# Patient Record
Sex: FEMALE | Race: WHITE | NOT HISPANIC OR LATINO | Employment: FULL TIME | URBAN - METROPOLITAN AREA
[De-identification: names, ages, dates, MRNs, and addresses within clinical notes are randomized per-mention and may not be internally consistent; named-entity substitution may affect disease eponyms.]

---

## 2018-10-17 ENCOUNTER — TRANSCRIBE ORDERS (OUTPATIENT)
Dept: NEUROLOGY | Facility: CLINIC | Age: 58
End: 2018-10-17

## 2018-10-17 ENCOUNTER — TELEPHONE (OUTPATIENT)
Dept: NEUROLOGY | Facility: CLINIC | Age: 58
End: 2018-10-17

## 2018-10-17 DIAGNOSIS — G43.109 MIGRAINE WITH AURA AND WITHOUT STATUS MIGRAINOSUS, NOT INTRACTABLE: Primary | ICD-10-CM

## 2018-11-27 ENCOUNTER — OFFICE VISIT (OUTPATIENT)
Dept: NEUROLOGY | Facility: CLINIC | Age: 58
End: 2018-11-27
Payer: COMMERCIAL

## 2018-11-27 VITALS
WEIGHT: 239.8 LBS | HEART RATE: 78 BPM | HEIGHT: 65 IN | SYSTOLIC BLOOD PRESSURE: 124 MMHG | BODY MASS INDEX: 39.95 KG/M2 | DIASTOLIC BLOOD PRESSURE: 84 MMHG

## 2018-11-27 DIAGNOSIS — G43.009 MIGRAINE WITHOUT AURA AND WITHOUT STATUS MIGRAINOSUS, NOT INTRACTABLE: Primary | ICD-10-CM

## 2018-11-27 DIAGNOSIS — H81.11 BENIGN POSITIONAL VERTIGO, RIGHT: ICD-10-CM

## 2018-11-27 PROCEDURE — 99244 OFF/OP CNSLTJ NEW/EST MOD 40: CPT | Performed by: PSYCHIATRY & NEUROLOGY

## 2018-11-27 RX ORDER — POLYETHYLENE GLYCOL 3350 17 G/17G
POWDER, FOR SOLUTION ORAL 2 TIMES DAILY
Refills: 0 | COMMUNITY
Start: 2018-10-10 | End: 2022-03-09

## 2018-11-27 RX ORDER — RIZATRIPTAN BENZOATE 10 MG/1
10 TABLET ORAL ONCE AS NEEDED
Qty: 9 TABLET | Refills: 5 | Status: SHIPPED | OUTPATIENT
Start: 2018-11-27 | End: 2020-04-27

## 2018-11-27 RX ORDER — ASPIRIN 81 MG/1
1 TABLET ORAL DAILY
COMMUNITY

## 2018-11-27 RX ORDER — MECLIZINE HYDROCHLORIDE 25 MG/1
25 TABLET ORAL 3 TIMES DAILY PRN
Refills: 0 | COMMUNITY
Start: 2018-10-28 | End: 2022-03-09

## 2018-11-27 RX ORDER — OMEPRAZOLE 20 MG/1
20 CAPSULE, DELAYED RELEASE ORAL 2 TIMES DAILY
Refills: 0 | COMMUNITY
Start: 2018-11-13 | End: 2021-04-21 | Stop reason: SDUPTHER

## 2018-11-27 NOTE — PROGRESS NOTES
Leyda Perez is a 61 y o  female who presents with complaints of migraine headache and dizziness    Assessment:  1  Migraine without aura and without status migrainosus, not intractable    2  Benign positional vertigo, right        Plan:  Physical therapy for vestibular therapy  Verapamil 180 mg daily  Maxalt 10 mg p r n  headache  Follow-up 6 weeks    Discussion:  Pepito Plamer has migraine without aura with frequent headaches  As she is intolerant of Imitrex have recommended a trial of Maxalt  She had been on Topamax (dosage unclear) without significant improvement in headache frequency  I have recommended a trial of verapamil (we did discuss potential adverse effects) of 180 mg daily  If this is not effective at time of refill will increase to 240 mg  She will initiate physical therapy for her positional vertigo and I will see her back in follow-up on these are completed      Subjective:    HPI  Pepito Palmer presents today with the above complaints  She states that for years she has had migraine headaches  She states that she gets 4-6 migraine headaches per month lasting for at least 2 or 3 days each time  She finds that lack of sleep will precipitate her headache  She states her headaches are usually frontal with a throbbing stabbing type pain associated with photophobia, sonophobia and nausea  She states that rarely did she vomit but when she does the headache goes away quicker  She states that recently when she has a headache if she can lay down in a dark quiet room she does so  She usually takes an Excedrin and states that if she catches it early enough this seems to reduce the severity of the pain enough that she can function  She states she had been on Imitrex for years and it worked effectively but she suddenly noted symptoms of nausea and lightheadedness when she took the medication and thus it was discontinued    She had been on Topamax and she states she was at 1 point taking 1/2 pills 2 or 3 times a day but does not recall the mg strength  She did not notice any improvement in her headache frequency with this  She did have imaging studies done of her head a few years ago and was told there were nonspecific white matter changes but no other structural abnormalities that would explain her headaches  In addition to headache she states that since August she has been having symptoms of vertigo  She states that when she turns removed in a position the room starts to spin for brief period of time  She denies any change in hearing or vision associated with this  She states she was given meclizine and this only helped very temporarily and continues to have symptoms      Past Medical History:   Diagnosis Date    Anemia     Cancer ([facility] [address] ) 2007    breast history of    Migraine        Family History:  Family History   Problem Relation Age of Onset    No Known Problems Mother     Lung cancer Brother        Past Surgical History:  Past Surgical History:   Procedure Laterality Date     SECTION      x2    DILATION AND CURETTAGE OF UTERUS      TONSILLECTOMY      age 4       Social History:   reports that she has never smoked  She has never used smokeless tobacco  She reports that she drinks alcohol  She reports that she does not use drugs      Allergies:  Codeine      Current Outpatient Prescriptions:     aspirin (ASPIRIN ADULT LOW DOSE) 81 mg EC tablet, Take 1 tablet by mouth daily, Disp: , Rfl:     Cholecalciferol (VITAMIN D3 PO), Take 1 tablet by mouth daily, Disp: , Rfl:     MAGNESIUM PO, Take 1 tablet by mouth daily, Disp: , Rfl:     meclizine (ANTIVERT) 25 mg tablet, Take 25 mg by mouth 3 (three) times a day as needed, Disp: , Rfl: 0    omeprazole (PriLOSEC) 20 mg delayed release capsule, Take 20 mg by mouth 2 (two) times a day, Disp: , Rfl: 0    polyethylene glycol (GLYCOLAX) powder, Take by mouth 2 (two) times a day, Disp: , Rfl: 0    I have reviewed the past medical, social and family history, current medications, allergies, vitals, review of systems and updated this information as appropriate today     Objective:    Vitals:  Blood pressure 124/84, pulse 78, height 5' 5" (1 651 m), weight 109 kg (239 lb 12 8 oz)  Physical Exam    Neurological Exam    GENERAL:  Cooperative in no acute distress  Well-developed and well-nourished    HEAD and NECK   Head is atraumatic normocephalic with no lesions or masses  Neck is supple with full range of motion    CARDIOVASCULAR  Carotid Arteries-no carotid bruits  NEUROLOGIC:  Mental Status-the patient is awake alert and oriented without aphasia or apraxia  Cranial Nerves: Visual fields are full to confrontation  Discs are flat  Extraocular movements are full without nystagmus  Pupils are 2-1/2 mm and reactive  Face is symmetrical to light touch  Movements of facial expression move symmetrically  Hearing is normal to finger rub bilaterally  Soft palate lifts symmetrically  Shoulder shrug is symmetrical  Tongue is midline without atrophy  Motor: No drift is noted on arm extension  Strength is full in the upper and lower extremities with normal bulk and tone  Sensory: Intact to temperature and vibratory sensation in the upper and lower extremities bilaterally  Cortical function is intact  Coordination: Finger to nose testing is performed accurately  Romberg is negative  Gait reveals a normal base with symmetrical arm swing  Tandem walk is normal   Reflexes:  1+ and symmetrical in the biceps, triceps, brachioradialis, knee jerk and ankle jerk regions  Toes are downgoing  Hallpike maneuver was performed and produced rotary nystagmus with head down to the right that did have a brief latency and did fatigue with repeated positioning            ROS:    Review of Systems   Constitutional: Positive for fatigue  Negative for appetite change and fever  HENT: Negative  Negative for hearing loss, tinnitus, trouble swallowing and voice change  Eyes: Negative    Negative for photophobia and pain  Respiratory: Negative  Negative for shortness of breath  Cardiovascular: Negative  Negative for palpitations  Gastrointestinal: Negative  Negative for nausea and vomiting  Endocrine: Negative  Negative for cold intolerance and heat intolerance  Genitourinary: Negative  Negative for dysuria, frequency and urgency  Musculoskeletal: Positive for back pain  Negative for myalgias and neck pain  Skin: Negative  Negative for rash  Neurological: Positive for dizziness and headaches  Negative for tremors, seizures, syncope, facial asymmetry, speech difficulty, weakness, light-headedness and numbness  Hematological: Negative  Does not bruise/bleed easily  Psychiatric/Behavioral: Positive for sleep disturbance  Negative for confusion and hallucinations  All other systems reviewed and are negative

## 2018-12-03 ENCOUNTER — EVALUATION (OUTPATIENT)
Dept: PHYSICAL THERAPY | Facility: MEDICAL CENTER | Age: 58
End: 2018-12-03
Payer: COMMERCIAL

## 2018-12-03 DIAGNOSIS — R42 DIZZINESS: Primary | ICD-10-CM

## 2018-12-03 DIAGNOSIS — H81.11 BENIGN POSITIONAL VERTIGO, RIGHT: ICD-10-CM

## 2018-12-03 PROCEDURE — G8990 OTHER PT/OT CURRENT STATUS: HCPCS | Performed by: PHYSICAL THERAPIST

## 2018-12-03 PROCEDURE — 97161 PT EVAL LOW COMPLEX 20 MIN: CPT | Performed by: PHYSICAL THERAPIST

## 2018-12-03 PROCEDURE — 97112 NEUROMUSCULAR REEDUCATION: CPT | Performed by: PHYSICAL THERAPIST

## 2018-12-03 PROCEDURE — G8991 OTHER PT/OT GOAL STATUS: HCPCS | Performed by: PHYSICAL THERAPIST

## 2018-12-10 ENCOUNTER — APPOINTMENT (OUTPATIENT)
Dept: PHYSICAL THERAPY | Facility: MEDICAL CENTER | Age: 58
End: 2018-12-10
Payer: COMMERCIAL

## 2018-12-12 ENCOUNTER — APPOINTMENT (OUTPATIENT)
Dept: PHYSICAL THERAPY | Facility: MEDICAL CENTER | Age: 58
End: 2018-12-12
Payer: COMMERCIAL

## 2018-12-12 ENCOUNTER — OFFICE VISIT (OUTPATIENT)
Dept: PHYSICAL THERAPY | Facility: MEDICAL CENTER | Age: 58
End: 2018-12-12
Payer: COMMERCIAL

## 2018-12-12 DIAGNOSIS — H81.11 BENIGN POSITIONAL VERTIGO, RIGHT: Primary | ICD-10-CM

## 2018-12-12 DIAGNOSIS — R42 DIZZINESS: ICD-10-CM

## 2018-12-12 PROCEDURE — 97112 NEUROMUSCULAR REEDUCATION: CPT

## 2018-12-12 NOTE — PROGRESS NOTES
Daily Note     Today's date: 2018  Patient name: Henry Vickers  : 1960  MRN: [id number]  Referring provider: Robi Skinner MD  Dx:   Encounter Diagnosis     ICD-10-CM    1  Benign positional vertigo, right H81 11    2  Dizziness R42                   Subjective: Pt states she was able to sleep in bed 2x this week with less dizziness compared to sleeping in recliner  She continues to report sense of vibrating in head  Objective: See treatment diary below    recautions: migraines, history breast CA    Daily Treatment Diary     Manual                                                                                   Exercise Diary  12/3 12/12           Seated VOR horizontal HEP            Seated VOR vertical HEP            Tandem stance nv 30"x3           Tandem walk nv 4 laps           FT EC Foam nv 30"x3           FA EC Foam nv NP           Seated targets nv 1 min x2 (50s each time)           Walking VOR horiz/vertical  2 laps ea                                                                                                                                                                           Modalities                                                           Assessment: Tolerated treatment well  Patient demonstrated fatigue post treatment and would benefit from continued PT  Pt reports worsening of symptoms with moving eyes to the L during targets  Mild sway w/ FT EC om foam  Good tolerance to walking VOR today  She stated she felt "an aura" post tx that subsided after resting  Plan: Continue per plan of care

## 2018-12-18 ENCOUNTER — APPOINTMENT (OUTPATIENT)
Dept: PHYSICAL THERAPY | Facility: MEDICAL CENTER | Age: 58
End: 2018-12-18
Payer: COMMERCIAL

## 2018-12-20 ENCOUNTER — APPOINTMENT (OUTPATIENT)
Dept: PHYSICAL THERAPY | Facility: MEDICAL CENTER | Age: 58
End: 2018-12-20
Payer: COMMERCIAL

## 2018-12-26 ENCOUNTER — APPOINTMENT (OUTPATIENT)
Dept: PHYSICAL THERAPY | Facility: MEDICAL CENTER | Age: 58
End: 2018-12-26
Payer: COMMERCIAL

## 2018-12-27 ENCOUNTER — OFFICE VISIT (OUTPATIENT)
Dept: PHYSICAL THERAPY | Facility: MEDICAL CENTER | Age: 58
End: 2018-12-27
Payer: COMMERCIAL

## 2018-12-27 ENCOUNTER — TELEPHONE (OUTPATIENT)
Dept: NEUROLOGY | Facility: CLINIC | Age: 58
End: 2018-12-27

## 2018-12-27 DIAGNOSIS — R42 DIZZINESS: ICD-10-CM

## 2018-12-27 DIAGNOSIS — H81.11 BENIGN POSITIONAL VERTIGO, RIGHT: Primary | ICD-10-CM

## 2018-12-27 PROCEDURE — 97112 NEUROMUSCULAR REEDUCATION: CPT | Performed by: PHYSICAL THERAPIST

## 2018-12-27 NOTE — TELEPHONE ENCOUNTER
Letter finalized as requested by patient  Called patient to inform her but her mailbox was full and not accepting messages at this time

## 2018-12-27 NOTE — PROGRESS NOTES
Daily Note     Today's date: 2018  Patient name: Giselle Daly  : 1960  MRN: [id number]  Referring provider: Zechariah Lane MD  Dx:   Encounter Diagnosis     ICD-10-CM    1  Benign positional vertigo, right H81 11    2  Dizziness R42                   Subjective:   Pt continues to c/o ongoing vibrating in her head and states "it's bad today", which she believes is due to repetitive bending at work today  She was able to tolerate sleeping in bed rather than recliner 5 times since last visit  Objective: See treatment diary below    recautions: migraines, history breast CA    Daily Treatment Diary     Manual                                                                                   Exercise Diary  12/3 12/12 12/27          Seated VOR horizontal HEP  HEP          Seated VOR vertical HEP  HEP          Tandem stance nv 30"x3 30"x3 ea          Tandem walk nv 4 laps 4 laps          FT EC Foam nv 30"x3 30"x3          FA EC Foam nv NP NP          Seated 3 targets nv 1 min x2 (50s each time) 1 min x2          Walking VOR horiz/vertical  2 laps ea 2 laps ea                                                                                                                                                                          Modalities                                                           Assessment: Tolerated treatment well  Patient demonstrated fatigue post treatment and would benefit from continued PT  Pt feels challenged w/ eye movements to the L w/ targets and describes it as strain  Challenged w/ horizontal VOR;  Less difficulty w/ vertical head movement  Plan: Continue per plan of care

## 2018-12-27 NOTE — TELEPHONE ENCOUNTER
Patient called in requesting note for her gym stating that she is under the care of Dr Piper Lopez for her vertigo and they will not let her out of her membership without a physicians note  Patient requests that the note specify that she's been experiencing the vertigo since August 2018  Patient first visit with us 11/27/18  It was noted in 11/27 office note that she had been experiencing the vertigo since August   Please advise if okay to write note as described above      Clinical team: patient requests notification once note is complete at P: [phone]

## 2018-12-27 NOTE — LETTER
December 27, 2018     Aga Man  [address]  [place] [address]    Patient: Aga Man   YOB: 1960       To Whom It May Concern:     Aga Man is under the care of Dr Mahogany Bello with [facility]  She has been experiencing vertigo since August of 2018  Due to her condition she has been unable to utilize the gym facility          Sincerely,        Mahogany Bello MD      CC: No Recipients

## 2019-01-03 ENCOUNTER — APPOINTMENT (OUTPATIENT)
Dept: PHYSICAL THERAPY | Facility: MEDICAL CENTER | Age: 59
End: 2019-01-03
Payer: COMMERCIAL

## 2019-01-08 ENCOUNTER — OFFICE VISIT (OUTPATIENT)
Dept: PHYSICAL THERAPY | Facility: MEDICAL CENTER | Age: 59
End: 2019-01-08
Payer: COMMERCIAL

## 2019-01-08 DIAGNOSIS — R42 DIZZINESS: ICD-10-CM

## 2019-01-08 DIAGNOSIS — H81.11 BENIGN POSITIONAL VERTIGO, RIGHT: Primary | ICD-10-CM

## 2019-01-08 PROCEDURE — 97112 NEUROMUSCULAR REEDUCATION: CPT

## 2019-01-08 NOTE — PROGRESS NOTES
Daily Note     Today's date: 2019  Patient name: Alex Marquez  : 1960  MRN: [id number]  Referring provider: Breonna Lebron MD  Dx:   Encounter Diagnosis     ICD-10-CM    1  Benign positional vertigo, right H81 11    2  Dizziness R42                   Subjective:   Pt reports intermittent vibration and she notes last week she has worse symptoms and she was unable to sleep in bed due to spinning  Objective: See treatment diary below    recautions: migraines, history breast CA    Daily Treatment Diary     Manual                                                                                   Exercise Diary  12/3 12/12 12/27 1/8         Seated VOR horizontal HEP  HEP HEP         Seated VOR vertical HEP  HEP HEP         Tandem stance nv 30"x3 30"x3 ea 30"x3         Tandem walk nv 4 laps 4 laps 4 laps         FT EC Foam nv 30"x3 30"x3 30"x3         FA EC Foam nv NP NP np         Seated 3 targets nv 1 min x2 (50s each time) 1 min x2 1 min x2         Walking VOR horiz/vertical  2 laps ea 2 laps ea 2 laps ea         Tandem walking on foam    3 laps                                                                                                                                                            Modalities                                                           Assessment: Tolerated treatment well  Patient demonstrated fatigue post treatment and would benefit from continued PT  Pt has improved tolerance to all NR today and was able to progress w/ foam for tandem walking and balance  She denied symptoms throughout tx  Plan: Continue per plan of care

## 2019-01-10 ENCOUNTER — APPOINTMENT (OUTPATIENT)
Dept: PHYSICAL THERAPY | Facility: MEDICAL CENTER | Age: 59
End: 2019-01-10
Payer: COMMERCIAL

## 2019-01-15 ENCOUNTER — OFFICE VISIT (OUTPATIENT)
Dept: PHYSICAL THERAPY | Facility: MEDICAL CENTER | Age: 59
End: 2019-01-15
Payer: COMMERCIAL

## 2019-01-15 DIAGNOSIS — H81.11 BENIGN POSITIONAL VERTIGO, RIGHT: Primary | ICD-10-CM

## 2019-01-15 DIAGNOSIS — R42 DIZZINESS: ICD-10-CM

## 2019-01-15 PROCEDURE — 97112 NEUROMUSCULAR REEDUCATION: CPT

## 2019-01-15 NOTE — PROGRESS NOTES
Daily Note     Today's date: 1/15/2019  Patient name: Marbin Golden  : 1960  MRN: [id number]  Referring provider: Rachel Jang MD  Dx:   Encounter Diagnosis     ICD-10-CM    1  Benign positional vertigo, right H81 11    2  Dizziness R42                   Subjective:   Pt reports  she was watching football at a friends house and when she went to leave she walked down the [address] and the "vibrations hit like a ton of bricks"  She states since then the dizziness and vibrations have felt worse than usual (since she had been overall feeling better until )  Pt states she has not been sleeping well lately since she still has to sleep in recliner secondary to dizzy symptoms if she lays flat  She denies room spinning  Objective: See treatment diary below    recautions: migraines, history breast CA    Daily Treatment Diary     Manual                                                                                   Exercise Diary  12/3 12/12 12/27 1/8 1/15        Seated VOR horizontal HEP  HEP HEP hep        Seated VOR vertical HEP  HEP HEP hep        Tandem stance nv 30"x3 30"x3 ea 30"x3 30"x2 ea        Tandem walk nv 4 laps 4 laps 4 laps 4 laps        FT EC Foam nv 30"x3 30"x3 30"x3 30"x2 dizzy        FA EC Foam nv NP NP np NP        Seated 3 targets nv 1 min x2 (50s each time) 1 min x2 1 min x2 1 min x2 ea        Walking VOR horiz/vertical  2 laps ea 2 laps ea 2 laps ea 1 lap ea        Tandem walking on foam    3 laps 3 laps        Side stepping on foam     3 laps         Busy background VOR     NV                                                                                                                                 Modalities                                                           Assessment: Tolerated treatment well  Patient demonstrated fatigue post treatment and would benefit from continued PT  Added side stepping on foam today   Pt c/o dizziness w/ FT EC on foam today and was unable to perform final set  She was also more challenged today w/ walking VOR  Pt notes good compliance w/ HEP  Progress w/ busy background VOR nv if able  Plan: Continue per plan of care

## 2019-01-24 ENCOUNTER — APPOINTMENT (OUTPATIENT)
Dept: PHYSICAL THERAPY | Facility: MEDICAL CENTER | Age: 59
End: 2019-01-24
Payer: COMMERCIAL

## 2019-01-29 ENCOUNTER — APPOINTMENT (OUTPATIENT)
Dept: PHYSICAL THERAPY | Facility: MEDICAL CENTER | Age: 59
End: 2019-01-29
Payer: COMMERCIAL

## 2019-01-30 ENCOUNTER — TELEPHONE (OUTPATIENT)
Dept: NEUROLOGY | Facility: CLINIC | Age: 59
End: 2019-01-30

## 2019-01-31 ENCOUNTER — APPOINTMENT (OUTPATIENT)
Dept: PHYSICAL THERAPY | Facility: MEDICAL CENTER | Age: 59
End: 2019-01-31
Payer: COMMERCIAL

## 2019-02-18 NOTE — TELEPHONE ENCOUNTER
LMOM for patient to call back to reschedule 3/20/19 please transfer to me patient is due to come in for this appointment 1/22/19

## 2019-02-26 NOTE — TELEPHONE ENCOUNTER
LMOM for patient to cancel 3/20/19 appointment with Dr Tracy Lewis and to call back to reschedule  Mailed out letter

## 2019-03-12 ENCOUNTER — TELEPHONE (OUTPATIENT)
Dept: NEUROLOGY | Facility: CLINIC | Age: 59
End: 2019-03-12

## 2019-03-19 ENCOUNTER — TELEPHONE (OUTPATIENT)
Dept: NEUROLOGY | Facility: CLINIC | Age: 59
End: 2019-03-19

## 2019-03-19 NOTE — TELEPHONE ENCOUNTER
Pt called and stated that she needs something to help her when she gets a migraine  Pt states she remembers some meds were prescribed in November but she's having issues getting them from the pharmacy  She states she got the verapamil but is not sure why it was prescribed  Reviewed this and directions with pt  Pt did not get rizatriptan  Spoke with [address] [place] [address]  They said med is covered and they will get it ready for pt  Left pt detailed voicemail letting her know

## 2019-05-23 DIAGNOSIS — G43.009 MIGRAINE WITHOUT AURA AND WITHOUT STATUS MIGRAINOSUS, NOT INTRACTABLE: ICD-10-CM

## 2019-10-09 ENCOUNTER — TELEPHONE (OUTPATIENT)
Dept: NEUROLOGY | Facility: CLINIC | Age: 59
End: 2019-10-09

## 2019-10-09 NOTE — TELEPHONE ENCOUNTER
Patient stated the welfare office is trying to take away her medicaid and she needs to appeal this  She is requesting a letter from our office with her dx, her treatment and her prognosis  She would like to pick this letter up this week  Okay to leave detailed message

## 2019-10-09 NOTE — TELEPHONE ENCOUNTER
Patient was seen on 1 occasion last November with complaints of frequent migraine headaches and vertigo  She did not keep her follow-up appointment was scheduled  We can generate a letter that in November she was having frequent migraine headaches and vertigo  At that time she was prescribed verapamil to reduce migraine headache frequency and Maxalt to use when she had a migraine and she was referred to physical therapy for vestibular rehab    Thank you

## 2019-10-10 ENCOUNTER — TELEPHONE (OUTPATIENT)
Dept: NEUROLOGY | Facility: CLINIC | Age: 59
End: 2019-10-10

## 2019-12-16 DIAGNOSIS — G43.009 MIGRAINE WITHOUT AURA AND WITHOUT STATUS MIGRAINOSUS, NOT INTRACTABLE: ICD-10-CM

## 2019-12-16 RX ORDER — RIZATRIPTAN BENZOATE 10 MG/1
TABLET ORAL
Qty: 9 TABLET | Refills: 0 | OUTPATIENT
Start: 2019-12-16

## 2019-12-18 NOTE — TELEPHONE ENCOUNTER
I called and spoke with patient son, and ask if he could please his mother call the office to schedule an appt

## 2020-02-12 ENCOUNTER — TELEPHONE (OUTPATIENT)
Dept: DERMATOLOGY | Facility: CLINIC | Age: 60
End: 2020-02-12

## 2020-02-17 ENCOUNTER — TELEPHONE (OUTPATIENT)
Dept: DERMATOLOGY | Facility: CLINIC | Age: 60
End: 2020-02-17

## 2020-02-17 NOTE — TELEPHONE ENCOUNTER
Left a message to patient regarding an appointment  Offered an appointment in our [place] office with Dr Nancy Kapoor  Advised to call us back when available

## 2020-02-25 ENCOUNTER — APPOINTMENT (OUTPATIENT)
Dept: LAB | Facility: MEDICAL CENTER | Age: 60
End: 2020-02-25
Payer: COMMERCIAL

## 2020-02-25 ENCOUNTER — TRANSCRIBE ORDERS (OUTPATIENT)
Dept: ADMINISTRATIVE | Facility: HOSPITAL | Age: 60
End: 2020-02-25

## 2020-02-25 ENCOUNTER — OFFICE VISIT (OUTPATIENT)
Dept: DERMATOLOGY | Facility: CLINIC | Age: 60
End: 2020-02-25
Payer: COMMERCIAL

## 2020-02-25 VITALS — WEIGHT: 227.4 LBS | BODY MASS INDEX: 38.82 KG/M2 | HEIGHT: 64 IN | TEMPERATURE: 97.8 F

## 2020-02-25 DIAGNOSIS — Z00.00 WELL ADULT EXAM: ICD-10-CM

## 2020-02-25 DIAGNOSIS — L82.1 SEBORRHEIC KERATOSIS: Primary | ICD-10-CM

## 2020-02-25 DIAGNOSIS — D22.9 ATYPICAL NEVUS: ICD-10-CM

## 2020-02-25 DIAGNOSIS — R53.83 FATIGUE, UNSPECIFIED TYPE: Primary | ICD-10-CM

## 2020-02-25 DIAGNOSIS — D48.5 NEOPLASM OF UNCERTAIN BEHAVIOR OF SKIN: ICD-10-CM

## 2020-02-25 DIAGNOSIS — E78.2 COMBINED HYPERLIPIDEMIA: ICD-10-CM

## 2020-02-25 LAB
25(OH)D3 SERPL-MCNC: 16.3 NG/ML (ref 30–100)
ALBUMIN SERPL BCP-MCNC: 3.7 G/DL (ref 3.5–5)
ALP SERPL-CCNC: 91 U/L (ref 46–116)
ALT SERPL W P-5'-P-CCNC: 25 U/L (ref 12–78)
ANION GAP SERPL CALCULATED.3IONS-SCNC: 4 MMOL/L (ref 4–13)
AST SERPL W P-5'-P-CCNC: 16 U/L (ref 5–45)
BASOPHILS # BLD AUTO: 0.04 THOUSANDS/ΜL (ref 0–0.1)
BASOPHILS NFR BLD AUTO: 1 % (ref 0–1)
BILIRUB SERPL-MCNC: 0.34 MG/DL (ref 0.2–1)
BUN SERPL-MCNC: 8 MG/DL (ref 5–25)
CALCIUM SERPL-MCNC: 9.2 MG/DL (ref 8.3–10.1)
CHLORIDE SERPL-SCNC: 108 MMOL/L (ref 100–108)
CHOLEST SERPL-MCNC: 293 MG/DL (ref 50–200)
CO2 SERPL-SCNC: 27 MMOL/L (ref 21–32)
CREAT SERPL-MCNC: 0.91 MG/DL (ref 0.6–1.3)
EOSINOPHIL # BLD AUTO: 0.18 THOUSAND/ΜL (ref 0–0.61)
EOSINOPHIL NFR BLD AUTO: 2 % (ref 0–6)
ERYTHROCYTE [DISTWIDTH] IN BLOOD BY AUTOMATED COUNT: 13.2 % (ref 11.6–15.1)
GFR SERPL CREATININE-BSD FRML MDRD: 69 ML/MIN/1.73SQ M
GLUCOSE P FAST SERPL-MCNC: 88 MG/DL (ref 65–99)
HCT VFR BLD AUTO: 40.8 % (ref 34.8–46.1)
HDLC SERPL-MCNC: 59 MG/DL
HGB BLD-MCNC: 13.1 G/DL (ref 11.5–15.4)
IMM GRANULOCYTES # BLD AUTO: 0.02 THOUSAND/UL (ref 0–0.2)
IMM GRANULOCYTES NFR BLD AUTO: 0 % (ref 0–2)
LDLC SERPL CALC-MCNC: 205 MG/DL (ref 0–100)
LYMPHOCYTES # BLD AUTO: 2.83 THOUSANDS/ΜL (ref 0.6–4.47)
LYMPHOCYTES NFR BLD AUTO: 33 % (ref 14–44)
MCH RBC QN AUTO: 30.1 PG (ref 26.8–34.3)
MCHC RBC AUTO-ENTMCNC: 32.1 G/DL (ref 31.4–37.4)
MCV RBC AUTO: 94 FL (ref 82–98)
MONOCYTES # BLD AUTO: 0.52 THOUSAND/ΜL (ref 0.17–1.22)
MONOCYTES NFR BLD AUTO: 6 % (ref 4–12)
NEUTROPHILS # BLD AUTO: 4.92 THOUSANDS/ΜL (ref 1.85–7.62)
NEUTS SEG NFR BLD AUTO: 58 % (ref 43–75)
NONHDLC SERPL-MCNC: 234 MG/DL
NRBC BLD AUTO-RTO: 0 /100 WBCS
PLATELET # BLD AUTO: 458 THOUSANDS/UL (ref 149–390)
PMV BLD AUTO: 10.6 FL (ref 8.9–12.7)
POTASSIUM SERPL-SCNC: 4.2 MMOL/L (ref 3.5–5.3)
PROT SERPL-MCNC: 7.8 G/DL (ref 6.4–8.2)
RBC # BLD AUTO: 4.35 MILLION/UL (ref 3.81–5.12)
SODIUM SERPL-SCNC: 139 MMOL/L (ref 136–145)
TRIGL SERPL-MCNC: 145 MG/DL
TSH SERPL DL<=0.05 MIU/L-ACNC: 1.39 UIU/ML (ref 0.36–3.74)
WBC # BLD AUTO: 8.51 THOUSAND/UL (ref 4.31–10.16)

## 2020-02-25 PROCEDURE — 88341 IMHCHEM/IMCYTCHM EA ADD ANTB: CPT | Performed by: STUDENT IN AN ORGANIZED HEALTH CARE EDUCATION/TRAINING PROGRAM

## 2020-02-25 PROCEDURE — 80061 LIPID PANEL: CPT | Performed by: PHYSICIAN ASSISTANT

## 2020-02-25 PROCEDURE — 36415 COLL VENOUS BLD VENIPUNCTURE: CPT | Performed by: PHYSICIAN ASSISTANT

## 2020-02-25 PROCEDURE — 88342 IMHCHEM/IMCYTCHM 1ST ANTB: CPT | Performed by: STUDENT IN AN ORGANIZED HEALTH CARE EDUCATION/TRAINING PROGRAM

## 2020-02-25 PROCEDURE — 99244 OFF/OP CNSLTJ NEW/EST MOD 40: CPT | Performed by: DERMATOLOGY

## 2020-02-25 PROCEDURE — 84443 ASSAY THYROID STIM HORMONE: CPT | Performed by: PHYSICIAN ASSISTANT

## 2020-02-25 PROCEDURE — 82306 VITAMIN D 25 HYDROXY: CPT | Performed by: PHYSICIAN ASSISTANT

## 2020-02-25 PROCEDURE — 88305 TISSUE EXAM BY PATHOLOGIST: CPT | Performed by: STUDENT IN AN ORGANIZED HEALTH CARE EDUCATION/TRAINING PROGRAM

## 2020-02-25 PROCEDURE — 85025 COMPLETE CBC W/AUTO DIFF WBC: CPT | Performed by: PHYSICIAN ASSISTANT

## 2020-02-25 PROCEDURE — 80053 COMPREHEN METABOLIC PANEL: CPT | Performed by: PHYSICIAN ASSISTANT

## 2020-02-25 PROCEDURE — 11104 PUNCH BX SKIN SINGLE LESION: CPT | Performed by: DERMATOLOGY

## 2020-02-25 RX ORDER — MELOXICAM 15 MG/1
TABLET ORAL DAILY
COMMUNITY
End: 2022-03-09

## 2020-02-25 RX ORDER — OMEPRAZOLE 20 MG/1
CAPSULE, DELAYED RELEASE ORAL DAILY
COMMUNITY
Start: 2019-09-18 | End: 2022-03-10 | Stop reason: SDUPTHER

## 2020-02-25 RX ORDER — CYCLOBENZAPRINE HCL 10 MG
TABLET ORAL 3 TIMES DAILY
COMMUNITY
End: 2022-03-09

## 2020-02-25 NOTE — PROGRESS NOTES
[facility] Note     Patient Name: Clementine Rowley  Encounter Date: 2/25/2020    Today's Chief Concerns:  Jocy Jean Baptiste Concern #1:  Skin exam    Past Medical History:  Have you ever had or currently have any of the following medical conditions or treatments? · HIV/AIDS: No  · Hepatitis B: No  · Hepatitis C: No   · Diabetes: No  · Tuberculosis: No  · Biologic Therapy/Chemotherapy: YES  · Organ or Bone Marrow Transplantation: No  · Radiation Treatment: No  · Cancer (If Yes, which types)- YES, Breast       Have you ever had any of the following skin conditions? · Melanoma? (If Yes, please provide more detail)- No  · Basal Cell Carcinoma: No  · Squamous Cell Carcinoma: No  · Sebaceous Cell Carcinoma: No  · Merkel Cell Carcinoma: No  · Angiosarcoma: No  · Blistering Sunburns: No  · Eczema: No  · Psoriasis: No    Social History:    What is your current Smoking Status? 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 Hike or walking    Family history:  Do any of your "first degree relatives" (parent, brother, sister, or child) have any of the following conditions? · Melanoma? (If Yes, which relatives?) No  · Eczema: No  · Asthma: YES  · Hay Fever/Seasonal Allergies: No  · Psoriasis: No  · Arthritis: No  · Thyroid Problems: No  · Lupus/Connective Tissue Disease: No  · Diabetes: No  · Stroke: No  · Blood Clots: No  · IBD/Crohn's/Ulcerative Colitis: No  · Vitiligo: No  · Scarring/Keloids: No  · Severe Acne: No  · Pancreatic Cancer: No  · Other known Skin Condition? If Yes, what condition and which relatives?   No    Current Medications:    Current Outpatient Medications:     aspirin (ASPIRIN ADULT LOW DOSE) 81 mg EC tablet, Take 1 tablet by mouth daily, Disp: , Rfl:     Cholecalciferol (VITAMIN D3 PO), Take 1 tablet by mouth daily, Disp: , Rfl:     MAGNESIUM PO, Take 1 tablet by mouth daily, Disp: , Rfl:     omeprazole (PriLOSEC) 20 mg delayed release capsule, Take 20 mg by mouth 2 (two) times a day, Disp: , Rfl: 0    verapamil (CALAN-SR) 180 mg CR tablet, take 1 tablet by mouth daily at bedtime, Disp: 30 tablet, Rfl: 5    cyclobenzaprine (FLEXERIL) 10 mg tablet, 3 (three) times a day, Disp: , Rfl:     meclizine (ANTIVERT) 25 mg tablet, Take 25 mg by mouth 3 (three) times a day as needed, Disp: , Rfl: 0    meloxicam (Mobic) 15 mg tablet, Daily, Disp: , Rfl:     omeprazole (PriLOSEC) 20 mg delayed release capsule, Daily, Disp: , Rfl:     polyethylene glycol (GLYCOLAX) powder, Take by mouth 2 (two) times a day, Disp: , Rfl: 0    rizatriptan (MAXALT) 10 MG tablet, Take 1 tablet (10 mg total) by mouth once as needed for migraine for up to 1 dose May repeat in 2 hours if needed (Patient not taking: Reported on 2/25/2020), Disp: 9 tablet, Rfl: 5    Specific Alerts:    Have you been seen by a [facility] in the last 3 years? No    Are you pregnant or planning to become pregnant? No    Are you currently or planning to be nursing or breast feeding? No    Allergies   Allergen Reactions    Codeine Headache     Headaches  Other reaction(s): headache       May we call your Preferred Phone number to discuss your specific medical information? YES    May we leave a detailed message that includes your specific medical information? YES    Have you traveled outside of the [facility] in the past 3 months? No    Do you currently have a pacemaker or defibrillator? No    Do you have any artificial heart valves, joints, plates, screws, rods, stents, pins, etc? YES   - If Yes, were any placed within the last 2 years? 2017 pins and plate in left ankle    Do you require any medications prior to a surgical procedure? No   - If Yes, for which procedure? - If Yes, what medications to you require? Are you taking any medications that cause you to bleed more easily ("blood thinners") No    Have you ever experienced a rapid heartbeat with epinephrine? No    Have you ever been treated with "gold" (gold sodium thiomalate) therapy? No    [facility] can help with wrinkles, "laugh lines," facial volume loss, "double chin," "love handles," age spots, and more  Are you interested in learning today about some of the skin enhancement procedures that we offer? (If Yes, please provide more detail) No    Review of Systems:  Have you recently had or currently have any of the following? · Fever or chills: No  · Night Sweats: No  · Headaches: No  · Weight Gain: No  · Weight Loss: No  · Blurry Vision: No  · Nausea: No  · Vomiting: No  · Diarrhea: No  · Blood in Stool: No  · Abdominal Pain: No  · Itchy Skin: No  · Painful Joints: No  · Swollen Joints: No  · Muscle Pain: No  · Irregular Mole: No  · Sun Burn: No  · Dry Skin: No  · Skin Color Changes: No  · Scar or Keloid: No  · Cold Sores/Fever Blisters: No  · Bacterial Infections/MRSA: No  · Anxiety: No  · Depression: No  · Suicidal or Homicidal Thoughts: No      PHYSICAL EXAM:      Was a chaperone (Derm Clinical Assistant) present for the entirety of the Physical Exam? YES    Did the Dermatology Team specifically ask and  the patient on the importance of a Full Skin Exam to be sure that nothing is missed clinically?  YES    Did the patient request or accept a Full Skin Exam?  No    Did the patient specifically refuse to have the areas "under-the-bra" examined by the Dermatologist? No    Did the patient specifically refuse to have the areas "under-the-underwear" examined by the Dermatologist? No      CONSTITUTIONAL:   Vitals:    02/25/20 1337   Temp: 97 8 °F (36 6 °C)   TempSrc: Tympanic   Weight: 103 kg (227 lb 6 4 oz)   Height: 5' 4" (1 626 m)       PSYCH: Normal mood and affect  EYES: Normal conjunctiva  ENT: Normal lips and oral mucosa  CARDIOVASCULAR: No edema  RESPIRATORY: Normal respirations  HEME/LYMPH/IMMUNO:  No regional lymphadenopathy except as noted below in ASSESSMENT AND PLAN BY DIAGNOSIS    FULL ORGAN SYSTEM SKIN EXAM (SKIN)  Hair, Scalp, Ears, Face Normal except as noted below in Assessment   Neck, Cervical Chain Nodes Normal except as noted below in Assessment   Right Arm/Hand/Fingers Normal except as noted below in Assessment   Left Arm/Hand/Fingers Normal except as noted below in Assessment   Chest/Breasts/Axillae Viewed areas Normal except as noted below in Assessment   Abdomen, Umbilicus Normal except as noted below in Assessment   Back/Spine Normal except as noted below in Assessment   Groin/Genitalia/Buttocks Deferred    Right Leg, Foot, Toes Deferred   Left Leg, Foot, Toes Deferred        ASSESSMENT AND PLAN BY DIAGNOSIS:    History of Present Condition:     Duration:  How long has this been an issue for you?    o  1 year, worsening   Location Affected:  Where on the body is this affecting you? o  Left chest    Quality:  Is there any bleeding, pain, itch, burning/irritation, or redness associated with the skin lesion?    o  Denies   Severity:  Describe any bleeding, pain, itch, burning/irritation, or redness on a scale of 1 to 10 (with 10 being the worst)  o  0   Timing:  Does this condition seem to be there pretty constantly or do you notice it more at specific times throughout the day?    o  Constant   Context:  Have you ever noticed that this condition seems to be associated with specific activities you do?    o  Deneis   Modifying Factors:    o Anything that seems to make the condition worse?    -  Denies   o What have you tried to do to make the condition better? -  Denies   Associated Signs and Symptoms:  Does this skin lesion seem to be associated with any of the following:  o  Denies     1   SEBORRHEIC KERATOSIS;INFLAMED    Physical Exam:   Anatomic Location Affected:  Lower right mid lower back flank   Morphological Description:  A: 0 1 cm brown scaly plaque   B: 0 2 cm brown warty plaque     Assessment and Plan:  Based on a thorough discussion of this condition and the management approach to it (including a comprehensive discussion of the known risks, side effects and potential benefits of treatment), the patient (family) agrees to implement the following specific plan:   Observe for change    Seborrheic Keratosis  A seborrheic keratosis is a harmless warty spot that appears during adult life as a common sign of skin aging  Seborrheic keratoses can arise on any area of skin, covered or uncovered, with the usual exception of the palms and soles  They do not arise from mucous membranes  Seborrheic keratoses can have highly variable appearance  Seborrheic keratoses are extremely common  It has been estimated that over 90% of adults over the age of 61 years have one or more of them  They occur in males and females of all races, typically beginning to erupt in the 35s or 45s  They are uncommon under the age of 21 years  The precise cause of seborrhoeic keratoses is not known  Seborrhoeic keratoses are considered degenerative in nature  As time goes by, seborrheic keratoses tend to become more numerous  Some people inherit a tendency to develop a very large number of them; some people may have hundreds of them  The name "seborrheic keratosis" is misleading, because these lesions are not limited to a seborrhoeic distribution (scalp, mid-face, chest, upper back), nor are they formed from sebaceous glands, nor are they associated with sebum -- which is greasy    Seborrheic keratosis may also be called "SK," "Seb K," "basal cell papilloma," "senile wart," or "barnacle "      Researchers have noted:   Eruptive seborrhoeic keratoses can follow sunburn or dermatitis   Skin friction may be the reason they appear in body folds   Viral cause (e g , human papillomavirus) seems unlikely   Stable and clonal mutations or activation of FRFR3, PIK3CA, JULIA, AKT1 and EGFR genes are found in seborrhoeic keratoses   Seborrhoeic keratosis can arise from solar lentigo   FRFR3 mutations also arise in solar lentigines  These mutations are associated with increased age and location on the head and neck, suggesting a role of ultraviolet radiation in these lesions   Seborrheic keratoses do not harbour tumour suppressor gene mutations   Epidermal growth factor receptor inhibitors, which are used to treat some cancers, often result in an increase in verrucal (warty) keratoses  There is no easy way to remove multiple lesions on a single occasion  Unless a specific lesion is "inflamed" and is causing pain or stinging/burning or is bleeding, most insurance companies do not authorize treatment  2  ATYPICAL MELANOCYTIC NEVI ("Moles")    Physical Exam:   Anatomic Location Affected:   Mostly on sun-exposed areas of the Right lateral back   Morphological Description:  A: 0 6 cm dark brown slightly asymmetrical macule     Assessment and Plan:  Based on a thorough discussion of this condition and the management approach to it (including a comprehensive discussion of the known risks, side effects and potential benefits of treatment), the patient (family) agrees to implement the following specific plan:   When outside we recommend using a wide brim hat, sunglasses, long sleeve and pants, sunscreen with SPF 52+ with reapplication every 2 hours, or SPF specific clothing      Melanocytic Nevi  Melanocytic nevi ("moles") are tan or brown, raised or flat areas of the skin which have an increased number of melanocytes  Melanocytes are the cells in our body which make pigment and account for skin color  Some moles are present at birth (I e , "congenital nevi"), while others come up later in life (i e , "acquired nevi")  The sun can stimulate the body to make more moles  Sunburns are not the only thing that triggers more moles  Chronic sun exposure can do it too  Clinically distinguishing a healthy mole from melanoma may be difficult, even for experienced dermatologists   The "ABCDE's" of moles have been suggested as a means of helping to alert a person to a suspicious mole and the possible increased risk of melanoma  The suggestions for raising alert are as follows:    Asymmetry: Healthy moles tend to be symmetric, while melanomas are often asymmetric  Asymmetry means if you draw a line through the mole, the two halves do not match in color, size, shape, or surface texture  Asymmetry can be a result of rapid enlargement of a mole, the development of a raised area on a previously flat lesion, scaling, ulceration, bleeding or scabbing within the mole  Any mole that starts to demonstrate "asymmetry" should be examined promptly by a board certified dermatologist      Border: Healthy moles tend to have discrete, even borders  The border of a melanoma often blends into the normal skin and does not sharply delineate the mole from normal skin  Any mole that starts to demonstrate "uneven borders" should be examined promptly by a board certified dermatologist      Color: Healthy moles tend to be one color throughout  Melanomas tend to be made up of different colors ranging from dark black, blue, white, or red  Any mole that demonstrates a color change should be examined promptly by a board certified dermatologist      Diameter: Healthy moles tend to be smaller than 0 6 cm in size; an exception are "congenital nevi" that can be larger  Melanomas tend to grow and can often be greater than 0 6 cm (1/4 of an inch, or the size of a pencil eraser)  This is only a guideline, and many normal moles may be larger than 0 6 cm without being unhealthy  Any mole that starts to change in size (small to bigger or bigger to smaller) should be examined promptly by a board certified dermatologist      Evolving: Healthy moles tend to "stay the same "  Melanomas may often show signs of change or evolution such as a change in size, shape, color, or elevation    Any mole that starts to itch, bleed, crust, burn, hurt, or ulcerate or demonstrate a change or evolution should be examined promptly by a board certified dermatologist       Dysplastic Nevi  Dysplastic moles are moles that fit the ABCDE rules of melanoma but are not identified as melanomas when examined under the microscope  They may indicate an increased risk of melanoma in that person  If there is a family history of melanoma, most experts agree that the person may be at an increased risk for developing a melanoma  Experts still do not agree on what dysplastic moles mean in patients without a personal or family history of melanoma  Dysplastic moles are usually larger than common moles and have different colors within it with irregular borders  The appearance can be very similar to a melanoma  Biopsies of dysplastic moles may show abnormalities which are different from a regular mole  Melanoma  Malignant melanoma is a type of skin cancer that can be deadly if it spreads throughout the body  The incidence of melanoma in the [place] is growing faster than any other cancer  Melanoma usually grows near the surface of the skin for a period of time, and then begins to grow deeper into the skin  Once it grows deeper into the skin, the risk of spread to other organs greatly increases  Therefore, early detection and removal of a malignant melanoma may result in a better chance at a complete cure; removal after the tumor has spread may not be as effective, leading to worse clinical outcomes such as death  The true rate of nevus transformation into a melanoma is unknown  It has been estimated that the lifetime risk for any acquired melanocytic nevus on any 21year-old individual transforming into melanoma by age [de-identified] is 0 03% (1 in 3,164) for men and 0 009% (1 in 10,800) for women  The appearance of a "new mole" remains one of the most reliable methods for identifying a malignant melanoma    Occasionally, melanomas appear as rapidly growing, blue-black, dome-shaped bumps within a previous mole or previous area of normal skin  Other times, melanomas are suspected when a mole suddenly appears or changes  Itching, burning, or pain in a pigmented lesion should increase suspicion, but most patients with early melanoma have no skin discomfort whatsoever  Melanoma can occur anywhere on the skin, including areas that are difficult for self-examination  Many melanomas are first noticed by other family members  Suspicious-looking moles may be removed for microscopic examination  You may be able to prevent death from melanoma by doing two simple things:    1  Try to avoid unnecessary sun exposure and protect your skin when it is exposed to the sun  People who live near the equator, people who have intermittent exposures to large amounts of sun, and people who have had sunburns in childhood or adolescence have an increased risk for melanoma  Sun sense and vigilant sun protection may be keys to helping to prevent melanoma  We recommend wearing UPF-rated sun protective clothing and sunglasses whenever possible and applying a moisturizer-sunscreen combination product (SPF 50+) such as Neutrogena Daily Defense to sun exposed areas of skin at least three times a day  2  Have your moles regularly examined by a board certified dermatologist AND by yourself or a family member/friend at home  We recommend that you have your moles examined at least once a year by a board certified dermatologist   Use your birthday as an annual reminder to have your "Birthday Suit" (I e , your skin) examined; it is a nice birthday gift to yourself to know that your skin is healthy appearing! Additionally, at-home self examinations may be helpful for detecting a possible melanoma  Use the ABCDEs we discussed and check your moles once a month at home        3  NEOPLASM OF UNCERTAIN BEHAVIOR OF SKIN    Physical Exam:   (Anatomic Location); (Size and Morphological Description); (Differential Diagnosis):  o Left anterior chest with a 6 cm pink dermal papule  Diff: Dermatofibroma versus other     Pertinent Positives:   Pertinent Negatives: No Lymphadenopathy     Additional History of Present Condition:  Present for 1 year  Located on the upper right chest  Patient denies symptoms or attempted treatment  Assessment and Plan:   I have discussed with the patient that a sample of skin via a "skin biopsy would be potentially helpful to further make a specific diagnosis under the microscope   Based on a thorough discussion of this condition and the management approach to it (including a comprehensive discussion of the known risks, side effects and potential benefits of treatment), the patient (family) agrees to implement the following specific plan:    o Procedure:  Skin Biopsy  After a thorough discussion of treatment options and risk/benefits/alternatives (including but not limited to local pain, scarring, dyspigmentation, blistering, possible superinfection, and inability to confirm a diagnosis via histopathology), verbal and written consent were obtained and portion of the rash was biopsied for tissue sample  See below for consent that was obtained from patient and subsequent Procedure Note  PROCEDURE NOTE:  PUNCH BIOPSY      Performing Physician: Dr Caicedo    Anatomic Location; Clinical Description with size (cm); Pre-Op Diagnosis:     Left anterior chest with a 6 cm pink dermal papule  Diff: Dermatofibroma versus other        Anesthesia: 1% xylocaine with epi       Topical anesthesia: None       Indications: To indicate diagnosis and management plan  Procedure Details     Patient informed of the risks (including bleeding,scaring and infection) and benefits of the procedure explained  Verbal and written informed consent obtained  The area was prepped and draped in the usual fashion  Anesthesia was obtained with 1% lidocaine with epinephrine   The skin was then stretched perpendicular to the skin tension lines and a punch biopsy to an appropriate sampling depth was obtained with a 6 mm punch with a forceps and iris scissors  Hemostasis was obtained with 3-0 Monocryl x 3 sutures  Complications:  None    Specimen has been sent for review by Dermatopathology  Plan:  1  Instructed to keep the wound dry and covered for 24-48h and clean thereafter  2  Warning signs of infection were reviewed  3  Recommended that the patient use acetaminophen as needed for pain    Standard post-procedure care has been explained and has been included in written form within the patient's copy of Informed Consent  Physician synopsis:    1  Dermatofibroma -L ant shoulder -   suspect red and irritated from bra but bx taken to r/o more serious dx because presentation isn't typical  2  ISK R back - 2 - pt prefers not tx  3  Mildly atypical nevus - L lateral back - monitor  4    Hx of skin cancer leg - uncertain type - recommend yearly skin checks  Scribe Attestation    I,:   Maria Ines Rogers MA am acting as a scribe while in the presence of the attending physician :        I,:   Odalys Morrison MD personally performed the services described in this documentation    as scribed in my presence :

## 2020-02-25 NOTE — PATIENT INSTRUCTIONS
SEBORRHEIC KERATOSIS;INFLAMED    Assessment and Plan:  Based on a thorough discussion of this condition and the management approach to it (including a comprehensive discussion of the known risks, side effects and potential benefits of treatment), the patient (family) agrees to implement the following specific plan:   Observe for change  Seborrheic Keratosis  A seborrheic keratosis is a harmless warty spot that appears during adult life as a common sign of skin aging  Seborrheic keratoses can arise on any area of skin, covered or uncovered, with the usual exception of the palms and soles  They do not arise from mucous membranes  Seborrheic keratoses can have highly variable appearance  Seborrheic keratoses are extremely common  It has been estimated that over 90% of adults over the age of 61 years have one or more of them  They occur in males and females of all races, typically beginning to erupt in the 35s or 45s  They are uncommon under the age of 21 years  The precise cause of seborrhoeic keratoses is not known  Seborrhoeic keratoses are considered degenerative in nature  As time goes by, seborrheic keratoses tend to become more numerous  Some people inherit a tendency to develop a very large number of them; some people may have hundreds of them  The name "seborrheic keratosis" is misleading, because these lesions are not limited to a seborrhoeic distribution (scalp, mid-face, chest, upper back), nor are they formed from sebaceous glands, nor are they associated with sebum -- which is greasy    Seborrheic keratosis may also be called "SK," "Seb K," "basal cell papilloma," "senile wart," or "barnacle "      Researchers have noted:   Eruptive seborrhoeic keratoses can follow sunburn or dermatitis   Skin friction may be the reason they appear in body folds   Viral cause (e g , human papillomavirus) seems unlikely   Stable and clonal mutations or activation of FRFR3, PIK3CA, JULIA, AKT1 and EGFR genes are found in seborrhoeic keratoses   Seborrhoeic keratosis can arise from solar lentigo   FRFR3 mutations also arise in solar lentigines  These mutations are associated with increased age and location on the head and neck, suggesting a role of ultraviolet radiation in these lesions   Seborrheic keratoses do not harbour tumour suppressor gene mutations   Epidermal growth factor receptor inhibitors, which are used to treat some cancers, often result in an increase in verrucal (warty) keratoses  There is no easy way to remove multiple lesions on a single occasion  Unless a specific lesion is "inflamed" and is causing pain or stinging/burning or is bleeding, most insurance companies do not authorize treatment  2  ATYPICAL MELANOCYTIC NEVI ("Moles")  Assessment and Plan:  Based on a thorough discussion of this condition and the management approach to it (including a comprehensive discussion of the known risks, side effects and potential benefits of treatment), the patient (family) agrees to implement the following specific plan:   When outside we recommend using a wide brim hat, sunglasses, long sleeve and pants, sunscreen with SPF 56+ with reapplication every 2 hours, or SPF specific clothing    Melanocytic Nevi  Melanocytic nevi ("moles") are tan or brown, raised or flat areas of the skin which have an increased number of melanocytes  Melanocytes are the cells in our body which make pigment and account for skin color  Some moles are present at birth (I e , "congenital nevi"), while others come up later in life (i e , "acquired nevi")  The sun can stimulate the body to make more moles  Sunburns are not the only thing that triggers more moles  Chronic sun exposure can do it too  Clinically distinguishing a healthy mole from melanoma may be difficult, even for experienced dermatologists   The "ABCDE's" of moles have been suggested as a means of helping to alert a person to a suspicious mole and the possible increased risk of melanoma  The suggestions for raising alert are as follows:    Asymmetry: Healthy moles tend to be symmetric, while melanomas are often asymmetric  Asymmetry means if you draw a line through the mole, the two halves do not match in color, size, shape, or surface texture  Asymmetry can be a result of rapid enlargement of a mole, the development of a raised area on a previously flat lesion, scaling, ulceration, bleeding or scabbing within the mole  Any mole that starts to demonstrate "asymmetry" should be examined promptly by a board certified dermatologist      Border: Healthy moles tend to have discrete, even borders  The border of a melanoma often blends into the normal skin and does not sharply delineate the mole from normal skin  Any mole that starts to demonstrate "uneven borders" should be examined promptly by a board certified dermatologist      Color: Healthy moles tend to be one color throughout  Melanomas tend to be made up of different colors ranging from dark black, blue, white, or red  Any mole that demonstrates a color change should be examined promptly by a board certified dermatologist      Diameter: Healthy moles tend to be smaller than 0 6 cm in size; an exception are "congenital nevi" that can be larger  Melanomas tend to grow and can often be greater than 0 6 cm (1/4 of an inch, or the size of a pencil eraser)  This is only a guideline, and many normal moles may be larger than 0 6 cm without being unhealthy  Any mole that starts to change in size (small to bigger or bigger to smaller) should be examined promptly by a board certified dermatologist      Evolving: Healthy moles tend to "stay the same "  Melanomas may often show signs of change or evolution such as a change in size, shape, color, or elevation    Any mole that starts to itch, bleed, crust, burn, hurt, or ulcerate or demonstrate a change or evolution should be examined promptly by a board certified dermatologist       Dysplastic Nevi  Dysplastic moles are moles that fit the ABCDE rules of melanoma but are not identified as melanomas when examined under the microscope  They may indicate an increased risk of melanoma in that person  If there is a family history of melanoma, most experts agree that the person may be at an increased risk for developing a melanoma  Experts still do not agree on what dysplastic moles mean in patients without a personal or family history of melanoma  Dysplastic moles are usually larger than common moles and have different colors within it with irregular borders  The appearance can be very similar to a melanoma  Biopsies of dysplastic moles may show abnormalities which are different from a regular mole  Melanoma  Malignant melanoma is a type of skin cancer that can be deadly if it spreads throughout the body  The incidence of melanoma in the [place] is growing faster than any other cancer  Melanoma usually grows near the surface of the skin for a period of time, and then begins to grow deeper into the skin  Once it grows deeper into the skin, the risk of spread to other organs greatly increases  Therefore, early detection and removal of a malignant melanoma may result in a better chance at a complete cure; removal after the tumor has spread may not be as effective, leading to worse clinical outcomes such as death  The true rate of nevus transformation into a melanoma is unknown  It has been estimated that the lifetime risk for any acquired melanocytic nevus on any 21year-old individual transforming into melanoma by age [de-identified] is 0 03% (1 in 3,164) for men and 0 009% (1 in 10,800) for women  The appearance of a "new mole" remains one of the most reliable methods for identifying a malignant melanoma  Occasionally, melanomas appear as rapidly growing, blue-black, dome-shaped bumps within a previous mole or previous area of normal skin    Other times, melanomas are suspected when a mole suddenly appears or changes  Itching, burning, or pain in a pigmented lesion should increase suspicion, but most patients with early melanoma have no skin discomfort whatsoever  Melanoma can occur anywhere on the skin, including areas that are difficult for self-examination  Many melanomas are first noticed by other family members  Suspicious-looking moles may be removed for microscopic examination  You may be able to prevent death from melanoma by doing two simple things:    1  Try to avoid unnecessary sun exposure and protect your skin when it is exposed to the sun  People who live near the equator, people who have intermittent exposures to large amounts of sun, and people who have had sunburns in childhood or adolescence have an increased risk for melanoma  Sun sense and vigilant sun protection may be keys to helping to prevent melanoma  We recommend wearing UPF-rated sun protective clothing and sunglasses whenever possible and applying a moisturizer-sunscreen combination product (SPF 50+) such as Neutrogena Daily Defense to sun exposed areas of skin at least three times a day  2  Have your moles regularly examined by a board certified dermatologist AND by yourself or a family member/friend at home  We recommend that you have your moles examined at least once a year by a board certified dermatologist   Use your birthday as an annual reminder to have your "Birthday Suit" (I e , your skin) examined; it is a nice birthday gift to yourself to know that your skin is healthy appearing! Additionally, at-home self examinations may be helpful for detecting a possible melanoma  Use the ABCDEs we discussed and check your moles once a month at home        NEOPLASM OF UNCERTAIN BEHAVIOR OF SKIN    Assessment and Plan:   I have discussed with the patient that a sample of skin via a "skin biopsy would be potentially helpful to further make a specific diagnosis under the microscope   Based on a thorough discussion of this condition and the management approach to it (including a comprehensive discussion of the known risks, side effects and potential benefits of treatment), the patient (family) agrees to implement the following specific plan:    o Procedure:  Skin Biopsy  After a thorough discussion of treatment options and risk/benefits/alternatives (including but not limited to local pain, scarring, dyspigmentation, blistering, possible superinfection, and inability to confirm a diagnosis via histopathology), verbal and written consent were obtained and portion of the rash was biopsied for tissue sample  See below for consent that was obtained from patient and subsequent Procedure Note  Plan:  1  Instructed to keep the wound dry and covered for 24-48h and clean thereafter  2  Warning signs of infection were reviewed  3  Recommended that the patient use acetaminophen as needed for pain      Standard post-procedure care has been explained and has been included in written form within the patient's copy of Informed Consent

## 2020-03-05 ENCOUNTER — OFFICE VISIT (OUTPATIENT)
Dept: DERMATOLOGY | Facility: CLINIC | Age: 60
End: 2020-03-05
Payer: COMMERCIAL

## 2020-03-05 VITALS — BODY MASS INDEX: 40.08 KG/M2 | HEIGHT: 64 IN | WEIGHT: 234.8 LBS | TEMPERATURE: 97.8 F

## 2020-03-05 DIAGNOSIS — L23.1 CONTACT DERMATITIS DUE TO ADHESIVE BANDAGE: Primary | ICD-10-CM

## 2020-03-05 PROCEDURE — 99213 OFFICE O/P EST LOW 20 MIN: CPT | Performed by: DERMATOLOGY

## 2020-03-05 NOTE — PATIENT INSTRUCTIONS
1  CONTACT DERMATITIS           Assessment and Plan:  Based on a thorough discussion of this condition and the management approach to it (including a comprehensive discussion of the known risks, side effects and potential benefits of treatment), the patient (family) agrees to implement the following specific plan:   Start using Triamcinolone 0 1% ointment topically 2 times a day for 2-3 weeks     Assessment and Plan:   Atopic Dermatitis is a chronic, itchy skin condition that is very common in children but may occur at any age  It is also known as eczema or atopic eczema   It is the most common form of dermatitis  Atopic dermatitis usually occurs in people who have an atopic tendency    This means they may develop any or all of these closely linked conditions: Atopic dermatitis, asthma, hay fever (allergic rhinitis), eosinophilic esophagitis, and gastroenteritis  Often these conditions run within families with a parent, child or sibling also affected  A family history of asthma, eczema or hay fever is particularly useful in diagnosing atopic dermatitis in infants  Atopic dermatitis arises because of a complex interaction of genetic and environmental factors  These include defects in skin barrier function making the skin more susceptible to irritation by soap and other contact irritants, the weather, temperature and non-specific triggers  There is also an element of immune system dysregulation that is often present  By definition, it is chronic and has a "waxing-waning" nature; flares should be expected but with good education and treatment strategies can be minimized  Some specific tips we discussed:   Dry skin care   Usingonly mild cleansers (hypoallergenic and without fragrances) and fragrance free detergent (not unscented products which contain a masking agent); we discussed avoiding irritants/fragranced products     The importance of regular application of moisturizers daily (at least 3 times a day)   The known and theoretical side effects of steroids at length, including but not limited to atrophy of skin and increased pressure in eye (glaucoma) and clouding of the eye's lens (cataracts) if used in or around the eye for extended durations   The specific over-the-counter interventions and medications   Side effects, risks and benefits of topical and oral medications discussed   After lengthy discussion of etiology and treatment options, we decided to implement the following personalized treatment plan:      YOUR PERSONALIZED ECZEMA ACTION PLAN    FOR ACUTE FLARING    1) Antimicrobials  a) None    b) Clindamycin 75mg/5mL solution:  Take by mouth THREE TIMES A DAY for 10 days straight to help reduce the amount of presumed Staph aureus colonizing the skin  c) Bleach baths:  Soak in a bleach bath (recipe provided via email) about 3 times a week for about 5-10 minutes a day; crucially, make sure to rinse thoroughly with fresh water and apply moisturizer within 3 minutes of toweling off gently  2) Anti-Inflammatories  a) During periods of acute flaring, apply the Hydrocortisone 2 5% ointment to the face and neck TWICE A DAY for 2 weeks straight  To give you an idea of how much medication to use: You should be using at least a single full 30-gram tube of this medication EACH WEEK  b) During periods of acute flaring, apply the Triamcinolone 0 1% ointment to the body TWICE A DAY for 2 weeks straight  To give you an idea of how much medication to use: You should be using at least two full 30-gram tubes of this medication EACH WEEK  Do NOT apply this stronger medication to the face or groin area as the skin is too thin and at greater risk for side effects  3) Moisturizers  a) Apply Eucerin cream or Aquaphor ointment at least 3 times a day  It is best to use moisturizers and prescription medications at DIFFERENT times during the day (ideally, about 30 minutes apart)   If you must apply your prescription medication and your moisturizer at the same time, then ALWAYS apply the prescription medication FIRST (i e , directly to the skin); as we discussed, this allows the prescription medication to reach the skin without being blocked by the thick moisturizer! 4) Oral Antihistamines  a) Cetirizine (Zyrtec): Take 5 mL (1 teaspoon) of 5mg/5mL oral suspension EACH MORNING through allergy season  b) Hydroxyzine (Atarax): Take 5 mL (1 teaspoon) of 12 5mg/5mL oral solution about 1 hour before bedtime for the next 3-5 evenings to help reduce scratching  FOR CHRONIC MAINTENANCE    1) Antimicrobials  a) None    b) Bleach baths:  Soak in a bleach bath (recipe provided via email) about 3 times a week for about 5-10 minutes a day; crucially, make sure to rinse thoroughly with fresh water and apply moisturizer within 3 minutes of toweling off gently  2) Anti-Inflammatories  a) Once in the chronic maintenance phase apply Hydrocortisone 2 5% ointment to the face ONCE A DAY and only on Mondays, Wednesdays and Fridays to help decrease the inflammation; try to decrease to [facility] and Fridays if possible  b) Once in the chronic maintenance phase apply Triamcinolone 0 1% ointment to the body ONCE A DAY and only on Mondays, Wednesdays and Fridays to help decrease the inflammation; try to decrease to [facility] and Fridays if possible  Do NOT apply this stronger medication to your face or groin area as the skin is too thin and at greater risk for side effects  c) Apply crisaborole 2% Kendrick Murillo) ointment TWICE A DAY on Tuesdays, Thursdays, Saturdays and Sundays (i e , the days you are not applying a topical steroid) to both the face/neck and body  As we discussed, this product is approved for the topical treatment of mild-to-moderate eczema in patients 3years of age and older; use of the medication in kids younger than 2 is considered off label and has not been formally studied  Burning and stinging are the most commonly reported side effects of this medication  Rarely, this product has been known to cause hives and hypersensitivity reactions; discontinue its use if you develop severe itching, swelling, or redness in the area of application  3) Moisturizers  a) Continue to apply Eucerin cream or Aquaphor ointment at least 3 times a day  It is best to use moisturizers and prescription medications at DIFFERENT times during the day (ideally, about 30 minutes apart)  If you must apply your prescription medication and your moisturizer at the same time, then ALWAYS apply the prescription medication FIRST (i e , directly to the skin); as we discussed, this allows the prescription medication to reach the skin without being blocked by the thick moisturizer! 4) Oral Antihistamines  Take Cetirizine (Zyrtec): Take 5 mL (1 teaspoon) of 5mg/5mL oral suspension through allergy season  EDUCATION AS INTERVENTION! WHAT IS ATOPIC DERMATITIS? Atopic dermatitis (also called eczema) is a condition of the skin where the skin is dry, red, and itchy  The main function of the skin is to provide a barrier from the environment and is also the first defense of the immune system  In atopic dermatitis the skin barrier is decreased or disrupted, and the skin is easily irritated  As a result, moisture escapes the skin more easily, and environmental allergens and microbes can enter the skin more easily  Consequently, the skin's immune system is altered  If there are increased allergic type cells in the skin, the skin may become red and hyper-excitable   This leads to itching and a subsequent rash  WHY DO PEOPLE GET ATOPIC DERMATITIS? There is no single answer because many factors are involved  It is likely a combination of genetic makeup and environmental triggers and/or exposures   Excessive drying or sweating of the skin, Irritating soaps, dust mites, and pet dander are some of the more common triggers  There is no blood test that can be done to confirm this diagnosis  The history and appearance of the skin is usually sufficient for a diagnosis  However, in some cases if the rash does not fit with the history or respond appropriately to treatment, a skin biopsy may be helpful  Many children do outgrow atopic dermatitis or get better; however, many continue to have sensitive skin into adulthood  Asthma and hay fever are often seen in many patients with atopic dermatitis; however, asthma flares do not necessarily occur at the same time as skin flares  PREVENTING FLARES OF ATOPIC DERMATITIS  The first step is to maintain the skin's barrier function  Keep the skin well moisturized  Avoid irritants and triggers  Use prescribed medicine when there are red or rough areas to help the skin to return to normal as quickly as possible  Try to limit scratching  If you keep the skin well moisturized, and avoid coming in contact with things you know irritate your child's skin, there will be less flares  However, some flares of atopic dermatitis are beyond your control  You should work with your health care provider to come up with a plan that minimizes flares while minimizing long term use of medications that suppress the immune system  WHAT ARE SOME OF THE TRIGGERS? Triggers are different for different people  The most common triggers are:   Heat and sweat for some individuals, cold weather for others   House dust mites, pet fur   Wool; synthetic fabrics like nylon; dyed fabrics   Tobacco smoke    Fragrances in: shampoos, soaps, lotions, laundry detergents, fabric softeners   Saliva or prolonged exposure to water  WHAT ABOUT FOOD ALLERGIES? This is a very controversial topic, as many believe that food allergies are responsible for skin flares   In some cases, specific foods may cause worsening of atopic dermatitis; however this occurs in a minority of cases and usually happens within a few hours of ingestion  While food allergy is more common in children with eczema, foods are specific triggers for flares in only a small percentage of children  If you notice that the skin flares after certain foods you can see if eliminating one food at time makes a difference, as long as your child can still enjoy a well-balanced diet  There are blood (RAST) and skin (PRICK) tests that can check for allergies, but they are often positive in children who are not truly allergic  Therefore it is important that you work with your allergist and dermatologist to determine which foods are relevant and causing true symptoms  Extreme food elimination diets without the guidance of your doctor, which have become more popular in recent years, may even result in worsening of the skin rash due to malnutrition and avoidance of essential nutrients  TREATMENT  Treatments are aimed at minimizing exposure to irritating factors and decreasing  the skin inflammation which results in an itchy rash  There are many different treatment options, which depend on your child's rash, its location, and severity  Topical treatments include corticosteroids and steroid-like creams such as Protopic, Elidel, and Eucrisa, which are believed to not thin the skin  Please read the discussions below regarding risks and benefits of all of these creams  Occasionally bacterial or viral infections can occur which flare the skin and require oral and/or topical antibiotics or antivirals  In some cases bleach baths 2-3 times weekly can be helpful to prevent recurrent infection  For severe disease, strong oral medications such as corticosteroids, methotrexate or azathioprine (Imuran) may be needed  These medications require close monitoring and follow-up  You should discuss the risks/ benefits/alternatives of these medications with your health care provider to come up with the best treatment plan for your child      1) Use moisturizer all over the entire body at least THREE TIMES a day  This keeps the skin moisturized to restore the barrier function  Find a cream or ointment that your child likes - this is the most important  The medicines do not work in the bottle  The thicker the moisturizer, generally the better barrier it provides  Ointments often moisturize better than creams; and creams work better than lotions  Lotions are more useful during the summer when thick greasy ointments are uncomfortable  If you put moisturizer on the skin after bathing, while the skin is damp, it is twice as effective  The moisturizer provides a seal holding the water in the skin  You may bathe your child in warm  not hot  water, for short periods of time (no more than 5-10 minutes at a time) once a day if they like  Lightly pat your child dry with a towel and, while the skin is still damp, (within 3 minutes) apply a moisturizer from head to toe  If your child is using a medicated cream, apply it and allow it to absorb completely BEFORE you apply the moisturizer  2) Apply the prescription medication TWICE A DAY to only the red, rough areas on the skin OR [place]  Put the medication on your fingers and gently rub it into the areas  Usually the medicine will help an area within a few days time  Try to put the medicine on for two days after you have noticed that the redness is no longer present; this will help the redness from returning  The severity of the rash and the strength and usage of the medication will determine how quickly you see improvement  It is important that you do not overuse steroid creams, and if you notice a thin, shiny appearance to the skin or broken blood vessels, you should stop using the cream and consult your health care provider regarding possible overuse/overthinning of the skin    The face, armpits and groin have particularly thin and sensitive skin and are therefore most at risk for bad results if steroids are over-used in these sites  3) Avoid triggers  Some children have specific things that trigger itching and rashes, while others may have none that can be identified  It may require a little bit of trial and error to see what applies to your child  Also, triggers can change over time for your child  The most common triggers are listed above; start with these  Avoid the use of fabric softeners in the washing machine or dryer sheets (unless they are fragrance-free)  Try to use laundry detergents, soaps and shampoos that are fragrance-free  You may find it helpful to double-rinse your clothes  Some children are sensitive to house dust mites and they may benefit from a plastic mattress wrap  While food allergy is more common in children with eczema, foods are specific triggers for flares in only a small percentage of children  If you notice that the skin flares after certain foods you can see if eliminating one food at time makes a difference, as long as your child can still enjoy a well-balanced diet  4) Consider using a medication like an anti-histamine by mouth to help control the itching  Scratching only makes the skin more reactive and the barrier function even more disrupted  It can cause both children and their parents to lose sleep! There are different types of anti-itch medications  Some cause more drowsiness than others  Both types are acceptable depending on your child and your preference  Start with Benadryl and if that does not work, ask for a prescription antihistamine      5) About the prescription creams:  Corticosteroid creams and ointments (generally things with "-one" or "stefano" on the end of their names): The strength of the cream or ointment depends on the name of the active ingredient  The numbers at the end do not indicate the relative strength    Thus triamcinolone 0 1% ointment, considered a mid-strength corticosteroid, is much stronger than hydrocortisone 1% even though the number following the name is much lower  Topical corticosteroids are very effective in treating atopic dermatitis  When used in the manner prescribed (to rashy areas of skin and for no more than a few weeks at a time to any one area) they are very safe  These are corticosteroids and are anti-inflammatory, not the anabolic steroids like those used illegally by some athletes  Topical non-steroid creams and ointments (immunomodulators): These creams and ointments are also called topical calcineurin inhibitors (TCIs)  These include Protopic ointment and Elidel cream  Crisaborole 2% Guy Noel) is a prescription ointment that targets an enzyme called PDE4 (phosphodiesterase 4)  It is used on the skin topically to treat mild-to-moderate eczema in adults and children 3years of age and older  In total, these nonsteroidal prescriptions are used to help decrease itching and redness in the skin  They are not as strong as most steroid creams; however, it is believed that they do not thin the skin when overused  They are generally used as second-line medications, though they may be used alone or in conjunction with topical steroids  In sensitive areas such as the face, underarms or groin, they are often recommended  They can sting inflamed skin, but are generally well tolerated once the skin is healing  The FDA placed a black-box warning on both Elidel and Protopic in 2006 based on animal studies using the medications  Some animals developed skin cancer and lymphoma  Subsequently, the FDA released a statement that there is no causal relationship between the two medications and cancer  Because of this concern, there are ongoing studies to evaluate this relationship in humans  So far, there are studies that support the safety of these medications    One showed that the rates of cancer in patient using these medications topically were less than the rates of the general population and another showed that in patient's using the medication over a large area of the body, the levels of the medication in the blood was undetectable  As for Eucrisa, this product is only approved for the topical treatment of mild-to-moderate eczema in patients 3years of age and older; use of the medication in kids younger than 2 is considered off label and has not been formally studied  Burning and stinging are the most commonly reported side effects of this medication  Rarely, this product has been known to cause hives and hypersensitivity reactions; discontinue its use if you develop severe itching, swelling, or redness in the area of application

## 2020-03-05 NOTE — PROGRESS NOTES
[facility] Follow Up Note    Patient Name: Kaci Cornelius  Encounter Date: 3/5/2020    Today's Chief Concerns:  [facility] Concern #1:  Wound check      Current Medications:    Current Outpatient Medications:     aspirin (ASPIRIN ADULT LOW DOSE) 81 mg EC tablet, Take 1 tablet by mouth daily, Disp: , Rfl:     Cholecalciferol (VITAMIN D3 PO), Take 1 tablet by mouth daily, Disp: , Rfl:     cyclobenzaprine (FLEXERIL) 10 mg tablet, 3 (three) times a day, Disp: , Rfl:     MAGNESIUM PO, Take 1 tablet by mouth daily, Disp: , Rfl:     meclizine (ANTIVERT) 25 mg tablet, Take 25 mg by mouth 3 (three) times a day as needed, Disp: , Rfl: 0    meloxicam (Mobic) 15 mg tablet, Daily, Disp: , Rfl:     omeprazole (PriLOSEC) 20 mg delayed release capsule, Take 20 mg by mouth 2 (two) times a day, Disp: , Rfl: 0    omeprazole (PriLOSEC) 20 mg delayed release capsule, Daily, Disp: , Rfl:     polyethylene glycol (GLYCOLAX) powder, Take by mouth 2 (two) times a day, Disp: , Rfl: 0    verapamil (CALAN-SR) 180 mg CR tablet, take 1 tablet by mouth daily at bedtime, Disp: 30 tablet, Rfl: 5    rizatriptan (MAXALT) 10 MG tablet, Take 1 tablet (10 mg total) by mouth once as needed for migraine for up to 1 dose May repeat in 2 hours if needed (Patient not taking: Reported on 2/25/2020), Disp: 9 tablet, Rfl: 5    CONSTITUTIONAL:   Vitals:    03/05/20 0856   Temp: 97 8 °F (36 6 °C)   TempSrc: Tympanic   Weight: 107 kg (234 lb 12 8 oz)   Height: 5' 4" (1 626 m)             Specific Alerts:    Have you been seen by a [facility] in the last 3 years? YES    Are you pregnant or planning to become pregnant? No    Are you currently or planning to be nursing or breast feeding? No    Allergies   Allergen Reactions    Codeine Headache     Headaches  Other reaction(s): headache       May we call your Preferred Phone number to discuss your specific medical information?  YES    May we leave a detailed message that includes your specific medical information? YES    Have you traveled outside of the [facility] in the past 3 months? No    Do you currently have a pacemaker or defibrillator? No    Do you have any artificial heart valves, joints, plates, screws, rods, stents, pins, etc? YES   - If Yes, were any placed within the last 2 years? Do you require any medications prior to a surgical procedure? No   - If Yes, for which procedure? - If Yes, what medications to you require? Are you taking any medications that cause you to bleed more easily ("blood thinners") No    Have you ever experienced a rapid heartbeat with epinephrine? No    Have you ever been treated with "gold" (gold sodium thiomalate) therapy? No    [address] [facility] can help with wrinkles, "laugh lines," facial volume loss, "double chin," "love handles," age spots, and more  Are you interested in learning today about some of the skin enhancement procedures that we offer? (If Yes, please provide more detail) No    Review of Systems:  Have you recently had or currently have any of the following?     · Fever or chills: No  · Night Sweats: No  · Headaches: No  · Weight Gain: No  · Weight Loss: No  · Blurry Vision: No  · Nausea: No  · Vomiting: No  · Diarrhea: No  · Blood in Stool: No  · Abdominal Pain: No  · Itchy Skin: No  · Painful Joints: No  · Swollen Joints: No  · Muscle Pain: No  · Irregular Mole: No  · Sun Burn: No  · Dry Skin: No  · Skin Color Changes: No  · Scar or Keloid: No  · Cold Sores/Fever Blisters: No  · Bacterial Infections/MRSA: No  · Anxiety: No  · Depression: No  · Suicidal or Homicidal Thoughts: No      PSYCH: Normal mood and affect  EYES: Normal conjunctiva  ENT: Normal lips and oral mucosa  CARDIOVASCULAR: No edema  RESPIRATORY: Normal respirations  HEME/LYMPH/IMMUNO:  No regional lymphadenopathy except as noted below in ASSESSMENT AND PLAN BY DIAGNOSIS    FULL ORGAN SYSTEM SKIN EXAM (SKIN)      Left shoulder 1  CONTACT DERMATITIS     Physical Exam:   Anatomic Location Affected:  Left shoulser   Morphological Description:  Well demarcated red spot in shape of bandaid   Severity: moderate     Pertinent Negatives:        Assessment and Plan:  Based on a thorough discussion of this condition and the management approach to it (including a comprehensive discussion of the known risks, side effects and potential benefits of treatment), the patient (family) agrees to implement the following specific plan:   Start using Triamcinolone 0 1% ointment topically 2 times a day for 2-3 weeks     Assessment and Plan:   Atopic Dermatitis is a chronic, itchy skin condition that is very common in children but may occur at any age  It is also known as eczema or atopic eczema   It is the most common form of dermatitis  Atopic dermatitis usually occurs in people who have an atopic tendency    This means they may develop any or all of these closely linked conditions: Atopic dermatitis, asthma, hay fever (allergic rhinitis), eosinophilic esophagitis, and gastroenteritis  Often these conditions run within families with a parent, child or sibling also affected  A family history of asthma, eczema or hay fever is particularly useful in diagnosing atopic dermatitis in infants  Atopic dermatitis arises because of a complex interaction of genetic and environmental factors  These include defects in skin barrier function making the skin more susceptible to irritation by soap and other contact irritants, the weather, temperature and non-specific triggers  There is also an element of immune system dysregulation that is often present  By definition, it is chronic and has a "waxing-waning" nature; flares should be expected but with good education and treatment strategies can be minimized  Some specific tips we discussed:   Dry skin care     Usingonly mild cleansers (hypoallergenic and without fragrances) and fragrance free detergent (not unscented products which contain a masking agent); we discussed avoiding irritants/fragranced products   The importance of regular application of moisturizers daily (at least 3 times a day)   The known and theoretical side effects of steroids at length, including but not limited to atrophy of skin and increased pressure in eye (glaucoma) and clouding of the eye's lens (cataracts) if used in or around the eye for extended durations   The specific over-the-counter interventions and medications   Side effects, risks and benefits of topical and oral medications discussed   After lengthy discussion of etiology and treatment options, we decided to implement the following personalized treatment plan:      YOUR PERSONALIZED ECZEMA ACTION PLAN    FOR ACUTE FLARING    1) Antimicrobials  a) None    b) Clindamycin 75mg/5mL solution:  Take by mouth THREE TIMES A DAY for 10 days straight to help reduce the amount of presumed Staph aureus colonizing the skin  c) Bleach baths:  Soak in a bleach bath (recipe provided via email) about 3 times a week for about 5-10 minutes a day; crucially, make sure to rinse thoroughly with fresh water and apply moisturizer within 3 minutes of toweling off gently  2) Anti-Inflammatories  a) During periods of acute flaring, apply the Hydrocortisone 2 5% ointment to the face and neck TWICE A DAY for 2 weeks straight  To give you an idea of how much medication to use: You should be using at least a single full 30-gram tube of this medication EACH WEEK  b) During periods of acute flaring, apply the Triamcinolone 0 1% ointment to the body TWICE A DAY for 2 weeks straight  To give you an idea of how much medication to use: You should be using at least two full 30-gram tubes of this medication EACH WEEK  Do NOT apply this stronger medication to the face or groin area as the skin is too thin and at greater risk for side effects        3) Moisturizers  a) Apply Eucerin cream or Aquaphor ointment at least 3 times a day  It is best to use moisturizers and prescription medications at DIFFERENT times during the day (ideally, about 30 minutes apart)  If you must apply your prescription medication and your moisturizer at the same time, then ALWAYS apply the prescription medication FIRST (i e , directly to the skin); as we discussed, this allows the prescription medication to reach the skin without being blocked by the thick moisturizer! 4) Oral Antihistamines  a) Cetirizine (Zyrtec): Take 5 mL (1 teaspoon) of 5mg/5mL oral suspension EACH MORNING through allergy season  b) Hydroxyzine (Atarax): Take 5 mL (1 teaspoon) of 12 5mg/5mL oral solution about 1 hour before bedtime for the next 3-5 evenings to help reduce scratching  FOR CHRONIC MAINTENANCE    1) Antimicrobials  a) None    b) Bleach baths:  Soak in a bleach bath (recipe provided via email) about 3 times a week for about 5-10 minutes a day; crucially, make sure to rinse thoroughly with fresh water and apply moisturizer within 3 minutes of toweling off gently  2) Anti-Inflammatories  a) Once in the chronic maintenance phase apply Hydrocortisone 2 5% ointment to the face ONCE A DAY and only on Mondays, Wednesdays and Fridays to help decrease the inflammation; try to decrease to [facility] and Fridays if possible  b) Once in the chronic maintenance phase apply Triamcinolone 0 1% ointment to the body ONCE A DAY and only on Mondays, Wednesdays and Fridays to help decrease the inflammation; try to decrease to [facility] and Fridays if possible  Do NOT apply this stronger medication to your face or groin area as the skin is too thin and at greater risk for side effects  c) Apply crisaborole 2% Georgi Shore) ointment TWICE A DAY on Tuesdays, Thursdays, Saturdays and Sundays (i e , the days you are not applying a topical steroid) to both the face/neck and body    As we discussed, this product is approved for the topical treatment of mild-to-moderate eczema in patients 3years of age and older; use of the medication in kids younger than 2 is considered off label and has not been formally studied  Burning and stinging are the most commonly reported side effects of this medication  Rarely, this product has been known to cause hives and hypersensitivity reactions; discontinue its use if you develop severe itching, swelling, or redness in the area of application  3) Moisturizers  a) Continue to apply Eucerin cream or Aquaphor ointment at least 3 times a day  It is best to use moisturizers and prescription medications at DIFFERENT times during the day (ideally, about 30 minutes apart)  If you must apply your prescription medication and your moisturizer at the same time, then ALWAYS apply the prescription medication FIRST (i e , directly to the skin); as we discussed, this allows the prescription medication to reach the skin without being blocked by the thick moisturizer! 4) Oral Antihistamines  Take Cetirizine (Zyrtec): Take 5 mL (1 teaspoon) of 5mg/5mL oral suspension through allergy season  EDUCATION AS INTERVENTION! WHAT IS ATOPIC DERMATITIS? Atopic dermatitis (also called eczema) is a condition of the skin where the skin is dry, red, and itchy  The main function of the skin is to provide a barrier from the environment and is also the first defense of the immune system  In atopic dermatitis the skin barrier is decreased or disrupted, and the skin is easily irritated  As a result, moisture escapes the skin more easily, and environmental allergens and microbes can enter the skin more easily  Consequently, the skin's immune system is altered  If there are increased allergic type cells in the skin, the skin may become red and hyper-excitable   This leads to itching and a subsequent rash  WHY DO PEOPLE GET ATOPIC DERMATITIS? There is no single answer because many factors are involved    It is likely a combination of genetic makeup and environmental triggers and/or exposures  Excessive drying or sweating of the skin, Irritating soaps, dust mites, and pet dander are some of the more common triggers  There is no blood test that can be done to confirm this diagnosis  The history and appearance of the skin is usually sufficient for a diagnosis  However, in some cases if the rash does not fit with the history or respond appropriately to treatment, a skin biopsy may be helpful  Many children do outgrow atopic dermatitis or get better; however, many continue to have sensitive skin into adulthood  Asthma and hay fever are often seen in many patients with atopic dermatitis; however, asthma flares do not necessarily occur at the same time as skin flares  PREVENTING FLARES OF ATOPIC DERMATITIS  The first step is to maintain the skin's barrier function  Keep the skin well moisturized  Avoid irritants and triggers  Use prescribed medicine when there are red or rough areas to help the skin to return to normal as quickly as possible  Try to limit scratching  If you keep the skin well moisturized, and avoid coming in contact with things you know irritate your child's skin, there will be less flares  However, some flares of atopic dermatitis are beyond your control  You should work with your health care provider to come up with a plan that minimizes flares while minimizing long term use of medications that suppress the immune system  WHAT ARE SOME OF THE TRIGGERS? Triggers are different for different people  The most common triggers are:   Heat and sweat for some individuals, cold weather for others   House dust mites, pet fur   Wool; synthetic fabrics like nylon; dyed fabrics   Tobacco smoke    Fragrances in: shampoos, soaps, lotions, laundry detergents, fabric softeners   Saliva or prolonged exposure to water  WHAT ABOUT FOOD ALLERGIES?   This is a very controversial topic, as many believe that food allergies are responsible for skin flares  In some cases, specific foods may cause worsening of atopic dermatitis; however this occurs in a minority of cases and usually happens within a few hours of ingestion  While food allergy is more common in children with eczema, foods are specific triggers for flares in only a small percentage of children  If you notice that the skin flares after certain foods you can see if eliminating one food at time makes a difference, as long as your child can still enjoy a well-balanced diet  There are blood (RAST) and skin (PRICK) tests that can check for allergies, but they are often positive in children who are not truly allergic  Therefore it is important that you work with your allergist and dermatologist to determine which foods are relevant and causing true symptoms  Extreme food elimination diets without the guidance of your doctor, which have become more popular in recent years, may even result in worsening of the skin rash due to malnutrition and avoidance of essential nutrients  TREATMENT  Treatments are aimed at minimizing exposure to irritating factors and decreasing  the skin inflammation which results in an itchy rash  There are many different treatment options, which depend on your child's rash, its location, and severity  Topical treatments include corticosteroids and steroid-like creams such as Protopic, Elidel, and Eucrisa, which are believed to not thin the skin  Please read the discussions below regarding risks and benefits of all of these creams  Occasionally bacterial or viral infections can occur which flare the skin and require oral and/or topical antibiotics or antivirals  In some cases bleach baths 2-3 times weekly can be helpful to prevent recurrent infection  For severe disease, strong oral medications such as corticosteroids, methotrexate or azathioprine (Imuran) may be needed   These medications require close monitoring and follow-up  You should discuss the risks/ benefits/alternatives of these medications with your health care provider to come up with the best treatment plan for your child  1) Use moisturizer all over the entire body at least THREE TIMES a day  This keeps the skin moisturized to restore the barrier function  Find a cream or ointment that your child likes - this is the most important  The medicines do not work in the bottle  The thicker the moisturizer, generally the better barrier it provides  Ointments often moisturize better than creams; and creams work better than lotions  Lotions are more useful during the summer when thick greasy ointments are uncomfortable  If you put moisturizer on the skin after bathing, while the skin is damp, it is twice as effective  The moisturizer provides a seal holding the water in the skin  You may bathe your child in warm  not hot  water, for short periods of time (no more than 5-10 minutes at a time) once a day if they like  Lightly pat your child dry with a towel and, while the skin is still damp, (within 3 minutes) apply a moisturizer from head to toe  If your child is using a medicated cream, apply it and allow it to absorb completely BEFORE you apply the moisturizer  2) Apply the prescription medication TWICE A DAY to only the red, rough areas on the skin OR AS [place]  Put the medication on your fingers and gently rub it into the areas  Usually the medicine will help an area within a few days time  Try to put the medicine on for two days after you have noticed that the redness is no longer present; this will help the redness from returning  The severity of the rash and the strength and usage of the medication will determine how quickly you see improvement      It is important that you do not overuse steroid creams, and if you notice a thin, shiny appearance to the skin or broken blood vessels, you should stop using the cream and consult your health care provider regarding possible overuse/overthinning of the skin  The face, armpits and groin have particularly thin and sensitive skin and are therefore most at risk for bad results if steroids are over-used in these sites  3) Avoid triggers  Some children have specific things that trigger itching and rashes, while others may have none that can be identified  It may require a little bit of trial and error to see what applies to your child  Also, triggers can change over time for your child  The most common triggers are listed above; start with these  Avoid the use of fabric softeners in the washing machine or dryer sheets (unless they are fragrance-free)  Try to use laundry detergents, soaps and shampoos that are fragrance-free  You may find it helpful to double-rinse your clothes  Some children are sensitive to house dust mites and they may benefit from a plastic mattress wrap  While food allergy is more common in children with eczema, foods are specific triggers for flares in only a small percentage of children  If you notice that the skin flares after certain foods you can see if eliminating one food at time makes a difference, as long as your child can still enjoy a well-balanced diet  4) Consider using a medication like an anti-histamine by mouth to help control the itching  Scratching only makes the skin more reactive and the barrier function even more disrupted  It can cause both children and their parents to lose sleep! There are different types of anti-itch medications  Some cause more drowsiness than others  Both types are acceptable depending on your child and your preference  Start with Benadryl and if that does not work, ask for a prescription antihistamine      5) About the prescription creams:  Corticosteroid creams and ointments (generally things with "-one" or "stefano" on the end of their names):   The strength of the cream or ointment depends on the name of the active ingredient  The numbers at the end do not indicate the relative strength  Thus triamcinolone 0 1% ointment, considered a mid-strength corticosteroid, is much stronger than hydrocortisone 1% even though the number following the name is much lower  Topical corticosteroids are very effective in treating atopic dermatitis  When used in the manner prescribed (to rashy areas of skin and for no more than a few weeks at a time to any one area) they are very safe  These are corticosteroids and are anti-inflammatory, not the anabolic steroids like those used illegally by some athletes  Topical non-steroid creams and ointments (immunomodulators): These creams and ointments are also called topical calcineurin inhibitors (TCIs)  These include Protopic ointment and Elidel cream  Crisaborole 2% Sulema Castanon) is a prescription ointment that targets an enzyme called PDE4 (phosphodiesterase 4)  It is used on the skin topically to treat mild-to-moderate eczema in adults and children 3years of age and older  In total, these nonsteroidal prescriptions are used to help decrease itching and redness in the skin  They are not as strong as most steroid creams; however, it is believed that they do not thin the skin when overused  They are generally used as second-line medications, though they may be used alone or in conjunction with topical steroids  In sensitive areas such as the face, underarms or groin, they are often recommended  They can sting inflamed skin, but are generally well tolerated once the skin is healing  The FDA placed a black-box warning on both Elidel and Protopic in 2006 based on animal studies using the medications  Some animals developed skin cancer and lymphoma  Subsequently, the FDA released a statement that there is no causal relationship between the two medications and cancer  Because of this concern, there are ongoing studies to evaluate this relationship in humans    So far, there are studies that support the safety of these medications  One showed that the rates of cancer in patient using these medications topically were less than the rates of the general population and another showed that in patient's using the medication over a large area of the body, the levels of the medication in the blood was undetectable  As for Eucrisa, this product is only approved for the topical treatment of mild-to-moderate eczema in patients 3years of age and older; use of the medication in kids younger than 2 is considered off label and has not been formally studied  Burning and stinging are the most commonly reported side effects of this medication  Rarely, this product has been known to cause hives and hypersensitivity reactions; discontinue its use if you   develop severe itching, swelling, or redness in the area of application  Suture removal  Date/Time: 3/5/2020 9:36 AM  Performed by: Nguyen Bass  Authorized by: Mary Leonard MD     Patient location:  Clinic  Consent:     Consent obtained:  Verbal    Consent given by:  Patient    Risks discussed:  Bleeding, pain and wound separation    Alternatives discussed:  No treatment  Universal protocol:     Procedure explained and questions answered to patient or proxy's satisfaction: yes      Relevant documents present and verified: yes      Test results available and properly labeled: yes      Radiology Images displayed and confirmed  If images not available, report reviewed: no      Required blood products, implants, devices, and special equipment available: no      Site/side marked: no      Immediately prior to procedure, a time out was called: no      Patient identity confirmed:  Verbally with patient  Location:     Laterality:  Left    Location:  Upper extremity    Upper extremity location:  Shoulder    Shoulder location:  L shoulder  Procedure details:      Tools used:  Suture removal kit and other (comment)    Wound appearance:  No sign(s) of infection, good wound healing and clean  Post-procedure details:     Post-removal:  No dressing applied  Comments:      Patient developed a rash from bandaid  Physician synopsis:  Pt developed contact derm to bandaid, tx with TAC 0 1% for 2-3 weeks  Reviewed pathology -> cellular DF, re-excision recommended  Will schedule for 4 weeks, after rash resolves          Scribe Attestation    I,:   Delon Gonzalez am acting as a scribe while in the presence of the attending physician :        I,:   Jocy Skelton MD personally performed the services described in this documentation    as scribed in my presence :

## 2020-03-18 ENCOUNTER — OFFICE VISIT (OUTPATIENT)
Dept: URGENT CARE | Facility: MEDICAL CENTER | Age: 60
End: 2020-03-18
Payer: COMMERCIAL

## 2020-03-18 VITALS
HEART RATE: 94 BPM | BODY MASS INDEX: 39.27 KG/M2 | RESPIRATION RATE: 18 BRPM | DIASTOLIC BLOOD PRESSURE: 86 MMHG | HEIGHT: 64 IN | SYSTOLIC BLOOD PRESSURE: 118 MMHG | OXYGEN SATURATION: 96 % | TEMPERATURE: 97.1 F | WEIGHT: 230 LBS

## 2020-03-18 DIAGNOSIS — R51.9 ACUTE NONINTRACTABLE HEADACHE, UNSPECIFIED HEADACHE TYPE: Primary | ICD-10-CM

## 2020-03-18 DIAGNOSIS — J02.9 SORE THROAT: ICD-10-CM

## 2020-03-18 PROCEDURE — G0382 LEV 3 HOSP TYPE B ED VISIT: HCPCS | Performed by: PHYSICIAN ASSISTANT

## 2020-03-18 PROCEDURE — 99203 OFFICE O/P NEW LOW 30 MIN: CPT | Performed by: PHYSICIAN ASSISTANT

## 2020-03-18 PROCEDURE — 99283 EMERGENCY DEPT VISIT LOW MDM: CPT | Performed by: PHYSICIAN ASSISTANT

## 2020-03-18 RX ORDER — NORTRIPTYLINE HYDROCHLORIDE 25 MG/1
25 CAPSULE ORAL
COMMUNITY
Start: 2020-03-11 | End: 2022-06-14

## 2020-03-18 RX ORDER — KETOROLAC TROMETHAMINE 30 MG/ML
30 INJECTION, SOLUTION INTRAMUSCULAR; INTRAVENOUS ONCE
Status: COMPLETED | OUTPATIENT
Start: 2020-03-18 | End: 2020-03-18

## 2020-03-18 RX ADMIN — KETOROLAC TROMETHAMINE 30 MG: 30 INJECTION, SOLUTION INTRAMUSCULAR; INTRAVENOUS at 18:26

## 2020-03-18 NOTE — PROGRESS NOTES
[facility]        NAME: Voncile Merlin is a 60 y o  female  : 1960    MRN: [id number]  DATE: 2020  TIME: 6:23 PM    Assessment and Plan   Acute nonintractable headache, unspecified headache type [R51]  1  Acute nonintractable headache, unspecified headache type  ketorolac (TORADOL) injection 30 mg   2  Sore throat           Patient Instructions   Tylenol or Motrin for pain  Continue medications as given by neurologist  Start antihistamine daily  Follow up with PCP in 3-5 days  Proceed to  ER if symptoms worsen  Chief Complaint     Chief Complaint   Patient presents with    Sore Throat     Intermittant sore throat x 1 week   Headache     Headache today  History of Present Illness       Patient is a 29-year-old female who presents today with complaints of intermittent sore throat for the past week and headache starting today  She does have a history of migraines and sees Neurology, she is on a daily medication but cannot remember the name  She was given rizatriptan for when her migraines occur but she is afraid to take this due to side effects  She did not take anything for her headache today  She has had some nausea but no vomiting  No visual disturbances  No dizziness  She also reports some postnasal drip  No fevers or chills  No cough or congestion  Review of Systems   Review of Systems   Constitutional: Negative for chills and fever  HENT: Positive for sore throat  Negative for congestion and ear pain  Eyes: Negative for photophobia and visual disturbance  Respiratory: Negative for cough and shortness of breath  Cardiovascular: Negative for chest pain  Gastrointestinal: Positive for nausea  Negative for vomiting  Neurological: Positive for headaches  Negative for dizziness           Current Medications       Current Outpatient Medications:     aspirin (ASPIRIN ADULT LOW DOSE) 81 mg EC tablet, Take 1 tablet by mouth daily, Disp: , Rfl:    Cholecalciferol (VITAMIN D3 PO), Take 1 tablet by mouth daily, Disp: , Rfl:     cyclobenzaprine (FLEXERIL) 10 mg tablet, 3 (three) times a day, Disp: , Rfl:     MAGNESIUM PO, Take 1 tablet by mouth daily, Disp: , Rfl:     nortriptyline (PAMELOR) 25 mg capsule, Take 25 mg by mouth, Disp: , Rfl:     omeprazole (PriLOSEC) 20 mg delayed release capsule, Daily, Disp: , Rfl:     rizatriptan (MAXALT) 10 MG tablet, Take 1 tablet (10 mg total) by mouth once as needed for migraine for up to 1 dose May repeat in 2 hours if needed, Disp: 9 tablet, Rfl: 5    triamcinolone (KENALOG) 0 1 % ointment, Apply topically 2 (two) times a day Apply topically to Left shoulder 2 times a day for 2-3 weeks  , Disp: 30 g, Rfl: 0    verapamil (CALAN-SR) 180 mg CR tablet, take 1 tablet by mouth daily at bedtime, Disp: 30 tablet, Rfl: 5    meclizine (ANTIVERT) 25 mg tablet, Take 25 mg by mouth 3 (three) times a day as needed, Disp: , Rfl: 0    meloxicam (Mobic) 15 mg tablet, Daily, Disp: , Rfl:     omeprazole (PriLOSEC) 20 mg delayed release capsule, Take 20 mg by mouth 2 (two) times a day, Disp: , Rfl: 0    polyethylene glycol (GLYCOLAX) powder, Take by mouth 2 (two) times a day, Disp: , Rfl: 0    Current Facility-Administered Medications:     ketorolac (TORADOL) injection 30 mg, 30 mg, Intramuscular, Once, Brett Soto PA-C    Current Allergies     Allergies as of 03/18/2020 - Reviewed 03/18/2020   Allergen Reaction Noted    Codeine Headache 01/20/2012            The following portions of the patient's history were reviewed and updated as appropriate: allergies, current medications, past family history, past medical history, past social history, past surgical history and problem list      Past Medical History:   Diagnosis Date    Anemia     Cancer ([facility] [address] ) 2007    breast history of    Migraine     Skin cancer     Left leg lump   cryotherapy and excision, unsure of which kind        Past Surgical History:   Procedure Laterality Date    ANKLE SURGERY Left 2017    pins and plate      SECTION      x2    DILATION AND CURETTAGE OF UTERUS      TONSILLECTOMY      age 4       Family History   Problem Relation Age of Onset    No Known Problems Mother     Lung cancer Brother          Medications have been verified  Objective   /86   Pulse 94   Temp (!) 97 1 °F (36 2 °C) (Temporal)   Resp 18   Ht 5' 4" (1 626 m)   Wt 104 kg (230 lb)   SpO2 96%   BMI 39 48 kg/m²        Physical Exam     Physical Exam   Constitutional: She is oriented to person, place, and time  She appears well-developed and well-nourished  She does not appear ill  No distress  HENT:   Head: Normocephalic and atraumatic  Right Ear: Tympanic membrane and ear canal normal    Left Ear: Tympanic membrane and ear canal normal    Mouth/Throat: Uvula is midline, oropharynx is clear and moist and mucous membranes are normal  Tonsils are 0 on the right  Tonsils are 0 on the left  No tonsillar exudate  Eyes: Pupils are equal, round, and reactive to light  EOM are normal    Neck: Neck supple  Cardiovascular: Normal rate and regular rhythm  Pulmonary/Chest: Effort normal and breath sounds normal    Lymphadenopathy:     She has no cervical adenopathy  Neurological: She is alert and oriented to person, place, and time  She has normal strength  No cranial nerve deficit  She displays a negative Romberg sign  Coordination normal  GCS eye subscore is 4  GCS verbal subscore is 5  GCS motor subscore is 6  Skin: Skin is warm and dry

## 2020-03-24 DIAGNOSIS — G43.009 MIGRAINE WITHOUT AURA AND WITHOUT STATUS MIGRAINOSUS, NOT INTRACTABLE: ICD-10-CM

## 2020-03-24 RX ORDER — RIZATRIPTAN BENZOATE 10 MG/1
TABLET ORAL
Qty: 9 TABLET | Refills: 5 | OUTPATIENT
Start: 2020-03-24

## 2020-04-21 DIAGNOSIS — G43.009 MIGRAINE WITHOUT AURA AND WITHOUT STATUS MIGRAINOSUS, NOT INTRACTABLE: ICD-10-CM

## 2020-04-22 RX ORDER — RIZATRIPTAN BENZOATE 10 MG/1
TABLET ORAL
Qty: 9 TABLET | Refills: 5 | OUTPATIENT
Start: 2020-04-22

## 2020-04-25 DIAGNOSIS — G43.009 MIGRAINE WITHOUT AURA AND WITHOUT STATUS MIGRAINOSUS, NOT INTRACTABLE: ICD-10-CM

## 2020-04-27 RX ORDER — RIZATRIPTAN BENZOATE 10 MG/1
TABLET ORAL
Qty: 9 TABLET | Refills: 5 | Status: SHIPPED | OUTPATIENT
Start: 2020-04-27 | End: 2022-07-21 | Stop reason: SDUPTHER

## 2020-05-21 ENCOUNTER — OFFICE VISIT (OUTPATIENT)
Dept: URGENT CARE | Facility: MEDICAL CENTER | Age: 60
End: 2020-05-21
Payer: COMMERCIAL

## 2020-05-21 VITALS
TEMPERATURE: 97.7 F | RESPIRATION RATE: 18 BRPM | DIASTOLIC BLOOD PRESSURE: 86 MMHG | OXYGEN SATURATION: 97 % | HEART RATE: 71 BPM | SYSTOLIC BLOOD PRESSURE: 155 MMHG

## 2020-05-21 DIAGNOSIS — G43.909 MIGRAINE WITHOUT STATUS MIGRAINOSUS, NOT INTRACTABLE, UNSPECIFIED MIGRAINE TYPE: Primary | ICD-10-CM

## 2020-05-21 PROCEDURE — 99213 OFFICE O/P EST LOW 20 MIN: CPT | Performed by: PHYSICIAN ASSISTANT

## 2020-05-21 PROCEDURE — 99283 EMERGENCY DEPT VISIT LOW MDM: CPT | Performed by: PHYSICIAN ASSISTANT

## 2020-05-21 PROCEDURE — 96372 THER/PROPH/DIAG INJ SC/IM: CPT | Performed by: PHYSICIAN ASSISTANT

## 2020-05-21 PROCEDURE — G0382 LEV 3 HOSP TYPE B ED VISIT: HCPCS | Performed by: PHYSICIAN ASSISTANT

## 2020-05-21 RX ORDER — KETOROLAC TROMETHAMINE 30 MG/ML
30 INJECTION, SOLUTION INTRAMUSCULAR; INTRAVENOUS ONCE
Status: COMPLETED | OUTPATIENT
Start: 2020-05-21 | End: 2020-05-21

## 2020-05-21 RX ORDER — ONDANSETRON 4 MG/1
8 TABLET, ORALLY DISINTEGRATING ORAL EVERY 8 HOURS PRN
Qty: 6 TABLET | Refills: 0 | Status: SHIPPED | OUTPATIENT
Start: 2020-05-21 | End: 2022-06-14

## 2020-05-21 RX ADMIN — KETOROLAC TROMETHAMINE 30 MG: 30 INJECTION, SOLUTION INTRAMUSCULAR; INTRAVENOUS at 11:26

## 2021-04-06 ENCOUNTER — OFFICE VISIT (OUTPATIENT)
Dept: URGENT CARE | Facility: MEDICAL CENTER | Age: 61
End: 2021-04-06
Payer: COMMERCIAL

## 2021-04-06 VITALS
BODY MASS INDEX: 39.09 KG/M2 | TEMPERATURE: 98.6 F | HEART RATE: 77 BPM | RESPIRATION RATE: 18 BRPM | HEIGHT: 64 IN | WEIGHT: 229 LBS | OXYGEN SATURATION: 96 % | DIASTOLIC BLOOD PRESSURE: 70 MMHG | SYSTOLIC BLOOD PRESSURE: 136 MMHG

## 2021-04-06 DIAGNOSIS — R10.84 GENERALIZED ABDOMINAL PAIN: Primary | ICD-10-CM

## 2021-04-06 PROCEDURE — G0382 LEV 3 HOSP TYPE B ED VISIT: HCPCS | Performed by: PHYSICIAN ASSISTANT

## 2021-04-06 PROCEDURE — 99213 OFFICE O/P EST LOW 20 MIN: CPT | Performed by: PHYSICIAN ASSISTANT

## 2021-04-06 PROCEDURE — 99283 EMERGENCY DEPT VISIT LOW MDM: CPT | Performed by: PHYSICIAN ASSISTANT

## 2021-04-06 RX ORDER — DICYCLOMINE HYDROCHLORIDE 10 MG/1
10 CAPSULE ORAL
Qty: 30 CAPSULE | Refills: 0 | Status: SHIPPED | OUTPATIENT
Start: 2021-04-06 | End: 2021-04-21

## 2021-04-06 NOTE — PROGRESS NOTES
[facility]        NAME: Randi Childers is a 63 y o  female  : 1960    MRN: [id number]  DATE: 2021  TIME: 6:53 PM    Assessment and Plan   Generalized abdominal pain [R10 84]  1  Generalized abdominal pain  dicyclomine (BENTYL) 10 mg capsule     Patient already on Prilosec  Will try Bentyl, I did advise drink plenty of fluids and eat high-fiber as constipation may be playing somewhat of an issue as well  Patient Instructions     Continue Prilosec   Bentyl as needed   drink plenty of fluids and eat high-fiber diet  Follow up with PCP in 3-5 days  Proceed to  ER if symptoms worsen  Chief Complaint     Chief Complaint   Patient presents with    Abdominal Pain     x 2 days no urinary symptoms          History of Present Illness        Patient is a 56-year-old female who presents today with complaints of generalized abdominal pain and crampiness for the past 2 days  She is lactose intolerant and does reports she ate a piece of lasagna on  night but has ate no further dairy since  No fever, chills, nausea, vomiting, diarrhea  Patient reports last bowel movement was 4 days ago but this is normal for her as she only has them twice per week  No hematochezia or black or tarry stools  She does have a history of GERD and already takes Prilosec  Has only tried Pepsi for her pain, states this did not help much  No urinary symptoms  Review of Systems   Review of Systems   Constitutional: Negative for chills and fever  Respiratory: Negative for shortness of breath  Cardiovascular: Negative for chest pain  Gastrointestinal: Positive for abdominal pain and constipation  Negative for abdominal distention, blood in stool, diarrhea, nausea and vomiting  Genitourinary: Negative for difficulty urinating  Musculoskeletal: Negative for back pain           Current Medications       Current Outpatient Medications:     aspirin (ASPIRIN ADULT LOW DOSE) 81 mg EC tablet, Take 1 tablet by mouth daily, Disp: , Rfl:     Cholecalciferol (VITAMIN D3 PO), Take 1 tablet by mouth daily, Disp: , Rfl:     cyclobenzaprine (FLEXERIL) 10 mg tablet, 3 (three) times a day, Disp: , Rfl:     MAGNESIUM PO, Take 1 tablet by mouth daily, Disp: , Rfl:     meclizine (ANTIVERT) 25 mg tablet, Take 25 mg by mouth 3 (three) times a day as needed, Disp: , Rfl: 0    meloxicam (Mobic) 15 mg tablet, Daily, Disp: , Rfl:     omeprazole (PriLOSEC) 20 mg delayed release capsule, Take 20 mg by mouth 2 (two) times a day, Disp: , Rfl: 0    omeprazole (PriLOSEC) 20 mg delayed release capsule, Daily, Disp: , Rfl:     polyethylene glycol (GLYCOLAX) powder, Take by mouth 2 (two) times a day, Disp: , Rfl: 0    rizatriptan (MAXALT) 10 MG tablet, take 1 tablet by mouth immediately may repeat after 2 hours, Disp: 9 tablet, Rfl: 5    triamcinolone (KENALOG) 0 1 % ointment, Apply topically 2 (two) times a day Apply topically to Left shoulder 2 times a day for 2-3 weeks  , Disp: 30 g, Rfl: 0    verapamil (CALAN-SR) 180 mg CR tablet, take 1 tablet by mouth daily at bedtime, Disp: 30 tablet, Rfl: 5    dicyclomine (BENTYL) 10 mg capsule, Take 1 capsule (10 mg total) by mouth 4 (four) times a day (before meals and at bedtime), Disp: 30 capsule, Rfl: 0    nortriptyline (PAMELOR) 25 mg capsule, Take 25 mg by mouth, Disp: , Rfl:     ondansetron (ZOFRAN-ODT) 4 mg disintegrating tablet, Take 2 tablets (8 mg total) by mouth every 8 (eight) hours as needed for nausea or vomiting for up to 2 days, Disp: 6 tablet, Rfl: 0    Current Allergies     Allergies as of 04/06/2021 - Reviewed 04/06/2021   Allergen Reaction Noted    Codeine Headache 01/20/2012            The following portions of the patient's history were reviewed and updated as appropriate: allergies, current medications, past family history, past medical history, past social history, past surgical history and problem list      Past Medical History:   Diagnosis Date    Anemia     Cancer [facility]) 2007    breast history of    Migraine     Skin cancer     Left leg lump  cryotherapy and excision, unsure of which kind        Past Surgical History:   Procedure Laterality Date    ANKLE SURGERY Left 2017    pins and plate      SECTION      x2    DILATION AND CURETTAGE OF UTERUS      TONSILLECTOMY      age 4       Family History   Problem Relation Age of Onset    No Known Problems Mother     Lung cancer Brother          Medications have been verified  Objective   /70   Pulse 77   Temp 98 6 °F (37 °C)   Resp 18   Ht 5' 4" (1 626 m)   Wt 104 kg (229 lb)   SpO2 96%   BMI 39 31 kg/m²        Physical Exam     Physical Exam  Constitutional:       General: She is not in acute distress  Appearance: She is well-developed  She is obese  She is not ill-appearing or toxic-appearing  Cardiovascular:      Rate and Rhythm: Normal rate and regular rhythm  Pulmonary:      Effort: Pulmonary effort is normal       Breath sounds: Normal breath sounds  Abdominal:      General: Abdomen is flat  Bowel sounds are decreased  There is no distension  Palpations: Abdomen is soft  Tenderness: There is generalized abdominal tenderness  There is no right CVA tenderness, left CVA tenderness, guarding or rebound  Negative signs include Jarrett's sign and McBurney's sign  Hernia: No hernia is present  Skin:     General: Skin is warm and dry  Neurological:      Mental Status: She is alert

## 2021-04-21 ENCOUNTER — OFFICE VISIT (OUTPATIENT)
Dept: FAMILY MEDICINE CLINIC | Facility: CLINIC | Age: 61
End: 2021-04-21
Payer: COMMERCIAL

## 2021-04-21 VITALS
TEMPERATURE: 99 F | OXYGEN SATURATION: 97 % | WEIGHT: 230 LBS | HEIGHT: 64 IN | HEART RATE: 73 BPM | BODY MASS INDEX: 39.27 KG/M2 | DIASTOLIC BLOOD PRESSURE: 66 MMHG | SYSTOLIC BLOOD PRESSURE: 106 MMHG | RESPIRATION RATE: 14 BRPM

## 2021-04-21 DIAGNOSIS — Z13.220 SCREENING CHOLESTEROL LEVEL: ICD-10-CM

## 2021-04-21 DIAGNOSIS — Z11.4 SCREENING FOR HIV (HUMAN IMMUNODEFICIENCY VIRUS): ICD-10-CM

## 2021-04-21 DIAGNOSIS — Z12.11 SCREENING FOR COLORECTAL CANCER: ICD-10-CM

## 2021-04-21 DIAGNOSIS — Z11.59 NEED FOR HEPATITIS C SCREENING TEST: ICD-10-CM

## 2021-04-21 DIAGNOSIS — R53.83 OTHER FATIGUE: ICD-10-CM

## 2021-04-21 DIAGNOSIS — Z12.31 SCREENING MAMMOGRAM FOR HIGH-RISK PATIENT: ICD-10-CM

## 2021-04-21 DIAGNOSIS — C50.511 MALIGNANT NEOPLASM OF LOWER-OUTER QUADRANT OF RIGHT FEMALE BREAST, UNSPECIFIED ESTROGEN RECEPTOR STATUS (HCC): Primary | ICD-10-CM

## 2021-04-21 DIAGNOSIS — Z12.4 SCREENING FOR CERVICAL CANCER: ICD-10-CM

## 2021-04-21 DIAGNOSIS — K21.9 GASTROESOPHAGEAL REFLUX DISEASE WITHOUT ESOPHAGITIS: ICD-10-CM

## 2021-04-21 DIAGNOSIS — Z12.12 SCREENING FOR COLORECTAL CANCER: ICD-10-CM

## 2021-04-21 PROCEDURE — 3008F BODY MASS INDEX DOCD: CPT | Performed by: FAMILY MEDICINE

## 2021-04-21 PROCEDURE — 99205 OFFICE O/P NEW HI 60 MIN: CPT | Performed by: FAMILY MEDICINE

## 2021-04-21 PROCEDURE — 3725F SCREEN DEPRESSION PERFORMED: CPT | Performed by: FAMILY MEDICINE

## 2021-04-21 PROCEDURE — 1036F TOBACCO NON-USER: CPT | Performed by: FAMILY MEDICINE

## 2021-04-21 RX ORDER — OMEPRAZOLE 20 MG/1
20 CAPSULE, DELAYED RELEASE ORAL 2 TIMES DAILY
Qty: 90 CAPSULE | Refills: 1 | Status: SHIPPED | OUTPATIENT
Start: 2021-04-21 | End: 2021-07-24

## 2021-04-21 NOTE — PROGRESS NOTES
BMI Counseling: Body mass index is 39 48 kg/m²  The BMI is above normal  Nutrition recommendations include decreasing portion sizes, encouraging healthy choices of fruits and vegetables, decreasing fast food intake, consuming healthier snacks, limiting drinks that contain sugar, moderation in carbohydrate intake, increasing intake of lean protein, reducing intake of saturated and trans fat and reducing intake of cholesterol  Exercise recommendations include exercising 3-5 times per week  No pharmacotherapy was ordered  Patient referred to PCP due to patient being overweight  Assessment/Plan:         Problem List Items Addressed This Visit     None      Visit Diagnoses     Malignant neoplasm of lower-outer quadrant of right female breast, unspecified estrogen receptor status ([facility] )    -  Primary    Need for hepatitis C screening test        Screening for HIV (human immunodeficiency virus)        Screening for cervical cancer        Screening for colorectal cancer        Gastroesophageal reflux disease without esophagitis                Subjective:      Patient ID: Chanel Dc is a 63 y o  female  This 78-year-old white female new to our practice here for initial exam   Patient with a history of skin cancer when she was younger she believes she said it was benign she also has history of breast cancer status post lumpectomy  Patient also has a problem with some reflux was started on some omeprazole and then she was given Dexilant by walk-in center felt little better and now she is having more troubles  Patient also has history of migraines and goes to a migraine clinic for that she is on 4 medications for that  Patient also has some obesity we talked about weight loss is being a good thing for her to do        The following portions of the patient's history were reviewed and updated as appropriate:   Past Medical History:  She has a past medical history of Anemia, [facility] ) ([facility]), Migraine, and Skin cancer  ,  _______________________________________________________________________  Medical Problems:  does not have a problem list on file ,  _______________________________________________________________________  Past Surgical History:   has a past surgical history that includes Tonsillectomy; Dilation and curettage of uterus;  section; and Ankle surgery (Left, )  ,  _______________________________________________________________________  Family History:  family history includes Lung cancer in her brother; No Known Problems in her mother ,  _______________________________________________________________________  Social History:   reports that she has never smoked  She has never used smokeless tobacco  She reports previous alcohol use  She reports that she does not use drugs  ,  _______________________________________________________________________  Allergies:  is allergic to codeine     _______________________________________________________________________  Current Outpatient Medications   Medication Sig Dispense Refill    aspirin (ASPIRIN ADULT LOW DOSE) 81 mg EC tablet Take 1 tablet by mouth daily      Cholecalciferol (VITAMIN D3 PO) Take 1 tablet by mouth daily      MAGNESIUM PO Take 1 tablet by mouth daily      omeprazole (PriLOSEC) 20 mg delayed release capsule Take 20 mg by mouth 2 (two) times a day  0    rizatriptan (MAXALT) 10 MG tablet take 1 tablet by mouth immediately may repeat after 2 hours 9 tablet 5    cyclobenzaprine (FLEXERIL) 10 mg tablet 3 (three) times a day      meclizine (ANTIVERT) 25 mg tablet Take 25 mg by mouth 3 (three) times a day as needed  0    meloxicam (Mobic) 15 mg tablet Daily      nortriptyline (PAMELOR) 25 mg capsule Take 25 mg by mouth      omeprazole (PriLOSEC) 20 mg delayed release capsule Daily      ondansetron (ZOFRAN-ODT) 4 mg disintegrating tablet Take 2 tablets (8 mg total) by mouth every 8 (eight) hours as needed for nausea or vomiting for up to 2 days 6 tablet 0    polyethylene glycol (GLYCOLAX) powder Take by mouth 2 (two) times a day  0    triamcinolone (KENALOG) 0 1 % ointment Apply topically 2 (two) times a day Apply topically to Left shoulder 2 times a day for 2-3 weeks  (Patient not taking: Reported on 4/21/2021) 30 g 0     No current facility-administered medications for this visit       _______________________________________________________________________  Review of Systems   Constitutional: Negative for activity change, appetite change, fatigue and fever  HENT: Negative for congestion, ear pain, postnasal drip, rhinorrhea, sinus pressure, sinus pain, sneezing and sore throat  Eyes: Negative for pain and redness  Respiratory: Negative for apnea, cough, chest tightness, shortness of breath and wheezing  Cardiovascular: Negative for chest pain, palpitations and leg swelling  Gastrointestinal: Negative for abdominal pain, constipation, diarrhea, nausea and vomiting  Endocrine: Negative for cold intolerance and heat intolerance  Genitourinary: Negative for difficulty urinating, dysuria, frequency, hematuria and urgency  Musculoskeletal: Negative for arthralgias, back pain, gait problem and myalgias  Skin: Negative for rash  Neurological: Negative for dizziness, speech difficulty, weakness, numbness and headaches  Hematological: Does not bruise/bleed easily  Psychiatric/Behavioral: Negative for agitation, confusion and hallucinations  Objective:  Vitals:    04/21/21 1509   BP: 106/66   BP Location: Left arm   Patient Position: Sitting   Cuff Size: Large   Pulse: 73   Resp: 14   Temp: 99 °F (37 2 °C)   SpO2: 97%   Weight: 104 kg (230 lb)   Height: 5' 4" (1 626 m)     Body mass index is 39 48 kg/m²  Physical Exam  Vitals signs and nursing note reviewed  Constitutional:       Appearance: She is well-developed  HENT:      Head: Normocephalic and atraumatic        Nose: Nose normal       Mouth/Throat:      Mouth: Mucous membranes are moist    Eyes:      General: No scleral icterus  Conjunctiva/sclera: Conjunctivae normal       Pupils: Pupils are equal, round, and reactive to light  Neck:      Musculoskeletal: Normal range of motion and neck supple  Thyroid: No thyromegaly  Cardiovascular:      Rate and Rhythm: Normal rate and regular rhythm  Pulmonary:      Effort: Pulmonary effort is normal       Breath sounds: Normal breath sounds  No wheezing  Abdominal:      General: Bowel sounds are normal  There is no distension  Palpations: Abdomen is soft  Tenderness: There is no abdominal tenderness  There is no guarding or rebound  Musculoskeletal: Normal range of motion  General: No tenderness or deformity  Skin:     General: Skin is warm and dry  Findings: No erythema or rash  Neurological:      Mental Status: She is alert and oriented to person, place, and time  Sensory: No sensory deficit  Psychiatric:         Mood and Affect: Mood normal          Behavior: Behavior normal          Thought Content:  Thought content normal          Judgment: Judgment normal

## 2021-05-18 ENCOUNTER — APPOINTMENT (OUTPATIENT)
Dept: LAB | Facility: MEDICAL CENTER | Age: 61
End: 2021-05-18
Payer: COMMERCIAL

## 2021-05-18 DIAGNOSIS — Z11.4 SCREENING FOR HIV (HUMAN IMMUNODEFICIENCY VIRUS): ICD-10-CM

## 2021-05-18 DIAGNOSIS — Z13.220 SCREENING CHOLESTEROL LEVEL: ICD-10-CM

## 2021-05-18 DIAGNOSIS — R53.83 OTHER FATIGUE: ICD-10-CM

## 2021-05-18 DIAGNOSIS — Z11.59 NEED FOR HEPATITIS C SCREENING TEST: ICD-10-CM

## 2021-05-18 LAB
ALBUMIN SERPL BCP-MCNC: 3.7 G/DL (ref 3.5–5)
ALP SERPL-CCNC: 81 U/L (ref 46–116)
ALT SERPL W P-5'-P-CCNC: 25 U/L (ref 12–78)
ANION GAP SERPL CALCULATED.3IONS-SCNC: 3 MMOL/L (ref 4–13)
AST SERPL W P-5'-P-CCNC: 17 U/L (ref 5–45)
BACTERIA UR QL AUTO: ABNORMAL /HPF
BASOPHILS # BLD AUTO: 0.05 THOUSANDS/ΜL (ref 0–0.1)
BASOPHILS NFR BLD AUTO: 1 % (ref 0–1)
BILIRUB SERPL-MCNC: 0.34 MG/DL (ref 0.2–1)
BILIRUB UR QL STRIP: NEGATIVE
BUN SERPL-MCNC: 11 MG/DL (ref 5–25)
CALCIUM SERPL-MCNC: 9.1 MG/DL (ref 8.3–10.1)
CHLORIDE SERPL-SCNC: 110 MMOL/L (ref 100–108)
CHOLEST SERPL-MCNC: 243 MG/DL (ref 50–200)
CLARITY UR: CLEAR
CO2 SERPL-SCNC: 28 MMOL/L (ref 21–32)
COLOR UR: YELLOW
CREAT SERPL-MCNC: 0.91 MG/DL (ref 0.6–1.3)
EOSINOPHIL # BLD AUTO: 0.24 THOUSAND/ΜL (ref 0–0.61)
EOSINOPHIL NFR BLD AUTO: 3 % (ref 0–6)
ERYTHROCYTE [DISTWIDTH] IN BLOOD BY AUTOMATED COUNT: 13.3 % (ref 11.6–15.1)
GFR SERPL CREATININE-BSD FRML MDRD: 68 ML/MIN/1.73SQ M
GLUCOSE P FAST SERPL-MCNC: 97 MG/DL (ref 65–99)
GLUCOSE UR STRIP-MCNC: NEGATIVE MG/DL
HCT VFR BLD AUTO: 38.6 % (ref 34.8–46.1)
HCV AB SER QL: NORMAL
HDLC SERPL-MCNC: 64 MG/DL
HGB BLD-MCNC: 12.5 G/DL (ref 11.5–15.4)
HGB UR QL STRIP.AUTO: NEGATIVE
HYALINE CASTS #/AREA URNS LPF: ABNORMAL /LPF
IMM GRANULOCYTES # BLD AUTO: 0.03 THOUSAND/UL (ref 0–0.2)
IMM GRANULOCYTES NFR BLD AUTO: 0 % (ref 0–2)
KETONES UR STRIP-MCNC: ABNORMAL MG/DL
LDLC SERPL CALC-MCNC: 159 MG/DL (ref 0–100)
LEUKOCYTE ESTERASE UR QL STRIP: ABNORMAL
LYMPHOCYTES # BLD AUTO: 2.76 THOUSANDS/ΜL (ref 0.6–4.47)
LYMPHOCYTES NFR BLD AUTO: 35 % (ref 14–44)
MAGNESIUM SERPL-MCNC: 2.3 MG/DL (ref 1.6–2.6)
MCH RBC QN AUTO: 30.3 PG (ref 26.8–34.3)
MCHC RBC AUTO-ENTMCNC: 32.4 G/DL (ref 31.4–37.4)
MCV RBC AUTO: 94 FL (ref 82–98)
MONOCYTES # BLD AUTO: 0.5 THOUSAND/ΜL (ref 0.17–1.22)
MONOCYTES NFR BLD AUTO: 6 % (ref 4–12)
NEUTROPHILS # BLD AUTO: 4.33 THOUSANDS/ΜL (ref 1.85–7.62)
NEUTS SEG NFR BLD AUTO: 55 % (ref 43–75)
NITRITE UR QL STRIP: NEGATIVE
NON-SQ EPI CELLS URNS QL MICRO: ABNORMAL /HPF
NRBC BLD AUTO-RTO: 0 /100 WBCS
PH UR STRIP.AUTO: 5.5 [PH]
PLATELET # BLD AUTO: 441 THOUSANDS/UL (ref 149–390)
PMV BLD AUTO: 11 FL (ref 8.9–12.7)
POTASSIUM SERPL-SCNC: 4 MMOL/L (ref 3.5–5.3)
PROT SERPL-MCNC: 7.4 G/DL (ref 6.4–8.2)
PROT UR STRIP-MCNC: NEGATIVE MG/DL
RBC # BLD AUTO: 4.12 MILLION/UL (ref 3.81–5.12)
RBC #/AREA URNS AUTO: ABNORMAL /HPF
SODIUM SERPL-SCNC: 141 MMOL/L (ref 136–145)
SP GR UR STRIP.AUTO: 1.03 (ref 1–1.03)
T4 FREE SERPL-MCNC: 1 NG/DL (ref 0.76–1.46)
TRIGL SERPL-MCNC: 100 MG/DL
TSH SERPL DL<=0.05 MIU/L-ACNC: 1.62 UIU/ML (ref 0.36–3.74)
URATE SERPL-MCNC: 5.6 MG/DL (ref 2–6.8)
UROBILINOGEN UR QL STRIP.AUTO: 0.2 E.U./DL
WBC # BLD AUTO: 7.91 THOUSAND/UL (ref 4.31–10.16)
WBC #/AREA URNS AUTO: ABNORMAL /HPF

## 2021-05-18 PROCEDURE — 81001 URINALYSIS AUTO W/SCOPE: CPT | Performed by: FAMILY MEDICINE

## 2021-05-18 PROCEDURE — 84443 ASSAY THYROID STIM HORMONE: CPT

## 2021-05-18 PROCEDURE — 83735 ASSAY OF MAGNESIUM: CPT

## 2021-05-18 PROCEDURE — 87389 HIV-1 AG W/HIV-1&-2 AB AG IA: CPT

## 2021-05-18 PROCEDURE — 84439 ASSAY OF FREE THYROXINE: CPT

## 2021-05-18 PROCEDURE — 85025 COMPLETE CBC W/AUTO DIFF WBC: CPT

## 2021-05-18 PROCEDURE — 84550 ASSAY OF BLOOD/URIC ACID: CPT

## 2021-05-18 PROCEDURE — 80053 COMPREHEN METABOLIC PANEL: CPT

## 2021-05-18 PROCEDURE — 80061 LIPID PANEL: CPT

## 2021-05-18 PROCEDURE — 36415 COLL VENOUS BLD VENIPUNCTURE: CPT

## 2021-05-18 PROCEDURE — 86803 HEPATITIS C AB TEST: CPT

## 2021-05-19 LAB — HIV 1+2 AB+HIV1 P24 AG SERPL QL IA: NORMAL

## 2021-05-21 ENCOUNTER — OFFICE VISIT (OUTPATIENT)
Dept: FAMILY MEDICINE CLINIC | Facility: CLINIC | Age: 61
End: 2021-05-21
Payer: COMMERCIAL

## 2021-05-21 VITALS
WEIGHT: 230 LBS | HEART RATE: 63 BPM | SYSTOLIC BLOOD PRESSURE: 102 MMHG | TEMPERATURE: 97.7 F | BODY MASS INDEX: 39.27 KG/M2 | OXYGEN SATURATION: 96 % | DIASTOLIC BLOOD PRESSURE: 64 MMHG | HEIGHT: 64 IN

## 2021-05-21 DIAGNOSIS — D75.839 THROMBOCYTOSIS: ICD-10-CM

## 2021-05-21 DIAGNOSIS — E66.9 OBESITY, UNSPECIFIED OBESITY SEVERITY, UNSPECIFIED OBESITY TYPE: ICD-10-CM

## 2021-05-21 DIAGNOSIS — E78.2 MIXED HYPERLIPIDEMIA: Primary | ICD-10-CM

## 2021-05-21 DIAGNOSIS — Z00.00 WELL ADULT EXAM: ICD-10-CM

## 2021-05-21 DIAGNOSIS — K21.9 GASTROESOPHAGEAL REFLUX DISEASE WITHOUT ESOPHAGITIS: ICD-10-CM

## 2021-05-21 PROBLEM — G47.30 APNEA, SLEEP: Status: ACTIVE | Noted: 2018-08-06

## 2021-05-21 PROCEDURE — 1036F TOBACCO NON-USER: CPT | Performed by: FAMILY MEDICINE

## 2021-05-21 PROCEDURE — 3008F BODY MASS INDEX DOCD: CPT | Performed by: FAMILY MEDICINE

## 2021-05-21 PROCEDURE — 99396 PREV VISIT EST AGE 40-64: CPT | Performed by: FAMILY MEDICINE

## 2021-05-21 RX ORDER — PROPRANOLOL HYDROCHLORIDE 120 MG/1
120 CAPSULE, EXTENDED RELEASE ORAL DAILY
COMMUNITY
End: 2022-08-10 | Stop reason: SDUPTHER

## 2021-05-21 NOTE — ASSESSMENT & PLAN NOTE
Discussed with patient low fat diet , exercise and weight loss  to attempt to lower cholesterol prior to any medical therapy  Patient to return to office to recheck progress on these measures to further assess treatment  All patient questions answered today and patient received a low fat diet handout today

## 2021-05-21 NOTE — PROGRESS NOTES
Assessment/Plan:         Problem List Items Addressed This Visit        Digestive    Gastroesophageal reflux disease without esophagitis     Patient to continue with present therapy and decrease caffeine, avoid ETOH and smoking to decrease acid production  Pt should also cease eating prior to bedtime and avoid excessive fluid intake prior to sleep  May use antacids as needed for breakthrough GERD  All pateint questions answered today about this condition  Hematopoietic and Hemostatic    Thrombocytosis (HCC)     Recheck cbc            Other    Obesity, unspecified obesity severity, unspecified obesity type     Patient to increase exercise and partake of a diet with less calories to promote  weight loss         Mixed hyperlipidemia - Primary     Discussed with patient low fat diet , exercise and weight loss  to attempt to lower cholesterol prior to any medical therapy  Patient to return to office to recheck progress on these measures to further assess treatment  All patient questions answered today and patient received a low fat diet handout today  Other Visit Diagnoses     Well adult exam                Subjective:      Patient ID: Graciela Somers is a 63 y o  female  A 58-year-old white female here today for her yearly physical   Patient with a a recent lab work done which shows she has some hyperlipidemia as well as the thrombocytosis started on a cytosis  Patient also with a BMI of 39  Patient also will 0 has a problem with sleeping at night she was feels tired and fatigued  Patient had been worked up for sleep apnea in the past was told she did not have that and also we did her lab work which was also normal except for cholesterol being high and platelets being on the high side    Patient we discussed with patient good sleep hygiene with the lights being out in the bed with no distractions but she uses either a television or radio low noise to help with white noise could she has some tinnitus  Discussed with patient about possibly seeing a sleep specialist at this time she declines to do that  I also discussed with her by using possible melatonin over-the-counter medications which she has failed with the ready  The following portions of the patient's history were reviewed and updated as appropriate:   Past Medical History:  She has a past medical history of Anemia, Cancer ([address] ) (), Migraine, and Skin cancer  ,  _______________________________________________________________________  Medical Problems:  does not have any pertinent problems on file ,  _______________________________________________________________________  Past Surgical History:   has a past surgical history that includes Tonsillectomy; Dilation and curettage of uterus;  section; and Ankle surgery (Left, 2017)  ,  _______________________________________________________________________  Family History:  family history includes Lung cancer in her brother; No Known Problems in her mother ,  _______________________________________________________________________  Social History:   reports that she has never smoked  She has never used smokeless tobacco  She reports previous alcohol use  She reports that she does not use drugs  ,  _______________________________________________________________________  Allergies:  is allergic to codeine     _______________________________________________________________________  Current Outpatient Medications   Medication Sig Dispense Refill    aspirin (ASPIRIN ADULT LOW DOSE) 81 mg EC tablet Take 1 tablet by mouth daily      Cholecalciferol (VITAMIN D3 PO) Take 1 tablet by mouth daily      cyclobenzaprine (FLEXERIL) 10 mg tablet 3 (three) times a day      MAGNESIUM PO Take 1 tablet by mouth daily      meclizine (ANTIVERT) 25 mg tablet Take 25 mg by mouth 3 (three) times a day as needed  0    meloxicam (Mobic) 15 mg tablet Daily      nortriptyline (PAMELOR) 25 mg capsule Take 25 mg by mouth      omeprazole (PriLOSEC) 20 mg delayed release capsule Daily      omeprazole (PriLOSEC) 20 mg delayed release capsule Take 1 capsule (20 mg total) by mouth 2 (two) times a day 90 capsule 1    ondansetron (ZOFRAN-ODT) 4 mg disintegrating tablet Take 2 tablets (8 mg total) by mouth every 8 (eight) hours as needed for nausea or vomiting for up to 2 days 6 tablet 0    polyethylene glycol (GLYCOLAX) powder Take by mouth 2 (two) times a day  0    rizatriptan (MAXALT) 10 MG tablet take 1 tablet by mouth immediately may repeat after 2 hours 9 tablet 5    triamcinolone (KENALOG) 0 1 % ointment Apply topically 2 (two) times a day Apply topically to Left shoulder 2 times a day for 2-3 weeks  (Patient not taking: Reported on 4/21/2021) 30 g 0     No current facility-administered medications for this visit       _______________________________________________________________________  Review of Systems   Constitutional: Negative for activity change, appetite change, fatigue and fever  HENT: Negative for congestion, ear pain, postnasal drip, rhinorrhea, sinus pressure, sinus pain, sneezing and sore throat  Eyes: Negative for pain and redness  Respiratory: Negative for apnea, cough, chest tightness, shortness of breath and wheezing  Cardiovascular: Negative for chest pain, palpitations and leg swelling  Gastrointestinal: Negative for abdominal pain, constipation, diarrhea, nausea and vomiting  Endocrine: Negative for cold intolerance and heat intolerance  Genitourinary: Negative for difficulty urinating, dysuria, frequency, hematuria and urgency  Musculoskeletal: Negative for arthralgias, back pain, gait problem and myalgias  Skin: Negative for rash  Neurological: Negative for dizziness, speech difficulty, weakness, numbness and headaches  Hematological: Does not bruise/bleed easily  Psychiatric/Behavioral: Negative for agitation, confusion and hallucinations  Objective:   There were no vitals filed for this visit  There is no height or weight on file to calculate BMI  Physical Exam  Vitals signs and nursing note reviewed  Constitutional:       Appearance: She is well-developed  She is obese  HENT:      Head: Normocephalic and atraumatic  Nose: Nose normal       Mouth/Throat:      Mouth: Mucous membranes are moist    Eyes:      General: No scleral icterus  Conjunctiva/sclera: Conjunctivae normal       Pupils: Pupils are equal, round, and reactive to light  Neck:      Musculoskeletal: Normal range of motion and neck supple  Thyroid: No thyromegaly  Cardiovascular:      Rate and Rhythm: Normal rate and regular rhythm  Pulmonary:      Effort: Pulmonary effort is normal       Breath sounds: Normal breath sounds  No wheezing  Abdominal:      General: Bowel sounds are normal  There is no distension  Palpations: Abdomen is soft  Tenderness: There is no abdominal tenderness  There is no guarding or rebound  Musculoskeletal: Normal range of motion  General: No tenderness or deformity  Skin:     General: Skin is warm and dry  Findings: No erythema or rash  Neurological:      Mental Status: She is alert and oriented to person, place, and time  Sensory: No sensory deficit  Psychiatric:         Mood and Affect: Mood normal          Behavior: Behavior normal          Thought Content:  Thought content normal          Judgment: Judgment normal

## 2021-06-11 ENCOUNTER — OFFICE VISIT (OUTPATIENT)
Dept: URGENT CARE | Facility: MEDICAL CENTER | Age: 61
End: 2021-06-11
Payer: COMMERCIAL

## 2021-06-11 ENCOUNTER — APPOINTMENT (OUTPATIENT)
Dept: RADIOLOGY | Facility: MEDICAL CENTER | Age: 61
End: 2021-06-11
Payer: COMMERCIAL

## 2021-06-11 VITALS
TEMPERATURE: 98.6 F | DIASTOLIC BLOOD PRESSURE: 75 MMHG | WEIGHT: 230 LBS | HEART RATE: 72 BPM | BODY MASS INDEX: 39.27 KG/M2 | SYSTOLIC BLOOD PRESSURE: 135 MMHG | RESPIRATION RATE: 18 BRPM | HEIGHT: 64 IN | OXYGEN SATURATION: 98 %

## 2021-06-11 DIAGNOSIS — M79.671 RIGHT FOOT PAIN: ICD-10-CM

## 2021-06-11 DIAGNOSIS — M79.671 RIGHT FOOT PAIN: Primary | ICD-10-CM

## 2021-06-11 PROCEDURE — 73630 X-RAY EXAM OF FOOT: CPT

## 2021-06-11 PROCEDURE — 99213 OFFICE O/P EST LOW 20 MIN: CPT | Performed by: PHYSICIAN ASSISTANT

## 2021-06-11 NOTE — PATIENT INSTRUCTIONS
Foot sprain  Rest, ice, elevate  Follow up with PCP in 3-5 days  Proceed to  ER if symptoms worsen  Ankle Sprain, Ambulatory Care   GENERAL INFORMATION:   An ankle sprain happens when 1 or more ligaments in your ankle joint stretch or tear  It is usually caused by a direct injury or sudden twisting of the joint  Common symptoms include the following:   · Trouble moving your ankle or foot    · Pain when you touch or put weight on your ankle    · Bruised, swollen, or odd shaped ankle  Seek immediate care for the following symptoms:   · Severe pain in your ankle    · Cold or numb foot or toes    · A weaker ankle    · Swelling that has increased or returned  Treatment for an ankle sprain  may include a supportive device, such as a brace, cast, or splint  These devices limit movement and protect your joint  You may also need to use crutches to decrease your pain as you move around  Treatment may also include pain medicine, physical therapy, or surgery if the ligament does not heal   Care for an ankle sprain:   · Rest  your joint so that it can heal  Return to normal activities as directed  · Ice  helps decrease swelling and pain  Ice may also help prevent tissue damage  Use an ice pack or put crushed ice in a plastic bag  Cover the ice pack with a towel and place it on your injured ligament for 15 to 20 minutes every hour  Use the ice for as long as directed  · Compression  of an elastic bandage provides support and helps decrease swelling and movement so your joint can heal  Ask if you should wrap an elastic bandage around your injured ligament  Wear as long as directed  · Elevate  your injured ankle raised above the level of your heart as often as you can  This will help decrease or limit swelling  Elevate your ankle by resting it on pillows  Prevent another ankle sprain:   · Return to your usual activities as directed  If you start activity too soon, you may develop a more serious injury      · Take it slow   Slowly increase how often and how long you exercise  Sudden increases may cause you to overstretch or tear your ligament  · Always warm up  and stretch before you exercise or play sports  · Use the proper equipment  Always wear shoes that fit well and are made for the activity that you are doing  You may need to use ankle supports, elbow and knee pads, or braces  Follow up with your healthcare provider as directed:  Write down your questions so you remember to ask them during your visits  CARE AGREEMENT:   You have the right to help plan your care  Learn about your health condition and how it may be treated  Discuss treatment options with your caregivers to decide what care you want to receive  You always have the right to refuse treatment  The above information is an  only  It is not intended as medical advice for individual conditions or treatments  Talk to your doctor, nurse or pharmacist before following any medical regimen to see if it is safe and effective for you  © 2014 [address] is for End User's use only and may not be sold, redistributed or otherwise used for commercial purposes  All illustrations and images included in CareNotes® are the copyrighted property of [employer]  or Torsten [employer] La [employer]

## 2021-06-11 NOTE — PROGRESS NOTES
[facility]        NAME: Jason Feldman is a 63 y o  female  : 1960    MRN: [id number]  DATE: 2021  TIME: 7:00 PM    Assessment and Plan   Right foot pain [M79 [id number]]  1  Right foot pain  XR foot 3+ vw right         Patient Instructions     Foot sprain  Rest, ice, elevate  Follow up with PCP in 3-5 days  Proceed to  ER if symptoms worsen  Chief Complaint     Chief Complaint   Patient presents with    Foot Pain     right foot pain          History of Present Illness       62 y/o female presents c/o pain to foot  States she rolled it one week ago  Denies head trauma, LOC      Review of Systems   Review of Systems   Constitutional: Negative  HENT: Negative  Eyes: Negative  Respiratory: Negative  Negative for cough, chest tightness, shortness of breath, wheezing and stridor  Cardiovascular: Negative  Negative for chest pain, palpitations and leg swelling  Musculoskeletal: Positive for arthralgias           Current Medications       Current Outpatient Medications:     aspirin (ASPIRIN ADULT LOW DOSE) 81 mg EC tablet, Take 1 tablet by mouth daily, Disp: , Rfl:     Cholecalciferol (VITAMIN D3 PO), Take 1 tablet by mouth daily, Disp: , Rfl:     cyclobenzaprine (FLEXERIL) 10 mg tablet, 3 (three) times a day, Disp: , Rfl:     MAGNESIUM PO, Take 1 tablet by mouth daily, Disp: , Rfl:     meclizine (ANTIVERT) 25 mg tablet, Take 25 mg by mouth 3 (three) times a day as needed, Disp: , Rfl: 0    meloxicam (Mobic) 15 mg tablet, Daily, Disp: , Rfl:     omeprazole (PriLOSEC) 20 mg delayed release capsule, Daily, Disp: , Rfl:     omeprazole (PriLOSEC) 20 mg delayed release capsule, Take 1 capsule (20 mg total) by mouth 2 (two) times a day, Disp: 90 capsule, Rfl: 1    polyethylene glycol (GLYCOLAX) powder, Take by mouth 2 (two) times a day, Disp: , Rfl: 0    propranolol (INDERAL LA) 120 mg 24 hr capsule, Take 120 mg by mouth daily, Disp: , Rfl:     rizatriptan (MAXALT) 10 MG tablet, take 1 tablet by mouth immediately may repeat after 2 hours, Disp: 9 tablet, Rfl: 5    triamcinolone (KENALOG) 0 1 % ointment, Apply topically 2 (two) times a day Apply topically to Left shoulder 2 times a day for 2-3 weeks  , Disp: 30 g, Rfl: 0    nortriptyline (PAMELOR) 25 mg capsule, Take 25 mg by mouth, Disp: , Rfl:     ondansetron (ZOFRAN-ODT) 4 mg disintegrating tablet, Take 2 tablets (8 mg total) by mouth every 8 (eight) hours as needed for nausea or vomiting for up to 2 days, Disp: 6 tablet, Rfl: 0    Current Allergies     Allergies as of 2021 - Reviewed 2021   Allergen Reaction Noted    Codeine Headache 2012            The following portions of the patient's history were reviewed and updated as appropriate: allergies, current medications, past family history, past medical history, past social history, past surgical history and problem list      Past Medical History:   Diagnosis Date    Anemia     Cancer ([facility] [address] )     breast history of    Migraine     Skin cancer     Left leg lump  cryotherapy and excision, unsure of which kind        Past Surgical History:   Procedure Laterality Date    ANKLE SURGERY Left 2017    pins and plate      SECTION      x2    DILATION AND CURETTAGE OF UTERUS      TONSILLECTOMY      age 4       Family History   Problem Relation Age of Onset    No Known Problems Mother     Lung cancer Brother          Medications have been verified  Objective   /75   Pulse 72   Temp 98 6 °F (37 °C)   Resp 18   Ht 5' 4" (1 626 m)   Wt 104 kg (230 lb)   SpO2 98%   BMI 39 48 kg/m²        Physical Exam     Physical Exam  Constitutional:       Appearance: She is well-developed  HENT:      Head: Normocephalic and atraumatic  Right Ear: External ear normal       Left Ear: External ear normal       Nose: Nose normal       Mouth/Throat:      Pharynx: No oropharyngeal exudate  Neck:      Musculoskeletal: Normal range of motion and neck supple  Cardiovascular:      Rate and Rhythm: Normal rate and regular rhythm  Heart sounds: Normal heart sounds  Pulmonary:      Effort: Pulmonary effort is normal  No respiratory distress  Breath sounds: Normal breath sounds  No wheezing or rales  Chest:      Chest wall: No tenderness  Abdominal:      Palpations: Abdomen is soft  Musculoskeletal:      Right ankle: She exhibits normal range of motion, no swelling, no ecchymosis, no deformity, no laceration and normal pulse  Tenderness  Head of 5th metatarsal tenderness found  No lateral malleolus, no medial malleolus, no AITFL, no CF ligament, no posterior TFL and no proximal fibula tenderness found  Achilles tendon normal    Lymphadenopathy:      Cervical: No cervical adenopathy

## 2021-07-24 DIAGNOSIS — K21.9 GASTROESOPHAGEAL REFLUX DISEASE WITHOUT ESOPHAGITIS: ICD-10-CM

## 2021-07-24 RX ORDER — OMEPRAZOLE 20 MG/1
CAPSULE, DELAYED RELEASE ORAL
Qty: 90 CAPSULE | Refills: 1 | Status: SHIPPED | OUTPATIENT
Start: 2021-07-24 | End: 2021-11-04

## 2021-11-04 DIAGNOSIS — K21.9 GASTROESOPHAGEAL REFLUX DISEASE WITHOUT ESOPHAGITIS: ICD-10-CM

## 2021-11-04 RX ORDER — OMEPRAZOLE 20 MG/1
CAPSULE, DELAYED RELEASE ORAL
Qty: 90 CAPSULE | Refills: 1 | Status: SHIPPED | OUTPATIENT
Start: 2021-11-04 | End: 2022-03-09

## 2021-11-23 ENCOUNTER — OFFICE VISIT (OUTPATIENT)
Dept: URGENT CARE | Facility: MEDICAL CENTER | Age: 61
End: 2021-11-23
Payer: COMMERCIAL

## 2021-11-23 VITALS — RESPIRATION RATE: 18 BRPM | TEMPERATURE: 97.9 F | OXYGEN SATURATION: 96 % | HEART RATE: 94 BPM

## 2021-11-23 DIAGNOSIS — B34.9 ACUTE VIRAL SYNDROME: Primary | ICD-10-CM

## 2021-11-23 PROCEDURE — U0005 INFEC AGEN DETEC AMPLI PROBE: HCPCS | Performed by: PHYSICIAN ASSISTANT

## 2021-11-23 PROCEDURE — U0003 INFECTIOUS AGENT DETECTION BY NUCLEIC ACID (DNA OR RNA); SEVERE ACUTE RESPIRATORY SYNDROME CORONAVIRUS 2 (SARS-COV-2) (CORONAVIRUS DISEASE [COVID-19]), AMPLIFIED PROBE TECHNIQUE, MAKING USE OF HIGH THROUGHPUT TECHNOLOGIES AS DESCRIBED BY CMS-2020-01-R: HCPCS | Performed by: PHYSICIAN ASSISTANT

## 2021-11-23 PROCEDURE — 99213 OFFICE O/P EST LOW 20 MIN: CPT | Performed by: PHYSICIAN ASSISTANT

## 2021-11-24 LAB — SARS-COV-2 RNA RESP QL NAA+PROBE: NEGATIVE

## 2021-11-25 ENCOUNTER — TELEPHONE (OUTPATIENT)
Dept: OTHER | Facility: OTHER | Age: 61
End: 2021-11-25

## 2022-03-09 ENCOUNTER — TELEPHONE (OUTPATIENT)
Dept: NEUROLOGY | Facility: CLINIC | Age: 62
End: 2022-03-09

## 2022-03-09 ENCOUNTER — OFFICE VISIT (OUTPATIENT)
Dept: FAMILY MEDICINE CLINIC | Facility: CLINIC | Age: 62
End: 2022-03-09
Payer: COMMERCIAL

## 2022-03-09 ENCOUNTER — APPOINTMENT (OUTPATIENT)
Dept: LAB | Facility: MEDICAL CENTER | Age: 62
End: 2022-03-09
Payer: COMMERCIAL

## 2022-03-09 VITALS
OXYGEN SATURATION: 97 % | WEIGHT: 236.2 LBS | HEIGHT: 64 IN | DIASTOLIC BLOOD PRESSURE: 70 MMHG | BODY MASS INDEX: 40.33 KG/M2 | HEART RATE: 72 BPM | SYSTOLIC BLOOD PRESSURE: 126 MMHG | RESPIRATION RATE: 18 BRPM | TEMPERATURE: 98.1 F

## 2022-03-09 DIAGNOSIS — G47.33 OBSTRUCTIVE SLEEP APNEA SYNDROME: ICD-10-CM

## 2022-03-09 DIAGNOSIS — E78.2 MIXED HYPERLIPIDEMIA: ICD-10-CM

## 2022-03-09 DIAGNOSIS — D75.839 THROMBOCYTOSIS: ICD-10-CM

## 2022-03-09 DIAGNOSIS — Z12.31 ENCOUNTER FOR SCREENING MAMMOGRAM FOR BREAST CANCER: ICD-10-CM

## 2022-03-09 DIAGNOSIS — Z12.12 SCREENING FOR COLORECTAL CANCER: ICD-10-CM

## 2022-03-09 DIAGNOSIS — E66.9 OBESITY, UNSPECIFIED OBESITY SEVERITY, UNSPECIFIED OBESITY TYPE: ICD-10-CM

## 2022-03-09 DIAGNOSIS — K21.9 GASTROESOPHAGEAL REFLUX DISEASE WITHOUT ESOPHAGITIS: Primary | ICD-10-CM

## 2022-03-09 DIAGNOSIS — Z12.11 SCREENING FOR COLORECTAL CANCER: ICD-10-CM

## 2022-03-09 DIAGNOSIS — Z12.4 SCREENING FOR CERVICAL CANCER: ICD-10-CM

## 2022-03-09 DIAGNOSIS — G43.809 OTHER MIGRAINE WITHOUT STATUS MIGRAINOSUS, NOT INTRACTABLE: ICD-10-CM

## 2022-03-09 LAB
BASOPHILS # BLD AUTO: 0.05 THOUSANDS/ΜL (ref 0–0.1)
BASOPHILS NFR BLD AUTO: 1 % (ref 0–1)
CHOLEST SERPL-MCNC: 276 MG/DL
EOSINOPHIL # BLD AUTO: 0.35 THOUSAND/ΜL (ref 0–0.61)
EOSINOPHIL NFR BLD AUTO: 4 % (ref 0–6)
ERYTHROCYTE [DISTWIDTH] IN BLOOD BY AUTOMATED COUNT: 12.9 % (ref 11.6–15.1)
HCT VFR BLD AUTO: 37.7 % (ref 34.8–46.1)
HDLC SERPL-MCNC: 51 MG/DL
HGB BLD-MCNC: 12.7 G/DL (ref 11.5–15.4)
IMM GRANULOCYTES # BLD AUTO: 0.03 THOUSAND/UL (ref 0–0.2)
IMM GRANULOCYTES NFR BLD AUTO: 0 % (ref 0–2)
LDLC SERPL CALC-MCNC: 177 MG/DL (ref 0–100)
LYMPHOCYTES # BLD AUTO: 2.53 THOUSANDS/ΜL (ref 0.6–4.47)
LYMPHOCYTES NFR BLD AUTO: 31 % (ref 14–44)
MCH RBC QN AUTO: 30.2 PG (ref 26.8–34.3)
MCHC RBC AUTO-ENTMCNC: 33.7 G/DL (ref 31.4–37.4)
MCV RBC AUTO: 90 FL (ref 82–98)
MONOCYTES # BLD AUTO: 0.57 THOUSAND/ΜL (ref 0.17–1.22)
MONOCYTES NFR BLD AUTO: 7 % (ref 4–12)
NEUTROPHILS # BLD AUTO: 4.61 THOUSANDS/ΜL (ref 1.85–7.62)
NEUTS SEG NFR BLD AUTO: 57 % (ref 43–75)
NRBC BLD AUTO-RTO: 0 /100 WBCS
PLATELET # BLD AUTO: 443 THOUSANDS/UL (ref 149–390)
PMV BLD AUTO: 10.7 FL (ref 8.9–12.7)
RBC # BLD AUTO: 4.21 MILLION/UL (ref 3.81–5.12)
TRIGL SERPL-MCNC: 242 MG/DL
WBC # BLD AUTO: 8.14 THOUSAND/UL (ref 4.31–10.16)

## 2022-03-09 PROCEDURE — 85025 COMPLETE CBC W/AUTO DIFF WBC: CPT

## 2022-03-09 PROCEDURE — 36415 COLL VENOUS BLD VENIPUNCTURE: CPT

## 2022-03-09 PROCEDURE — 99214 OFFICE O/P EST MOD 30 MIN: CPT | Performed by: FAMILY MEDICINE

## 2022-03-09 PROCEDURE — 80061 LIPID PANEL: CPT

## 2022-03-09 RX ORDER — ATORVASTATIN CALCIUM 40 MG/1
40 TABLET, FILM COATED ORAL DAILY
Qty: 30 TABLET | Refills: 5 | Status: SHIPPED | OUTPATIENT
Start: 2022-03-09 | End: 2022-05-02 | Stop reason: SDUPTHER

## 2022-03-09 NOTE — TELEPHONE ENCOUNTER
Patient calling to reestablish being a patient with us for migraines  Formerly seen by Angelica Ibrahim  Currently seen by [facility] neurology  Testing done while patient there  Triage intake sent

## 2022-03-09 NOTE — PROGRESS NOTES
BMI Counseling: There is no height or weight on file to calculate BMI  The BMI is above normal  Nutrition recommendations include decreasing portion sizes, encouraging healthy choices of fruits and vegetables, decreasing fast food intake, consuming healthier snacks, limiting drinks that contain sugar, moderation in carbohydrate intake, increasing intake of lean protein, reducing intake of saturated and trans fat and reducing intake of cholesterol  Exercise recommendations include exercising 3-5 times per week  No pharmacotherapy was ordered  Patient referred to PCP  Rationale for BMI follow-up plan is due to patient being overweight or obese  Assessment/Plan:         Problem List Items Addressed This Visit        Digestive    Gastroesophageal reflux disease without esophagitis - Primary     Patient to continue with present therapy and decrease caffeine, avoid ETOH and smoking to decrease acid production  Pt should also cease eating prior to bedtime and avoid excessive fluid intake prior to sleep  May use antacids as needed for breakthrough GERD  All pateint questions answered today about this condition  Respiratory    Apnea, sleep     Patient instructed to continue using CPAP as directed to decrease episodes of apnea to minimize risk of cardiac complications  Pt instructed to kep  machine in clean working order and to call if any replacement parts are needed  Cardiovascular and Mediastinum    Migraine     Patient is stable  and will continue present plan of care and reassess at next routine visit  All questions about this problem from patient were answered today           Relevant Orders    Ambulatory Referral to Neurology       Other    Obesity, unspecified obesity severity, unspecified obesity type     Patient to increase exercise and partake of a diet with less calories to promote  weight loss         Mixed hyperlipidemia     Patient  is stable with current medication and we discussed a low fat low cholesterol diet  Weight loss also discussed for this will help lower cholesterol also  Recheck lipids in 6 months  Relevant Medications    atorvastatin (LIPITOR) 40 mg tablet    Other Relevant Orders    Comprehensive metabolic panel    Lipid Panel with Direct LDL reflex      Other Visit Diagnoses     Screening for cervical cancer        Relevant Orders    Ambulatory referral to Obstetrics / Gynecology    Encounter for screening mammogram for breast cancer        Relevant Orders    Mammo screening bilateral w 3d & cad    Screening for colorectal cancer        Relevant Orders    Ambulatory referral to Gastroenterology            Subjective:      Patient ID: Cynthia Bhagat is a 57 y o  female  This 41-year-old female here today for checkup on multiple medical problems patient with migraines history of sleep apnea history of anemia and history of GERD  The patient is due for her mammogram as well as her colonoscopy which she was scheduling  Patient also has been having some problems with her migraines patient is seeing a neurologist to [facility] and she would like to get a 2nd opinion I will see about making a referral to 1 of our doctors at [facility] and will see how she does with that  Patient her medicines reviewed and will call when she has prescription refills  Patient also with lab work reviewed showed high cholesterol we will be starting her on some Lipitor 40 mg and we discussed with her side effects of the medication with muscle achiness and also about liver function test patient will return back in 6 weeks to recheck her cholesterol and LFTs after being on the medicine for 6 weeks        The following portions of the patient's history were reviewed and updated as appropriate:   Past Medical History:  She has a past medical history of Anemia, Cancer ([address] ) (2007), Migraine, and Skin cancer  ,  _______________________________________________________________________  Medical Problems:  does not have any pertinent problems on file ,  _______________________________________________________________________  Past Surgical History:   has a past surgical history that includes Tonsillectomy; Dilation and curettage of uterus;  section; and Ankle surgery (Left, )  ,  _______________________________________________________________________  Family History:  family history includes Lung cancer in her brother; No Known Problems in her mother ,  _______________________________________________________________________  Social History:   reports that she has never smoked  She has never used smokeless tobacco  She reports previous alcohol use  She reports that she does not use drugs  ,  _______________________________________________________________________  Allergies:  is allergic to codeine     _______________________________________________________________________  Current Outpatient Medications   Medication Sig Dispense Refill    aspirin (ASPIRIN ADULT LOW DOSE) 81 mg EC tablet Take 1 tablet by mouth daily      Cholecalciferol (VITAMIN D3 PO) Take 1 tablet by mouth daily      MAGNESIUM PO Take 1 tablet by mouth daily      omeprazole (PriLOSEC) 20 mg delayed release capsule Daily      propranolol (INDERAL LA) 120 mg 24 hr capsule Take 120 mg by mouth daily      rizatriptan (MAXALT) 10 MG tablet take 1 tablet by mouth immediately may repeat after 2 hours 9 tablet 5    atorvastatin (LIPITOR) 40 mg tablet Take 1 tablet (40 mg total) by mouth daily 30 tablet 5    nortriptyline (PAMELOR) 25 mg capsule Take 25 mg by mouth      ondansetron (ZOFRAN-ODT) 4 mg disintegrating tablet Take 2 tablets (8 mg total) by mouth every 8 (eight) hours as needed for nausea or vomiting for up to 2 days 6 tablet 0     No current facility-administered medications for this visit  _______________________________________________________________________  Review of Systems   Constitutional: Negative for activity change, appetite change, fatigue and fever  HENT: Negative for congestion, ear pain, postnasal drip, rhinorrhea, sinus pressure, sinus pain, sneezing and sore throat  Eyes: Negative for pain and redness  Respiratory: Negative for apnea, cough, chest tightness, shortness of breath and wheezing  Cardiovascular: Negative for chest pain, palpitations and leg swelling  Gastrointestinal: Negative for abdominal pain, constipation, diarrhea, nausea and vomiting  Endocrine: Negative for cold intolerance and heat intolerance  Genitourinary: Negative for difficulty urinating, dysuria, frequency, hematuria and urgency  Musculoskeletal: Negative for arthralgias, back pain, gait problem and myalgias  Skin: Negative for rash  Neurological: Positive for headaches  Negative for dizziness, speech difficulty, weakness and numbness  Hematological: Does not bruise/bleed easily  Psychiatric/Behavioral: Negative for agitation, confusion and hallucinations  Objective:  Vitals:    03/09/22 1327   BP: 126/70   BP Location: Left arm   Patient Position: Sitting   Cuff Size: Standard   Pulse: 72   Resp: 18   Temp: 98 1 °F (36 7 °C)   SpO2: 97%   Weight: 107 kg (236 lb 3 2 oz)   Height: 5' 4" (1 626 m)     Body mass index is 40 54 kg/m²  Physical Exam  Vitals and nursing note reviewed  Constitutional:       Appearance: She is well-developed  She is obese  HENT:      Head: Normocephalic and atraumatic  Nose: Nose normal    Eyes:      General: No scleral icterus  Conjunctiva/sclera: Conjunctivae normal       Pupils: Pupils are equal, round, and reactive to light  Neck:      Thyroid: No thyromegaly  Cardiovascular:      Rate and Rhythm: Normal rate and regular rhythm  Pulmonary:      Effort: Pulmonary effort is normal       Breath sounds: Normal breath sounds  No wheezing  Abdominal:      General: Bowel sounds are normal  There is no distension  Palpations: Abdomen is soft  Tenderness: There is no abdominal tenderness  There is no guarding or rebound  Musculoskeletal:         General: No tenderness or deformity  Normal range of motion  Cervical back: Normal range of motion and neck supple  Skin:     General: Skin is warm and dry  Findings: No erythema or rash  Neurological:      Mental Status: She is alert and oriented to person, place, and time  Sensory: No sensory deficit  Psychiatric:         Behavior: Behavior normal          Thought Content:  Thought content normal          Judgment: Judgment normal

## 2022-03-10 DIAGNOSIS — K21.9 GASTROESOPHAGEAL REFLUX DISEASE WITHOUT ESOPHAGITIS: Primary | ICD-10-CM

## 2022-03-10 RX ORDER — OMEPRAZOLE 20 MG/1
20 CAPSULE, DELAYED RELEASE ORAL DAILY
Qty: 90 CAPSULE | Refills: 1 | Status: SHIPPED | OUTPATIENT
Start: 2022-03-10 | End: 2022-05-02 | Stop reason: SDUPTHER

## 2022-03-30 ENCOUNTER — TELEPHONE (OUTPATIENT)
Dept: NEUROLOGY | Facility: CLINIC | Age: 62
End: 2022-03-30

## 2022-05-01 NOTE — PROGRESS NOTES
Assessment/Plan:         Problem List Items Addressed This Visit        Digestive    Gastroesophageal reflux disease without esophagitis - Primary     Patient to continue with present therapy and decrease caffeine, avoid ETOH and smoking to decrease acid production  Pt should also cease eating prior to bedtime and avoid excessive fluid intake prior to sleep  May use antacids as needed for breakthrough GERD  All pateint questions answered today about this condition  Relevant Medications    omeprazole (PriLOSEC) 20 mg delayed release capsule       Cardiovascular and Mediastinum    Migraine     Patient is stable  and will continue present plan of care and reassess at next routine visit  All questions about this problem from patient were answered today  Other    Obesity, unspecified obesity severity, unspecified obesity type     Patient to increase exercise and partake of a diet with less calories to promote  weight loss         Mixed hyperlipidemia     Patient  is stable with current medication and we discussed a low fat low cholesterol diet  Weight loss also discussed for this will help lower cholesterol also  Recheck lipids in 6 months  Relevant Medications    atorvastatin (LIPITOR) 40 mg tablet            Subjective:      Patient ID: Silvano Marcial is a 57 y o  female  This 79-year-old female here today for multiple medical problems patient history of some migraines hypertension hyperlipidemia and GERD  Patient was started on Lipitor and she is due for her LFTs the cholesterol panel  Patient has having no muscle achiness is doing well with the medication  Patient also states that she had seen Podiatry and they recommended a leg lift for his her 1 leg is 2 in shorter than the other  She was asking for referral to hang her and I told her I can give her prescription for this I am not really sure high do referral to a durable medical goods practice        The following portions of the patient's history were reviewed and updated as appropriate:   Past Medical History:  She has a past medical history of Anemia, Cancer ([address] ) (), Migraine, Mixed hyperlipidemia (2021), and Skin cancer  ,  _______________________________________________________________________  Medical Problems:  does not have any pertinent problems on file ,  _______________________________________________________________________  Past Surgical History:   has a past surgical history that includes Tonsillectomy; Dilation and curettage of uterus;  section; and Ankle surgery (Left, 2017)  ,  _______________________________________________________________________  Family History:  family history includes Lung cancer in her brother; No Known Problems in her mother ,  _______________________________________________________________________  Social History:   reports that she has never smoked  She has never used smokeless tobacco  She reports previous alcohol use  She reports that she does not use drugs  ,  _______________________________________________________________________  Allergies:  is allergic to codeine     _______________________________________________________________________  Current Outpatient Medications   Medication Sig Dispense Refill    aspirin (ASPIRIN ADULT LOW DOSE) 81 mg EC tablet Take 1 tablet by mouth daily      atorvastatin (LIPITOR) 40 mg tablet Take 1 tablet (40 mg total) by mouth daily 30 tablet 5    Cholecalciferol (VITAMIN D3 PO) Take 1 tablet by mouth daily      MAGNESIUM PO Take 1 tablet by mouth daily      omeprazole (PriLOSEC) 20 mg delayed release capsule Take 1 capsule (20 mg total) by mouth daily 60 capsule 5    propranolol (INDERAL LA) 120 mg 24 hr capsule Take 120 mg by mouth daily      rizatriptan (MAXALT) 10 MG tablet take 1 tablet by mouth immediately may repeat after 2 hours 9 tablet 5    nortriptyline (PAMELOR) 25 mg capsule Take 25 mg by mouth (Patient not taking: Reported on 5/2/2022 )      ondansetron (ZOFRAN-ODT) 4 mg disintegrating tablet Take 2 tablets (8 mg total) by mouth every 8 (eight) hours as needed for nausea or vomiting for up to 2 days (Patient not taking: Reported on 5/2/2022 ) 6 tablet 0     No current facility-administered medications for this visit      _______________________________________________________________________  Review of Systems   Constitutional: Negative for activity change, appetite change, fatigue and fever  HENT: Negative for congestion, ear pain, postnasal drip, rhinorrhea, sinus pressure, sinus pain, sneezing and sore throat  Eyes: Negative for pain and redness  Respiratory: Negative for apnea, cough, chest tightness, shortness of breath and wheezing  Cardiovascular: Negative for chest pain, palpitations and leg swelling  Gastrointestinal: Negative for abdominal pain, constipation, diarrhea, nausea and vomiting  Endocrine: Negative for cold intolerance and heat intolerance  Genitourinary: Negative for difficulty urinating, dysuria, frequency, hematuria and urgency  Musculoskeletal: Negative for arthralgias, back pain, gait problem and myalgias  Skin: Negative for rash  Neurological: Negative for dizziness, speech difficulty, weakness, numbness and headaches  Hematological: Does not bruise/bleed easily  Psychiatric/Behavioral: Negative for agitation, confusion and hallucinations  Objective:  Vitals:    05/02/22 1642   BP: 144/76   BP Location: Left arm   Patient Position: Sitting   Cuff Size: Large   Pulse: 73   Temp: 98 2 °F (36 8 °C)   TempSrc: Skin   SpO2: 97%   Weight: 108 kg (237 lb)   Height: 5' 4" (1 626 m)     Body mass index is 40 68 kg/m²  Physical Exam  Vitals and nursing note reviewed  Constitutional:       Appearance: She is well-developed  She is obese  HENT:      Head: Normocephalic and atraumatic        Nose: Nose normal       Mouth/Throat:      Mouth: Mucous membranes are moist    Eyes: General: No scleral icterus  Conjunctiva/sclera: Conjunctivae normal       Pupils: Pupils are equal, round, and reactive to light  Neck:      Thyroid: No thyromegaly  Cardiovascular:      Rate and Rhythm: Normal rate and regular rhythm  Pulmonary:      Effort: Pulmonary effort is normal       Breath sounds: Normal breath sounds  No wheezing  Abdominal:      General: Bowel sounds are normal  There is no distension  Palpations: Abdomen is soft  Tenderness: There is no abdominal tenderness  There is no guarding or rebound  Musculoskeletal:         General: No tenderness or deformity  Normal range of motion  Cervical back: Normal range of motion and neck supple  Skin:     General: Skin is warm and dry  Findings: No erythema or rash  Neurological:      Mental Status: She is alert and oriented to person, place, and time  Sensory: No sensory deficit  Psychiatric:         Mood and Affect: Mood normal          Behavior: Behavior normal          Thought Content:  Thought content normal          Judgment: Judgment normal

## 2022-05-02 ENCOUNTER — OFFICE VISIT (OUTPATIENT)
Dept: FAMILY MEDICINE CLINIC | Facility: CLINIC | Age: 62
End: 2022-05-02
Payer: COMMERCIAL

## 2022-05-02 VITALS
DIASTOLIC BLOOD PRESSURE: 76 MMHG | SYSTOLIC BLOOD PRESSURE: 144 MMHG | HEIGHT: 64 IN | WEIGHT: 237 LBS | OXYGEN SATURATION: 97 % | BODY MASS INDEX: 40.46 KG/M2 | TEMPERATURE: 98.2 F | HEART RATE: 73 BPM

## 2022-05-02 DIAGNOSIS — G43.009 MIGRAINE WITHOUT AURA AND WITHOUT STATUS MIGRAINOSUS, NOT INTRACTABLE: ICD-10-CM

## 2022-05-02 DIAGNOSIS — G43.809 OTHER MIGRAINE WITHOUT STATUS MIGRAINOSUS, NOT INTRACTABLE: ICD-10-CM

## 2022-05-02 DIAGNOSIS — K21.9 GASTROESOPHAGEAL REFLUX DISEASE WITHOUT ESOPHAGITIS: Primary | ICD-10-CM

## 2022-05-02 DIAGNOSIS — E78.2 MIXED HYPERLIPIDEMIA: ICD-10-CM

## 2022-05-02 DIAGNOSIS — E66.9 OBESITY, UNSPECIFIED OBESITY SEVERITY, UNSPECIFIED OBESITY TYPE: ICD-10-CM

## 2022-05-02 PROCEDURE — 99214 OFFICE O/P EST MOD 30 MIN: CPT | Performed by: FAMILY MEDICINE

## 2022-05-02 RX ORDER — OMEPRAZOLE 20 MG/1
20 CAPSULE, DELAYED RELEASE ORAL DAILY
Qty: 60 CAPSULE | Refills: 5 | Status: SHIPPED | OUTPATIENT
Start: 2022-05-02

## 2022-05-02 RX ORDER — ATORVASTATIN CALCIUM 40 MG/1
40 TABLET, FILM COATED ORAL DAILY
Qty: 30 TABLET | Refills: 5 | Status: SHIPPED | OUTPATIENT
Start: 2022-05-02 | End: 2022-08-10 | Stop reason: SDUPTHER

## 2022-05-03 ENCOUNTER — TELEPHONE (OUTPATIENT)
Dept: ADMINISTRATIVE | Facility: OTHER | Age: 62
End: 2022-05-03

## 2022-05-03 NOTE — TELEPHONE ENCOUNTER
Upon review of the In Basket request we were able to locate, review, and update the patient chart as requested for Mammogram     Any additional questions or concerns should be emailed to the Practice Liaisons via [email or web address] email, please do not reply via In Basket      Thank you  Charmayne Bathe, MA

## 2022-05-03 NOTE — TELEPHONE ENCOUNTER
Upon review of the In Basket request we were able to note that no further action is required  The patient chart is up to date as a result of a previous request       Any additional questions or concerns should be emailed to the Practice Liaisons via [email or web address] email, please do not reply via In Basket      Thank you  Phan Nath MA

## 2022-05-03 NOTE — TELEPHONE ENCOUNTER
----- Message from Peter Costello sent at 5/2/2022  4:44 PM EDT -----  Regarding: Care Gap Request  05/02/22 4:44 PM    Hello, our patient Tabatha Cano has had Mammogram completed/performed  Please assist in updating the patient chart by pulling the Care Everywhere (CE) document  The date of service is 04/19/2022       Thank you,  Peter Costello  [facility]

## 2022-05-03 NOTE — TELEPHONE ENCOUNTER
----- Message from Felisha Kennedy sent at 5/2/2022  5:43 PM EDT -----  Regarding: care gap request Mammogram  05/02/22 5:43 PM    Hello, our patient attached above has had Mammogram completed/performed  Please assist in updating the patient chart by pulling the Care Everywhere (CE) document  The date of service is 04/19/2022       Thank you,  Sakina De La Fuente  [facility]

## 2022-05-31 ENCOUNTER — TELEPHONE (OUTPATIENT)
Dept: FAMILY MEDICINE CLINIC | Facility: CLINIC | Age: 62
End: 2022-05-31

## 2022-05-31 NOTE — TELEPHONE ENCOUNTER
Patient states you started her on a cholesterol medication and she was told to make you aware of any muscle aches and and she's been having pain in her back    She's unsure if it's related to the medication

## 2022-06-14 ENCOUNTER — OFFICE VISIT (OUTPATIENT)
Dept: GASTROENTEROLOGY | Facility: CLINIC | Age: 62
End: 2022-06-14
Payer: COMMERCIAL

## 2022-06-14 VITALS
HEIGHT: 64 IN | SYSTOLIC BLOOD PRESSURE: 112 MMHG | BODY MASS INDEX: 39.44 KG/M2 | WEIGHT: 231 LBS | HEART RATE: 69 BPM | DIASTOLIC BLOOD PRESSURE: 72 MMHG | OXYGEN SATURATION: 97 %

## 2022-06-14 DIAGNOSIS — K59.04 CHRONIC IDIOPATHIC CONSTIPATION: Primary | ICD-10-CM

## 2022-06-14 PROCEDURE — 99203 OFFICE O/P NEW LOW 30 MIN: CPT | Performed by: PHYSICIAN ASSISTANT

## 2022-06-14 RX ORDER — MIRABEGRON 25 MG/1
TABLET, FILM COATED, EXTENDED RELEASE ORAL
COMMUNITY
Start: 2022-06-10

## 2022-06-14 RX ORDER — LINACLOTIDE 72 UG/1
72 CAPSULE, GELATIN COATED ORAL DAILY
Qty: 90 CAPSULE | Refills: 3 | Status: SHIPPED | COMMUNITY
Start: 2022-06-14

## 2022-06-14 NOTE — PROGRESS NOTES
[facility] - Outpatient Consultation  Suleiman Vo 57 y o  female MRN: [id number]  Encounter: [id number]          ASSESSMENT AND PLAN:      1  Chronic idiopathic constipation  Problematic for most of her adult life  Previously had done better on Miralax but even with 17gms twice daily still struggled  Will trial Linzess 72mcg daily    Her last colonoscopy was in 2017 and noted to be normal  She is due routinely for a colonoscopy in 2027    ______________________________________________________________________    HPI:  And 59-year-old female presents for evaluation of constipation  She admits that this has been problematic most of her adult life  She reports that she was seeing Dr Rica guido in the past and he used prescribed her MiraLax which did help but never fully resolved the problem  She denies any severe abdominal pain and has no rectal bleeding  Her last colonoscopy was in 2017 and reported to be normal   She has no family history of colorectal cancer  REVIEW OF SYSTEMS:    CONSTITUTIONAL: Denies any fever, chills, rigors, and weight loss  HEENT: No earache or tinnitus  Denies hearing loss or visual disturbances  CARDIOVASCULAR: No chest pain or palpitations  RESPIRATORY: Denies any cough, hemoptysis, shortness of breath or dyspnea on exertion  GASTROINTESTINAL: As noted in the History of Present Illness  GENITOURINARY: No problems with urination  Denies any hematuria or dysuria  NEUROLOGIC: No dizziness or vertigo, denies headaches  MUSCULOSKELETAL: Denies any muscle or joint pain  SKIN: Denies skin rashes or itching  ENDOCRINE: Denies excessive thirst  Denies intolerance to heat or cold  PSYCHOSOCIAL: Denies depression or anxiety  Denies any recent memory loss         Historical Information   Past Medical History:   Diagnosis Date    Anemia     Cancer [facility]) 2007    breast history of    Migraine     Mixed hyperlipidemia 5/21/2021    Skin cancer     Left leg lump  cryotherapy and excision, unsure of which kind      Past Surgical History:   Procedure Laterality Date    ANKLE SURGERY Left 2017    pins and plate      SECTION      x2    DILATION AND CURETTAGE OF UTERUS      TONSILLECTOMY      age 4     Social History   Social History     Substance and Sexual Activity   Alcohol Use Not Currently    Comment: social wine     Social History     Substance and Sexual Activity   Drug Use No     Social History     Tobacco Use   Smoking Status Never Smoker   Smokeless Tobacco Never Used     Family History   Problem Relation Age of Onset    No Known Problems Mother     Lung cancer Brother        Meds/Allergies       Current Outpatient Medications:     aspirin (ECOTRIN LOW STRENGTH) 81 mg EC tablet    atorvastatin (LIPITOR) 40 mg tablet    Cholecalciferol (VITAMIN D3 PO)    linaCLOtide (Linzess) 72 MCG CAPS    MAGNESIUM PO    Myrbetriq 25 MG TB24    omeprazole (PriLOSEC) 20 mg delayed release capsule    propranolol (INDERAL LA) 120 mg 24 hr capsule    rizatriptan (MAXALT) 10 MG tablet    Allergies   Allergen Reactions    Codeine Headache     Headaches  Other reaction(s): headache           Objective     Blood pressure 112/72, pulse 69, height 5' 4" (1 626 m), weight 105 kg (231 lb), SpO2 97 %  Body mass index is 39 65 kg/m²  PHYSICAL EXAM:      General Appearance:   Alert, cooperative, no distress   HEENT:   Normocephalic, atraumatic, anicteric      Neck:  Supple, symmetrical, trachea midline   Lungs:   Clear to auscultation bilaterally; no rales, rhonchi or wheezing; respirations unlabored    Heart[de-identified]   Regular rate and rhythm; no murmur, rub, or gallop     Abdomen:   Soft, non-tender, non-distended; normal bowel sounds; no masses, no organomegaly    Genitalia:   Deferred    Rectal:   Deferred    Extremities:  No cyanosis, clubbing or edema    Pulses:  2+ and symmetric    Skin:  No jaundice, rashes, or lesions    Lymph nodes:  No palpable cervical lymphadenopathy        Lab Results:   No visits with results within 1 Day(s) from this visit  Latest known visit with results is:   Appointment on 03/09/2022   Component Date Value    WBC 03/09/2022 8 14     RBC 03/09/2022 4 21     Hemoglobin 03/09/2022 12 7     Hematocrit 03/09/2022 37 7     MCV 03/09/2022 90     MCH 03/09/2022 30 2     MCHC 03/09/2022 33 7     RDW 03/09/2022 12 9     MPV 03/09/2022 10 7     Platelets 05/23/8895 443 (A)    nRBC 03/09/2022 0     Neutrophils Relative 03/09/2022 57     Immat GRANS % 03/09/2022 0     Lymphocytes Relative 03/09/2022 31     Monocytes Relative 03/09/2022 7     Eosinophils Relative 03/09/2022 4     Basophils Relative 03/09/2022 1     Neutrophils Absolute 03/09/2022 4 61     Immature Grans Absolute 03/09/2022 0 03     Lymphocytes Absolute 03/09/2022 2 53     Monocytes Absolute 03/09/2022 0 57     Eosinophils Absolute 03/09/2022 0 35     Basophils Absolute 03/09/2022 0 05     Cholesterol 03/09/2022 276 (A)    Triglycerides 03/09/2022 242 (A)    HDL, Direct 03/09/2022 51     LDL Calculated 03/09/2022 177 (A)         Radiology Results:   No results found

## 2022-06-22 ENCOUNTER — TELEPHONE (OUTPATIENT)
Dept: GASTROENTEROLOGY | Facility: CLINIC | Age: 62
End: 2022-06-22

## 2022-06-22 NOTE — TELEPHONE ENCOUNTER
----- Message from [id number] on behalf of Francisca Haines sent at 6/22/2022  2:52 PM EDT -----  Regarding: Rosa Screen  This message is being sent by Babatunde Perez on behalf of Francisca Haines      Catie actually had 2

## 2022-06-22 NOTE — TELEPHONE ENCOUNTER
----- Message from [address] on behalf of Olivia Chung sent at 6/22/2022  2:52 PM EDT -----  Regarding: Garrick Can  This message is being sent by Jamal Orosco on behalf of Olivia Haddad actually had 2

## 2022-06-23 NOTE — TELEPHONE ENCOUNTER
Encounter from Pt message on Alan Horn  to Artur Ayala          6/22/22 12:55 PM  This message is being sent by Babatunde Perez on behalf of Francisca Haines      Im getting cramps I think from the pills  Am I to keep taking      Maria Luisa Brown, RN  to Proxy for Ariadna Martinez (Babatunde Perez)          6/22/22 2:42 PM  Srinivas Ott, Have you started to have bowel movements with the linzess 72mcg? Last read by Babatunde Perez at 6:04 PM on 6/22/2022  Ariadna Martinez  to Juan Carlos Cheney PA-C          6/22/22 2:47 PM  This message is being sent by Babatunde Perez on behalf of Guille Lucero had one   Just started over weekend       Ariadna Martinez  to Artur Ayala          6/22/22 2:52 PM  This message is being sent by Babatunde Perez on behalf of uGille avalos had 2

## 2022-06-23 NOTE — TELEPHONE ENCOUNTER
I spoke to patient  She had 2 hard stools since starting the Linzess 72  I advised her to increase it to 145 so 2 pills a day    She will call back with an update

## 2022-07-06 ENCOUNTER — TELEPHONE (OUTPATIENT)
Dept: FAMILY MEDICINE CLINIC | Facility: CLINIC | Age: 62
End: 2022-07-06

## 2022-07-06 NOTE — TELEPHONE ENCOUNTER
Pt would like to know if you can order her rizatriptan   Has appt with her new neurologist on 09/02/2022    Please review and advise

## 2022-07-21 ENCOUNTER — APPOINTMENT (OUTPATIENT)
Dept: LAB | Facility: MEDICAL CENTER | Age: 62
End: 2022-07-21
Payer: COMMERCIAL

## 2022-07-21 DIAGNOSIS — E78.2 MIXED HYPERLIPIDEMIA: ICD-10-CM

## 2022-07-21 DIAGNOSIS — G43.009 MIGRAINE WITHOUT AURA AND WITHOUT STATUS MIGRAINOSUS, NOT INTRACTABLE: ICD-10-CM

## 2022-07-21 LAB
ALBUMIN SERPL BCP-MCNC: 3.4 G/DL (ref 3.5–5)
ALP SERPL-CCNC: 87 U/L (ref 46–116)
ALT SERPL W P-5'-P-CCNC: 21 U/L (ref 12–78)
ANION GAP SERPL CALCULATED.3IONS-SCNC: 5 MMOL/L (ref 4–13)
AST SERPL W P-5'-P-CCNC: 14 U/L (ref 5–45)
BILIRUB SERPL-MCNC: 0.28 MG/DL (ref 0.2–1)
BUN SERPL-MCNC: 11 MG/DL (ref 5–25)
CALCIUM ALBUM COR SERPL-MCNC: 9.8 MG/DL (ref 8.3–10.1)
CALCIUM SERPL-MCNC: 9.3 MG/DL (ref 8.3–10.1)
CHLORIDE SERPL-SCNC: 110 MMOL/L (ref 96–108)
CHOLEST SERPL-MCNC: 194 MG/DL
CO2 SERPL-SCNC: 26 MMOL/L (ref 21–32)
CREAT SERPL-MCNC: 0.86 MG/DL (ref 0.6–1.3)
GFR SERPL CREATININE-BSD FRML MDRD: 72 ML/MIN/1.73SQ M
GLUCOSE P FAST SERPL-MCNC: 97 MG/DL (ref 65–99)
HDLC SERPL-MCNC: 50 MG/DL
LDLC SERPL CALC-MCNC: 122 MG/DL (ref 0–100)
POTASSIUM SERPL-SCNC: 4.5 MMOL/L (ref 3.5–5.3)
PROT SERPL-MCNC: 7.5 G/DL (ref 6.4–8.4)
SODIUM SERPL-SCNC: 141 MMOL/L (ref 135–147)
TRIGL SERPL-MCNC: 112 MG/DL

## 2022-07-21 PROCEDURE — 80061 LIPID PANEL: CPT

## 2022-07-21 PROCEDURE — 80053 COMPREHEN METABOLIC PANEL: CPT

## 2022-07-21 PROCEDURE — 36415 COLL VENOUS BLD VENIPUNCTURE: CPT

## 2022-07-21 RX ORDER — RIZATRIPTAN BENZOATE 10 MG/1
10 TABLET ORAL
Qty: 9 TABLET | Refills: 5 | Status: SHIPPED | OUTPATIENT
Start: 2022-07-21 | End: 2022-10-03 | Stop reason: SDUPTHER

## 2022-08-10 ENCOUNTER — OFFICE VISIT (OUTPATIENT)
Dept: FAMILY MEDICINE CLINIC | Facility: CLINIC | Age: 62
End: 2022-08-10
Payer: COMMERCIAL

## 2022-08-10 VITALS
HEART RATE: 67 BPM | HEIGHT: 64 IN | SYSTOLIC BLOOD PRESSURE: 130 MMHG | BODY MASS INDEX: 39.78 KG/M2 | WEIGHT: 233 LBS | OXYGEN SATURATION: 97 % | TEMPERATURE: 98.6 F | DIASTOLIC BLOOD PRESSURE: 70 MMHG

## 2022-08-10 DIAGNOSIS — K21.9 GASTROESOPHAGEAL REFLUX DISEASE WITHOUT ESOPHAGITIS: Primary | ICD-10-CM

## 2022-08-10 DIAGNOSIS — E66.9 OBESITY, UNSPECIFIED OBESITY SEVERITY, UNSPECIFIED OBESITY TYPE: ICD-10-CM

## 2022-08-10 DIAGNOSIS — R53.83 OTHER FATIGUE: ICD-10-CM

## 2022-08-10 DIAGNOSIS — E78.2 MIXED HYPERLIPIDEMIA: ICD-10-CM

## 2022-08-10 DIAGNOSIS — G47.33 OBSTRUCTIVE SLEEP APNEA SYNDROME: ICD-10-CM

## 2022-08-10 DIAGNOSIS — G47.01 INSOMNIA DUE TO MEDICAL CONDITION: ICD-10-CM

## 2022-08-10 DIAGNOSIS — G43.809 OTHER MIGRAINE WITHOUT STATUS MIGRAINOSUS, NOT INTRACTABLE: ICD-10-CM

## 2022-08-10 PROCEDURE — 99214 OFFICE O/P EST MOD 30 MIN: CPT | Performed by: FAMILY MEDICINE

## 2022-08-10 RX ORDER — PROPRANOLOL HYDROCHLORIDE 120 MG/1
120 CAPSULE, EXTENDED RELEASE ORAL DAILY
Qty: 90 CAPSULE | Refills: 1 | Status: SHIPPED | OUTPATIENT
Start: 2022-08-10 | End: 2022-09-01 | Stop reason: SDUPTHER

## 2022-08-10 RX ORDER — ATORVASTATIN CALCIUM 40 MG/1
40 TABLET, FILM COATED ORAL DAILY
Qty: 90 TABLET | Refills: 1 | Status: SHIPPED | OUTPATIENT
Start: 2022-08-10 | End: 2022-10-03

## 2022-08-10 RX ORDER — TRAZODONE HYDROCHLORIDE 100 MG/1
100 TABLET ORAL
Qty: 30 TABLET | Refills: 2 | Status: SHIPPED | OUTPATIENT
Start: 2022-08-10 | End: 2022-09-01 | Stop reason: SDUPTHER

## 2022-08-10 NOTE — PROGRESS NOTES
Assessment/Plan:         Problem List Items Addressed This Visit        Digestive    Gastroesophageal reflux disease without esophagitis - Primary     Patient to continue with present therapy and decrease caffeine, avoid ETOH and smoking to decrease acid production  Pt should also cease eating prior to bedtime and avoid excessive fluid intake prior to sleep  May use antacids as needed for breakthrough GERD  All pateint questions answered today about this condition  Respiratory    Apnea, sleep     Patient instructed to continue using CPAP as directed to decrease episodes of apnea to minimize risk of cardiac complications  Pt instructed to kep  machine in clean working order and to call if any replacement parts are needed  Cardiovascular and Mediastinum    Migraine     Patient is stable  and will continue present plan of care and reassess at next routine visit  All questions about this problem from patient were answered today  Other    Obesity, unspecified obesity severity, unspecified obesity type     Patient to increase exercise and partake of a diet with less calories to promote  weight loss         Mixed hyperlipidemia     Patient  is stable with current medication and we discussed a low fat low cholesterol diet  Weight loss also discussed for this will help lower cholesterol also  Recheck lipids in 6 months  Subjective:      Patient ID: Will Johnson is a 57 y o  female  This 76-year-old female here today for checkup for her yearly physical exam as well as checkup on multiple medical problems  Patient with history of migraines hyperlipidemia BMI of 39 and is doing well  Patient has been complaining about having a problem with falling asleep and staying asleep at night  Will see about getting started on some trazodone to see the skin help with her insomnia diet    Patient had her cholesterol done and is doing very well with her cholesterol though she was out of her medication which I will refill for her today  Patient will come back to see us in 1 month to reassess her for her insomnia  The following portions of the patient's history were reviewed and updated as appropriate:   Past Medical History:  She has a past medical history of Anemia, Cancer ([address] ) (), Migraine, Mixed hyperlipidemia (2021), and Skin cancer  ,  _______________________________________________________________________  Medical Problems:  does not have any pertinent problems on file ,  _______________________________________________________________________  Past Surgical History:   has a past surgical history that includes Tonsillectomy; Dilation and curettage of uterus;  section; and Ankle surgery (Left, 2017)  ,  _______________________________________________________________________  Family History:  family history includes Lung cancer in her brother; No Known Problems in her mother ,  _______________________________________________________________________  Social History:   reports that she has never smoked  She has never used smokeless tobacco  She reports previous alcohol use  She reports that she does not use drugs  ,  _______________________________________________________________________  Allergies:  is allergic to codeine     _______________________________________________________________________  Current Outpatient Medications   Medication Sig Dispense Refill    aspirin (ECOTRIN LOW STRENGTH) 81 mg EC tablet Take 1 tablet by mouth daily      atorvastatin (LIPITOR) 40 mg tablet Take 1 tablet (40 mg total) by mouth daily 30 tablet 5    Cholecalciferol (VITAMIN D3 PO) Take 1 tablet by mouth daily      linaCLOtide (Linzess) 72 MCG CAPS Take 72 mcg by mouth in the morning 90 capsule 3    MAGNESIUM PO Take 1 tablet by mouth daily      Myrbetriq 25 MG TB24       omeprazole (PriLOSEC) 20 mg delayed release capsule Take 1 capsule (20 mg total) by mouth daily 60 capsule 5    propranolol (INDERAL LA) 120 mg 24 hr capsule Take 120 mg by mouth daily      rizatriptan (MAXALT) 10 mg tablet Take 1 tablet (10 mg total) by mouth every 2 (two) hours Take at the onset of migraine; if symptoms continue or return, may take another dose at least 2 hours after first dose  Take no more than 2 doses in a day  9 tablet 5     No current facility-administered medications for this visit      _______________________________________________________________________  Review of Systems   Constitutional: Negative for activity change, appetite change, fatigue and fever  HENT: Negative for congestion, ear pain, postnasal drip, rhinorrhea, sinus pressure, sinus pain, sneezing and sore throat  Eyes: Negative for pain and redness  Respiratory: Negative for apnea, cough, chest tightness, shortness of breath and wheezing  Cardiovascular: Negative for chest pain, palpitations and leg swelling  Gastrointestinal: Negative for abdominal pain, constipation, diarrhea, nausea and vomiting  Endocrine: Negative for cold intolerance and heat intolerance  Genitourinary: Negative for difficulty urinating, dysuria, frequency, hematuria and urgency  Musculoskeletal: Negative for arthralgias, back pain, gait problem and myalgias  Skin: Negative for rash  Neurological: Negative for dizziness, speech difficulty, weakness, numbness and headaches  Hematological: Does not bruise/bleed easily  Psychiatric/Behavioral: Negative for agitation, confusion and hallucinations  Objective: There were no vitals filed for this visit  There is no height or weight on file to calculate BMI  Physical Exam  Vitals and nursing note reviewed  Constitutional:       Appearance: She is well-developed  HENT:      Head: Normocephalic and atraumatic  Nose: Nose normal    Eyes:      General: No scleral icterus  Conjunctiva/sclera: Conjunctivae normal       Pupils: Pupils are equal, round, and reactive to light  Neck:      Thyroid: No thyromegaly  Pulmonary:      Effort: Pulmonary effort is normal       Breath sounds: Normal breath sounds  No wheezing  Abdominal:      General: Bowel sounds are normal  There is no distension  Palpations: Abdomen is soft  Tenderness: There is no abdominal tenderness  There is no guarding or rebound  Musculoskeletal:         General: No tenderness or deformity  Normal range of motion  Cervical back: Normal range of motion and neck supple  Skin:     General: Skin is warm and dry  Findings: No erythema or rash  Neurological:      Mental Status: She is alert and oriented to person, place, and time  Sensory: No sensory deficit  Psychiatric:         Behavior: Behavior normal          Thought Content:  Thought content normal          Judgment: Judgment normal

## 2022-09-01 DIAGNOSIS — G43.809 OTHER MIGRAINE WITHOUT STATUS MIGRAINOSUS, NOT INTRACTABLE: ICD-10-CM

## 2022-09-01 DIAGNOSIS — G47.01 INSOMNIA DUE TO MEDICAL CONDITION: ICD-10-CM

## 2022-09-01 DIAGNOSIS — K21.9 GASTROESOPHAGEAL REFLUX DISEASE WITHOUT ESOPHAGITIS: ICD-10-CM

## 2022-09-01 RX ORDER — OMEPRAZOLE 20 MG/1
20 CAPSULE, DELAYED RELEASE ORAL DAILY
Qty: 60 CAPSULE | Refills: 5 | Status: SHIPPED | OUTPATIENT
Start: 2022-09-01

## 2022-09-01 RX ORDER — PROPRANOLOL HYDROCHLORIDE 120 MG/1
120 CAPSULE, EXTENDED RELEASE ORAL DAILY
Qty: 90 CAPSULE | Refills: 1 | Status: SHIPPED | OUTPATIENT
Start: 2022-09-01 | End: 2022-10-13

## 2022-09-01 RX ORDER — TRAZODONE HYDROCHLORIDE 100 MG/1
100 TABLET ORAL
Qty: 30 TABLET | Refills: 2 | Status: SHIPPED | OUTPATIENT
Start: 2022-09-01

## 2022-09-14 ENCOUNTER — VBI (OUTPATIENT)
Dept: ADMINISTRATIVE | Facility: OTHER | Age: 62
End: 2022-09-14

## 2022-09-22 ENCOUNTER — TELEPHONE (OUTPATIENT)
Dept: FAMILY MEDICINE CLINIC | Facility: CLINIC | Age: 62
End: 2022-09-22

## 2022-10-02 NOTE — PROGRESS NOTES
Name: Ankita Melvin      : 1960      MRN: [id number]  Encounter Provider: Cathy Haddad MD  Encounter Date: 10/3/2022   Encounter department: [address] [place] [address] Place     1  Gastroesophageal reflux disease without esophagitis  Assessment & Plan:  Patient to continue with present therapy and decrease caffeine, avoid ETOH and smoking to decrease acid production  Pt should also cease eating prior to bedtime and avoid excessive fluid intake prior to sleep  May use antacids as needed for breakthrough GERD  All pateint questions answered today about this condition  2  Other migraine without status migrainosus, not intractable  Assessment & Plan:  Patient is stable  and will continue present plan of care and reassess at next routine visit  All questions about this problem from patient were answered today  3  Mixed hyperlipidemia  Assessment & Plan:  Patient  is stable with current medication and we discussed a low fat low cholesterol diet  Weight loss also discussed for this will help lower cholesterol also  Recheck lipids in 6 months  Orders:  -     pravastatin (PRAVACHOL) 80 mg tablet; Take 1 tablet (80 mg total) by mouth daily at bedtime    4  Obesity, unspecified obesity severity, unspecified obesity type  Assessment & Plan:  Patient to increase exercise and partake of a diet with less calories to promote  weight loss      5  Well adult exam    6  Screen for colon cancer    7  Encounter for immunization    8  Migraine without aura and without status migrainosus, not intractable  Assessment & Plan:  Patient is stable  and will continue present plan of care and reassess at next routine visit  All questions about this problem from patient were answered today  Orders:  -     rizatriptan (MAXALT) 10 mg tablet;  Take 1 tablet (10 mg total) by mouth every 2 (two) hours Take at the onset of migraine; if symptoms continue or return, may take another dose at least 2 hours after first dose  Take no more than 2 doses in a day  9  Immunization due  -     influenza vaccine, quadrivalent, recombinant, PF, 0 5 mL, for patients 18 yr+ (FLUBLOK)  -     Pneumococcal Conjugate Vaccine 20-valent (Pcv20)           Subjective     57-year-old female here today for checkup for her yearly exam patient also with history of some GERD migraines hyperlipidemia history of left ankle repair after fracture GERD and a BMI of 40  Patient had been taking Lipitor for cholesterol which had come down nicely however she was having problems taking Lipitor and stop that medicine will see about switching over to Pravachol 80 mg once a day and recheck her lab work which she is due for back in about 2 months  Patient also with some moderate amount of wax in both ears right being worse than the left patient will use Debrox or peroxide to treat the years previous until she comes back in visit in about 2 weeks for a flushing  Patient refills done today will call with other ones are appropriate to refill  She also would like a flu shot and will also give her the  Prevnar 20  Review of Systems    Past Medical History:   Diagnosis Date    Anemia     Cancer [facility]) 2007    breast history of    Migraine     Mixed hyperlipidemia 2021    Skin cancer     Left leg lump   cryotherapy and excision, unsure of which kind      Past Surgical History:   Procedure Laterality Date    ANKLE SURGERY Left     pins and plate      SECTION      x2    DILATION AND CURETTAGE OF UTERUS      TONSILLECTOMY      age 4     Family History   Problem Relation Age of Onset    No Known Problems Mother     Lung cancer Brother      Social History     Socioeconomic History    Marital status:      Spouse name: None    Number of children: None    Years of education: None    Highest education level: None   Occupational History    None   Tobacco Use    Smoking status: Never Smoker    Smokeless tobacco: Never Used   Vaping Use    Vaping Use: Never used   Substance and Sexual Activity    Alcohol use: Not Currently     Comment: social wine    Drug use: No    Sexual activity: None   Other Topics Concern    None   Social History Narrative    None     Social Determinants of Health     Financial Resource Strain: Not on file   Food Insecurity: Not on file   Transportation Needs: Not on file   Physical Activity: Not on file   Stress: Not on file   Social Connections: Not on file   Intimate Partner Violence: Not on file   Housing Stability: Not on file     Current Outpatient Medications on File Prior to Visit   Medication Sig    aspirin (ECOTRIN LOW STRENGTH) 81 mg EC tablet Take 1 tablet by mouth daily    Cholecalciferol (VITAMIN D3 PO) Take 1 tablet by mouth daily    MAGNESIUM PO Take 1 tablet by mouth daily    Myrbetriq 50 MG TB24 Take 1 tablet by mouth daily    omeprazole (PriLOSEC) 20 mg delayed release capsule Take 1 capsule (20 mg total) by mouth daily    propranolol (INDERAL LA) 120 mg 24 hr capsule Take 1 capsule (120 mg total) by mouth daily    traZODone (DESYREL) 100 mg tablet Take 1 tablet (100 mg total) by mouth daily at bedtime as needed for sleep    [DISCONTINUED] rizatriptan (MAXALT) 10 mg tablet Take 1 tablet (10 mg total) by mouth every 2 (two) hours Take at the onset of migraine; if symptoms continue or return, may take another dose at least 2 hours after first dose  Take no more than 2 doses in a day      [DISCONTINUED] atorvastatin (LIPITOR) 40 mg tablet Take 1 tablet (40 mg total) by mouth daily (Patient not taking: Reported on 10/3/2022)    [DISCONTINUED] linaCLOtide (Linzess) 72 MCG CAPS Take 72 mcg by mouth in the morning (Patient not taking: Reported on 10/3/2022)    [DISCONTINUED] Myrbetriq 25 MG TB24  (Patient not taking: Reported on 10/3/2022)     Allergies   Allergen Reactions    Codeine Headache     Headaches  Other reaction(s): headache     Immunization History   Administered Date(s) Administered    INFLUENZA 08/06/2014, 10/19/2015, 08/21/2016, 11/17/2017, 08/19/2018    Influenza Quadrivalent Preservative Free 3 years and older IM 08/19/2018    Influenza, injectable, quadrivalent, preservative free 0 5 mL 08/19/2018    Pneumococcal Polysaccharide PPV23 05/25/2020    Tdap 09/13/2012    Tuberculin Skin Test-PPD Intradermal 08/06/2018, 08/23/2018    Zoster 05/29/2015       Objective     /80 (BP Location: Left arm, Patient Position: Sitting, Cuff Size: Large)   Pulse 72   Temp (!) 97 4 °F (36 3 °C) (Temporal)   Resp 16   Ht 5' 4" (1 626 m)   Wt 106 kg (234 lb)   SpO2 98%   BMI 40 17 kg/m²     Physical Exam  Radha Marmolejo MD

## 2022-10-03 ENCOUNTER — OFFICE VISIT (OUTPATIENT)
Dept: FAMILY MEDICINE CLINIC | Facility: CLINIC | Age: 62
End: 2022-10-03
Payer: COMMERCIAL

## 2022-10-03 ENCOUNTER — TELEPHONE (OUTPATIENT)
Dept: NEUROLOGY | Facility: CLINIC | Age: 62
End: 2022-10-03

## 2022-10-03 VITALS
SYSTOLIC BLOOD PRESSURE: 132 MMHG | TEMPERATURE: 97.4 F | BODY MASS INDEX: 39.95 KG/M2 | WEIGHT: 234 LBS | DIASTOLIC BLOOD PRESSURE: 80 MMHG | RESPIRATION RATE: 16 BRPM | HEART RATE: 72 BPM | OXYGEN SATURATION: 98 % | HEIGHT: 64 IN

## 2022-10-03 DIAGNOSIS — Z23 ENCOUNTER FOR IMMUNIZATION: ICD-10-CM

## 2022-10-03 DIAGNOSIS — G43.809 OTHER MIGRAINE WITHOUT STATUS MIGRAINOSUS, NOT INTRACTABLE: ICD-10-CM

## 2022-10-03 DIAGNOSIS — Z00.00 WELL ADULT EXAM: ICD-10-CM

## 2022-10-03 DIAGNOSIS — E78.2 MIXED HYPERLIPIDEMIA: ICD-10-CM

## 2022-10-03 DIAGNOSIS — Z12.11 SCREEN FOR COLON CANCER: ICD-10-CM

## 2022-10-03 DIAGNOSIS — E66.9 OBESITY, UNSPECIFIED OBESITY SEVERITY, UNSPECIFIED OBESITY TYPE: ICD-10-CM

## 2022-10-03 DIAGNOSIS — G43.009 MIGRAINE WITHOUT AURA AND WITHOUT STATUS MIGRAINOSUS, NOT INTRACTABLE: ICD-10-CM

## 2022-10-03 DIAGNOSIS — K21.9 GASTROESOPHAGEAL REFLUX DISEASE WITHOUT ESOPHAGITIS: Primary | ICD-10-CM

## 2022-10-03 DIAGNOSIS — Z23 IMMUNIZATION DUE: ICD-10-CM

## 2022-10-03 PROCEDURE — 90472 IMMUNIZATION ADMIN EACH ADD: CPT | Performed by: FAMILY MEDICINE

## 2022-10-03 PROCEDURE — 90471 IMMUNIZATION ADMIN: CPT | Performed by: FAMILY MEDICINE

## 2022-10-03 PROCEDURE — 90682 RIV4 VACC RECOMBINANT DNA IM: CPT | Performed by: FAMILY MEDICINE

## 2022-10-03 PROCEDURE — 99396 PREV VISIT EST AGE 40-64: CPT | Performed by: FAMILY MEDICINE

## 2022-10-03 PROCEDURE — 90677 PCV20 VACCINE IM: CPT | Performed by: FAMILY MEDICINE

## 2022-10-03 RX ORDER — RIZATRIPTAN BENZOATE 10 MG/1
10 TABLET ORAL
Qty: 9 TABLET | Refills: 5 | Status: SHIPPED | OUTPATIENT
Start: 2022-10-03 | End: 2022-10-13

## 2022-10-03 RX ORDER — PRAVASTATIN SODIUM 80 MG/1
80 TABLET ORAL
Qty: 90 TABLET | Refills: 1 | Status: SHIPPED | OUTPATIENT
Start: 2022-10-03 | End: 2022-10-14 | Stop reason: SDUPTHER

## 2022-10-03 RX ORDER — MIRABEGRON 50 MG/1
1 TABLET, FILM COATED, EXTENDED RELEASE ORAL DAILY
COMMUNITY
Start: 2022-09-26

## 2022-10-06 ENCOUNTER — TELEPHONE (OUTPATIENT)
Dept: GASTROENTEROLOGY | Facility: AMBULARY SURGERY CENTER | Age: 62
End: 2022-10-06

## 2022-10-06 NOTE — TELEPHONE ENCOUNTER
Patients GI provider:  Dr Judson Haley     Number to return call: [phone]    Reason for call: Zay Vicente from [address] requesting for colonoscopy report from 2017    Scheduled procedure/appointment date if applicable: Apt/procedure n/a

## 2022-10-07 ENCOUNTER — TELEPHONE (OUTPATIENT)
Dept: ADMINISTRATIVE | Facility: OTHER | Age: 62
End: 2022-10-07

## 2022-10-07 NOTE — TELEPHONE ENCOUNTER
----- Message from Alonso Mayorga sent at 10/6/2022  1:15 PM EDT -----  Regarding: Care Gap Request  10/06/22 1:15 PM    Hello, our patient Jason Feldman has had CRC: Colonoscopy completed/performed  Please assist in updating the patient chart by making an External outreach to Dr Ramses Duarte facility located in [address] [place] [address]  The date of service is 2017      Thank you,  Alonso Mayorga  [facility]

## 2022-10-13 ENCOUNTER — CONSULT (OUTPATIENT)
Dept: NEUROLOGY | Facility: CLINIC | Age: 62
End: 2022-10-13
Payer: COMMERCIAL

## 2022-10-13 ENCOUNTER — TELEPHONE (OUTPATIENT)
Dept: NEUROLOGY | Facility: CLINIC | Age: 62
End: 2022-10-13

## 2022-10-13 VITALS
BODY MASS INDEX: 39.95 KG/M2 | TEMPERATURE: 93 F | WEIGHT: 234 LBS | HEART RATE: 67 BPM | SYSTOLIC BLOOD PRESSURE: 125 MMHG | HEIGHT: 64 IN | DIASTOLIC BLOOD PRESSURE: 73 MMHG | OXYGEN SATURATION: 96 %

## 2022-10-13 DIAGNOSIS — G20 PARKINSONISM (HCC): ICD-10-CM

## 2022-10-13 DIAGNOSIS — G43.111 MIGRAINE WITH AURA, INTRACTABLE, WITH STATUS MIGRAINOSUS: Primary | ICD-10-CM

## 2022-10-13 DIAGNOSIS — G47.33 OBSTRUCTIVE SLEEP APNEA (ADULT) (PEDIATRIC): ICD-10-CM

## 2022-10-13 PROCEDURE — 99205 OFFICE O/P NEW HI 60 MIN: CPT | Performed by: STUDENT IN AN ORGANIZED HEALTH CARE EDUCATION/TRAINING PROGRAM

## 2022-10-13 RX ORDER — PROPRANOLOL HCL 60 MG
CAPSULE, EXTENDED RELEASE 24HR ORAL
Qty: 10 CAPSULE | Refills: 0 | Status: SHIPPED | OUTPATIENT
Start: 2022-10-13 | End: 2022-10-23

## 2022-10-13 RX ORDER — NAPROXEN 500 MG/1
500 TABLET ORAL 2 TIMES DAILY WITH MEALS
Qty: 14 TABLET | Refills: 0 | Status: SHIPPED | OUTPATIENT
Start: 2022-10-13 | End: 2022-10-20

## 2022-10-13 RX ORDER — PROPRANOLOL HYDROCHLORIDE 80 MG/1
80 CAPSULE, EXTENDED RELEASE ORAL DAILY
Qty: 7 CAPSULE | Refills: 0 | Status: SHIPPED | OUTPATIENT
Start: 2022-10-13 | End: 2022-10-20

## 2022-10-13 RX ORDER — RIMEGEPANT SULFATE 75 MG/75MG
TABLET, ORALLY DISINTEGRATING ORAL
Qty: 8 TABLET | Refills: 3 | Status: SHIPPED | OUTPATIENT
Start: 2022-10-13

## 2022-10-13 RX ORDER — PROCHLORPERAZINE MALEATE 10 MG
TABLET ORAL
Qty: 15 TABLET | Refills: 4 | Status: SHIPPED | OUTPATIENT
Start: 2022-10-13

## 2022-10-13 NOTE — PATIENT INSTRUCTIONS
Additional Testing:   Neurodiagnostic workup: NM brain Andrea Scan  - Sleep study    Headache Calendar  Please maintain a headache calendar  Consider using phone applications such as Migraine Shayne or [employer] Migraine Tracker    Headache/migraine treatment:   Acute medications (for immediate treatment of a headache): It is ok to take ibuprofen, acetaminophen or naproxen (Advil, Tylenol,  Aleve, Excedrin) if they help your headaches you should limit these to No more than 2-3 times a week to avoid medication overuse/rebound headaches  For your more moderate to severe migraines take this medication early  Nurtec 75mg oral dissolving tablet    Bridge Therapy  - Naproxen 500mg tablets  - 1 tablet twice daily with food or milk for a total of 7 days    Rescue medication  - Can combine Nurtec with Compazine 10mg and Benadryl 25mg    Prescription preventive medications for headaches/migraines   (to take every day to help prevent headaches - not to take at the time of headache):  [x] Emgality/Galcanezumab - the 1st dose is 240 mg loading dose of 2 consecutive 120 mg injections  Thereafter, 120 mg injections every 30 days    If needed there is a coupon card for the copay at [employer]  com     READ INSTRUCTIONS that come with the medication  REFRIGERATE  Keep out of direct sunlight  Prior to administration, allow to come to room temperature for 30 minutes  Do not warm using a heat source (eg, microwave or hot water)  Do not shake  Administer in preferably abdomen (avoiding 2 inches around the navel), thigh, upper arm, or buttocks avoiding areas of skin that are tender, bruised, red or hard  Deliver entire contents of single-use prefilled pen or syringe  *Typically these types of medications take time until you see the benefit, although some may see improvement in days, often it may take weeks, especially if the medication is being titrated up to a beneficial level   Please contact us if there are any concerns or questions regarding the medication  Stop Propranolol  Week 1: 80mg daily for 7 days  Week 2: 60mg daily for 7 days  Week 3: 60mg every other day for a total of 3 days and then stop    Sleep and headache prevention:   Sleep Education/Resources  1) Get up at the same time seven days out of the week  2) Only go to bed when feeling sleepy  3) Wind down in the evening without electronics  4) Stimulus Control: If lying in bed for 15-20 minutes (estimated because the clock is turned away so you cannot see it) and you are not asleep get up and do something relaxing in a different room (reading a magazine article, solitaire with a deck of cards)  Do this in the middle of the night as well if awake  Avoid doing work or getting on the computer  5) Bedroom for sleep only  No watching TV or using electronics (computer, phone, tablet etc )  in bed  6) Turn clock away so you cannot see it in bed  7) Exercise regularly but try to avoid exercise within 4 hours of bedtime  Morning exercise is best      8) Avoid caffeine in the afternoon  Considering tapering down on caffeine by decreasing by one beverage with caffeine every 3 days until off  9) Avoid smoking near bedtime     10) Avoid alcohol before bed  If you consume one alcoholic beverage allow 3 hours between that drink and bedtime  If you consume two alcoholic beverages allow 5 hours  Between those drinks and bedtime  Alcohol may lead to waking at night  11) Avoid napping except for driving safety  If you feel to sleepy to drive do not drive  If you get sleepy while driving pull over and nap  You may resume driving once you feel alert  12) Read "No More Sleepless Nights" by Wen Loving PhD      13) There are some on-line resources that do require a fee that can be of help    Two credible websites are as follows:  http://[employer]/cbt-online-insomnia-treatment html  [employer]  An joaquin used by the [place] is as follows:  CBT-I   Go! To Sleep by the [facility]  Lifestyle Recommendations:  [x] SLEEP - Maintain a regular sleep schedule: Adults need at least 7-8 hours of uninterrupted a night  Maintain good sleep hygiene:  Going to bed and waking up at consistent times, avoiding excessive daytime naps, avoiding caffeinated beverages in the evening, avoid excessive stimulation in the evening and generally using bed primarily for sleeping  One hour before bedtime would recommend turning lights down lower, decreasing your activity (may read quietly, listen to music at a low volume)  When you get into bed, should eliminate all technology (no texting, emailing, playing with your phone, iPad or tablet in bed)  [x] HYDRATION - Maintain good hydration  Drink  2L of fluid a day (4 typical small water bottles)  [x] DIET - Maintain good nutrition  In particular don't skip meals and try and eat healthy balanced meals regularly  [x] TRIGGERS - Look for other triggers and avoid them: Limit caffeine to 1-2 cups a day or less  Avoid dietary triggers that you have noticed bring on your headaches (this could include aged cheese, peanuts, MSG, aspartame and nitrates)  [x] EXERCISE - physical exercise as we all know is good for you in many ways, and not only is good for your heart, but also is beneficial for your mental health, cognitive health and  chronic pain/headaches  I would encourage at the least 5 days of physical exercise weekly for at least 30 minutes  Education and Follow-up  [x] Please call with any questions or concerns  Of course if any new concerning symptoms go to the emergency department    [x] Follow up in 3 months

## 2022-10-13 NOTE — PROGRESS NOTES
[facility] Consult  PATIENT:  Santiago Yao  MRN:  [id number]  :  1960  DATE OF SERVICE:  10/13/2022  REFERRED BY: Yareli Glaser MD  PMD: Dean Jones MD    Assessment/Plan:     Santiago Yao is a very pleasant 57 y o  female with a past medical history that includes GERD, LORETTA, HLD referred here for evaluation of headache  Initial evaluation 10/13/2022     Ms Ravi Cuenca reports a longstanding history of migraines since she was about 4years old  She used to follow with [facility] and was last seen in   She had been on propanolol for a few years and reported that it was working well but recently noted that it was not helping (plan is to taper off of it slowly over 3 weeks)  We discussed a variety of treatment options but decided that Emgality would be a good choice given her multiple prior failed medications  From an abortive standpoint, she has failed multiple triptans and would be a good candidate to try Nurtec  Due to the prolonged nature of her severe headaches, I would like her to combine the Nurtec with Compazine and Benadryl  I will also bridge her with naproxen for 7 days to help decrease her overall pain at the moment  A potential contributing factor to her uncontrolled headaches is sleep apnea  She states that she was at 1 point diagnosed with it and then told that she did not have it  For clarity purposes and because she meets the criteria, I will obtain a repeat sleep study  Finally she endorsed a recent fall last week after she tripped over a rug  In addition she reports that she developed a left upper extremity tremor over the last year  On examination she has features consistent with parkinsonism and I would like to obtain more information with a michelle scan  Migraine with aura, intractable, with status migrainosus  -     Rimegepant Sulfate (Nurtec) 75 MG TBDP; Take one NURTEC 75 mg at onset under tongue  Limit 1 in 24 hours   8 a month  -     Galcanezumab-gnlm 120 MG/ML SOAJ; Inject 240 mg under the skin once for 1 dose For just the first month loading dose, followed by 1 injection monthly on separate prescription   -     Galcanezumab-gnlm 120 MG/ML SOAJ; Inject 120 mg under the skin every 30 (thirty) days Following the first month loading dose of 2 pens  -     propranolol (INDERAL LA) 80 mg 24 hr capsule; Take 1 capsule (80 mg total) by mouth daily for 7 days  -     propranolol (INDERAL LA) 60 mg 24 hr capsule; Take 1 capsule (60 mg total) by mouth daily for 7 days, THEN 1 capsule (60 mg total) every other day for 3 days   -     naproxen (EC NAPROSYN) 500 MG EC tablet; Take 1 tablet (500 mg total) by mouth 2 (two) times a day with meals for 7 days  -     prochlorperazine (COMPAZINE) 10 mg tablet; One tab up to t i d  p r n  Migraine  Max 3 days per week  Obstructive sleep apnea (adult) (pediatric)  -     Diagnostic Sleep Study; Future    Parkinsonism ([facility] )  -     NM brain michelle scan w/Rx; Future    Workup:  - Neurologic assessment reveals unremarkable neurological exam   - michelle scan for parkinsonism  - sleep study    Preventative:  - we discussed headache hygiene and lifestyle factors that may improve headaches  - Emgality  - taper off of propanolol  - Currently on through other providers:  None  - Past/ failed/contraindicated: Topamax, verapamil, gabapentin, nortriptyline, magnesium all failed  - future options: CGRP med, botox    Rescue:  - discussed not taking over-the-counter or prescription pain medications more than 3 days per week to prevent medication overuse/rebound headache  - Nurtec 75mg ODT  - Bridge with 7 days Naproxen  - Compazine and Benadryl combo for prolonged migraine  - Currently on through other providers: None  - Past/ failed/contraindicated: Maxalt, Imitrex, Zomig  - future options:   Toradol IM or p o , could consider trial of 5 days of Depakote 500 mg nightly or dexamethasone 2 mg daily for prolonged migraine, Christopher Harrison  Patient instructions   Additional Testing:   Neurodiagnostic workup: NM brain Andrea Scan  - Sleep study    Headache Calendar  Please maintain a headache calendar  Consider using phone applications such as Migraine Shayne or [employer] Migraine Tracker    Headache/migraine treatment:   Acute medications (for immediate treatment of a headache): It is ok to take ibuprofen, acetaminophen or naproxen (Advil, Tylenol,  Aleve, Excedrin) if they help your headaches you should limit these to No more than 2-3 times a week to avoid medication overuse/rebound headaches  For your more moderate to severe migraines take this medication early  Nurtec 75mg oral dissolving tablet    Bridge Therapy  - Naproxen 500mg tablets  - 1 tablet twice daily with food or milk for a total of 7 days    Rescue medication  - Can combine Nurtec with Compazine 10mg and Benadryl 25mg    Prescription preventive medications for headaches/migraines   (to take every day to help prevent headaches - not to take at the time of headache):  [x] Emgality/Galcanezumab - the 1st dose is 240 mg loading dose of 2 consecutive 120 mg injections  Thereafter, 120 mg injections every 30 days    If needed there is a coupon card for the copay at [employer]     READ INSTRUCTIONS that come with the medication  REFRIGERATE  Keep out of direct sunlight  Prior to administration, allow to come to room temperature for 30 minutes  Do not warm using a heat source (eg, microwave or hot water)  Do not shake  Administer in preferably abdomen (avoiding 2 inches around the navel), thigh, upper arm, or buttocks avoiding areas of skin that are tender, bruised, red or hard  Deliver entire contents of single-use prefilled pen or syringe  *Typically these types of medications take time until you see the benefit, although some may see improvement in days, often it may take weeks, especially if the medication is being titrated up to a beneficial level   Please contact us if there are any concerns or questions regarding the medication  Stop Propranolol  Week 1: 80mg daily for 7 days  Week 2: 60mg daily for 7 days  Week 3: 60mg every other day for a total of 3 days and then stop    Sleep and headache prevention:   Sleep Education/Resources  1) Get up at the same time seven days out of the week  2) Only go to bed when feeling sleepy  3) Wind down in the evening without electronics  4) Stimulus Control: If lying in bed for 15-20 minutes (estimated because the clock is turned away so you cannot see it) and you are not asleep get up and do something relaxing in a different room (reading a magazine article, solitaire with a deck of cards)  Do this in the middle of the night as well if awake  Avoid doing work or getting on the computer  5) Bedroom for sleep only  No watching TV or using electronics (computer, phone, tablet etc )  in bed  6) Turn clock away so you cannot see it in bed  7) Exercise regularly but try to avoid exercise within 4 hours of bedtime  Morning exercise is best      8) Avoid caffeine in the afternoon  Considering tapering down on caffeine by decreasing by one beverage with caffeine every 3 days until off  9) Avoid smoking near bedtime     10) Avoid alcohol before bed  If you consume one alcoholic beverage allow 3 hours between that drink and bedtime  If you consume two alcoholic beverages allow 5 hours  Between those drinks and bedtime  Alcohol may lead to waking at night  11) Avoid napping except for driving safety  If you feel to sleepy to drive do not drive  If you get sleepy while driving pull over and nap  You may resume driving once you feel alert  12) Read "No More Sleepless Nights" by Sarah Miller PhD      13) There are some on-line resources that do require a fee that can be of help    Two credible websites are as follows:  http://[employer]/cbt-online-insomnia-treatment html  [employer] si  An joaquin used by the [facility] is as follows:  CBT-I   Go! To Sleep by the [facility]  Lifestyle Recommendations:  [x] SLEEP - Maintain a regular sleep schedule: Adults need at least 7-8 hours of uninterrupted a night  Maintain good sleep hygiene:  Going to bed and waking up at consistent times, avoiding excessive daytime naps, avoiding caffeinated beverages in the evening, avoid excessive stimulation in the evening and generally using bed primarily for sleeping  One hour before bedtime would recommend turning lights down lower, decreasing your activity (may read quietly, listen to music at a low volume)  When you get into bed, should eliminate all technology (no texting, emailing, playing with your phone, iPad or tablet in bed)  [x] HYDRATION - Maintain good hydration  Drink  2L of fluid a day (4 typical small water bottles)  [x] DIET - Maintain good nutrition  In particular don't skip meals and try and eat healthy balanced meals regularly  [x] TRIGGERS - Look for other triggers and avoid them: Limit caffeine to 1-2 cups a day or less  Avoid dietary triggers that you have noticed bring on your headaches (this could include aged cheese, peanuts, MSG, aspartame and nitrates)  [x] EXERCISE - physical exercise as we all know is good for you in many ways, and not only is good for your heart, but also is beneficial for your mental health, cognitive health and  chronic pain/headaches  I would encourage at the least 5 days of physical exercise weekly for at least 30 minutes  Education and Follow-up  [x] Please call with any questions or concerns  Of course if any new concerning symptoms go to the emergency department  [x] Follow up in 3 months  CC: We had the pleasure of evaluating Tonya Camilo in neurological consultation today  Tonya Camilo is a   right handed female who presents today for evaluation of headaches  History obtained from patient as well as available medical record review    History of Present Illness:   Current medical illnesses  or past medical history include GERD, LORETTA, HLD    Pertinent history:  - Seen by neurology in 12/2018 with plans for trial Verapamil 180mg daily and use Maxalt as needed  Unable to tolerate Imitrex and Topamax did not help  - seen via telemedicine at [facility] 2021: At that visit it was reported that she had been tolerating propanolol 40 mg twice daily well without side effects  Reportedly stopped working in the month prior to that with worsening migraines  Propanolol was increased to 120 mg daily  - seen in ED July 2021 for migraine and treated with migraine cocktail    Headaches started at what age? 4years old  How often do the headaches occur?  - as of 10/13/2022: 30/30 (4/30 are severe)  What time of the day do the headaches start? No particular time of day  How long do the headaches last? 4-5 days  Are you ever headache free? No    Aura? with aura - described as kaleidoscope vision     Where is your headache located and pain quality? Usual location frontal/parietal, sometimes holocephalic and throbbing pain  What is the intensity of pain? Worst 10/10, Average: 7/10  Associated symptoms:   [x] Nausea       [x] Vomiting   [x] Stiff or sore neck   [x] Problems with concentration  [x] Photophobia     [x]Phonophobia      [x] Osmophobia  [x] Blurred vision   [x] Prefer quiet, dark room  [x] Light-headed or dizzy     [x] Tinnitus (described as a humming)    Things that make the headache worse? Worse with exertion    Headache triggers:  Poor sleep    Have you seen someone else for headaches or pain? Yes, [facility] neurology  Have you had trigger point injection performed and how often? No, reports injections in the past but not sure what kind  Have you had Botox injection performed and how often? No   Have you had epidural injections or transforaminal injections performed? No  Are you current pregnant or planning on getting pregnant?  N/A  Have you ever had any Brain imaging? Yes, MRI    Last eye exam: June 2022 - wears contacts for distance and reading; normal dilated exam    What medications do you take or have you taken for your headaches? ABORTIVE:    OTC medications: Excedrin migraine (does not help; takes it about 5 days out of the week)  Prescription: Rizatriptan (only takes it before bed because it makes her dizzy; doesn't necessarily help)    Past/ failed/contraindicated:  OTC medications: Tylenol, Advil (took them frequently in the past)  Prescription:  Zomig and Imitrex did not help    PREVENTIVE:   Propranolol 120mg daily (has not helped)    Past/ failed/contraindicated: Ineffective medications include Topamax, verapamil, gabapentin, nortriptyline, magnesium    LIFESTYLE  Sleep   - averages: about 6-7 hours  Problems falling asleep?:   Yes  Problems staying asleep?:  Yes  - Unknown history of snoring  - [place] sleepiness scale total: 18    Physical activity: usually walks 2 dogs but had a fall the other week and has difficulty at the moment    Water: plenty per day  Caffeine: 4 cups of decaffeinated tea per day    Mood:   No formal diagnosis of depression, but feels that way    The following portions of the patient's history were reviewed and updated as appropriate: allergies, current medications, past family history, past medical history, past social history, past surgical history and problem list     Pertinent family history:  Family history of headaches:  migraine headaches in grandmother and both sons  Any family history of aneurysms - No    Pertinent social history:  Work: home health aid  Education: college  Lives by herself    Illicit Drugs: denies  Alcohol/tobacco: Denies alcohol use, Denies tobacco use    Past Medical History:     Past Medical History:   Diagnosis Date   • Anemia    • Cancer ([facility] [address] ) 2007    breast history of   • Migraine    • Mixed hyperlipidemia 5/21/2021   • Skin cancer     Left leg lump   cryotherapy and excision, unsure of which kind Patient Active Problem List   Diagnosis   • Apnea, sleep   • Obesity, unspecified obesity severity, unspecified obesity type   • Migraine   • Mixed hyperlipidemia   • Gastroesophageal reflux disease without esophagitis   • Thrombocytosis       Medications:      Current Outpatient Medications   Medication Sig Dispense Refill   • aspirin (ECOTRIN LOW STRENGTH) 81 mg EC tablet Take 1 tablet by mouth daily     • Cholecalciferol (VITAMIN D3 PO) Take 1 tablet by mouth daily     • MAGNESIUM PO Take 1 tablet by mouth daily     • Myrbetriq 50 MG TB24 Take 1 tablet by mouth daily     • omeprazole (PriLOSEC) 20 mg delayed release capsule Take 1 capsule (20 mg total) by mouth daily 60 capsule 5   • propranolol (INDERAL LA) 120 mg 24 hr capsule Take 1 capsule (120 mg total) by mouth daily 90 capsule 1   • traZODone (DESYREL) 100 mg tablet Take 1 tablet (100 mg total) by mouth daily at bedtime as needed for sleep 30 tablet 2   • pravastatin (PRAVACHOL) 80 mg tablet Take 1 tablet (80 mg total) by mouth daily at bedtime (Patient not taking: Reported on 10/13/2022) 90 tablet 1   • rizatriptan (MAXALT) 10 mg tablet Take 1 tablet (10 mg total) by mouth every 2 (two) hours Take at the onset of migraine; if symptoms continue or return, may take another dose at least 2 hours after first dose  Take no more than 2 doses in a day  (Patient not taking: Reported on 10/13/2022) 9 tablet 5     No current facility-administered medications for this visit  Allergies:       Allergies   Allergen Reactions   • Codeine Headache     Headaches  Other reaction(s): headache       Family History:     Family History   Problem Relation Age of Onset   • No Known Problems Mother    • Lung cancer Brother        Social History:       Social History     Socioeconomic History   • Marital status:      Spouse name: Not on file   • Number of children: Not on file   • Years of education: Not on file   • Highest education level: Not on file Occupational History   • Not on file   Tobacco Use   • Smoking status: Never Smoker   • Smokeless tobacco: Never Used   Vaping Use   • Vaping Use: Never used   Substance and Sexual Activity   • Alcohol use: Not Currently   • Drug use: No   • Sexual activity: Not on file   Other Topics Concern   • Not on file   Social History Narrative   • Not on file     Social Determinants of Health     Financial Resource Strain: Not on file   Food Insecurity: Not on file   Transportation Needs: Not on file   Physical Activity: Not on file   Stress: Not on file   Social Connections: Not on file   Intimate Partner Violence: Not on file   Housing Stability: Not on file         Objective:   Physical Exam:                                                                 Vitals:            Constitutional:    /73 (BP Location: Left arm, Patient Position: Sitting, Cuff Size: Large)   Pulse 67   Temp (!) 93 °F (33 9 °C)   Ht 5' 4" (1 626 m)   Wt 106 kg (234 lb)   SpO2 96%   BMI 40 17 kg/m²   BP Readings from Last 3 Encounters:   10/13/22 125/73   10/03/22 132/80   08/10/22 130/70     Pulse Readings from Last 3 Encounters:   10/13/22 67   10/03/22 72   08/10/22 67         Well developed, well nourished, well groomed  No dysmorphic features  HEENT:  Normocephalic atraumatic  Oropharynx is clear and moist  No oral mucosal lesions  Chest:  Respirations regular and unlabored  Cardiovascular:  Distal extremities warm without palpable edema or tenderness, no observed significant swelling  Musculoskeletal:  (see below under neurologic exam for evaluation of motor function and gait)   Skin:  warm and dry, not diaphoretic  No apparent birthmarks or stigmata of neurocutaneous disease  Psychiatric:  Normal behavior and appropriate affect       Neurological Examination:     Mental status/cognitive function:   Orientated to time, place and person  Recent and remote memory intact   Attention span and concentration as well as fund of knowledge are appropriate for age  Normal language and spontaneous speech  Cranial Nerves:  II-visual fields full  Fundi poorly visualized due to pupillary constriction  III, IV, VI-Pupils were equal, round, and reactive to light and accomodation  Extraocular movements were full and conjugate without nystagmus  Conjugate gaze, normal smooth pursuits, normal saccades   V-facial sensation symmetric  VII-facial expression symmetric, intact forehead wrinkle, strong eye closure, symmetric smile    VIII-hearing grossly intact bilaterally   IX, X-palate elevation symmetric, no dysarthria  XI-shoulder shrug strength intact    XII-tongue protrusion midline  Motor Exam: symmetric bulk and tone throughout, no pronator drift  Power/strength 5/5 bilateral upper and lower extremities except for 4/5 in the LUE  Resting tremor and rigidity noted in the LUE  Sensory: grossly intact light touch in all extremities  Reflexes: brachioradialis 2+, biceps 2+, knee 2+, ankle 2+ bilaterally  No ankle clonus  Coordination: Finger nose finger intact bilaterally, no apparent dysmetria or ataxia  Gait: shuffling gait with imbalance at times    Pertinent lab results: None     Pertinent Imaging:   MRI brain without contrast 10/2/20:  No acute findings  Trace nonspecific white matter changes suspected to be microvascular angiopathy  I have personally reviewed imaging and radiology read   Review of Systems:   Constitutional: Negative  Negative for appetite change and fever  HENT: Negative  Negative for hearing loss, tinnitus, trouble swallowing and voice change  Constant humming noise   Eyes: Positive for visual disturbance  Negative for photophobia and pain  Respiratory: Negative  Negative for shortness of breath  Cardiovascular: Negative  Negative for palpitations  Gastrointestinal: Positive for nausea and vomiting  Endocrine: Negative  Negative for cold intolerance     Genitourinary: Negative  Negative for dysuria, frequency and urgency  Musculoskeletal: Negative  Negative for gait problem, myalgias and neck pain  Skin: Negative  Negative for rash  Allergic/Immunologic: Negative  Neurological: Positive for headaches  Negative for dizziness, tremors, seizures, syncope, facial asymmetry, speech difficulty, weakness, light-headedness and numbness  Vertigo   Hematological: Negative  Does not bruise/bleed easily  Psychiatric/Behavioral: Positive for sleep disturbance  Negative for confusion and hallucinations  All other systems reviewed and are negative  I have spent  minutes with the patient today in which greater than 50% of this time was spent in counseling/coordination of care regarding Prognosis, Risks and benefits of tx options and Impressions  I also spent 20 minutes non face to face for this patient the same day       Activity Minutes   Precharting/reviewing 10   Patient care/counseling  60   Postcharting/care coordination 10       Author:  Steven Bonilla 10/13/2022 3:36 PM

## 2022-10-13 NOTE — LETTER
October 13, 2022    Re: Tanika Jackson    Thank you for referring your patient to our Neurology practice for consultation  We regret to inform you that your patient did not attend their hour-long New Patient/Consult appointment scheduled with our office  Due to the limited access in Neurology, we ask that your office please contact the patient to evaluate if there is further necessity for a Neurology consultation  If you determine that a Neurology consultation is still necessary, please have someone from your office call to reschedule  Our office will not automatically reschedule this appointment  Appointment Missed: 10/13/2022      Appointment Scheduled with: Sylvia Maciel, DO    If you have questions or concerns regarding this request, please do not hesitate to reach out to our Practice Administrator(s)  Thank you in advance for your assistance with this matter        Sincerely,    [facility]

## 2022-10-13 NOTE — PROGRESS NOTES
Review of Systems   Constitutional: Negative  Negative for appetite change and fever  HENT: Negative  Negative for hearing loss, tinnitus, trouble swallowing and voice change  Constant humming noise   Eyes: Positive for visual disturbance  Negative for photophobia and pain  Respiratory: Negative  Negative for shortness of breath  Cardiovascular: Negative  Negative for palpitations  Gastrointestinal: Positive for nausea and vomiting  Endocrine: Negative  Negative for cold intolerance  Genitourinary: Negative  Negative for dysuria, frequency and urgency  Musculoskeletal: Positive for gait problem (Left leg)  Negative for myalgias and neck pain  Skin: Negative  Negative for rash  Allergic/Immunologic: Negative  Neurological: Positive for tremors (Left hand) and headaches  Negative for dizziness, seizures, syncope, facial asymmetry, speech difficulty, weakness, light-headedness and numbness  Vertigo   Hematological: Negative  Does not bruise/bleed easily  Psychiatric/Behavioral: Positive for sleep disturbance  Negative for confusion and hallucinations  All other systems reviewed and are negative

## 2022-10-14 DIAGNOSIS — E78.2 MIXED HYPERLIPIDEMIA: ICD-10-CM

## 2022-10-14 DIAGNOSIS — G43.111 MIGRAINE WITH AURA, INTRACTABLE, WITH STATUS MIGRAINOSUS: ICD-10-CM

## 2022-10-14 DIAGNOSIS — R07.81 RIB PAIN: Primary | ICD-10-CM

## 2022-10-14 RX ORDER — PRAVASTATIN SODIUM 80 MG/1
80 TABLET ORAL
Qty: 90 TABLET | Refills: 1 | Status: SHIPPED | OUTPATIENT
Start: 2022-10-14

## 2022-10-14 RX ORDER — NAPROXEN 500 MG/1
500 TABLET ORAL 2 TIMES DAILY WITH MEALS
Qty: 40 TABLET | Refills: 1 | Status: SHIPPED | OUTPATIENT
Start: 2022-10-14

## 2022-10-14 NOTE — TELEPHONE ENCOUNTER
Upon review of the In Basket request and the patient's chart, initial outreach has been made via telephone call, please see Contacts section for details       Thank you  Meera Villanueva

## 2022-10-19 ENCOUNTER — TELEPHONE (OUTPATIENT)
Dept: SLEEP CENTER | Facility: CLINIC | Age: 62
End: 2022-10-19

## 2022-10-19 NOTE — TELEPHONE ENCOUNTER
----- Message from Carlos Kay MD sent at 10/18/2022 12:22 PM EDT -----  Approved    ----- Message -----  From: Neeraj Roche  Sent: 10/14/2022   9:19 AM EDT  To: Sleep Medicine [place] Provider    This Diagnostic sleep study needs approval      If approved please sign and return to clerical pool  If denied please include reasons why  Also provide alternative testing if warranted  Please sign and return to clerical pool

## 2022-10-20 ENCOUNTER — TELEPHONE (OUTPATIENT)
Dept: GASTROENTEROLOGY | Facility: CLINIC | Age: 62
End: 2022-10-20

## 2022-10-20 NOTE — TELEPHONE ENCOUNTER
Patients GI provider:  Dr Fisher Means    Number to return call: [phone]    Reason for call: Rocio Hagen from [facility] department calling for the colonoscopy report from 2017   The fax number is [phone]     Scheduled procedure/appointment date if applicable: N/A

## 2022-10-20 NOTE — TELEPHONE ENCOUNTER
As a follow-up, a second attempt has been made for outreach via telephone call, please see Contacts section for details  I spoke with [place]   Colonoscopy report was scanned by Scan Dept        Thank you  Angela Rojo

## 2022-10-20 NOTE — TELEPHONE ENCOUNTER
Upon review of the In Basket request we were able to locate, review, and update the patient chart as requested for CRC: Colonoscopy  Any additional questions or concerns should be emailed to the Practice Liaisons via the appropriate education email address, please do not reply via In Basket      Thank you  Lionel Preciado

## 2022-10-21 NOTE — TELEPHONE ENCOUNTER
Printed and faxed as requested, but fax number is not in service    Called and left message on the voice mail to call out office back to get correct fax

## 2022-10-24 ENCOUNTER — TELEPHONE (OUTPATIENT)
Dept: NEUROLOGY | Facility: CLINIC | Age: 62
End: 2022-10-24

## 2022-10-24 NOTE — TELEPHONE ENCOUNTER
Received fax from [facility] requires PA    Key [id number]    PA initiated on [facility]    Awaiting determination

## 2022-10-27 NOTE — TELEPHONE ENCOUNTER
Per CMM   PA has been approved through 4/25/23    Approval letter placed in clerical bin to be scanned

## 2022-10-28 DIAGNOSIS — E78.2 MIXED HYPERLIPIDEMIA: ICD-10-CM

## 2022-10-28 RX ORDER — ATORVASTATIN CALCIUM 40 MG/1
TABLET, FILM COATED ORAL
Qty: 30 TABLET | Refills: 5 | Status: SHIPPED | OUTPATIENT
Start: 2022-10-28

## 2022-11-03 ENCOUNTER — HOSPITAL ENCOUNTER (OUTPATIENT)
Dept: RADIOLOGY | Facility: HOSPITAL | Age: 62
Discharge: HOME | End: 2022-11-03
Attending: STUDENT IN AN ORGANIZED HEALTH CARE EDUCATION/TRAINING PROGRAM

## 2022-11-03 DIAGNOSIS — G43.909 MIGRAINE SYNDROME: ICD-10-CM

## 2022-11-16 DIAGNOSIS — G43.111 MIGRAINE WITH AURA, INTRACTABLE, WITH STATUS MIGRAINOSUS: ICD-10-CM

## 2022-11-16 RX ORDER — PROCHLORPERAZINE MALEATE 10 MG
TABLET ORAL
Qty: 15 TABLET | Refills: 4 | Status: SHIPPED | OUTPATIENT
Start: 2022-11-16

## 2022-11-26 DIAGNOSIS — G47.01 INSOMNIA DUE TO MEDICAL CONDITION: ICD-10-CM

## 2022-11-28 RX ORDER — TRAZODONE HYDROCHLORIDE 100 MG/1
TABLET ORAL
Qty: 30 TABLET | Refills: 2 | Status: SHIPPED | OUTPATIENT
Start: 2022-11-28

## 2022-12-08 ENCOUNTER — OFFICE VISIT (OUTPATIENT)
Dept: FAMILY MEDICINE CLINIC | Facility: CLINIC | Age: 62
End: 2022-12-08

## 2022-12-08 VITALS
WEIGHT: 224 LBS | SYSTOLIC BLOOD PRESSURE: 120 MMHG | HEIGHT: 64 IN | OXYGEN SATURATION: 96 % | BODY MASS INDEX: 38.24 KG/M2 | HEART RATE: 89 BPM | TEMPERATURE: 97.9 F | DIASTOLIC BLOOD PRESSURE: 76 MMHG

## 2022-12-08 DIAGNOSIS — E78.2 MIXED HYPERLIPIDEMIA: ICD-10-CM

## 2022-12-08 DIAGNOSIS — J02.9 SORE THROAT: ICD-10-CM

## 2022-12-08 DIAGNOSIS — E66.9 OBESITY, UNSPECIFIED OBESITY SEVERITY, UNSPECIFIED OBESITY TYPE: ICD-10-CM

## 2022-12-08 DIAGNOSIS — G47.33 OBSTRUCTIVE SLEEP APNEA SYNDROME: ICD-10-CM

## 2022-12-08 DIAGNOSIS — K21.9 GASTROESOPHAGEAL REFLUX DISEASE WITHOUT ESOPHAGITIS: Primary | ICD-10-CM

## 2022-12-08 DIAGNOSIS — G43.809 OTHER MIGRAINE WITHOUT STATUS MIGRAINOSUS, NOT INTRACTABLE: ICD-10-CM

## 2022-12-08 DIAGNOSIS — R52 BODY ACHES: ICD-10-CM

## 2022-12-08 LAB — S PYO AG THROAT QL: NEGATIVE

## 2022-12-08 RX ORDER — RIZATRIPTAN BENZOATE 10 MG/1
10 TABLET ORAL AS NEEDED
Qty: 9 TABLET | Refills: 1 | Status: ON HOLD | OUTPATIENT
Start: 2022-12-08

## 2022-12-08 RX ORDER — OSELTAMIVIR PHOSPHATE 75 MG/1
75 CAPSULE ORAL EVERY 12 HOURS SCHEDULED
Qty: 10 CAPSULE | Refills: 0 | Status: SHIPPED | OUTPATIENT
Start: 2022-12-08 | End: 2022-12-13

## 2022-12-08 NOTE — PROGRESS NOTES
Name: Sherry Barraza      : 1960      MRN: [id number]  Encounter Provider: Sushant Phelps MD  Encounter Date: 2022   Encounter department: [address] [place] [address] Place     1  Gastroesophageal reflux disease without esophagitis  Assessment & Plan:  Patient to continue with present therapy and decrease caffeine, avoid ETOH and smoking to decrease acid production  Pt should also cease eating prior to bedtime and avoid excessive fluid intake prior to sleep  May use antacids as needed for breakthrough GERD  All pateint questions answered today about this condition  2  Obstructive sleep apnea syndrome  Assessment & Plan:  Patient instructed to continue using CPAP as directed to decrease episodes of apnea to minimize risk of cardiac complications  Pt instructed to kep  machine in clean working order and to call if any replacement parts are needed  3  Other migraine without status migrainosus, not intractable  Assessment & Plan:  Patient is stable  and will continue present plan of care and reassess at next routine visit  All questions about this problem from patient were answered today  Orders:  -     rizatriptan (Maxalt) 10 mg tablet; Take 1 tablet (10 mg total) by mouth as needed for migraine Take at the onset of migraine; if symptoms continue or return, may take another dose at least 2 hours after first dose  Take no more than 2 doses in a day  4  Mixed hyperlipidemia  Assessment & Plan:  Patient  is stable with current medication and we discussed a low fat low cholesterol diet  Weight loss also discussed for this will help lower cholesterol also  Recheck lipids in 6 months  5  Obesity, unspecified obesity severity, unspecified obesity type  Assessment & Plan:  Patient to increase exercise and partake of a diet with less calories to promote  weight loss      6  Sore throat  -     POCT rapid strepA    7   Body aches  -     oseltamivir (TAMIFLU) 75 mg capsule; Take 1 capsule (75 mg total) by mouth every 12 (twelve) hours for 5 days  -     Covid/Flu- Office Collect           Subjective     57-year-old female here today for checkup on multiple medical problems patient with a history of GERD sleep apnea and migraines however she is here today because she is not feeling well  Patient has been having some headache for the last 3 days she has had no fever but she has some body aches fatigue some sore throat some dry cough and denies any kind made runny nose  Patient has decreased appetite but has no vomiting or diarrhea but has some nausea  Patient also with some dizziness waking up this morning     Patient is not aware of being around anybody that sick however she is quarantining herself from all her family and friends because she does not feel well there is influenza A in the community and I suspect that she may have this  If she is positive for this she is within the 5-day window for Tamiflu treatment and I would recommend that if this is what it is but I think it is  Review of Systems   Constitutional: Positive for fatigue  Negative for activity change, appetite change and fever  HENT: Positive for sore throat  Negative for congestion, ear pain, postnasal drip, rhinorrhea, sinus pressure, sinus pain and sneezing  Eyes: Negative for pain and redness  Respiratory: Positive for cough  Negative for apnea, chest tightness, shortness of breath and wheezing  Cardiovascular: Negative for chest pain, palpitations and leg swelling  Gastrointestinal: Positive for nausea  Negative for abdominal pain, constipation, diarrhea and vomiting  Endocrine: Negative for cold intolerance and heat intolerance  Genitourinary: Negative for difficulty urinating, dysuria, frequency, hematuria and urgency  Musculoskeletal: Positive for myalgias  Negative for arthralgias, back pain and gait problem  Skin: Negative for rash  Neurological: Positive for headaches   Negative for dizziness, speech difficulty, weakness and numbness  Hematological: Does not bruise/bleed easily  Psychiatric/Behavioral: Negative for agitation, confusion and hallucinations  Past Medical History:   Diagnosis Date   • Anemia    • Cancer [facility])     breast history of   • Migraine    • Mixed hyperlipidemia 2021   • Skin cancer     Left leg lump   cryotherapy and excision, unsure of which kind      Past Surgical History:   Procedure Laterality Date   • ANKLE SURGERY Left 2017    pins and plate    •  SECTION      x2   • DILATION AND CURETTAGE OF UTERUS     • TONSILLECTOMY      age 4     Family History   Problem Relation Age of Onset   • No Known Problems Mother    • Lung cancer Brother      Social History     Socioeconomic History   • Marital status:      Spouse name: None   • Number of children: None   • Years of education: None   • Highest education level: None   Occupational History   • None   Tobacco Use   • Smoking status: Never   • Smokeless tobacco: Never   Vaping Use   • Vaping Use: Never used   Substance and Sexual Activity   • Alcohol use: Not Currently   • Drug use: No   • Sexual activity: None   Other Topics Concern   • None   Social History Narrative   • None     Social Determinants of Health     Financial Resource Strain: Not on file   Food Insecurity: Not on file   Transportation Needs: Not on file   Physical Activity: Not on file   Stress: Not on file   Social Connections: Not on file   Intimate Partner Violence: Not on file   Housing Stability: Not on file     Current Outpatient Medications on File Prior to Visit   Medication Sig   • Cholecalciferol (VITAMIN D3 PO) Take 1 tablet by mouth daily   • Myrbetriq 50 MG TB24 Take 1 tablet by mouth daily   • naproxen (Naprosyn) 500 mg tablet Take 1 tablet (500 mg total) by mouth 2 (two) times a day with meals   • omeprazole (PriLOSEC) 20 mg delayed release capsule Take 1 capsule (20 mg total) by mouth daily   • traZODone (DESYREL) 100 mg tablet take 1 tablet by mouth daily at bedtime if needed for sleep   • atorvastatin (LIPITOR) 40 mg tablet take 1 tablet by mouth once daily (Patient not taking: Reported on 12/8/2022)   • [DISCONTINUED] aspirin (ECOTRIN LOW STRENGTH) 81 mg EC tablet Take 1 tablet by mouth daily (Patient not taking: Reported on 12/8/2022)   • [DISCONTINUED] Galcanezumab-gnlm 120 MG/ML SOAJ Inject 120 mg under the skin every 30 (thirty) days Following the first month loading dose of 2 pens  (Patient not taking: Reported on 12/8/2022)   • [DISCONTINUED] MAGNESIUM PO Take 1 tablet by mouth daily (Patient not taking: Reported on 12/8/2022)   • [DISCONTINUED] naproxen (EC NAPROSYN) 500 MG EC tablet Take 1 tablet (500 mg total) by mouth 2 (two) times a day with meals for 7 days (Patient not taking: Reported on 12/8/2022)   • [DISCONTINUED] pravastatin (PRAVACHOL) 80 mg tablet Take 1 tablet (80 mg total) by mouth daily at bedtime (Patient not taking: Reported on 12/8/2022)   • [DISCONTINUED] prochlorperazine (COMPAZINE) 10 mg tablet One tab up to t i d  p r n  Migraine  Max 3 days per week  (Patient not taking: Reported on 12/8/2022)   • [DISCONTINUED] propranolol (INDERAL LA) 60 mg 24 hr capsule Take 1 capsule (60 mg total) by mouth daily for 7 days, THEN 1 capsule (60 mg total) every other day for 3 days  (Patient not taking: Reported on 12/8/2022)   • [DISCONTINUED] propranolol (INDERAL LA) 80 mg 24 hr capsule Take 1 capsule (80 mg total) by mouth daily for 7 days (Patient not taking: Reported on 12/8/2022)   • [DISCONTINUED] Rimegepant Sulfate (Nurtec) 75 MG TBDP Take one NURTEC 75 mg at onset under tongue  Limit 1 in 24 hours  8 a month   (Patient not taking: Reported on 12/8/2022)     Allergies   Allergen Reactions   • Codeine Headache     Headaches  Other reaction(s): headache     Immunization History   Administered Date(s) Administered   • INFLUENZA 08/06/2014, 10/19/2015, 08/21/2016, 11/17/2017, 08/19/2018, 10/03/2022   • Influenza Quadrivalent Preservative Free 3 years and older IM 08/19/2018   • Influenza, injectable, quadrivalent, preservative free 0 5 mL 08/19/2018   • Influenza, recombinant, quadrivalent,injectable, preservative free 10/03/2022   • Pneumococcal Conjugate Vaccine 20-valent (Pcv20), Polysace 10/03/2022   • Pneumococcal Polysaccharide PPV23 05/25/2020   • Tdap 09/13/2012   • Tuberculin Skin Test-PPD Intradermal 08/06/2018, 08/23/2018   • Zoster 05/29/2015       Objective     /76 (BP Location: Left arm, Patient Position: Sitting, Cuff Size: Large)   Pulse 89   Temp 97 9 °F (36 6 °C) (Temporal)   Ht 5' 4" (1 626 m)   Wt 102 kg (224 lb)   SpO2 96%   BMI 38 45 kg/m²     Physical Exam  Constitutional:       Appearance: She is obese  She is ill-appearing  HENT:      Right Ear: Tympanic membrane normal       Left Ear: Tympanic membrane normal       Nose: Congestion present  Mouth/Throat:      Mouth: Mucous membranes are moist       Pharynx: Posterior oropharyngeal erythema present  No oropharyngeal exudate or uvula swelling  Tonsils: Tonsillar abscess present  No tonsillar exudate  Neck:      Thyroid: No thyromegaly  Cardiovascular:      Rate and Rhythm: Normal rate and regular rhythm  Pulmonary:      Effort: Pulmonary effort is normal       Breath sounds: Normal breath sounds  No wheezing, rhonchi or rales  Abdominal:      General: Bowel sounds are normal       Palpations: Abdomen is soft  Lymphadenopathy:      Cervical: No cervical adenopathy  Skin:     General: Skin is warm and dry  Neurological:      Mental Status: She is oriented to person, place, and time     Psychiatric:         Behavior: Behavior normal        Sosa Johnson MD

## 2022-12-09 DIAGNOSIS — U07.1 COVID-19: Primary | ICD-10-CM

## 2022-12-09 LAB
FLUAV RNA RESP QL NAA+PROBE: NEGATIVE
FLUBV RNA RESP QL NAA+PROBE: NEGATIVE
SARS-COV-2 RNA RESP QL NAA+PROBE: POSITIVE

## 2022-12-09 RX ORDER — NIRMATRELVIR AND RITONAVIR 300-100 MG
3 KIT ORAL 2 TIMES DAILY
Qty: 30 TABLET | Refills: 0 | Status: SHIPPED | OUTPATIENT
Start: 2022-12-09 | End: 2022-12-14

## 2022-12-13 ENCOUNTER — TELEPHONE (OUTPATIENT)
Dept: FAMILY MEDICINE CLINIC | Facility: CLINIC | Age: 62
End: 2022-12-13

## 2022-12-13 NOTE — TELEPHONE ENCOUNTER
Pt  called, she said the Covid antiviral is making her feel nausseous and dizzy, she is going to stop taking it, advised her if she developes any shortness of breath to proceed to ED

## 2022-12-18 ENCOUNTER — APPOINTMENT (EMERGENCY)
Dept: CT IMAGING | Facility: HOSPITAL | Age: 62
End: 2022-12-18

## 2022-12-18 ENCOUNTER — APPOINTMENT (EMERGENCY)
Dept: RADIOLOGY | Facility: HOSPITAL | Age: 62
End: 2022-12-18

## 2022-12-18 ENCOUNTER — HOSPITAL ENCOUNTER (OUTPATIENT)
Facility: HOSPITAL | Age: 62
Setting detail: OBSERVATION
Discharge: NURSING, REHAB OR LONG-TERM CARE | End: 2022-12-21
Attending: EMERGENCY MEDICINE | Admitting: STUDENT IN AN ORGANIZED HEALTH CARE EDUCATION/TRAINING PROGRAM

## 2022-12-18 DIAGNOSIS — K59.00 CONSTIPATION: ICD-10-CM

## 2022-12-18 DIAGNOSIS — I26.99 PULMONARY EMBOLISM (HCC): Primary | ICD-10-CM

## 2022-12-18 DIAGNOSIS — U07.1 COVID-19: ICD-10-CM

## 2022-12-18 PROBLEM — E87.1 HYPONATREMIA: Status: ACTIVE | Noted: 2022-12-18

## 2022-12-18 PROBLEM — E87.6 HYPOKALEMIA: Status: ACTIVE | Noted: 2022-12-18

## 2022-12-18 LAB
2HR DELTA HS TROPONIN: 1 NG/L
ALBUMIN SERPL BCP-MCNC: 3.2 G/DL (ref 3.5–5)
ALP SERPL-CCNC: 69 U/L (ref 46–116)
ALT SERPL W P-5'-P-CCNC: 31 U/L (ref 12–78)
ANION GAP SERPL CALCULATED.3IONS-SCNC: 7 MMOL/L (ref 4–13)
ANION GAP SERPL CALCULATED.3IONS-SCNC: 8 MMOL/L (ref 4–13)
APTT PPP: 29 SECONDS (ref 23–37)
APTT PPP: >210 SECONDS (ref 23–37)
AST SERPL W P-5'-P-CCNC: 34 U/L (ref 5–45)
ATRIAL RATE: 112 BPM
BASOPHILS # BLD AUTO: 0.01 THOUSANDS/ÂΜL (ref 0–0.1)
BASOPHILS NFR BLD AUTO: 0 % (ref 0–1)
BILIRUB SERPL-MCNC: 0.42 MG/DL (ref 0.2–1)
BUN SERPL-MCNC: 11 MG/DL (ref 5–25)
BUN SERPL-MCNC: 8 MG/DL (ref 5–25)
CALCIUM ALBUM COR SERPL-MCNC: 9 MG/DL (ref 8.3–10.1)
CALCIUM SERPL-MCNC: 7.6 MG/DL (ref 8.3–10.1)
CALCIUM SERPL-MCNC: 8.4 MG/DL (ref 8.3–10.1)
CARDIAC TROPONIN I PNL SERPL HS: 4 NG/L
CARDIAC TROPONIN I PNL SERPL HS: 5 NG/L
CHLORIDE SERPL-SCNC: 100 MMOL/L (ref 96–108)
CHLORIDE SERPL-SCNC: 93 MMOL/L (ref 96–108)
CO2 SERPL-SCNC: 25 MMOL/L (ref 21–32)
CO2 SERPL-SCNC: 30 MMOL/L (ref 21–32)
CREAT SERPL-MCNC: 0.66 MG/DL (ref 0.6–1.3)
CREAT SERPL-MCNC: 0.84 MG/DL (ref 0.6–1.3)
D DIMER PPP FEU-MCNC: 1.02 UG/ML FEU
EOSINOPHIL # BLD AUTO: 0 THOUSAND/ÂΜL (ref 0–0.61)
EOSINOPHIL NFR BLD AUTO: 0 % (ref 0–6)
ERYTHROCYTE [DISTWIDTH] IN BLOOD BY AUTOMATED COUNT: 12.6 % (ref 11.6–15.1)
GFR SERPL CREATININE-BSD FRML MDRD: 74 ML/MIN/1.73SQ M
GFR SERPL CREATININE-BSD FRML MDRD: 94 ML/MIN/1.73SQ M
GLUCOSE SERPL-MCNC: 104 MG/DL (ref 65–140)
GLUCOSE SERPL-MCNC: 99 MG/DL (ref 65–140)
HCT VFR BLD AUTO: 38 % (ref 34.8–46.1)
HGB BLD-MCNC: 12.7 G/DL (ref 11.5–15.4)
IMM GRANULOCYTES # BLD AUTO: 0.01 THOUSAND/UL (ref 0–0.2)
IMM GRANULOCYTES NFR BLD AUTO: 0 % (ref 0–2)
INR PPP: 1.2 (ref 0.84–1.19)
LYMPHOCYTES # BLD AUTO: 1.05 THOUSANDS/ÂΜL (ref 0.6–4.47)
LYMPHOCYTES NFR BLD AUTO: 16 % (ref 14–44)
MAGNESIUM SERPL-MCNC: 2.4 MG/DL (ref 1.6–2.6)
MCH RBC QN AUTO: 29.5 PG (ref 26.8–34.3)
MCHC RBC AUTO-ENTMCNC: 33.4 G/DL (ref 31.4–37.4)
MCV RBC AUTO: 88 FL (ref 82–98)
MONOCYTES # BLD AUTO: 0.4 THOUSAND/ÂΜL (ref 0.17–1.22)
MONOCYTES NFR BLD AUTO: 6 % (ref 4–12)
NEUTROPHILS # BLD AUTO: 5.04 THOUSANDS/ÂΜL (ref 1.85–7.62)
NEUTS SEG NFR BLD AUTO: 78 % (ref 43–75)
NRBC BLD AUTO-RTO: 0 /100 WBCS
NT-PROBNP SERPL-MCNC: 47 PG/ML
P AXIS: 40 DEGREES
PLATELET # BLD AUTO: 349 THOUSANDS/UL (ref 149–390)
PMV BLD AUTO: 10.2 FL (ref 8.9–12.7)
POTASSIUM SERPL-SCNC: 2.8 MMOL/L (ref 3.5–5.3)
POTASSIUM SERPL-SCNC: 3.6 MMOL/L (ref 3.5–5.3)
PR INTERVAL: 160 MS
PROT SERPL-MCNC: 7.4 G/DL (ref 6.4–8.4)
PROTHROMBIN TIME: 14.9 SECONDS (ref 11.6–14.5)
QRS AXIS: -25 DEGREES
QRSD INTERVAL: 88 MS
QT INTERVAL: 346 MS
QTC INTERVAL: 472 MS
RBC # BLD AUTO: 4.31 MILLION/UL (ref 3.81–5.12)
SODIUM SERPL-SCNC: 130 MMOL/L (ref 135–147)
SODIUM SERPL-SCNC: 133 MMOL/L (ref 135–147)
T WAVE AXIS: 28 DEGREES
VENTRICULAR RATE: 112 BPM
WBC # BLD AUTO: 6.51 THOUSAND/UL (ref 4.31–10.16)

## 2022-12-18 RX ORDER — POTASSIUM CHLORIDE 20 MEQ/1
40 TABLET, EXTENDED RELEASE ORAL ONCE
Status: COMPLETED | OUTPATIENT
Start: 2022-12-18 | End: 2022-12-18

## 2022-12-18 RX ORDER — POTASSIUM CHLORIDE 14.9 MG/ML
20 INJECTION INTRAVENOUS
Status: DISPENSED | OUTPATIENT
Start: 2022-12-18 | End: 2022-12-18

## 2022-12-18 RX ORDER — KETOROLAC TROMETHAMINE 30 MG/ML
15 INJECTION, SOLUTION INTRAMUSCULAR; INTRAVENOUS ONCE
Status: DISCONTINUED | OUTPATIENT
Start: 2022-12-18 | End: 2022-12-18

## 2022-12-18 RX ORDER — OXYBUTYNIN CHLORIDE 5 MG/1
10 TABLET, EXTENDED RELEASE ORAL DAILY
Status: DISCONTINUED | OUTPATIENT
Start: 2022-12-19 | End: 2022-12-19

## 2022-12-18 RX ORDER — HEPARIN SODIUM 1000 [USP'U]/ML
7600 INJECTION, SOLUTION INTRAVENOUS; SUBCUTANEOUS EVERY 6 HOURS PRN
Status: DISCONTINUED | OUTPATIENT
Start: 2022-12-18 | End: 2022-12-19

## 2022-12-18 RX ORDER — PANTOPRAZOLE SODIUM 40 MG/1
40 TABLET, DELAYED RELEASE ORAL
Status: DISCONTINUED | OUTPATIENT
Start: 2022-12-19 | End: 2022-12-21 | Stop reason: HOSPADM

## 2022-12-18 RX ORDER — ONDANSETRON 2 MG/ML
4 INJECTION INTRAMUSCULAR; INTRAVENOUS ONCE
Status: COMPLETED | OUTPATIENT
Start: 2022-12-18 | End: 2022-12-18

## 2022-12-18 RX ORDER — HEPARIN SODIUM 1000 [USP'U]/ML
3800 INJECTION, SOLUTION INTRAVENOUS; SUBCUTANEOUS EVERY 6 HOURS PRN
Status: DISCONTINUED | OUTPATIENT
Start: 2022-12-18 | End: 2022-12-19

## 2022-12-18 RX ORDER — ACETAMINOPHEN 325 MG/1
650 TABLET ORAL EVERY 6 HOURS PRN
Status: DISCONTINUED | OUTPATIENT
Start: 2022-12-18 | End: 2022-12-21 | Stop reason: HOSPADM

## 2022-12-18 RX ORDER — MAGNESIUM SULFATE HEPTAHYDRATE 40 MG/ML
2 INJECTION, SOLUTION INTRAVENOUS ONCE
Status: COMPLETED | OUTPATIENT
Start: 2022-12-18 | End: 2022-12-19

## 2022-12-18 RX ORDER — HEPARIN SODIUM 10000 [USP'U]/100ML
3-30 INJECTION, SOLUTION INTRAVENOUS
Status: DISCONTINUED | OUTPATIENT
Start: 2022-12-18 | End: 2022-12-19

## 2022-12-18 RX ORDER — HEPARIN SODIUM 1000 [USP'U]/ML
7600 INJECTION, SOLUTION INTRAVENOUS; SUBCUTANEOUS ONCE
Status: COMPLETED | OUTPATIENT
Start: 2022-12-18 | End: 2022-12-18

## 2022-12-18 RX ORDER — ATORVASTATIN CALCIUM 40 MG/1
40 TABLET, FILM COATED ORAL DAILY
Status: DISCONTINUED | OUTPATIENT
Start: 2022-12-19 | End: 2022-12-21 | Stop reason: HOSPADM

## 2022-12-18 RX ORDER — ACETAMINOPHEN 325 MG/1
650 TABLET ORAL EVERY 6 HOURS PRN
Status: DISCONTINUED | OUTPATIENT
Start: 2022-12-18 | End: 2022-12-18

## 2022-12-18 RX ORDER — TRAZODONE HYDROCHLORIDE 50 MG/1
50 TABLET ORAL
Status: DISCONTINUED | OUTPATIENT
Start: 2022-12-18 | End: 2022-12-21 | Stop reason: HOSPADM

## 2022-12-18 RX ORDER — POTASSIUM CHLORIDE 29.8 MG/ML
40 INJECTION INTRAVENOUS ONCE
Status: DISCONTINUED | OUTPATIENT
Start: 2022-12-18 | End: 2022-12-18

## 2022-12-18 RX ADMIN — ONDANSETRON 4 MG: 2 INJECTION INTRAMUSCULAR; INTRAVENOUS at 16:14

## 2022-12-18 RX ADMIN — SODIUM CHLORIDE 1000 ML: 0.9 INJECTION, SOLUTION INTRAVENOUS at 16:13

## 2022-12-18 RX ADMIN — POTASSIUM CHLORIDE 40 MEQ: 1500 TABLET, EXTENDED RELEASE ORAL at 17:17

## 2022-12-18 RX ADMIN — POTASSIUM CHLORIDE 20 MEQ: 200 INJECTION, SOLUTION INTRAVENOUS at 17:17

## 2022-12-18 RX ADMIN — HEPARIN SODIUM 18 UNITS/KG/HR: 10000 INJECTION, SOLUTION INTRAVENOUS at 19:02

## 2022-12-18 RX ADMIN — IOHEXOL 85 ML: 350 INJECTION, SOLUTION INTRAVENOUS at 16:56

## 2022-12-18 RX ADMIN — HEPARIN SODIUM 7600 UNITS: 1000 INJECTION INTRAVENOUS; SUBCUTANEOUS at 19:00

## 2022-12-18 RX ADMIN — MAGNESIUM SULFATE HEPTAHYDRATE 2 G: 40 INJECTION, SOLUTION INTRAVENOUS at 23:19

## 2022-12-18 NOTE — ASSESSMENT & PLAN NOTE
Worsening shortness of breath x1 week post COVID diagnosis  Acute pulmonary embolus pulmonary artery and subsegmental branches  No evidence of acutely elevated right-sided pressure  Few peripheral groundglass opacities in the lower lungs, more prominent in the right lower lobe, compatible with pneumonitis or Covid pneumonia  Blood pressure stable    Most recent heart rate 100  · No previous history, likely provoked secondary to hypercoagulable state setting of COVID-19  · Echocardiogram ordered  · Initiated on heparin drip  · Eliquis sent to pharmacy for price check, CM consulted  · Telemetry

## 2022-12-18 NOTE — ASSESSMENT & PLAN NOTE
Tested positive for COVID 12/8    Ongoing shortness of breath, likely in setting of acute PE  Further management as highlighted below

## 2022-12-18 NOTE — H&P
[facility]  H&P- Jo Median 1960, 57 y o  female MRN: [id number]  Unit/Bed#: ED 07 Encounter: [id number]  Primary Care Provider: Andrea Olguin MD   Date and time admitted to hospital: 12/18/2022  2:47 PM    COVID-19  Assessment & Plan  Tested positive for COVID 12/8  Ongoing shortness of breath, likely in setting of acute PE    · 2 week hx of fatigue, nausea, vomiting, SOB  · Further management as highlighted below    Hyponatremia  Assessment & Plan  Sodium 130, likely setting of hypovolemia, poor po intake  Received 1 L IV fluids in ED  Follow-up BMP    Hypokalemia  Assessment & Plan  Potassium 2 8 in setting of poor po intake  Status post repletion  Follow-up on magnesium  Monitor with a m  BMP    Gastroesophageal reflux disease without esophagitis  Assessment & Plan  Continue home Protonix    Mixed hyperlipidemia  Assessment & Plan  Continue atorvastatin 40 mg daily    Obesity, unspecified obesity severity, unspecified obesity type  Assessment & Plan  Educated on dietary and lifestyle modifications    * Acute pulmonary embolism (HCC)  Assessment & Plan  Worsening shortness of breath x1 week post COVID diagnosis  Acute pulmonary embolus pulmonary artery and subsegmental branches  No evidence of acutely elevated right-sided pressure  Few peripheral groundglass opacities in the lower lungs, more prominent in the right lower lobe, compatible with pneumonitis or Covid pneumonia  Blood pressure stable  Most recent heart rate 100  · No previous history, likely provoked secondary to hypercoagulable state setting of COVID-19, sedentary state in last 2 weeks due to covid-19 symptoms  · Echocardiogram ordered  · Initiated on heparin drip  · Eliquis sent to pharmacy for price check, CM consulted  · Telemetry        VTE Pharmacologic Prophylaxis: VTE Score: 6 High Risk (Score >/= 5) - Pharmacological DVT Prophylaxis Ordered: heparin drip  Sequential Compression Devices Ordered    Code Status: Level 1 - Full Code   Discussion with family: PT  Anticipated Length of Stay: Patient will be admitted on an observation basis with an anticipated length of stay of less than 2 midnights secondary to acute PE  Total Time for Visit, including Counseling / Coordination of Care: 70 minutes Greater than 50% of this total time spent on direct patient counseling and coordination of care  Chief Complaint: SOB x 1 week post covid    History of Present Illness:  Amna Medrano is a 57 y o  female with a PMH of obesity, migraines, HLD, recent Covid 19 infection who presents with worsening SOB x 1 week  Dx with covid 19 on , noted to have worsening symptoms and SOB over the past week  + palpitations  Has had poor appetite, po intake, and fatigue since getting covid 19, has been bed bound since diagnosis  Denies recent traveling or family hx of DVT/PE  Admitted for management of acute PE  Review of Systems:  Review of Systems   Constitutional: Positive for fatigue  Negative for fever  Respiratory: Positive for cough and shortness of breath  Past Medical and Surgical History:   Past Medical History:   Diagnosis Date   • Anemia    • Cancer [facility])     breast history of   • Migraine    • Mixed hyperlipidemia 2021   • Skin cancer     Left leg lump  cryotherapy and excision, unsure of which kind        Past Surgical History:   Procedure Laterality Date   • ANKLE SURGERY Left     pins and plate    •  SECTION      x2   • DILATION AND CURETTAGE OF UTERUS     • TONSILLECTOMY      age 4       Meds/Allergies:  Prior to Admission medications    Medication Sig Start Date End Date Taking? Authorizing Provider   apixaban (Eliquis) 5 mg Take 2 tablets (10 mg total) by mouth 2 (two) times a day for 7 days, THEN 1 tablet (5 mg total) 2 (two) times a day for 23 days   22 Yes Marine Das DO   atorvastatin (LIPITOR) 40 mg tablet take 1 tablet by mouth once daily  Patient not taking: Reported on 12/8/2022 10/28/22   Wen Clancy MD   Cholecalciferol (VITAMIN D3 PO) Take 1 tablet by mouth daily    Historical Provider, MD   Myrbetriq 50 MG TB24 Take 1 tablet by mouth daily 9/26/22   Historical Provider, MD   naproxen (Naprosyn) 500 mg tablet Take 1 tablet (500 mg total) by mouth 2 (two) times a day with meals 10/14/22   Wen Clancy MD   omeprazole (PriLOSEC) 20 mg delayed release capsule Take 1 capsule (20 mg total) by mouth daily 9/1/22   Wen Clancy MD   rizatriptan (Maxalt) 10 mg tablet Take 1 tablet (10 mg total) by mouth as needed for migraine Take at the onset of migraine; if symptoms continue or return, may take another dose at least 2 hours after first dose  Take no more than 2 doses in a day  12/8/22   Wen Clancy MD   traZODone (DESYREL) 100 mg tablet take 1 tablet by mouth daily at bedtime if needed for sleep 11/28/22   Wen Clancy MD     I have reviewed home medications with patient personally  Allergies: Allergies   Allergen Reactions   • Codeine Headache     Headaches  Other reaction(s): headache       Social History:  Marital Status:      Substance Use History:   Social History     Substance and Sexual Activity   Alcohol Use Not Currently     Social History     Tobacco Use   Smoking Status Never   Smokeless Tobacco Never     Social History     Substance and Sexual Activity   Drug Use No       Family History:  Family History   Problem Relation Age of Onset   • No Known Problems Mother    • Lung cancer Brother        Physical Exam:     Vitals:   Blood Pressure: 145/69 (12/18/22 1830)  Pulse: 104 (12/18/22 1830)  Temperature: 98 5 °F (36 9 °C) (12/18/22 1453)  Temp Source: Oral (12/18/22 1453)  Respirations: (!) 27 (12/18/22 1830)  Weight - Scale: 98 8 kg (217 lb 13 oz) (12/18/22 1453)  SpO2: 92 % (12/18/22 1830)    Physical Exam  Vitals reviewed  Constitutional:       General: She is not in acute distress       Appearance: She is well-developed  She is obese  She is not diaphoretic  HENT:      Head: Normocephalic and atraumatic  Nose: Nose normal       Mouth/Throat:      Pharynx: No oropharyngeal exudate  Eyes:      General: No scleral icterus  Right eye: No discharge  Left eye: No discharge  Conjunctiva/sclera: Conjunctivae normal    Cardiovascular:      Rate and Rhythm: Regular rhythm  Tachycardia present  Heart sounds: Normal heart sounds  No murmur heard  No friction rub  No gallop  Pulmonary:      Effort: Pulmonary effort is normal  No respiratory distress  Breath sounds: Normal breath sounds  No wheezing or rales  Abdominal:      General: Bowel sounds are normal  There is no distension  Palpations: Abdomen is soft  Tenderness: There is no abdominal tenderness  There is no guarding  Musculoskeletal:         General: Normal range of motion  Cervical back: Normal range of motion and neck supple  Skin:     General: Skin is warm and dry  Findings: No erythema  Neurological:      Mental Status: She is alert and oriented to person, place, and time     Psychiatric:         Behavior: Behavior normal           Additional Data:     Lab Results:  Results from last 7 days   Lab Units 12/18/22  1537   WBC Thousand/uL 6 51   HEMOGLOBIN g/dL 12 7   HEMATOCRIT % 38 0   PLATELETS Thousands/uL 349   NEUTROS PCT % 78*   LYMPHS PCT % 16   MONOS PCT % 6   EOS PCT % 0     Results from last 7 days   Lab Units 12/18/22  1537   SODIUM mmol/L 130*   POTASSIUM mmol/L 2 8*   CHLORIDE mmol/L 93*   CO2 mmol/L 30   BUN mg/dL 11   CREATININE mg/dL 0 84   ANION GAP mmol/L 7   CALCIUM mg/dL 8 4   ALBUMIN g/dL 3 2*   TOTAL BILIRUBIN mg/dL 0 42   ALK PHOS U/L 69   ALT U/L 31   AST U/L 34   GLUCOSE RANDOM mg/dL 104     Results from last 7 days   Lab Units 12/18/22  1834   INR  1 20*                   Lines/Drains:  Invasive Devices     Peripheral Intravenous Line  Duration           Peripheral IV 12/18/22 Dorsal (posterior); Left Forearm <1 day                    Imaging: Reviewed radiology reports from this admission including: chest CT scan  CTA ED chest PE Study   Final Result by Lan Granado MD (12/18 6243)         1  Acute pulmonary embolus pulmonary artery and subsegmental branches  No evidence of acutely elevated right-sided pressure  2   Few peripheral groundglass opacities in the lower lungs, more prominent in the right lower lobe, compatible with pneumonitis or Covid pneumonia  I personally discussed this study with Megan Joshi on 12/18/2022 at 5:51 PM                      Workstation performed: [id number]         XR chest 2 views   ED Interpretation by Isaias Granado DO (12/18 3110)   NAD      Final Result by Chrissie Laws MD (12/18 7461)      No acute cardiopulmonary disease  Workstation performed: [id number]             EKG and Other Studies Reviewed on Admission:   · EKG: Sinus Tachycardia       ** Please Note: This note has been constructed using a voice recognition system   **

## 2022-12-18 NOTE — ED PROVIDER NOTES
History  Chief Complaint   Patient presents with   • Shortness of Breath     Pt presents to ER via ALS from home with c/o's shortness of breath - pt dx with COVID a week or so ago & has been getting worse  Generally healthy appearing obese female presents in no distress  Patient is a 26-year-old female past medical history migraine, hyperlipidemia, anemia, GERD, skin cancer presenting with shortness of breath  Patient was diagnosed with COVID-19 2 weeks ago and given medication but states she can only take 2 doses and could not tolerate anymore  She states over the past 2 days she has had constant exertional shortness of breath, nausea and vomiting roughly 2-3 episodes, nonbilious, nonbloody, diarrhea roughly 3 episodes today but none today, decreased p o  intake and dry cough  She denies any chest pain, leg pain or swelling, fevers, rashes, vision changes, dysuria  Does note intermittent lightheadedness  Denies any history of blood clots or coagulopathies, cancer diagnosis recently, recent surgeries or immobilization, use of estrogen products, hemoptysis  Prior to Admission Medications   Prescriptions Last Dose Informant Patient Reported? Taking? Cholecalciferol (VITAMIN D3 PO)   Yes No   Sig: Take 1 tablet by mouth daily   Myrbetriq 50 MG TB24   Yes No   Sig: Take 1 tablet by mouth daily   atorvastatin (LIPITOR) 40 mg tablet   No No   Sig: take 1 tablet by mouth once daily   Patient not taking: Reported on 12/8/2022   naproxen (Naprosyn) 500 mg tablet   No No   Sig: Take 1 tablet (500 mg total) by mouth 2 (two) times a day with meals   omeprazole (PriLOSEC) 20 mg delayed release capsule   No No   Sig: Take 1 capsule (20 mg total) by mouth daily   rizatriptan (Maxalt) 10 mg tablet   No No   Sig: Take 1 tablet (10 mg total) by mouth as needed for migraine Take at the onset of migraine; if symptoms continue or return, may take another dose at least 2 hours after first dose   Take no more than 2 doses in a day  traZODone (DESYREL) 100 mg tablet   No No   Sig: take 1 tablet by mouth daily at bedtime if needed for sleep      Facility-Administered Medications: None       Past Medical History:   Diagnosis Date   • Anemia    • Cancer ([facility] [address] )     breast history of   • Migraine    • Mixed hyperlipidemia 2021   • Skin cancer     Left leg lump  cryotherapy and excision, unsure of which kind        Past Surgical History:   Procedure Laterality Date   • ANKLE SURGERY Left 2017    pins and plate    •  SECTION      x2   • DILATION AND CURETTAGE OF UTERUS     • TONSILLECTOMY      age 4       Family History   Problem Relation Age of Onset   • No Known Problems Mother    • Lung cancer Brother      I have reviewed and agree with the history as documented  E-Cigarette/Vaping   • E-Cigarette Use Never User      E-Cigarette/Vaping Substances   • Nicotine No    • THC No    • CBD No    • Flavoring No    • Other No    • Unknown No      Social History     Tobacco Use   • Smoking status: Never   • Smokeless tobacco: Never   Vaping Use   • Vaping Use: Never used   Substance Use Topics   • Alcohol use: Not Currently   • Drug use: No       Review of Systems   All other systems reviewed and are negative  Physical Exam  Physical Exam  Vitals reviewed  Constitutional:       General: She is not in acute distress  Appearance: Normal appearance  She is not ill-appearing  HENT:      Mouth/Throat:      Comments: Dry mucous membranes  Eyes:      Conjunctiva/sclera: Conjunctivae normal       Comments: Normal conjunctiva   Cardiovascular:      Rate and Rhythm: Regular rhythm  Tachycardia present  Pulses: Normal pulses  Heart sounds: Normal heart sounds  Pulmonary:      Effort: Pulmonary effort is normal       Breath sounds: Examination of the right-middle field reveals rales  Examination of the left-middle field reveals rales  Examination of the right-lower field reveals rales   Examination of the left-lower field reveals rales  Rales present  Abdominal:      General: Abdomen is flat  Palpations: Abdomen is soft  Tenderness: There is no abdominal tenderness  Musculoskeletal:         General: No swelling  Normal range of motion  Cervical back: Neck supple  Right lower leg: No tenderness  No edema  Left lower leg: No tenderness  No edema  Skin:     General: Skin is warm and dry  Neurological:      General: No focal deficit present  Mental Status: She is alert     Psychiatric:         Mood and Affect: Mood normal          Vital Signs  ED Triage Vitals [12/18/22 1453]   Temperature Pulse Respirations Blood Pressure SpO2   98 5 °F (36 9 °C) (!) 112 16 138/67 97 %      Temp Source Heart Rate Source Patient Position - Orthostatic VS BP Location FiO2 (%)   Oral Monitor Lying Left arm --      Pain Score       No Pain           Vitals:    12/18/22 1453   BP: 138/67   Pulse: (!) 112   Patient Position - Orthostatic VS: Lying         Visual Acuity      ED Medications  Medications   sodium chloride 0 9 % bolus 1,000 mL (has no administration in time range)   ondansetron (ZOFRAN) injection 4 mg (has no administration in time range)   ketorolac (TORADOL) injection 15 mg (has no administration in time range)       Diagnostic Studies  Results Reviewed     None                 No orders to display              Procedures  ECG 12 Lead Documentation Only    Date/Time: 12/18/2022 3:01 PM  Performed by: Jeana Cortez DO  Authorized by: Jeana Cortez DO     ECG reviewed by me, the ED Provider: yes    Patient location:  ED  Previous ECG:     Previous ECG:  Unavailable  Interpretation:     Interpretation: non-specific    Rate:     ECG rate assessment: tachycardic    Rhythm:     Rhythm: sinus rhythm    Ectopy:     Ectopy: none    QRS:     QRS axis:  Left    QRS intervals:  Normal  Conduction:     Conduction: normal    T waves:     T waves: non-specific               ED Course  ED Course as of 12/18/22 2218   Dawson Shah Dec 18, 2022   1754 Patient with segmental PE with no signs of strain, will admit  MDM  Number of Diagnoses or Management Options  Diagnosis management comments: Patient is a 13-year-old female past medical history migraine, hyperlipidemia, anemia, GERD, skin cancer presenting with shortness of breath  Patient is well-appearing at bedside with low-grade tachycardia, dry mucous membranes, rales in the bases bilaterally, no lower extremity edema, equal pulses and no other significant physical exam findings  Patient has no conversational dyspnea, retractions or accessory muscle use  Is saturating 94% on room air  Will obtain cardiac work-up, chest x-ray, ambulatory pulse ox in the setting of recent COVID-19 diagnosis, D-dimer as patient is Wells criteria low but unable to apply PERC rule secondary to tachycardia, give fluids, pain control, antiemetic and reassess  Disposition  Final diagnoses:   None     ED Disposition     None      Follow-up Information    None         Patient's Medications   Discharge Prescriptions    No medications on file       No discharge procedures on file      PDMP Review     None          ED Provider  Electronically Signed by           Isaias Granado DO  12/18/22 221

## 2022-12-19 ENCOUNTER — APPOINTMENT (OUTPATIENT)
Dept: NON INVASIVE DIAGNOSTICS | Facility: HOSPITAL | Age: 62
End: 2022-12-19

## 2022-12-19 LAB
ANION GAP SERPL CALCULATED.3IONS-SCNC: 10 MMOL/L (ref 4–13)
APTT PPP: >210 SECONDS (ref 23–37)
BUN SERPL-MCNC: 7 MG/DL (ref 5–25)
CALCIUM SERPL-MCNC: 8.1 MG/DL (ref 8.3–10.1)
CHLORIDE SERPL-SCNC: 100 MMOL/L (ref 96–108)
CO2 SERPL-SCNC: 24 MMOL/L (ref 21–32)
CREAT SERPL-MCNC: 0.7 MG/DL (ref 0.6–1.3)
ERYTHROCYTE [DISTWIDTH] IN BLOOD BY AUTOMATED COUNT: 12.8 % (ref 11.6–15.1)
GFR SERPL CREATININE-BSD FRML MDRD: 93 ML/MIN/1.73SQ M
GLUCOSE SERPL-MCNC: 88 MG/DL (ref 65–140)
HCT VFR BLD AUTO: 35 % (ref 34.8–46.1)
HGB BLD-MCNC: 11.5 G/DL (ref 11.5–15.4)
MCH RBC QN AUTO: 29.4 PG (ref 26.8–34.3)
MCHC RBC AUTO-ENTMCNC: 32.9 G/DL (ref 31.4–37.4)
MCV RBC AUTO: 90 FL (ref 82–98)
PLATELET # BLD AUTO: 298 THOUSANDS/UL (ref 149–390)
PMV BLD AUTO: 11.3 FL (ref 8.9–12.7)
POTASSIUM SERPL-SCNC: 4.1 MMOL/L (ref 3.5–5.3)
RBC # BLD AUTO: 3.91 MILLION/UL (ref 3.81–5.12)
SODIUM SERPL-SCNC: 134 MMOL/L (ref 135–147)
WBC # BLD AUTO: 6.26 THOUSAND/UL (ref 4.31–10.16)

## 2022-12-19 RX ADMIN — APIXABAN 10 MG: 5 TABLET, FILM COATED ORAL at 10:41

## 2022-12-19 RX ADMIN — PANTOPRAZOLE SODIUM 40 MG: 40 TABLET, DELAYED RELEASE ORAL at 05:37

## 2022-12-19 RX ADMIN — ACETAMINOPHEN 650 MG: 325 TABLET, FILM COATED ORAL at 05:40

## 2022-12-19 RX ADMIN — APIXABAN 10 MG: 5 TABLET, FILM COATED ORAL at 17:54

## 2022-12-19 NOTE — OCCUPATIONAL THERAPY NOTE
Occupational Therapy Cancellation Note     Patient Name: Eric Lewis  JRROQ'X Date: 12/19/2022  Problem List  Principal Problem:    Acute pulmonary embolism ([address] )  Active Problems:    Obesity, unspecified obesity severity, unspecified obesity type    Mixed hyperlipidemia    Gastroesophageal reflux disease without esophagitis    Hypokalemia    Hyponatremia    COVID-19        12/19/22 1211   OT Last Visit   OT Visit Date 12/19/22   Note Type   Note type Cancelled Session   Cancel Reasons Medical status       OT order received and chart review performed  At this time, OT evaluation cancelled due to patient not medically appropriate d/t acute PE  Per department protocol will hold on skilled services till 24 hours post start of anticoagulation  OT will follow and evaluate as appropriate        JOSEPH Painter, OTR/L

## 2022-12-19 NOTE — PLAN OF CARE
Problem: Potential for Falls  Goal: Patient will remain free of falls  Description: INTERVENTIONS:  - Educate patient/family on patient safety including physical limitations  - Instruct patient to call for assistance with activity   - Consult OT/PT to assist with strengthening/mobility   - Keep Call bell within reach  - Keep bed low and locked with side rails adjusted as appropriate  - Keep care items and personal belongings within reach  - Initiate and maintain comfort rounds  - Make Fall Risk Sign visible to staff  - Offer Toileting every 3 Hours, in advance of need  - Initiate/Maintain bed/chair alarm  - Obtain necessary fall risk management equipment:   - Apply yellow socks and bracelet for high fall risk patients  - Consider moving patient to room near nurses station  Outcome: Progressing     Problem: MOBILITY - ADULT  Goal: Maintain or return to baseline ADL function  Description: INTERVENTIONS:  -  Assess patient's ability to carry out ADLs; assess patient's baseline for ADL function and identify physical deficits which impact ability to perform ADLs (bathing, care of mouth/teeth, toileting, grooming, dressing, etc )  - Assess/evaluate cause of self-care deficits   - Assess range of motion  - Assess patient's mobility; develop plan if impaired  - Assess patient's need for assistive devices and provide as appropriate  - Encourage maximum independence but intervene and supervise when necessary  - Involve family in performance of ADLs  - Assess for home care needs following discharge   - Consider OT consult to assist with ADL evaluation and planning for discharge  - Provide patient education as appropriate  Outcome: Progressing  Goal: Maintains/Returns to pre admission functional level  Description: INTERVENTIONS:  - Perform BMAT or MOVE assessment daily    - Set and communicate daily mobility goal to care team and patient/family/caregiver     - Collaborate with rehabilitation services on mobility goals if consulted  - Reposition patient every 2 hours  - Dangle patient 3 times a day  - Stand patient 3 times a day  - Ambulate patient 1 times a day  - Out of bed to chair 3 times a day   - Out of bed for meals 3 times a day  - Out of bed for toileting  - Record patient progress and toleration of activity level   Outcome: Progressing     Problem: Prexisting or High Potential for Compromised Skin Integrity  Goal: Skin integrity is maintained or improved  Description: INTERVENTIONS:  - Identify patients at risk for skin breakdown  - Assess and monitor skin integrity  - Assess and monitor nutrition and hydration status  - Monitor labs   - Assess for incontinence   - Turn and reposition patient  - Assist with mobility/ambulation  - Relieve pressure over bony prominences  - Avoid friction and shearing  - Provide appropriate hygiene as needed including keeping skin clean and dry  - Evaluate need for skin moisturizer/barrier cream  - Collaborate with interdisciplinary team   - Patient/family teaching  - Consider wound care consult   Outcome: Progressing     Problem: Nutrition/Hydration-ADULT  Goal: Nutrient/Hydration intake appropriate for improving, restoring or maintaining nutritional needs  Description: Monitor and assess patient's nutrition/hydration status for malnutrition  Collaborate with interdisciplinary team and initiate plan and interventions as ordered  Monitor patient's weight and dietary intake as ordered or per policy  Utilize nutrition screening tool and intervene as necessary  Determine patient's food preferences and provide high-protein, high-caloric foods as appropriate       INTERVENTIONS:  - Monitor oral intake, urinary output, labs, and treatment plans  - Assess nutrition and hydration status and recommend course of action  - Evaluate amount of meals eaten  - Assist patient with eating if necessary   - Allow adequate time for meals  - Recommend/ encourage appropriate diets, oral nutritional supplements, and vitamin/mineral supplements  - Order, calculate, and assess calorie counts as needed  - Recommend, monitor, and adjust tube feedings and TPN/PPN based on assessed needs  - Assess need for intravenous fluids  - Provide specific nutrition/hydration education as appropriate  - Include patient/family/caregiver in decisions related to nutrition  Outcome: Progressing     Problem: RESPIRATORY - ADULT  Goal: Achieves optimal ventilation and oxygenation  Description: INTERVENTIONS:  - Assess for changes in respiratory status  - Assess for changes in mentation and behavior  - Position to facilitate oxygenation and minimize respiratory effort  - Oxygen administered by appropriate delivery if ordered  - Initiate smoking cessation education as indicated  - Encourage broncho-pulmonary hygiene including cough, deep breathe, Incentive Spirometry  - Assess the need for suctioning and aspirate as needed  - Assess and instruct to report SOB or any respiratory difficulty  - Respiratory Therapy support as indicated  Outcome: Progressing

## 2022-12-19 NOTE — ASSESSMENT & PLAN NOTE
Tested positive for COVID 12/8    Ongoing shortness of breath, likely in setting of acute PE    · 2 week hx of fatigue, nausea, vomiting, SOB  · Further management as highlighted below

## 2022-12-19 NOTE — ASSESSMENT & PLAN NOTE
Worsening shortness of breath x1 week post COVID diagnosis  Acute pulmonary embolus pulmonary artery and subsegmental branches  No evidence of acutely elevated right-sided pressure  Few peripheral groundglass opacities in the lower lungs, more prominent in the right lower lobe, compatible with pneumonitis or Covid pneumonia  Blood pressure stable  Most recent heart rate 100  · No previous history, likely provoked secondary to hypercoagulable state setting of COVID-19, sedentary state in last 2 weeks due to covid-19 symptoms    · Eliquis covered by her insurance  · Currently on Eliquis  · PT/OT, if needs rehab, patient agrees to go

## 2022-12-19 NOTE — PROGRESS NOTES
INTERNAL MEDICINE RESIDENCY PROGRESS NOTE     Name: Ryann Cruz   Age & Sex: 57 y o  female   MRN: [id number]  Unit/Bed#: -01   Encounter: [id number]    PATIENT INFORMATION     Name: Ryann Cruz   Age & Sex: 57 y o  female   MRN: [id number]  Hospital Stay Days: 0    ASSESSMENT/PLAN     Principal Problem:    Acute pulmonary embolism ([address] )  Active Problems:    Obesity, unspecified obesity severity, unspecified obesity type    Mixed hyperlipidemia    Gastroesophageal reflux disease without esophagitis    Hypokalemia    Hyponatremia    COVID-19      COVID-19  Assessment & Plan  Tested positive for COVID 12/8  Ongoing shortness of breath, likely in setting of acute PE    · 2 week hx of fatigue, nausea, vomiting, SOB  · Further management as highlighted below    Hyponatremia  Assessment & Plan  Sodium 130, likely setting of hypovolemia, poor po intake  Received 1 L IV fluids in ED  Follow-up BMP    Hypokalemia  Assessment & Plan  Potassium 2 8 in setting of poor po intake  Status post repletion  Follow-up on magnesium  Monitor with a m  BMP    Gastroesophageal reflux disease without esophagitis  Assessment & Plan  Continue home Protonix    Mixed hyperlipidemia  Assessment & Plan  Continue atorvastatin 40 mg daily    Obesity, unspecified obesity severity, unspecified obesity type  Assessment & Plan  Educated on dietary and lifestyle modifications    * Acute pulmonary embolism (HCC)  Assessment & Plan  Worsening shortness of breath x1 week post COVID diagnosis  Acute pulmonary embolus pulmonary artery and subsegmental branches  No evidence of acutely elevated right-sided pressure  Few peripheral groundglass opacities in the lower lungs, more prominent in the right lower lobe, compatible with pneumonitis or Covid pneumonia  Blood pressure stable    Most recent heart rate 100  · No previous history, likely provoked secondary to hypercoagulable state setting of COVID-19, sedentary state in last 2 weeks due to covid-19 symptoms  · Eliquis covered by her insurance  · DC heparin, switched to Eliquis  · PT/OT, if needs rehab, patient agrees to go          Disposition: To be determined    SUBJECTIVE     Patient seen and examined  No acute events overnight  Patient feels tired  Patient denies shortness of breath or chest pain    OBJECTIVE     Vitals:    22 1949 22 2235 22 0755 22 1121   BP: 147/75 113/61 107/63 110/64   BP Location:       Pulse: 99 100 89 88   Resp:   (!)    Temp: 98 6 °F (37 °C)  98 5 °F (36 9 °C) 97 9 °F (36 6 °C)   TempSrc:    Oral   SpO2: 96% 94% 95% 94%   Weight:          Temperature:   Temp (24hrs), Av 4 °F (36 9 °C), Min:97 9 °F (36 6 °C), Max:98 6 °F (37 °C)    Temperature: 97 9 °F (36 6 °C)  Intake & Output:  I/O        0701   0700  0701   0700  0701   0700    P  O   480 118    I V  (mL/kg)   192 7 (2)    IV Piggyback  1100     Total Intake(mL/kg)  1580 (16) 310 7 (3 1)    Urine (mL/kg/hr)  600 650 (1 4)    Total Output  600 650    Net  +980 -339 3               Weights:        Body mass index is 37 39 kg/m²  Weight (last 2 days)     Date/Time Weight    22 1453 98 8 (217 81)        Physical Exam  HENT:      Head: Normocephalic  Eyes:      Pupils: Pupils are equal, round, and reactive to light  Cardiovascular:      Rate and Rhythm: Normal rate and regular rhythm  Heart sounds: No murmur heard  Pulmonary:      Effort: Pulmonary effort is normal  No respiratory distress  Breath sounds: No wheezing or rales  Abdominal:      Palpations: Abdomen is soft  Tenderness: There is no abdominal tenderness  Musculoskeletal:         General: No swelling  Normal range of motion  Cervical back: Normal range of motion  Skin:     General: Skin is warm  Neurological:      Mental Status: She is alert and oriented to person, place, and time  Psychiatric:         Mood and Affect: Mood normal        LABORATORY DATA     Labs:  I have personally reviewed pertinent reports  Results from last 7 days   Lab Units 12/19/22  0451 12/18/22  1537   WBC Thousand/uL 6 26 6 51   HEMOGLOBIN g/dL 11 5 12 7   HEMATOCRIT % 35 0 38 0   PLATELETS Thousands/uL 298 349   NEUTROS PCT %  --  78*   MONOS PCT %  --  6      Results from last 7 days   Lab Units 12/19/22  0451 12/18/22  2317 12/18/22  1537   POTASSIUM mmol/L 4 1 3 6 2 8*   CHLORIDE mmol/L 100 100 93*   CO2 mmol/L 24 25 30   BUN mg/dL 7 8 11   CREATININE mg/dL 0 70 0 66 0 84   CALCIUM mg/dL 8 1* 7 6* 8 4   ALK PHOS U/L  --   --  69   ALT U/L  --   --  31   AST U/L  --   --  34     Results from last 7 days   Lab Units 12/18/22  1537   MAGNESIUM mg/dL 2 4          Results from last 7 days   Lab Units 12/19/22  0451 12/18/22 2317 12/18/22  1834   INR   --   --  1 20*   PTT seconds >210* >210* 29               IMAGING & DIAGNOSTIC TESTING     Radiology Results: I have personally reviewed pertinent reports  XR chest 2 views    Result Date: 12/18/2022  Impression: No acute cardiopulmonary disease  Workstation performed: [id number]     CTA ED chest PE Study    Result Date: 12/18/2022  Impression: 1  Acute pulmonary embolus pulmonary artery and subsegmental branches  No evidence of acutely elevated right-sided pressure  2   Few peripheral groundglass opacities in the lower lungs, more prominent in the right lower lobe, compatible with pneumonitis or Covid pneumonia  I personally discussed this study with Guanaco Keating on 12/18/2022 at 5:51 PM  Workstation performed: [id number]     Other Diagnostic Testing: I have personally reviewed pertinent reports      ACTIVE MEDICATIONS     Current Facility-Administered Medications   Medication Dose Route Frequency   • acetaminophen (TYLENOL) tablet 650 mg  650 mg Oral Q6H PRN   • apixaban (ELIQUIS) tablet 10 mg  10 mg Oral BID   • atorvastatin (LIPITOR) tablet 40 mg  40 mg Oral Daily   • oxybutynin (DITROPAN-XL) 24 hr tablet 10 mg  10 mg Oral Daily   • pantoprazole (PROTONIX) EC tablet 40 mg  40 mg Oral Early Morning   • traZODone (DESYREL) tablet 50 mg  50 mg Oral HS PRN       VTE Pharmacologic Prophylaxis: Eliquis  VTE Mechanical Prophylaxis: sequential compression device    Portions of the record may have been created with voice recognition software  Occasional wrong word or "sound a like" substitutions may have occurred due to the inherent limitations of voice recognition software  Read the chart carefully and recognize, using context, where substitutions have occurred    ==  Violeta Bynum MD  [facility]  Internal Medicine Residency PGY-3

## 2022-12-19 NOTE — NURSING NOTE
Patients daughter Delon Brown calling for update  Daughter not listed on emergency contact list  Nursing staff asked patient if she would allow daughter to be given update and patient agreed daughter can be given update on her

## 2022-12-19 NOTE — CASE MANAGEMENT
Case Management Assessment & Discharge Planning Note    Patient name Eusebia Adams  Location [address] 310/-[id number] MRN [id number]  : 1960 Date 2022       Current Admission Date: 2022  Current Admission Diagnosis:Acute pulmonary embolism [facility])   Patient Active Problem List    Diagnosis Date Noted   • Acute pulmonary embolism ([address] ) 2022   • Hypokalemia 2022   • Hyponatremia 2022   • COVID-19 2022   • Mixed hyperlipidemia 2021   • Gastroesophageal reflux disease without esophagitis 2021   • Thrombocytosis 2021   • Migraine    • Apnea, sleep 2018   • Obesity, unspecified obesity severity, unspecified obesity type 2018      LOS (days): 0  Geometric Mean LOS (GMLOS) (days):   Days to GMLOS:     OBJECTIVE:              Current admission status: Observation       Preferred Pharmacy:   [address] [place] [address], [address] [place] [address] [address] [place] [address]  Phone: [phone] Fax: [address] #[phone] [facility] [address] [address] [address]  Phone: [phone] Fax: [phone]    Primary Care Provider: Marylou Swain MD    Primary Insurance: [address] [place] [address]  Secondary Insurance:     ASSESSMENT:  Karina  Proxies    There are no active Health Care Proxies on file         Advance Directives  Does patient have a [address]?: No  Was patient offered paperwork?: Yes (declined)  Does patient currently have a Health Care decision maker?: Yes, please see Health Care Proxy section  Does patient have Advance Directives?: No  Was patient offered paperwork?: Yes (declined)  Primary Contact: maurice Tinajero    Obs Notice Signed:  (not on workqueue)    Readmission Root Cause  30 Day Readmission: No    Patient Information  Admitted from[de-identified] Home  Mental Status: Alert  During Assessment patient was accompanied by: Not accompanied during assessment  Assessment information provided by[de-identified] Son, Patient  Primary Caregiver: Self  Support Systems: Son, Spouse/significant other (ex boyfriend Sohan Lira and son Nils Nino)  [place] of Residence: [address] do you live in?: [address] [place] entry access options   Select all that apply : No steps to enter home  Type of Current Residence: Apartment  Floor Level: 1  Upon entering residence, is there a bedroom on the main floor (no further steps)?: Yes  Upon entering residence, is there a bathroom on the main floor (no further steps)?: Yes  In the last 12 months, was there a time when you were not able to pay the mortgage or rent on time?: No  In the last 12 months, how many places have you lived?: 3  In the last 12 months, was there a time when you did not have a steady place to sleep or slept in a shelter (including now)?: No  Homeless/housing insecurity resource given?: No  Living Arrangements: Lives w/ Spouse/significant other (lives with ex-boyfriend Sohan Lira)  Is patient a ?: No    Activities of Daily Living Prior to Admission  Functional Status: Independent  Completes ADLs independently?: Yes  Ambulates independently?: Yes  Does patient use assisted devices?: Yes  Assisted Devices (DME) used: [employer]  Does patient currently own DME?: Yes  What DME does the patient currently own?: [employer]cc[employer] Graver  Does patient have a history of Outpatient Therapy (PT/OT)?: No  Does the patient have a history of Short-Term Rehab?: No  Does patient have a history of HHC?: No  Does patient currently have [facility]?: No         Patient Information Continued  Income Source: Employed (Pt is employed by [employer])  Does patient have prescription coverage?: Yes  Within the past 12 months, you worried that your food would run out before you got the money to buy more : Never true  Within the past 12 months, the food you bought just didn't last and you didn't have money to get more : Never true  Food insecurity resource given?: No  Does patient receive dialysis treatments?: No  Does patient have a history of substance abuse?: No  Does patient have a history of Mental Health Diagnosis?: No    PHQ 2/9 Screening   Reviewed PHQ 2/9 Depression Screening Score?: No    Means of Transportation  Means of Transport to Appts[de-identified] Drives Self  In the past 12 months, has lack of transportation kept you from medical appointments or from getting medications?: No  In the past 12 months, has lack of transportation kept you from meetings, work, or from getting things needed for daily living?: No  Was application for public transport provided?: No        DISCHARGE DETAILS:    Discharge planning discussed with[de-identified] pt and son Jonathan Del Real of Choice: Yes  Comments - Freedom of Choice: Pt works for [address] [place] [address] and will not VNA services on d/c  CM contacted family/caregiver?: Yes  Were Treatment Team discharge recommendations reviewed with patient/caregiver?: Yes  Did patient/caregiver verbalize understanding of patient care needs?: Yes  Were patient/caregiver advised of the risks associated with not following Treatment Team discharge recommendations?: Yes    Contacts  Patient Contacts: Anastasiia Marks  Relationship to Patient[de-identified] Family  Contact Method: Phone  Phone Number: [phone]  Reason/Outcome: Continuity of Care, Discharge Planning    [facility]         Is the patient interested in [facility] at discharge?: No    DME Referral Provided  Referral made for DME?: No    Other Referral/Resources/Interventions Provided:  Interventions: Prescription Price Check  Referral Comments: CM contacted [facility] and was informed that Eliquis is covered completely-no oop cost    Would you like to participate in our [address] service program?  : No - Declined    Treatment Team Recommendation: Home  Discharge Destination Plan[de-identified] Home  Transport at Discharge : Family

## 2022-12-19 NOTE — UTILIZATION REVIEW
Initial Clinical Review    Admission: Date/Time/Statement:   Admission Orders (From admission, onward)     Ordered        12/18/22 1805  Place in Observation  Once            12/18/22 1805  Place in Observation  Once                      Orders Placed This Encounter   Procedures   • Place in Observation     Standing Status:   Standing     Number of Occurrences:   1     Order Specific Question:   Level of Care     Answer:   Med Surg [16]   • Place in Observation     Standing Status:   Standing     Number of Occurrences:   1     Order Specific Question:   Level of Care     Answer:   Med Surg [16]     ED Arrival Information     Expected   -    Arrival   12/18/2022 14:46    Acuity   Urgent            Means of arrival   Ambulance    Escorted by   [facility]    Service   Hospitalist    Admission type   Emergency            Arrival complaint   -           Chief Complaint   Patient presents with   • Shortness of Breath     Pt presents to ER via ALS from home with c/o's shortness of breath - pt dx with COVID a week or so ago & has been getting worse  Generally healthy appearing obese female presents in no distress  Initial Presentation: 57 y o  female  PMH of obesity, migraines, HLD,anemia, GERD, recent Covid 19 infection  presents to ED from home with worsening SOB x 1 week  Dx with covid 19 on 12/8, noted to have worsening symptoms and SOB over the past week  + palpitations, poor appetite, N/V/D and fatigue since getting covid 19  Tachycardic, dry mucous membranes, rales @ bases  D-dimer 1 02, Na 130, K 2 8  CTA showed  Acute pulmonary embolus pulmonary artery and subsegmental branches   No evidence of acutely elevated right-sided pressure and Few peripheral groundglass opacities in the lower lungs, more prominent in the right lower lobe, compatible with pneumonitis or Covid pneumonia     Admitted to Observation with COVID-19, Hyponatremia, Hypokalemia, Acute PE (likely provoked 2/2 hypercoagulable state setting of COVID-19, sedentary state in last 2 weeks due to covid-19 symptoms)  Rec'd 1 L IVF in ED, replete K IV & po, recheck bmp, Heparin drip, ECHO, Telemetry        Date: 12/19  Day 2: Feels tired  DC Heparin; switch to eliquis, PT/OT Eval  Anticipate medically clear later today pending PT/OT eval    ED Triage Vitals [12/18/22 1453]   Temperature Pulse Respirations Blood Pressure SpO2   98 5 °F (36 9 °C) (!) 112 16 138/67 97 %      Temp Source Heart Rate Source Patient Position - Orthostatic VS BP Location FiO2 (%)   Oral Monitor Lying Left arm --      Pain Score       No Pain          Wt Readings from Last 1 Encounters:   12/18/22 98 8 kg (217 lb 13 oz)     Additional Vital Signs:   12/19/22 11:21:02 97 9 °F (36 6 °C) 88 18 110/64 79 94 % 28 2 L/min Nasal cannula   12/19/22 07:55:36 98 5 °F (36 9 °C) 89 24 Abnormal  107/63 78 95 % -- -- --   12/18/22 22:35:25 -- 100 -- 113/61 78 94 % -- -- --   12/18/22 19:49:18 98 6 °F (37 °C) 99 -- 147/75 99 96 % -- -- --   12/18/22 1830 -- 104 27 Abnormal  145/69 99 92 % -- -- --   12/18/22 1715 -- 100 22 143/68 95 93 % -- -- --   12/18/22 1630 -- 96 23 Abnormal  141/62 89 93 % -- -- None (Room air)   12/18/22 1500 -- 108 Abnormal  21 133/66 91 93 % -- -- None (Room air)       Pertinent Labs/Diagnostic Test Results:   CTA ED chest PE Study   Final Result by Suleman Morelos MD (12/18 6304)         1  Acute pulmonary embolus pulmonary artery and subsegmental branches  No evidence of acutely elevated right-sided pressure  2   Few peripheral groundglass opacities in the lower lungs, more prominent in the right lower lobe, compatible with pneumonitis or Covid pneumonia               I personally discussed this study with Arnold Zarate on 12/18/2022 at 5:51 PM                      Workstation performed: [id number]         XR chest 2 views   ED Interpretation by Kristin Calhoun DO (12/18 7254)   NAD      Final Result by Deejay Salazar MD (12/18 0576)      No acute cardiopulmonary disease                    Workstation performed: [id number]           Results from last 7 days   Lab Units 12/19/22  0451 12/18/22  1537   WBC Thousand/uL 6 26 6 51   HEMOGLOBIN g/dL 11 5 12 7   HEMATOCRIT % 35 0 38 0   PLATELETS Thousands/uL 298 349   NEUTROS ABS Thousands/µL  --  5 04     Results from last 7 days   Lab Units 12/19/22  0451 12/18/22  2317 12/18/22  1537   SODIUM mmol/L 134* 133* 130*   POTASSIUM mmol/L 4 1 3 6 2 8*   CHLORIDE mmol/L 100 100 93*   CO2 mmol/L 24 25 30   ANION GAP mmol/L 10 8 7   BUN mg/dL 7 8 11   CREATININE mg/dL 0 70 0 66 0 84   EGFR ml/min/1 73sq m 93 94 74   CALCIUM mg/dL 8 1* 7 6* 8 4   MAGNESIUM mg/dL  --   --  2 4     Results from last 7 days   Lab Units 12/18/22  1537   AST U/L 34   ALT U/L 31   ALK PHOS U/L 69   TOTAL PROTEIN g/dL 7 4   ALBUMIN g/dL 3 2*   TOTAL BILIRUBIN mg/dL 0 42     Results from last 7 days   Lab Units 12/19/22  0451 12/18/22  2317 12/18/22  1537   GLUCOSE RANDOM mg/dL 88 99 104     Results from last 7 days   Lab Units 12/18/22  1834 12/18/22  1537   HS TNI 0HR ng/L  --  4   HS TNI 2HR ng/L 5  --    HSTNI D2 ng/L 1  --      Results from last 7 days   Lab Units 12/18/22  1537   D-DIMER QUANTITATIVE ug/ml FEU 1 02*     Results from last 7 days   Lab Units 12/19/22  0451 12/18/22  2317 12/18/22  1834   PROTIME seconds  --   --  14 9*   INR   --   --  1 20*   PTT seconds >210* >210* 29     Results from last 7 days   Lab Units 12/18/22  1537   NT-PRO BNP pg/mL 47     12/18 EKG: ECG rate assessment: tachycardic      Rhythm: sinus rhythm      Ectopy: none      QRS axis:  Left    QRS intervals:  Normal    Conduction: normal      T waves: non-specific        ED Treatment:   Medication Administration from 12/18/2022 1446 to 12/18/2022 1944       Date/Time Order Dose Route Action     12/18/2022 1613 EST sodium chloride 0 9 % bolus 1,000 mL 1,000 mL Intravenous New Bag     12/18/2022 1614 EST ondansetron (ZOFRAN) injection 4 mg 4 mg Intravenous Given 12/18/2022 1717 EST potassium chloride (K-DUR,KLOR-CON) CR tablet 40 mEq 40 mEq Oral Given     12/18/2022 1717 EST potassium chloride 20 mEq IVPB (premix) 20 mEq Intravenous New Bag     12/18/2022 1900 EST heparin (porcine) injection 7,600 Units 7,600 Units Intravenous Given     12/18/2022 1902 EST heparin (porcine) 25,000 units in 0 45% NaCl 250 mL infusion (premix) 18 Units/kg/hr Intravenous New Bag        Past Medical History:   Diagnosis Date   • Anemia    • Cancer ([facility] [address] ) 2007    breast history of   • Migraine    • Mixed hyperlipidemia 5/21/2021   • Skin cancer     Left leg lump  cryotherapy and excision, unsure of which kind      Present on Admission:  • [facility] )  • Mixed hyperlipidemia  • Gastroesophageal reflux disease without esophagitis  • Hypokalemia  • Hyponatremia  • COVID-19  • Obesity, unspecified obesity severity, unspecified obesity type      Admitting Diagnosis: Pulmonary embolism (HCC) [I26 99]  SOB (shortness of breath) [R06 02]  COVID-19 [U07 1]  Age/Sex: 57 y o  female  Admission Orders:  Scheduled Medications:  apixaban, 10 mg, Oral, BID  atorvastatin, 40 mg, Oral, Daily  oxybutynin, 10 mg, Oral, Daily  pantoprazole, 40 mg, Oral, Early Morning  Mag Sulfate 2 Grams IV x 1 12/18    Continuous IV Infusions:  heparin (porcine) 25,000 units in 0 45% NaCl 250 mL infusion     PRN Meds:  acetaminophen, 650 mg, Oral, Q6H PRN x 1 12/19  traZODone, 50 mg, Oral, HS PRN    Telemetry  SCDs  IP CONSULT TO CASE MANAGEMENT    Network Utilization Review Department  ATTENTION: Please call with any questions or concerns to [phone] and carefully listen to the prompts so that you are directed to the right person  All voicemails are confidential   Jane Figueroa all requests for admission clinical reviews, approved or denied determinations and any other requests to dedicated fax number below belonging to the campus where the patient is receiving treatment   List of dedicated fax numbers for the Facilities:  FACILITY NAME UR FAX NUMBER   ADMISSION DENIALS (Administrative/Medical Necessity) [phone]   [address] (Maternity/NICU/Pediatrics) [phone]   [address] Alysha Stallworth  [phone]   [address] [address] [place] [address] [facility] 310 [facility] [address] 8[address] [facility] [phone]

## 2022-12-19 NOTE — PHYSICAL THERAPY NOTE
Physical Therapy Cancellation Note    PT order received  Chart review performed  At this time, PT evaluation cancelled secondary to patient is not medically appropriate secondary to acute PE  Per department policy, PT to hold 24 hours post start of anticoagulation  PT will follow and evaluate as appropriate  12/19/22 1210   PT Last Visit   PT Visit Date 12/19/22   Note Type   Note type Evaluation; Cancelled Session   Cancel Reasons Medical status     Kofi Lu, PT, DPT

## 2022-12-20 LAB
ANION GAP SERPL CALCULATED.3IONS-SCNC: 11 MMOL/L (ref 4–13)
BASOPHILS # BLD AUTO: 0.01 THOUSANDS/ÂΜL (ref 0–0.1)
BASOPHILS NFR BLD AUTO: 0 % (ref 0–1)
BUN SERPL-MCNC: 6 MG/DL (ref 5–25)
CALCIUM SERPL-MCNC: 8.6 MG/DL (ref 8.3–10.1)
CHLORIDE SERPL-SCNC: 100 MMOL/L (ref 96–108)
CO2 SERPL-SCNC: 29 MMOL/L (ref 21–32)
CREAT SERPL-MCNC: 0.84 MG/DL (ref 0.6–1.3)
EOSINOPHIL # BLD AUTO: 0.03 THOUSAND/ÂΜL (ref 0–0.61)
EOSINOPHIL NFR BLD AUTO: 1 % (ref 0–6)
ERYTHROCYTE [DISTWIDTH] IN BLOOD BY AUTOMATED COUNT: 12.7 % (ref 11.6–15.1)
GFR SERPL CREATININE-BSD FRML MDRD: 74 ML/MIN/1.73SQ M
GLUCOSE P FAST SERPL-MCNC: 103 MG/DL (ref 65–99)
GLUCOSE SERPL-MCNC: 103 MG/DL (ref 65–140)
HCT VFR BLD AUTO: 38.1 % (ref 34.8–46.1)
HGB BLD-MCNC: 12.4 G/DL (ref 11.5–15.4)
IMM GRANULOCYTES # BLD AUTO: 0.01 THOUSAND/UL (ref 0–0.2)
IMM GRANULOCYTES NFR BLD AUTO: 0 % (ref 0–2)
LYMPHOCYTES # BLD AUTO: 1.3 THOUSANDS/ÂΜL (ref 0.6–4.47)
LYMPHOCYTES NFR BLD AUTO: 26 % (ref 14–44)
MCH RBC QN AUTO: 29 PG (ref 26.8–34.3)
MCHC RBC AUTO-ENTMCNC: 32.5 G/DL (ref 31.4–37.4)
MCV RBC AUTO: 89 FL (ref 82–98)
MONOCYTES # BLD AUTO: 0.42 THOUSAND/ÂΜL (ref 0.17–1.22)
MONOCYTES NFR BLD AUTO: 9 % (ref 4–12)
NEUTROPHILS # BLD AUTO: 3.15 THOUSANDS/ÂΜL (ref 1.85–7.62)
NEUTS SEG NFR BLD AUTO: 64 % (ref 43–75)
NRBC BLD AUTO-RTO: 0 /100 WBCS
PLATELET # BLD AUTO: 343 THOUSANDS/UL (ref 149–390)
PMV BLD AUTO: 10.1 FL (ref 8.9–12.7)
POTASSIUM SERPL-SCNC: 3.3 MMOL/L (ref 3.5–5.3)
RBC # BLD AUTO: 4.27 MILLION/UL (ref 3.81–5.12)
SODIUM SERPL-SCNC: 140 MMOL/L (ref 135–147)
WBC # BLD AUTO: 4.92 THOUSAND/UL (ref 4.31–10.16)

## 2022-12-20 RX ORDER — ONDANSETRON 2 MG/ML
4 INJECTION INTRAMUSCULAR; INTRAVENOUS EVERY 8 HOURS PRN
Status: DISCONTINUED | OUTPATIENT
Start: 2022-12-20 | End: 2022-12-21 | Stop reason: HOSPADM

## 2022-12-20 RX ORDER — ONDANSETRON 2 MG/ML
4 INJECTION INTRAMUSCULAR; INTRAVENOUS ONCE
Status: COMPLETED | OUTPATIENT
Start: 2022-12-20 | End: 2022-12-20

## 2022-12-20 RX ORDER — POTASSIUM CHLORIDE 20 MEQ/1
40 TABLET, EXTENDED RELEASE ORAL ONCE
Status: COMPLETED | OUTPATIENT
Start: 2022-12-20 | End: 2022-12-20

## 2022-12-20 RX ORDER — NYSTATIN 100000 [USP'U]/G
POWDER TOPICAL 2 TIMES DAILY
Status: DISCONTINUED | OUTPATIENT
Start: 2022-12-20 | End: 2022-12-21 | Stop reason: HOSPADM

## 2022-12-20 RX ADMIN — NYSTATIN: 100000 POWDER TOPICAL at 17:49

## 2022-12-20 RX ADMIN — POTASSIUM CHLORIDE 40 MEQ: 1500 TABLET, EXTENDED RELEASE ORAL at 09:29

## 2022-12-20 RX ADMIN — ONDANSETRON 4 MG: 2 INJECTION INTRAMUSCULAR; INTRAVENOUS at 02:12

## 2022-12-20 RX ADMIN — ACETAMINOPHEN 650 MG: 325 TABLET, FILM COATED ORAL at 17:49

## 2022-12-20 RX ADMIN — ACETAMINOPHEN 650 MG: 325 TABLET, FILM COATED ORAL at 09:29

## 2022-12-20 RX ADMIN — APIXABAN 10 MG: 5 TABLET, FILM COATED ORAL at 17:49

## 2022-12-20 RX ADMIN — PANTOPRAZOLE SODIUM 40 MG: 40 TABLET, DELAYED RELEASE ORAL at 05:12

## 2022-12-20 RX ADMIN — ONDANSETRON 4 MG: 2 INJECTION INTRAMUSCULAR; INTRAVENOUS at 17:49

## 2022-12-20 RX ADMIN — ONDANSETRON 4 MG: 2 INJECTION INTRAMUSCULAR; INTRAVENOUS at 10:39

## 2022-12-20 RX ADMIN — ACETAMINOPHEN 650 MG: 325 TABLET, FILM COATED ORAL at 02:12

## 2022-12-20 RX ADMIN — ATORVASTATIN CALCIUM 40 MG: 40 TABLET, FILM COATED ORAL at 08:13

## 2022-12-20 RX ADMIN — NYSTATIN: 100000 POWDER TOPICAL at 05:12

## 2022-12-20 RX ADMIN — APIXABAN 10 MG: 5 TABLET, FILM COATED ORAL at 08:13

## 2022-12-20 NOTE — CASE MANAGEMENT
[facility] received request for authorization from Care Manager  Authorization request for: SNF  Facility Name: [facility]  NPI: [id number]  Facility MD:  Dr Shwetha Spann   NPI: [id number]  Authorization initiated by contacting: Lyndon Garcia Via: Fax  Clinicals submitted via:  Fax

## 2022-12-20 NOTE — PLAN OF CARE
Problem: PHYSICAL THERAPY ADULT  Goal: Performs mobility at highest level of function for planned discharge setting  See evaluation for individualized goals  Description: Treatment/Interventions: LE strengthening/ROM, Functional transfer training, ADL retraining, Therapeutic exercise, Endurance training, Bed mobility, Gait training, Compensatory technique education, Spoke to nursing, OT          See flowsheet documentation for full assessment, interventions and recommendations  Outcome: Progressing  Note: Prognosis: Good  Problem List: Decreased strength, Decreased endurance, Impaired balance, Decreased mobility, Pain  Assessment: Pt is 60 y o  female seen for PT evaluation s/p admit to [facility] on 12/18/2022 w/ Acute pulmonary embolism ([facility] [address] )  PT consulted to assess pt's functional mobility and d/c needs  Order placed for PT eval and tx, w/ up w/ A order  Comorbidities affecting pt's physical performance at time of assessment include: acute PE, COVID 19, hyponatremia, GERD, HLD, obesity  PTA, pt was independent w/ all functional mobility w/ no device, ambulates community distances and elevations, ambulates household distances, has 0 BILLY, lives alone in two level house and works full time  Personal factors affecting pt at time of IE include: inaccessible home environment, ambulating w/ assistive device, inability to ambulate household distances, inability to navigate community distances, inability to navigate level surfaces w/o external assistance, unable to perform dynamic tasks in community, inability to perform IADLs and inability to perform ADLs  Please find objective findings from PT assessment regarding body systems outlined above with impairments and limitations including weakness, impaired balance, decreased endurance, gait deviations, pain, decreased activity tolerance, decreased functional mobility tolerance, fall risk and SOB upon exertion   The following objective measures performed on IE also reveal limitations: Barthel Index: 40/100, Modified [place]: 4 (moderate/severe disability) and AM-PAC 6-Clicks: 1/71  Pt's clinical presentation is currently unstable/unpredictable seen in pt's presentation of continued need for medical management and monitoring, decreased strength and balance resulting in an increased risk for falls, decreased endurance and activity tolerance limiting mobility, increased pain  Pt to benefit from continued PT tx to address deficits as defined above and maximize level of functional independent mobility and consistency  From PT/mobility standpoint, recommendation at time of d/c would be post acute rehabilitation services pending progress in order to facilitate return to PLOF  Barriers to Discharge: Inaccessible home environment, Decreased caregiver support     PT Discharge Recommendation: Post acute rehabilitation services    See flowsheet documentation for full assessment

## 2022-12-20 NOTE — PROGRESS NOTES
[facility]  Progress Note - Tracyharrison Gwendolyn 1960, 60 y o  female MRN: [id number]  Unit/Bed#: -Brit Encounter: [id number]  Primary Care Provider: Jorge Farmer MD   Date and time admitted to hospital: 12/18/2022  2:47 PM    * Acute pulmonary embolism ([address] )  Assessment & Plan  Worsening shortness of breath x1 week post COVID diagnosis  Acute pulmonary embolus pulmonary artery and subsegmental branches  No evidence of acutely elevated right-sided pressure  Few peripheral groundglass opacities in the lower lungs, more prominent in the right lower lobe, compatible with pneumonitis or Covid pneumonia  Blood pressure stable  Most recent heart rate 100  · No previous history, likely provoked secondary to hypercoagulable state setting of COVID-19, sedentary state in last 2 weeks due to covid-19 symptoms  · Eliquis covered by her insurance  · Currently on Eliquis  · PT/OT, if needs rehab, patient agrees to go      COVID-19  Assessment & Plan  Tested positive for COVID 12/8  Ongoing shortness of breath, likely in setting of acute PE    · 2 week hx of fatigue, nausea, vomiting, SOB  · Further management as highlighted below    Hyponatremia  Assessment & Plan  Resolved    Hypokalemia  Assessment & Plan  · Pleat as necessary  · Continue to monitor    Gastroesophageal reflux disease without esophagitis  Assessment & Plan  Continue home Protonix    Mixed hyperlipidemia  Assessment & Plan  Continue atorvastatin 40 mg daily    Obesity, unspecified obesity severity, unspecified obesity type  Assessment & Plan  Educated on dietary and lifestyle modifications        VTE Pharmacologic Prophylaxis: VTE Score: 6 High Risk (Score >/= 5) - Pharmacological DVT Prophylaxis Ordered: apixaban (Eliquis)  Sequential Compression Devices Ordered  Patient Centered Rounds: I performed bedside rounds with nursing staff today    Discussions with Specialists or Other Care Team Provider: Case management    Education and Discussions with Family / Patient: Patient declined call to   Time Spent for Care: 35 minutes  More than 50% of total time spent on counseling and coordination of care as described above  Current Length of Stay: 0 day(s)  Current Patient Status: Observation   Certification Statement: Awaiting PT and OT evaluation  Discharge Plan: Awaiting PT and OT evaluation    Code Status: Level 1 - Full Code    Subjective:   Patient resting comfortably on examination  Patient with multiple complaints on examination this morning including weakness and muscular pains  Patient had no other complaints on exam     Objective:     Vitals:   Temp (24hrs), Av 8 °F (37 1 °C), Min:97 9 °F (36 6 °C), Max:99 3 °F (37 4 °C)    Temp:  [97 9 °F (36 6 °C)-99 3 °F (37 4 °C)] 99 2 °F (37 3 °C)  HR:  [78-91] 91  Resp:  [13-19] 18  BP: (110-148)/(62-77) 144/77  SpO2:  [90 %-94 %] 94 %  Body mass index is 37 39 kg/m²  Input and Output Summary (last 24 hours): Intake/Output Summary (Last 24 hours) at 2022 1020  Last data filed at 2022 0501  Gross per 24 hour   Intake 522 7 ml   Output 1300 ml   Net -777 3 ml       Physical Exam:   Physical Exam  Vitals and nursing note reviewed  Constitutional:       General: She is not in acute distress  Appearance: She is well-developed  She is obese  Comments: 1L NC   HENT:      Head: Normocephalic and atraumatic  Eyes:      General: No scleral icterus  Conjunctiva/sclera: Conjunctivae normal    Cardiovascular:      Rate and Rhythm: Normal rate and regular rhythm  Heart sounds: Normal heart sounds  No murmur heard  No friction rub  No gallop  Pulmonary:      Effort: Pulmonary effort is normal  No respiratory distress  Breath sounds: Normal breath sounds  No wheezing or rales  Abdominal:      General: Bowel sounds are normal  There is no distension  Palpations: Abdomen is soft  Tenderness: There is no abdominal tenderness  Musculoskeletal:         General: Normal range of motion  Skin:     General: Skin is warm  Findings: No rash  Neurological:      Mental Status: She is alert and oriented to person, place, and time  Additional Data:     Labs:  Results from last 7 days   Lab Units 22  0728   WBC Thousand/uL 4 92   HEMOGLOBIN g/dL 12 4   HEMATOCRIT % 38 1   PLATELETS Thousands/uL 343   NEUTROS PCT % 64   LYMPHS PCT % 26   MONOS PCT % 9   EOS PCT % 1     Results from last 7 days   Lab Units 22  0728 22  2317 22  1537   SODIUM mmol/L 140   < > 130*   POTASSIUM mmol/L 3 3*   < > 2 8*   CHLORIDE mmol/L 100   < > 93*   CO2 mmol/L 29   < > 30   BUN mg/dL 6   < > 11   CREATININE mg/dL 0 84   < > 0 84   ANION GAP mmol/L 11   < > 7   CALCIUM mg/dL 8 6   < > 8 4   ALBUMIN g/dL  --   --  3 2*   TOTAL BILIRUBIN mg/dL  --   --  0 42   ALK PHOS U/L  --   --  69   ALT U/L  --   --  31   AST U/L  --   --  34   GLUCOSE RANDOM mg/dL 103   < > 104    < > = values in this interval not displayed  Results from last 7 days   Lab Units 22  1834   INR  1 20*                   Lines/Drains:  Invasive Devices     Peripheral Intravenous Line  Duration           Peripheral IV 22 Dorsal (posterior); Left Forearm 1 day    Peripheral IV 22 Dorsal (posterior); Right Forearm 1 day          Drain  Duration           External Urinary Catheter 1 day                  Telemetry:  Telemetry Orders (From admission, onward)             24 Hour Telemetry Monitoring  Continuous x 24 Hours (Telem)           References:    Telemetry Guidelines   Question:  Reason for 24 Hour Telemetry  Answer:  Pulmonary Embolism - 24 hours without resp  compromise, dysrhythmias, hemodynamically stable                          Imaging: No pertinent imaging reviewed      Recent Cultures (last 7 days):         Last 24 Hours Medication List:   Current Facility-Administered Medications   Medication Dose Route Frequency Provider Last Rate   • acetaminophen  650 mg Oral Q6H PRN Marinemichele Das, DO     • apixaban  10 mg Oral BID Chelsie Juarez MD     • atorvastatin  40 mg Oral Daily Marinemichele Das, DO     • nystatin   Topical BID Ragini Salmon PA-C     • ondansetron  4 mg Intravenous Q8H PRN Lc Underwood DO     • pantoprazole  40 mg Oral Early Morning Marinemichele Das, DO     • potassium chloride  40 mEq Oral Once Lc Underwood DO     • traZODone  50 mg Oral HS PRN Marine Kumar DO          Today, Patient Was Seen By: Lc Underwood DO    **Please Note: This note may have been constructed using a voice recognition system  **

## 2022-12-20 NOTE — OCCUPATIONAL THERAPY NOTE
Occupational Therapy Evaluation         Patient Name: Shahab Donis  MMQBG'C Date: 12/20/2022 12/20/22 5323   OT Last Visit   OT Visit Date 12/20/22   Note Type   Note type Evaluation   Pain Assessment   Pain Assessment Tool 0-10   Pain Score 7   Pain Location/Orientation Location: Head   Pain Onset/Description Onset: Ongoing;Frequency: Constant/Continuous   Patient's Stated Pain Goal No pain   Hospital Pain Intervention(s) Repositioned   Restrictions/Precautions   Weight Bearing Precautions Per Order No   Braces or Orthoses Other (Comment)  (none reported)   Other Precautions Chair Alarm; Bed Alarm;O2;Fall Risk;Pain  (2L O2)   Home Living   Type of Home House   Home Layout Two level;Bed/bath upstairs  (0 BILLY, no bath on first floor, 17 steps to bedroom)   Bathroom Shower/Tub Tub/shower unit   Bathroom Toilet Standard   Bathroom Equipment Shower chair;Grab bars in shower   [address] Other (Comment)  (none reported)   Additional Comments Ambulates without device at baseline   Prior Function   Level of [place] Independent with functional mobility; Independent with ADLs; Independent with IADLS   Lives With Alone   Receives Help From Family  (children live locally reports they work)   IADLs Independent with driving; Independent with medication management; Independent with meal prep   Falls in the last 6 months 0   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)   Lifestyle   Autonomy Pt lives alone in a two story house with 0 BILLY with no bath on the first floor  Pt with 17 steps to second floor  Pt independent with ADLs, IADLs, and functional mobility   Pt reports 0 falls in the last 6 months   Reciprocal Relationships supportive family   Service to 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   General   Family/Caregiver Present No   ADL   Where Assessed Edge of bed   Equipment Provided Other (Comment)  (no AD provided)   Eating Assistance 6  Modified independent Grooming Assistance 5  Supervision/Setup   UB Bathing Assistance 4  Minimal Assistance   LB Bathing Assistance 3  Moderate Assistance   UB Dressing Assistance 3  Moderate Assistance   LB Dressing Assistance 2  Maximal [address]  2  Maximal Assistance   Functional Assistance 2  Maximal Assistance   Bed Mobility   Supine to Sit 3  Moderate assistance   Additional items Assist x 1;HOB elevated; Bedrails; Increased time required;Verbal cues;LE management   Sit to Supine 3  Moderate assistance   Additional items Assist x 2;HOB elevated; Increased time required;Verbal cues;LE management   Transfers   Sit to Stand 2  Maximal assistance   Additional items Assist x 2; Increased time required;Verbal cues   Stand to Sit 2  Maximal assistance   Additional items Assist x 2; Increased time required;Verbal cues   Additional Comments Pt attempted 3 sit to stand tranfers  Pt able to achieve full stand on 3rd attempt  Functional Mobility   Functional Mobility 3  Moderate assistance   Additional Comments mod x2 with RW and increased VC  Pt took 2 side steps to head of bed   Balance   Static Sitting Fair +   Dynamic Sitting Fair   Static Standing Poor +   Dynamic Standing Poor   Ambulatory Poor   Activity Tolerance   Activity Tolerance Patient limited by fatigue   Medical Staff Made Aware Pt seen as a co-eval with PT Brianne Bazzi due to the patient's co-morbidities, clinically unstable presentation, and present impairments which are a regression from the patient's baseline     RUE Assessment   RUE Assessment X   RUE Overall AROM   R Shoulder Flexion ~100 degrees   RUE Strength   RUE Overall Strength Deficits  (4-/5)   LUE Assessment   LUE Assessment X   LUE Overall AROM   L Shoulder Flexion ~100 degrees   LUE Strength   LUE Overall Strength Deficits  (4-/5)   Hand Function   Gross Motor Coordination Functional   Fine Motor Coordination Functional   Sensation   Light Touch No apparent deficits  (BUEs)   Vision-Basic Assessment   Current Vision Wears contacts   Patient Visual Report   (notices some visual changes due to sickness)   Psychosocial   Psychosocial (WDL) WDL   Cognition   Overall Cognitive Status   (questionable)   Arousal/Participation Alert; Responsive; Cooperative   Attention Within functional limits   Orientation Level Oriented X4   Memory Decreased short term memory;Decreased recall of recent events   Following Commands Follows all commands and directions without difficulty   Comments Therapist will follow-up with cognitive assessments during hospital stay   Assessment   Limitation Decreased ADL status; Decreased UE ROM; Decreased UE strength;Decreased cognition;Decreased endurance;Decreased self-care trans;Decreased high-level ADLs   Prognosis Good   Assessment Pt is a 60 y o  Female seen for an OT evaluation admitted to [address] [place] [address] on 12/18/2022 w/ acute pulmonary embolisms  OT orders placed for evaluation and treatment  Performed at least two patient identifiers during session including name and wristband  Comorbidities affecting the patient at time of admission include: obesity, mixed hyperlipidemia, gastroesophageal reflux disease without esophagitis, hypokalemia, hyponatremia, and COVID-19  PTA, Pt lives alone in a two story house with 0 BILLY with no bath on the first floor  Pt with 17 steps to second floor  Pt independent with ADLs, IADLs, and functional mobility  Pt reports 0 falls in the last 6 months  Personal factors affecting Pt at time of IE include: lives alone, stairs to navigate within house, decreased ability navigating household distances, pain, fall risk, and decreased ADL/IADL status  The following outcome measures performed on IE also indicate need for skilled therapy: Barthel Index: 20/238 and AM-PAC 6-Clicks: 10/58   Upon evaluation, Pt requires supervision/setup A for grooming, min A for UB bathing, mod A for LB bathing and UB dressing, and max A for LB dressing and toileting/functional assistance  Pt is alert and oriented x4  Occupational performance is affected by the following deficits, decreased UE ROM, decreased UE strength, decreased endurance, increased pain, increased use of external supports/AD, and decreased static/dynamic sitting/standing balance  Pt would benefit from continued OT treatment while in the hospital to address deficits as defined above and maximize level of functional independence  Occupational performance areas to address will include: dressing, bathing/showering, toilet hygiene, health maintenance, functional mobility, functional transfers to all surfaces, and IADLs such as leisure participation/exploration  From OT standpoint, recommendation at time of d/c would be STR in order to facilitate return to PLOF  Plan   Treatment Interventions ADL retraining;Functional transfer training;UE strengthening/ROM; Endurance training;Cognitive reorientation;Patient/family training; Compensatory technique education;Continued evaluation; Activityengagement   Goal Expiration Date 01/03/23   OT Treatment Day 0   OT Frequency 3-5x/wk   Recommendation   OT Discharge Recommendation Post acute rehabilitation services   AM-PAC Daily Activity Inpatient   Lower Body Dressing 1   Bathing 2   Toileting 1   Upper Body Dressing 2   Grooming 3   Eating 4   Daily Activity Raw Score 13   Daily Activity Standardized Score (Calc for Raw Score >=11) 32 03   AM-PAC Applied Cognition Inpatient   Following a Speech/Presentation 4   Understanding Ordinary Conversation 4   Taking Medications 4   Remembering Where Things Are Placed or Put Away 4   Remembering List of 4-5 Errands 3   Taking Care of Complicated Tasks 3   Applied Cognition Raw Score 22   Applied Cognition Standardized Score 47 83   Barthel Index   Feeding 10   Bathing 0   Grooming Score 0   Dressing Score 5   Bladder Score 5   Bowels Score 10   Toilet Use Score 5   Transfers (Bed/Chair) Score 5   Mobility (Level Surface) Score 0   Stairs Score 0   Barthel Index Score 40     Occupational Therapy goals: In 7-14 days:    Patient will increase OOB positioning to 2-4 hours per day for increased participation in self care and leisure tasks    Patient will increase standing tolerance time to 5 minutes with AD  Patient will transfer bed to chair/toilet at supervision/setup assist level with AD  Patient will complete LB ADLs with min assist and the use of adaptive equipment  Patient will engage in further cognitive assessment to determine current level of functioning  Patient will complete toilet hygiene task with min assist     Patient will increase R UE AROM to ~120 degrees to allow for increased independence in ADLs  Patient will increase b/l UE strength to 4/5 for increased independence in self care  Pt will have 15 min of activity tolerance for UB ADLs at supervision/setup assist     Patient will engage in leisure interest check list to identify areas of interest/ facilitate patients involvement in play/ leisure tasks

## 2022-12-20 NOTE — PHYSICAL THERAPY NOTE
Physical Therapy Evaluation     Patient's Name: Austin Cheadle    Admitting Diagnosis  Pulmonary embolism ([facility] ) [I26 99]  SOB (shortness of breath) [R06 02]  COVID-19 [U07 1]    Problem List  Patient Active Problem List   Diagnosis    Apnea, sleep    Obesity, unspecified obesity severity, unspecified obesity type    Migraine    Mixed hyperlipidemia    Gastroesophageal reflux disease without esophagitis    Thrombocytosis    Acute pulmonary embolism (HCC)    Hypokalemia    Hyponatremia    COVID-19       Past Medical History  Past Medical History:   Diagnosis Date    Anemia     Cancer ([facility] )     breast history of    Migraine     Mixed hyperlipidemia 2021    Skin cancer     Left leg lump  cryotherapy and excision, unsure of which kind        Past Surgical History  Past Surgical History:   Procedure Laterality Date    ANKLE SURGERY Left     pins and plate      SECTION      x2    DILATION AND CURETTAGE OF UTERUS      TONSILLECTOMY      age 4        21 0846   PT Last Visit   PT Visit Date 22   Note Type   Note type Evaluation   Pain Assessment   Pain Assessment Tool 0-10   Pain Score 7   Pain Location/Orientation Location: Head   Pain Onset/Description Onset: Ongoing;Frequency: Constant/Continuous   Patient's Stated Pain Goal No pain   Hospital Pain Intervention(s) Repositioned   Restrictions/Precautions   Weight Bearing Precautions Per Order No   Braces or Orthoses Other (Comment)  (none reported)   Other Precautions Chair Alarm; Bed Alarm;O2;Fall Risk;Pain  (2L O2)   Home Living   Type of Home House   Home Layout Two level;Bed/bath upstairs  (0 BILLY, no bath on first floor, 17 steps to bedroom)   Bathroom Shower/Tub Tub/shower unit   Bathroom Toilet Standard   Bathroom Equipment Shower chair;Grab bars in shower   [address] Other (Comment)  (none reported)   Additional Comments Ambulates without device at baseline   Prior Function   Level of [place] Independent with functional mobility; Independent with ADLs; Independent with IADLS   Lives With Alone   Receives Help From Family  (children live locally reports they work)   IADLs Independent with driving; Independent with medication management; Independent with meal prep   Falls in the last 6 months 0   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)   General   Family/Caregiver Present No   Cognition   Overall Cognitive Status   (requires further assessment)   Arousal/Participation Alert   Orientation Level Oriented X4   Memory Decreased short term memory;Decreased recall of recent events   Following Commands Follows all commands and directions without difficulty   Comments Pt agreeable to PT   RLE Assessment   RLE Assessment X   Strength RLE   RLE Overall Strength 3-/5   LLE Assessment   LLE Assessment X   Strength LLE   LLE Overall Strength 3-/5   Vision-Basic Assessment   Current Vision Wears contacts   Light Touch   RLE Light Touch Grossly intact   LLE Light Touch Impaired   Bed Mobility   Supine to Sit 3  Moderate assistance   Additional items Assist x 1;Bedrails;HOB elevated; Increased time required;Verbal cues;LE management   Sit to Supine 3  Moderate assistance   Additional items Assist x 1;HOB elevated; Bedrails; Increased time required;Verbal cues;LE management   Additional Comments Pt received at start of session supine in bed with HOB elevated  Following session, pt remained in bed with needs met, alarm activated and call bell within reach   Transfers   Sit to Stand 2  Maximal assistance   Additional items Assist x 2;Armrests; Increased time required;Verbal cues   Stand to Sit 2  Maximal assistance   Additional items Assist x 2;Armrests; Increased time required;Verbal cues   Additional Comments Attempted STS transfer x3 with pt able to achieve full stand on 3rd attempt  VCs for appropriate hand placements   Ambulation/Elevation   Gait pattern Decreased foot clearance; Inconsistent magdalene; Short stride;Decreased heel strike   Gait Assistance 3  Moderate assist   Additional items Assist x 2;Verbal cues   Assistive Device Rolling walker   Distance 2' lateral side steps towards [facility]   Stair Management Assistance Not tested   Balance   Static Sitting Fair +   Dynamic Sitting Fair   Static Standing Poor +   Dynamic Standing Poor   Ambulatory Poor   Endurance Deficit   Endurance Deficit Yes   Endurance Deficit Description decreased activity tolerance   Activity Tolerance   Activity Tolerance Patient limited by fatigue   Medical Staff Made Aware OT Angelito Castañeda, Student OT Vincent Mills   Nurse Made Aware DARRIUS Boland   Assessment   Prognosis Good   Problem List Decreased strength;Decreased endurance; Impaired balance;Decreased mobility;Pain   Assessment Pt is 60 y o  female seen for PT evaluation s/p admit to [facility] on 12/18/2022 w/ Acute pulmonary embolism ([facility] )  PT consulted to assess pt's functional mobility and d/c needs  Order placed for PT eval and tx, w/ up w/ A order  Comorbidities affecting pt's physical performance at time of assessment include: acute PE, COVID 19, hyponatremia, GERD, HLD, obesity  PTA, pt was independent w/ all functional mobility w/ no device, ambulates community distances and elevations, ambulates household distances, has 0 BILLY, lives alone in two level house and works full time  Personal factors affecting pt at time of IE include: inaccessible home environment, ambulating w/ assistive device, inability to ambulate household distances, inability to navigate community distances, inability to navigate level surfaces w/o external assistance, unable to perform dynamic tasks in community, inability to perform IADLs and inability to perform ADLs   Please find objective findings from PT assessment regarding body systems outlined above with impairments and limitations including weakness, impaired balance, decreased endurance, gait deviations, pain, decreased activity tolerance, decreased functional mobility tolerance, fall risk and SOB upon exertion  The following objective measures performed on IE also reveal limitations: Barthel Index: 40/100, Modified [place]: 4 (moderate/severe disability) and AM-PAC 6-Clicks: 3/74  Pt's clinical presentation is currently unstable/unpredictable seen in pt's presentation of continued need for medical management and monitoring, decreased strength and balance resulting in an increased risk for falls, decreased endurance and activity tolerance limiting mobility, increased pain  Pt to benefit from continued PT tx to address deficits as defined above and maximize level of functional independent mobility and consistency  From PT/mobility standpoint, recommendation at time of d/c would be post acute rehabilitation services pending progress in order to facilitate return to PLOF  Barriers to Discharge Inaccessible home environment;Decreased caregiver support   Goals   STG Expiration Date 12/30/22   Short Term Goal #1 In 7-10 days: Perform all bed mobility tasks with min A of 1 to decrease caregiver burden, Perform all transfers with min A of 1 to improve independence, Ambulate > 25 ft  with least restrictive assistive device with SBA w/o LOB and w/ normalized gait pattern 100% of the time, Tolerate 4 hr OOB to faciliate upright tolerance and Tolerate standing 5 minutes to facilitate functional task performance   Plan   Treatment/Interventions LE strengthening/ROM; Functional transfer training;ADL retraining; Therapeutic exercise; Endurance training;Bed mobility;Gait training; Compensatory technique education;Spoke to nursing;OT   PT Frequency 3-5x/wk   Recommendation   PT Discharge Recommendation Post acute rehabilitation services   AM-[facility] Basic Mobility Inpatient   Turning in Bed Without Bedrails 2   Lying on Back to Sitting on Edge of Flat Bed 2   Moving Bed to Chair 1   Standing Up From Chair 2   Walk in Room 1   Climb 3-5 Stairs 1   Basic Mobility Inpatient Raw Score 9   Turning Head Towards Sound 4   Follow Simple Instructions 4   Low Function Basic Mobility Raw Score 17   Low Function Basic Mobility Standardized Score 27 46   Highest Level Of Mobility   [facility] Goal 3: Sit at edge of bed   [facility] Achieved 3: Sit at edge of bed   Modified Logan Scale   Modified Logan Scale 4   Barthel Index   Feeding 10   Bathing 0   Grooming Score 0   Dressing Score 5   Bladder Score 5   Bowels Score 10   Toilet Use Score 5   Transfers (Bed/Chair) Score 5   Mobility (Level Surface) Score 0   Stairs Score 0   Barthel Index Score 40   Additional Treatment Session   Start Time 0784   End Time 0911   Treatment Assessment Pt seen for PT treatment session this date with interventions consisting of Therapeutic exercise consisting of: BLE exercises performed supine in bed  Pt agreeable to PT treatment session following initial evaluation  Post session: pt returned BTB, bed alarm engaged, all needs in reach and RN notified of session findings/recommendations  Continue to recommend post acute rehabilitation services at time of d/c in order to maximize pt's functional independence and safety w/ mobility  Pt continues to be functioning below baseline level, and remains limited 2* factors listed above and including continued need for medical management and monitoring, decreased strength and balance resulting in an increased risk for falls, decreased endurance and activity tolerance limiting mobility, increased pain  PT will continue to see pt during current hospitalization in order to address the deficits listed above and provide interventions consistent w/ POC in effort to achieve STGs  Exercises   Heelslides Supine;20 reps;AROM; Bilateral   Glute Sets Supine;20 reps;AROM; Bilateral   Hip Abduction Supine;20 reps;AROM; Bilateral   Ankle Pumps Supine;20 reps;AROM; Bilateral   End of Consult   Patient Position at End of Consult Supine;Bed/Chair alarm activated; All needs within reach       Wayne Human, PT

## 2022-12-20 NOTE — CASE MANAGEMENT
Case Management Discharge Planning Note    Patient name Marguerite Hunt  Location [address] [id number] MRN [id number]  : 1960 Date 2022       Current Admission Date: 2022  Current Admission Diagnosis:Acute pulmonary embolism [facility])   Patient Active Problem List    Diagnosis Date Noted   • Acute pulmonary embolism ([address] ) 2022   • Hypokalemia 2022   • Hyponatremia 2022   • COVID-19 2022   • Mixed hyperlipidemia 2021   • Gastroesophageal reflux disease without esophagitis 2021   • Thrombocytosis 2021   • Migraine    • Apnea, sleep 2018   • Obesity, unspecified obesity severity, unspecified obesity type 2018      LOS (days): 0  Geometric Mean LOS (GMLOS) (days):   Days to GMLOS:     OBJECTIVE:        Current admission status: Observation   Preferred Pharmacy:   [facility], [address] [place] [address] [address] [address]  Phone: [phone] Fax: Emerson Ruiz 44 [phone] [address] [address]  Phone: [phone] Fax: [phone]    Primary Care Provider: Susy Colbert MD    Primary Insurance: Candi Pop  Secondary Insurance:     DISCHARGE DETAILS:    Discharge planning discussed with[de-identified] Patient and at her request roe Rosales via phone  Freedom of Choice: Yes (Patient receptive to STR recommendation  She stated she never had been in a STR and has no preference  [place] referral made  Choices received later in day     reviewed with patient who is opting for [facility] )  Comments - Freedom of Choice: See above  CM contacted family/caregiver?: Yes (Roe Rosales updated per patient request)  Were Treatment Team discharge recommendations reviewed with patient/caregiver?: Yes  Did patient/caregiver verbalize understanding of patient care needs?: Yes  Were patient/caregiver advised of the risks associated with not following Treatment Team discharge recommendations?: Yes    Contacts  Patient Contacts: [place]  Relationship to Patient[de-identified] Family  Contact Method: Phone  Phone Number: [phone]  Reason/Outcome: Discharge Planning, [address]         Is the patient interested in [facility] at discharge?: No    DME Referral Provided  Referral made for DME?: No    Other Referral/Resources/Interventions Provided:  Interventions: Short Term Rehab  Referral Comments: Refferals made and patient chose teodoro Diaz request submitted and pending      Would you like to participate in our [address] service program?  : No - Declined    Treatment Team Recommendation: Short Term Rehab  Discharge Destination Plan[de-identified] Short Term Rehab  Transport at Discharge : BLS Ambulance

## 2022-12-20 NOTE — PLAN OF CARE
Problem: OCCUPATIONAL THERAPY ADULT  Goal: Performs self-care activities at highest level of function for planned discharge setting  See evaluation for individualized goals  Description: Treatment Interventions: ADL retraining, Functional transfer training, UE strengthening/ROM, Endurance training, Cognitive reorientation, Patient/family training, Compensatory technique education, Continued evaluation, Activityengagement          See flowsheet documentation for full assessment, interventions and recommendations  Note: Limitation: Decreased ADL status, Decreased UE ROM, Decreased UE strength, Decreased cognition, Decreased endurance, Decreased self-care trans, Decreased high-level ADLs  Prognosis: Good  Assessment: Pt is a 60 y o  Female seen for an OT evaluation admitted to [address] [place] [address] on 12/18/2022 w/ acute pulmonary embolisms  OT orders placed for evaluation and treatment  Performed at least two patient identifiers during session including name and wristband  Comorbidities affecting the patient at time of admission include: obesity, mixed hyperlipidemia, gastroesophageal reflux disease without esophagitis, hypokalemia, hyponatremia, and COVID-19  PTA, Pt lives alone in a two story house with 0 BILLY with no bath on the first floor  Pt with 17 steps to second floor  Pt independent with ADLs, IADLs, and functional mobility  Pt reports 0 falls in the last 6 months  Personal factors affecting Pt at time of IE include: lives alone, stairs to navigate within house, decreased ability navigating household distances, pain, fall risk, and decreased ADL/IADL status  The following outcome measures performed on IE also indicate need for skilled therapy: Barthel Index: 63/568 and AM-PAC 6-Clicks: 56/57  Upon evaluation, Pt requires supervision/setup A for grooming, min A for UB bathing, mod A for LB bathing and UB dressing, and max A for LB dressing and toileting/functional assistance   Pt is alert and oriented x4  Occupational performance is affected by the following deficits, decreased UE ROM, decreased UE strength, decreased endurance, increased pain, increased use of external supports/AD, and decreased static/dynamic sitting/standing balance  Pt would benefit from continued OT treatment while in the hospital to address deficits as defined above and maximize level of functional independence  Occupational performance areas to address will include: dressing, bathing/showering, toilet hygiene, health maintenance, functional mobility, functional transfers to all surfaces, and IADLs such as leisure participation/exploration  From OT standpoint, recommendation at time of d/c would be STR in order to facilitate return to PLOF       OT Discharge Recommendation: Post acute rehabilitation services

## 2022-12-20 NOTE — PLAN OF CARE
Problem: Potential for Falls  Goal: Patient will remain free of falls  Description: INTERVENTIONS:  - Educate patient/family on patient safety including physical limitations  - Instruct patient to call for assistance with activity   - Consult OT/PT to assist with strengthening/mobility   - Keep Call bell within reach  - Keep bed low and locked with side rails adjusted as appropriate  - Keep care items and personal belongings within reach  - Initiate and maintain comfort rounds  - Make Fall Risk Sign visible to staff  - Offer Toileting every 2 Hours, in advance of need  - Initiate/Maintain bed and chair alarm  - Obtain necessary fall risk management equipment:   - Apply yellow socks and bracelet for high fall risk patients  - Consider moving patient to room near nurses station  Outcome: Progressing

## 2022-12-21 VITALS
WEIGHT: 217.81 LBS | TEMPERATURE: 97.7 F | RESPIRATION RATE: 18 BRPM | DIASTOLIC BLOOD PRESSURE: 77 MMHG | HEIGHT: 64 IN | HEART RATE: 54 BPM | BODY MASS INDEX: 37.19 KG/M2 | OXYGEN SATURATION: 100 % | SYSTOLIC BLOOD PRESSURE: 128 MMHG

## 2022-12-21 LAB
ANION GAP SERPL CALCULATED.3IONS-SCNC: 8 MMOL/L (ref 4–13)
BUN SERPL-MCNC: 5 MG/DL (ref 5–25)
CALCIUM SERPL-MCNC: 8.5 MG/DL (ref 8.3–10.1)
CHLORIDE SERPL-SCNC: 102 MMOL/L (ref 96–108)
CO2 SERPL-SCNC: 29 MMOL/L (ref 21–32)
CREAT SERPL-MCNC: 0.74 MG/DL (ref 0.6–1.3)
GFR SERPL CREATININE-BSD FRML MDRD: 86 ML/MIN/1.73SQ M
GLUCOSE P FAST SERPL-MCNC: 99 MG/DL (ref 65–99)
GLUCOSE SERPL-MCNC: 99 MG/DL (ref 65–140)
POTASSIUM SERPL-SCNC: 3.3 MMOL/L (ref 3.5–5.3)
SODIUM SERPL-SCNC: 139 MMOL/L (ref 135–147)

## 2022-12-21 RX ORDER — POLYETHYLENE GLYCOL 3350 17 G/17G
17 POWDER, FOR SOLUTION ORAL ONCE
Status: COMPLETED | OUTPATIENT
Start: 2022-12-21 | End: 2022-12-21

## 2022-12-21 RX ORDER — POTASSIUM CHLORIDE 20 MEQ/1
40 TABLET, EXTENDED RELEASE ORAL 2 TIMES DAILY
Status: COMPLETED | OUTPATIENT
Start: 2022-12-21 | End: 2022-12-21

## 2022-12-21 RX ORDER — AMOXICILLIN 250 MG
1 CAPSULE ORAL
Qty: 7 TABLET | Refills: 0
Start: 2022-12-21 | End: 2022-12-28

## 2022-12-21 RX ORDER — AMOXICILLIN 250 MG
1 CAPSULE ORAL
Status: DISCONTINUED | OUTPATIENT
Start: 2022-12-21 | End: 2022-12-21 | Stop reason: HOSPADM

## 2022-12-21 RX ADMIN — ACETAMINOPHEN 650 MG: 325 TABLET, FILM COATED ORAL at 09:28

## 2022-12-21 RX ADMIN — POTASSIUM CHLORIDE 40 MEQ: 1500 TABLET, EXTENDED RELEASE ORAL at 17:42

## 2022-12-21 RX ADMIN — PANTOPRAZOLE SODIUM 40 MG: 40 TABLET, DELAYED RELEASE ORAL at 06:06

## 2022-12-21 RX ADMIN — ATORVASTATIN CALCIUM 40 MG: 40 TABLET, FILM COATED ORAL at 08:28

## 2022-12-21 RX ADMIN — NYSTATIN: 100000 POWDER TOPICAL at 17:42

## 2022-12-21 RX ADMIN — APIXABAN 10 MG: 5 TABLET, FILM COATED ORAL at 17:42

## 2022-12-21 RX ADMIN — SENNOSIDES AND DOCUSATE SODIUM 1 TABLET: 50; 8.6 TABLET ORAL at 09:28

## 2022-12-21 RX ADMIN — APIXABAN 10 MG: 5 TABLET, FILM COATED ORAL at 08:28

## 2022-12-21 RX ADMIN — POLYETHYLENE GLYCOL 3350 17 G: 17 POWDER, FOR SOLUTION ORAL at 09:28

## 2022-12-21 RX ADMIN — NYSTATIN: 100000 POWDER TOPICAL at 08:28

## 2022-12-21 RX ADMIN — POTASSIUM CHLORIDE 40 MEQ: 1500 TABLET, EXTENDED RELEASE ORAL at 08:28

## 2022-12-21 NOTE — ASSESSMENT & PLAN NOTE
· Tested positive for COVID 12/8     · 2 week hx of fatigue, nausea, vomiting, SOB, now improved  · Further management as highlighted below

## 2022-12-21 NOTE — CASE MANAGEMENT
Mayra Welch 49 has received approved authorization from:   Tootie Yu in by Rep: [facility] P#  [phone]  Authorization received for: [facility]  Facility: [place] [address] #: [id number]  Start of Care: 12/21  Next Review Date: 12/28  Submit next review to: [address] [place] [address] notified: Lavern Chavez

## 2022-12-21 NOTE — PLAN OF CARE
Problem: Potential for Falls  Goal: Patient will remain free of falls  Description: INTERVENTIONS:  - Educate patient/family on patient safety including physical limitations  - Instruct patient to call for assistance with activity   - Consult OT/PT to assist with strengthening/mobility   - Keep Call bell within reach  - Keep bed low and locked with side rails adjusted as appropriate  - Keep care items and personal belongings within reach  - Initiate and maintain comfort rounds  - Make Fall Risk Sign visible to staff  - Apply yellow socks and bracelet for high fall risk patients  - Consider moving patient to room near nurses station  12/21/2022 1524 by Gurmeet Driver RN  Outcome: Adequate for Discharge  12/21/2022 0845 by Gurmeet Driver RN  Outcome: Progressing     Problem: MOBILITY - ADULT  Goal: Maintain or return to baseline ADL function  Description: INTERVENTIONS:  -  Assess patient's ability to carry out ADLs; assess patient's baseline for ADL function and identify physical deficits which impact ability to perform ADLs (bathing, care of mouth/teeth, toileting, grooming, dressing, etc )  - Assess/evaluate cause of self-care deficits   - Assess range of motion  - Assess patient's mobility; develop plan if impaired  - Assess patient's need for assistive devices and provide as appropriate  - Encourage maximum independence but intervene and supervise when necessary  - Involve family in performance of ADLs  - Assess for home care needs following discharge   - Consider OT consult to assist with ADL evaluation and planning for discharge  - Provide patient education as appropriate  12/21/2022 1524 by Gurmeet Driver RN  Outcome: Adequate for Discharge  12/21/2022 0845 by Gurmeet Driver RN  Outcome: Progressing  Goal: Maintains/Returns to pre admission functional level  Description: INTERVENTIONS:  - Perform BMAT or MOVE assessment daily    - Set and communicate daily mobility goal to care team and patient/family/caregiver  - Collaborate with rehabilitation services on mobility goals if consulted  - Out of bed for toileting  - Record patient progress and toleration of activity level   12/21/2022 1524 by Elizabeth Briones RN  Outcome: Adequate for Discharge  12/21/2022 0845 by Elizabeth Briones RN  Outcome: Progressing     Problem: Prexisting or High Potential for Compromised Skin Integrity  Goal: Skin integrity is maintained or improved  Description: INTERVENTIONS:  - Identify patients at risk for skin breakdown  - Assess and monitor skin integrity  - Assess and monitor nutrition and hydration status  - Monitor labs   - Assess for incontinence   - Turn and reposition patient  - Assist with mobility/ambulation  - Relieve pressure over bony prominences  - Avoid friction and shearing  - Provide appropriate hygiene as needed including keeping skin clean and dry  - Evaluate need for skin moisturizer/barrier cream  - Collaborate with interdisciplinary team   - Patient/family teaching  - Consider wound care consult   12/21/2022 1524 by Elizabeth Briones RN  Outcome: Adequate for Discharge  12/21/2022 0845 by Elizabeth Briones RN  Outcome: Progressing     Problem: Nutrition/Hydration-ADULT  Goal: Nutrient/Hydration intake appropriate for improving, restoring or maintaining nutritional needs  Description: Monitor and assess patient's nutrition/hydration status for malnutrition  Collaborate with interdisciplinary team and initiate plan and interventions as ordered  Monitor patient's weight and dietary intake as ordered or per policy  Utilize nutrition screening tool and intervene as necessary  Determine patient's food preferences and provide high-protein, high-caloric foods as appropriate       INTERVENTIONS:  - Monitor oral intake, urinary output, labs, and treatment plans  - Assess nutrition and hydration status and recommend course of action  - Evaluate amount of meals eaten  - Assist patient with eating if necessary   - Allow adequate time for meals  - Recommend/ encourage appropriate diets, oral nutritional supplements, and vitamin/mineral supplements  - Order, calculate, and assess calorie counts as needed  - Recommend, monitor, and adjust tube feedings and TPN/PPN based on assessed needs  - Assess need for intravenous fluids  - Provide specific nutrition/hydration education as appropriate  - Include patient/family/caregiver in decisions related to nutrition  12/21/2022 1524 by Sandy Alcaraz RN  Outcome: Adequate for Discharge  12/21/2022 0845 by Sandy Alcaraz RN  Outcome: Progressing     Problem: RESPIRATORY - ADULT  Goal: Achieves optimal ventilation and oxygenation  Description: INTERVENTIONS:  - Assess for changes in respiratory status  - Assess for changes in mentation and behavior  - Position to facilitate oxygenation and minimize respiratory effort  - Oxygen administered by appropriate delivery if ordered  - Initiate smoking cessation education as indicated  - Encourage broncho-pulmonary hygiene including cough, deep breathe, Incentive Spirometry  - Assess the need for suctioning and aspirate as needed  - Assess and instruct to report SOB or any respiratory difficulty  - Respiratory Therapy support as indicated  12/21/2022 1524 by Sandy Alcaraz RN  Outcome: Adequate for Discharge  12/21/2022 0845 by Sandy Alcaraz RN  Outcome: Progressing

## 2022-12-21 NOTE — PROGRESS NOTES
INTERNAL MEDICINE RESIDENCY PROGRESS NOTE     Name: Jared Wright   Age & Sex: 60 y o  female   MRN: [id number]  Unit/Bed#: -01   Encounter: [id number]    PATIENT INFORMATION     Name: Jared Wright   Age & Sex: 60 y o  female   MRN: [id number]  Hospital Stay Days: 0    ASSESSMENT/PLAN     Principal Problem:    Acute pulmonary embolism ([address] )  Active Problems:    Obesity, unspecified obesity severity, unspecified obesity type    Mixed hyperlipidemia    Gastroesophageal reflux disease without esophagitis    Hypokalemia    Hyponatremia    COVID-19      COVID-19  Assessment & Plan  · Tested positive for COVID 12/8  · 2 week hx of fatigue, nausea, vomiting, SOB, now improved  · Further management as highlighted below    Hyponatremia  Assessment & Plan  Resolved    Hypokalemia  Assessment & Plan  · replete as necessary  · Continue to monitor    Gastroesophageal reflux disease without esophagitis  Assessment & Plan  Continue home Protonix    Mixed hyperlipidemia  Assessment & Plan  Continue atorvastatin 40 mg daily    Obesity, unspecified obesity severity, unspecified obesity type  Assessment & Plan  Educated on dietary and lifestyle modifications    * Acute pulmonary embolism (HCC)  Assessment & Plan  Worsening shortness of breath x1 week post COVID diagnosis  Acute pulmonary embolus pulmonary artery and subsegmental branches  No evidence of acutely elevated right-sided pressure  Few peripheral groundglass opacities in the lower lungs, more prominent in the right lower lobe, compatible with pneumonitis or Covid pneumonia  Blood pressure stable  Most recent heart rate 100  · No previous history, likely provoked secondary to hypercoagulable state setting of COVID-19, sedentary state in last 2 weeks due to covid-19 symptoms    · Eliquis covered by her insurance  · Currently on Eliquis  · PT/OT recommends rehab,   · patient agrees to go, pending authorization          Disposition: STR    SUBJECTIVE     Patient seen and examined  No acute events overnight  Patient feels tired  No SOB or chest pain  OBJECTIVE     Vitals:    22 1448 22 1919 22 2326 22 0820   BP: 143/73 146/94 145/94 133/71   Pulse: 93 96 74 86   Resp:   18    Temp: (!) 97 °F (36 1 °C) (!) 97 2 °F (36 2 °C) 97 7 °F (36 5 °C) 98 5 °F (36 9 °C)   TempSrc:       SpO2: 92% 92% 92% 93%   Weight:       Height:          Temperature:   Temp (24hrs), Av 6 °F (36 4 °C), Min:97 °F (36 1 °C), Max:98 5 °F (36 9 °C)    Temperature: 98 5 °F (36 9 °C)  Intake & Output:  I/O        07 0700  0701   0700  0701   0700    P  O  448 290 120    I V  (mL/kg) 192 7 (2)      IV Piggyback       Total Intake(mL/kg) 640 7 (6 5) 290 (2 9) 120 (1 2)    Urine (mL/kg/hr) 1300 (0 5) 1300 (0 5) 400 (1 3)    Total Output 1300 1300 400    Net -659 3 -1010 -280           Unmeasured Urine Occurrence 2 x 1 x         Weights:        Body mass index is 37 39 kg/m²  Weight (last 2 days)     None        Physical Exam  HENT:      Head: Normocephalic  Eyes:      Pupils: Pupils are equal, round, and reactive to light  Cardiovascular:      Rate and Rhythm: Normal rate and regular rhythm  Heart sounds: No murmur heard  Pulmonary:      Effort: Pulmonary effort is normal  No respiratory distress  Breath sounds: No wheezing or rales  Abdominal:      Palpations: Abdomen is soft  Tenderness: There is no abdominal tenderness  Musculoskeletal:         General: No swelling  Normal range of motion  Cervical back: Normal range of motion  Skin:     General: Skin is warm  Neurological:      Mental Status: She is alert and oriented to person, place, and time  Psychiatric:         Mood and Affect: Mood normal        LABORATORY DATA     Labs: I have personally reviewed pertinent reports    Results from last 7 days   Lab Units 22  0728 22  0451 22  1537   WBC Thousand/uL 4 92 6 26 6 51   HEMOGLOBIN g/dL 12 4 11 5 12  7   HEMATOCRIT % 38 1 35 0 38 0   PLATELETS Thousands/uL 343 298 349   NEUTROS PCT % 64  --  78*   MONOS PCT % 9  --  6      Results from last 7 days   Lab Units 12/21/22  0629 12/20/22  0728 12/19/22  0451 12/18/22 2317 12/18/22  1537   POTASSIUM mmol/L 3 3* 3 3* 4 1   < > 2 8*   CHLORIDE mmol/L 102 100 100   < > 93*   CO2 mmol/L 29 29 24   < > 30   BUN mg/dL 5 6 7   < > 11   CREATININE mg/dL 0 74 0 84 0 70   < > 0 84   CALCIUM mg/dL 8 5 8 6 8 1*   < > 8 4   ALK PHOS U/L  --   --   --   --  69   ALT U/L  --   --   --   --  31   AST U/L  --   --   --   --  34    < > = values in this interval not displayed  Results from last 7 days   Lab Units 12/18/22  1537   MAGNESIUM mg/dL 2 4          Results from last 7 days   Lab Units 12/19/22  0451 12/18/22 2317 12/18/22  1834   INR   --   --  1 20*   PTT seconds >210* >210* 29               IMAGING & DIAGNOSTIC TESTING     Radiology Results: I have personally reviewed pertinent reports  XR chest 2 views    Result Date: 12/18/2022  Impression: No acute cardiopulmonary disease  Workstation performed: [id number]     CTA ED chest PE Study    Result Date: 12/18/2022  Impression: 1  Acute pulmonary embolus pulmonary artery and subsegmental branches  No evidence of acutely elevated right-sided pressure  2   Few peripheral groundglass opacities in the lower lungs, more prominent in the right lower lobe, compatible with pneumonitis or Covid pneumonia  I personally discussed this study with Alin Neri on 12/18/2022 at 5:51 PM  Workstation performed: [id number]     Other Diagnostic Testing: I have personally reviewed pertinent reports      ACTIVE MEDICATIONS     Current Facility-Administered Medications   Medication Dose Route Frequency   • acetaminophen (TYLENOL) tablet 650 mg  650 mg Oral Q6H PRN   • apixaban (ELIQUIS) tablet 10 mg  10 mg Oral BID   • [START ON 12/26/2022] apixaban (ELIQUIS) tablet 5 mg  5 mg Oral BID   • atorvastatin (LIPITOR) tablet 40 mg  40 mg Oral Daily   • nystatin (MYCOSTATIN) powder   Topical BID   • ondansetron (ZOFRAN) injection 4 mg  4 mg Intravenous Q8H PRN   • pantoprazole (PROTONIX) EC tablet 40 mg  40 mg Oral Early Morning   • potassium chloride (K-DUR,KLOR-CON) CR tablet 40 mEq  40 mEq Oral BID   • senna-docusate sodium (SENOKOT S) 8 6-50 mg per tablet 1 tablet  1 tablet Oral HS   • traZODone (DESYREL) tablet 50 mg  50 mg Oral HS PRN       VTE Pharmacologic Prophylaxis: Eliquis  VTE Mechanical Prophylaxis: sequential compression device    Portions of the record may have been created with voice recognition software  Occasional wrong word or "sound a like" substitutions may have occurred due to the inherent limitations of voice recognition software  Read the chart carefully and recognize, using context, where substitutions have occurred    ==  Matheus Jackson MD  [facility]  Internal Medicine Residency PGY-3

## 2022-12-21 NOTE — DISCHARGE SUMMARY
[address] [facility]  Discharge- Curtis Holm 1960, 60 y o  female MRN: [id number]  Unit/Bed#: -Brit Encounter: [id number]  Primary Care Provider: Cabrera Preciado MD   Date and time admitted to hospital: 12/18/2022  2:47 PM    COVID-19  Assessment & Plan  · Tested positive for COVID 12/8  · 2 week hx of fatigue, nausea, vomiting, SOB, now improved  · Further management as highlighted below    Hyponatremia  Assessment & Plan  Resolved    Hypokalemia  Assessment & Plan  · replete as necessary  · Continue to monitor    Gastroesophageal reflux disease without esophagitis  Assessment & Plan  Continue home Protonix    Mixed hyperlipidemia  Assessment & Plan  Continue atorvastatin 40 mg daily    Obesity, unspecified obesity severity, unspecified obesity type  Assessment & Plan  Educated on dietary and lifestyle modifications    * Acute pulmonary embolism (HCC)  Assessment & Plan  Worsening shortness of breath x1 week post COVID diagnosis  Acute pulmonary embolus pulmonary artery and subsegmental branches  No evidence of acutely elevated right-sided pressure  Few peripheral groundglass opacities in the lower lungs, more prominent in the right lower lobe, compatible with pneumonitis or Covid pneumonia  Blood pressure stable  Most recent heart rate 100  · No previous history, likely provoked secondary to hypercoagulable state setting of COVID-19, sedentary state in last 2 weeks due to covid-19 symptoms    · Eliquis covered by her insurance  · Currently on Eliquis  · PT/OT recommends rehab,   · patient agrees to go          Discharging Resident Physician: Gerard Robbins MD  Attending: Richard Rosario DO  PCP: Cabrera Preciado MD  Admission Date: 12/18/2022  Discharge Date: 12/21/22        Disposition:     Other: SNF at [address] [place] [address]    Reason for Admission: Shortness of breath    Consultations During Hospital Stay:  · None    Procedures Performed:     · None    Significant Findings / Test Results:     XR chest 2 views    Result Date: 12/18/2022  Impression: No acute cardiopulmonary disease  Workstation performed: [id number]     CTA ED chest PE Study    Result Date: 12/18/2022  Impression: 1  Acute pulmonary embolus pulmonary artery and subsegmental branches  No evidence of acutely elevated right-sided pressure  2   Few peripheral groundglass opacities in the lower lungs, more prominent in the right lower lobe, compatible with pneumonitis or Covid pneumonia  I personally discussed this study with Sofie Lara on 12/18/2022 at 5:51 PM  Workstation performed: [id number]       Incidental Findings:   · none    Test Results Pending at Discharge (will require follow up): · None     Outpatient Tests Requested:  · none    Complications:  none    Hospital Course:     Jovany Bar is a 60 y o  female patient who originally presented to the hospital on 12/18/2022 due to shortness of breath  Patient was found to have COVID-19 positive on December 8  Since then patient has been bedbound  Patient presented with shortness of breath for a week and was found to have acute PE on admission  Patient was initially given IV heparin drip and later then switched to Eliquis  Patient sats well on room air  Patient denies chest pain or shortness of breath  Patient feels tired PT/OT recommended short-term rehab  Patient is medically stable for short-term rehab  Condition at Discharge: good     Discharge Day Visit / Exam:     Subjective: Patient feels tired    Patient denies chest pain or shortness of breath  Vitals: Blood Pressure: 128/77 (12/21/22 1511)  Pulse: (!) 54 (12/21/22 1511)  Temperature: 97 7 °F (36 5 °C) (12/21/22 1511)  Temp Source: Oral (12/19/22 1121)  Respirations: 18 (12/21/22 1511)  Height: 5' 4" (162 6 cm) (last ht assessment) (12/20/22 1025)  Weight - Scale: 98 8 kg (217 lb 13 oz) (12/18/22 1453)  SpO2: 100 % (12/21/22 1511)  Exam:   Physical Exam  Constitutional:       General: She is not in acute distress  Appearance: She is well-developed  She is not diaphoretic  HENT:      Head: Normocephalic  Mouth/Throat:      Pharynx: No oropharyngeal exudate  Eyes:      Pupils: Pupils are equal, round, and reactive to light  Cardiovascular:      Rate and Rhythm: Normal rate and regular rhythm  Heart sounds: No murmur heard  Pulmonary:      Effort: Pulmonary effort is normal  No respiratory distress  Breath sounds: No wheezing or rales  Abdominal:      General: Bowel sounds are normal       Palpations: Abdomen is soft  Tenderness: There is no abdominal tenderness  Musculoskeletal:         General: No tenderness  Normal range of motion  Cervical back: Normal range of motion  Skin:     General: Skin is warm  Neurological:      Mental Status: She is alert and oriented to person, place, and time  Discussion with Family: Patient    Discharge instructions/Information to patient and family:   See after visit summary for information provided to patient and family  Provisions for Follow-Up Care:  See after visit summary for information related to follow-up care and any pertinent home health orders  Planned Readmission: NO     Discharge Medications:  See after visit summary for reconciled discharge medications provided to patient and family        ** Please Note: This note has been constructed using a voice recognition system **

## 2022-12-21 NOTE — CASE MANAGEMENT
Case Management Discharge Planning Note    Patient name Jo Median  Location [address] 310/-10 MRN [id number]  : 1960 Date 2022       Current Admission Date: 2022  Current Admission Diagnosis:Acute pulmonary embolism [facility])   Patient Active Problem List    Diagnosis Date Noted   • Acute pulmonary embolism ([address] ) 2022   • Hypokalemia 2022   • Hyponatremia 2022   • COVID-19 2022   • Mixed hyperlipidemia 2021   • Gastroesophageal reflux disease without esophagitis 2021   • Thrombocytosis 2021   • Migraine    • Apnea, sleep 2018   • Obesity, unspecified obesity severity, unspecified obesity type 2018      LOS (days): 0  Geometric Mean LOS (GMLOS) (days):   Days to GMLOS:     OBJECTIVE:        Current admission status: Observation   Preferred Pharmacy:   [address] [place] [address] Monica [place]ANCE [address]  Phone: [phone] Fax: Emerson Ruiz [address] [address]  Phone: [phone] Fax: [phone]    Primary Care Provider: Andrea Olguin MD    Primary Insurance: Liz Jean  Secondary Insurance:     DISCHARGE DETAILS:    Discharge planning discussed with[de-identified] Patient  Freedom of Choice: Yes  Comments - Freedom of Choice: Patient agreeable to STR transfer to her facility of preference today- [address] [place] [address] contacted family/caregiver?: Yes     Did patient/caregiver verbalize understanding of patient care needs?: Yes  Were patient/caregiver advised of the risks associated with not following Treatment Team discharge recommendations?: Yes    Contacts  Patient Contacts: Lynne López  Relationship to Patient[de-identified] Family  Contact Method: Phone  Phone Number: [phone]  Reason/Outcome: Continuity of Care, Discharge Planning    Requested  [facility] [address]         Is the patient interested in [address] at discharge?: No    DME Referral Provided  Referral made for DME?: No    Other Referral/Resources/Interventions Provided:  Interventions: Short Term Rehab, Transportation  Referral Comments: CM informed RN, Physician, Lety Horner and patients son Ludy Minors of [facility] time    CM requested transportation auth via [employer]    Would you like to participate in our [address] service program?  : No - Declined    Treatment Team Recommendation: Short Term Rehab  Discharge Destination Plan[de-identified] Short Term Rehab  Transport at Discharge : [facility]  Dispatcher Contacted: Yes  Number/Name of Dispatcher: Round trip  Transported by [employer] and Unit #): [address] [facility] of Transport (Date): 12/21/22  ETA of Transport (Time): 1900     Transfer Mode: [address] Name, [address] : [facility]  Receiving Facility/Agency Phone Number: Phone: [phone]  Facility/Agency Fax Number: [phone]

## 2022-12-21 NOTE — CASE MANAGEMENT
Case Management Discharge Planning Note    Patient name Yang Lucio [address] 310/-98 MRN [id number]  : 1960 Date 2022       Current Admission Date: 2022  Current Admission Diagnosis:Acute pulmonary embolism [facility])   Patient Active Problem List    Diagnosis Date Noted   • Acute pulmonary embolism ([address] ) 2022   • Hypokalemia 2022   • Hyponatremia 2022   • COVID-19 2022   • Mixed hyperlipidemia 2021   • Gastroesophageal reflux disease without esophagitis 2021   • Thrombocytosis 2021   • Migraine    • Apnea, sleep 2018   • Obesity, unspecified obesity severity, unspecified obesity type 2018      LOS (days): 0  Geometric Mean LOS (GMLOS) (days):   Days to GMLOS:     OBJECTIVE:            Current admission status: Observation   Preferred Pharmacy:   [facility] [place] [address] [facility] [address]  Phone: [phone] Fax: Emerson Ruiz [address][id number] [facility] [address] [address] [address]  Phone: [phone] Fax: [phone]    Primary Care Provider: Jorge Farmer MD    Primary Insurance: [address]:     [address] [place] [address] Avenue Number: [id number]

## 2022-12-21 NOTE — ASSESSMENT & PLAN NOTE
Worsening shortness of breath x1 week post COVID diagnosis  Acute pulmonary embolus pulmonary artery and subsegmental branches  No evidence of acutely elevated right-sided pressure  Few peripheral groundglass opacities in the lower lungs, more prominent in the right lower lobe, compatible with pneumonitis or Covid pneumonia  Blood pressure stable  Most recent heart rate 100  · No previous history, likely provoked secondary to hypercoagulable state setting of COVID-19, sedentary state in last 2 weeks due to covid-19 symptoms    · Eliquis covered by her insurance  · Currently on Eliquis  · PT/OT recommends rehab,   · patient agrees to go

## 2022-12-21 NOTE — CASE MANAGEMENT
Mayra Welch 50 received request for transport authorization from Care Manager     Type of transport: BLS  Date of transport: 12/21  Transport company:  [facility]   NPI: [id number]  Start Location: [facility] Location: [address] [place] [address] 2029  Authorization initiated by contacting: [employer] [facility]  Via: [address] [place]

## 2023-01-12 ENCOUNTER — TELEPHONE (OUTPATIENT)
Dept: NEUROLOGY | Facility: CLINIC | Age: 63
End: 2023-01-12

## 2023-02-01 PROBLEM — I26.94 MULTIPLE SUBSEGMENTAL PULMONARY EMBOLI WITHOUT ACUTE COR PULMONALE (HCC): Status: ACTIVE | Noted: 2022-12-18

## 2023-02-03 ENCOUNTER — OFFICE VISIT (OUTPATIENT)
Dept: FAMILY MEDICINE CLINIC | Facility: CLINIC | Age: 63
End: 2023-02-03

## 2023-02-03 VITALS
SYSTOLIC BLOOD PRESSURE: 124 MMHG | TEMPERATURE: 98.9 F | BODY MASS INDEX: 35.34 KG/M2 | WEIGHT: 207 LBS | HEART RATE: 100 BPM | RESPIRATION RATE: 16 BRPM | DIASTOLIC BLOOD PRESSURE: 80 MMHG | OXYGEN SATURATION: 97 % | HEIGHT: 64 IN

## 2023-02-03 DIAGNOSIS — I26.99 PULMONARY EMBOLISM (HCC): ICD-10-CM

## 2023-02-03 DIAGNOSIS — R53.1 WEAKNESS GENERALIZED: Primary | ICD-10-CM

## 2023-02-03 DIAGNOSIS — R26.9 NEUROLOGIC GAIT DYSFUNCTION: ICD-10-CM

## 2023-02-03 DIAGNOSIS — M25.572 ACUTE LEFT ANKLE PAIN: ICD-10-CM

## 2023-02-03 DIAGNOSIS — Z79.01 ANTICOAGULATED: ICD-10-CM

## 2023-02-03 DIAGNOSIS — Z86.711 HISTORY OF PULMONARY EMBOLUS (PE): ICD-10-CM

## 2023-02-03 NOTE — PROGRESS NOTES
Name: Nessa Hopkins      : 1960      MRN: [id number]  Encounter Provider: SAHARA Tinsley  Encounter Date: 2/3/2023   Encounter department: [address] [place] [address] Place     1  Weakness generalized  -     Ambulatory Referral to PT/OT Functional Capacity Evaluation; Future    2  History of pulmonary embolus (PE)  -     D-dimer, quantitative; Future  -     APTT; Future  -     Protime-INR; Future    3  Neurologic gait dysfunction  -     Ambulatory Referral to PT/OT Functional Capacity Evaluation; Future    4  Anticoagulated  -     D-dimer, quantitative; Future  -     APTT; Future  -     Protime-INR; Future    5  Pulmonary embolism (HCC)  -     D-dimer, quantitative; Future  -     APTT; Future  -     Protime-INR; Future    6  Acute left ankle pain  -     XR ankle 3+ vw left; Future; Expected date: 2023    Physical assessment is remarkable as noted  Patient arrived at office visit in a wheelchair stating that her left ankle is very painful that she cannot walk on it  Did advise get an x-ray of the ankle if she is worthy then we will refer to orthopedics  Patient also is on the Eliquis due to the pulmonary embolism vital signs are all well within normal limits today did advise we will do a PT/INR PTT and a D-dimer  She is to complete those at her earliest convenience  Otherwise I would like for her to have physical therapy occupational therapy to determine the reasoning for the the weakness is a due to [place] or another circumstance  Patient is advised activity as tolerated no medication was refilled at this office visit as there is no need  All questions were answered a placard for handicap parking access was given to patient  Return to office as needed or as indicated  Subjective     She is here for follow-up from a hospital stay due to [place] and a pulmonary embolism  She denies any acute symptoms at this time however she is having ankle pain unable to ambulate  Is appearing in a wheelchair  Review of Systems   Constitutional: Negative for appetite change and fever  HENT: Negative for sinus pressure and sore throat  Eyes: Negative for pain  Respiratory: Negative for shortness of breath  Cardiovascular: Negative for chest pain  Gastrointestinal: Negative for abdominal pain  Genitourinary: Negative for dysuria  Musculoskeletal: Positive for arthralgias (l ankle) and gait problem  Negative for myalgias  Skin: Negative for color change  Neurological: Negative for light-headedness  Psychiatric/Behavioral: Negative for behavioral problems  Past Medical History:   Diagnosis Date   • Anemia    • Cancer [facility])     breast history of   • Migraine    • Mixed hyperlipidemia 2021   • Mixed hyperlipidemia 2021   • Skin cancer     Left leg lump   cryotherapy and excision, unsure of which kind      Past Surgical History:   Procedure Laterality Date   • ANKLE SURGERY Left 2017    pins and plate    •  SECTION      x2   • DILATION AND CURETTAGE OF UTERUS     • TONSILLECTOMY      age 4     Family History   Problem Relation Age of Onset   • No Known Problems Mother    • Lung cancer Brother      Social History     Socioeconomic History   • Marital status:      Spouse name: None   • Number of children: None   • Years of education: None   • Highest education level: None   Occupational History   • None   Tobacco Use   • Smoking status: Never   • Smokeless tobacco: Never   Vaping Use   • Vaping Use: Never used   Substance and Sexual Activity   • Alcohol use: Not Currently   • Drug use: No   • Sexual activity: Not Currently     Partners: Male     Birth control/protection: None   Other Topics Concern   • None   Social History Narrative   • None     Social Determinants of Health     Financial Resource Strain: Not on file   Food Insecurity: No Food Insecurity   • Worried About Running Out of Food in the Last Year: Never true   • Ran Out of Food in the Last Year: Never true   Transportation Needs: No Transportation Needs   • Lack of Transportation (Medical): No   • Lack of Transportation (Non-Medical): No   Physical Activity: Not on file   Stress: Not on file   Social Connections: Not on file   Intimate Partner Violence: Not on file   Housing Stability: High Risk   • Unable to Pay for Housing in the Last Year: No   • Number of Places Lived in the Last Year: 3   • Unstable Housing in the Last Year: No     Current Outpatient Medications on File Prior to Visit   Medication Sig   • atorvastatin (LIPITOR) 40 mg tablet take 1 tablet by mouth once daily   • Cholecalciferol (VITAMIN D3 PO) Take 1 tablet by mouth daily   • Myrbetriq 50 MG TB24 Take 1 tablet by mouth daily   • omeprazole (PriLOSEC) 20 mg delayed release capsule Take 1 capsule (20 mg total) by mouth daily   • rizatriptan (Maxalt) 10 mg tablet Take 1 tablet (10 mg total) by mouth as needed for migraine Take at the onset of migraine; if symptoms continue or return, may take another dose at least 2 hours after first dose  Take no more than 2 doses in a day  • apixaban (Eliquis) 5 mg Take 2 tablets (10 mg total) by mouth 2 (two) times a day for 4 days, THEN 1 tablet (5 mg total) 2 (two) times a day for 26 days     • senna-docusate sodium (SENOKOT S) 8 6-50 mg per tablet Take 1 tablet by mouth daily at bedtime for 7 days   • traZODone (DESYREL) 100 mg tablet take 1 tablet by mouth daily at bedtime if needed for sleep (Patient not taking: Reported on 2/3/2023)     Allergies   Allergen Reactions   • Codeine Headache     Headaches  Other reaction(s): headache     Immunization History   Administered Date(s) Administered   • INFLUENZA 08/06/2014, 10/19/2015, 08/21/2016, 11/17/2017, 08/19/2018, 10/03/2022   • Influenza Quadrivalent Preservative Free 3 years and older IM 08/19/2018   • Influenza, injectable, quadrivalent, preservative free 0 5 mL 08/19/2018   • Influenza, recombinant, quadrivalent,injectable, preservative free 10/03/2022   • Pneumococcal Conjugate Vaccine 20-valent (Pcv20), Polysace 10/03/2022   • Pneumococcal Polysaccharide PPV23 05/25/2020   • Tdap 09/13/2012   • Tuberculin Skin Test-PPD Intradermal 08/06/2018, 08/23/2018   • Zoster 05/29/2015       Objective     /80 (BP Location: Left arm, Patient Position: Sitting, Cuff Size: Large)   Pulse 100   Temp 98 9 °F (37 2 °C) (Temporal)   Resp 16   Ht 5' 4" (1 626 m)   Wt 93 9 kg (207 lb)   SpO2 97%   BMI 35 53 kg/m²     Physical Exam  Vitals and nursing note reviewed  Constitutional:       General: She is not in acute distress  Appearance: She is well-developed  She is not diaphoretic  HENT:      Head: Normocephalic and atraumatic  Right Ear: External ear normal       Left Ear: External ear normal       Mouth/Throat:      Pharynx: Oropharynx is clear  Eyes:      Pupils: Pupils are equal, round, and reactive to light  Cardiovascular:      Rate and Rhythm: Normal rate and regular rhythm  Heart sounds: Normal heart sounds  Pulmonary:      Effort: Pulmonary effort is normal       Breath sounds: Normal breath sounds  Abdominal:      Palpations: Abdomen is soft  Musculoskeletal:         General: Tenderness (l ankle) present  Normal range of motion  Cervical back: Normal range of motion and neck supple  Skin:     General: Skin is dry  Neurological:      Mental Status: She is alert and oriented to person, place, and time  Psychiatric:         Behavior: Behavior normal          Thought Content:  Thought content normal        SAHARA Balderrama

## 2023-02-06 ENCOUNTER — TELEPHONE (OUTPATIENT)
Dept: FAMILY MEDICINE CLINIC | Facility: CLINIC | Age: 63
End: 2023-02-06

## 2023-02-06 NOTE — TELEPHONE ENCOUNTER
Refill requested apixaban (Eliquis) 5 mg    I saw the starter script in there but not the maintenance one

## 2023-02-07 DIAGNOSIS — I26.99 PULMONARY EMBOLISM, UNSPECIFIED CHRONICITY, UNSPECIFIED PULMONARY EMBOLISM TYPE, UNSPECIFIED WHETHER ACUTE COR PULMONALE PRESENT (HCC): Primary | ICD-10-CM

## 2023-02-08 ENCOUNTER — VBI (OUTPATIENT)
Dept: ADMINISTRATIVE | Facility: OTHER | Age: 63
End: 2023-02-08

## 2023-02-09 DIAGNOSIS — R53.1 WEAKNESS: ICD-10-CM

## 2023-02-09 DIAGNOSIS — R26.9 NEUROLOGIC GAIT DYSFUNCTION: Primary | ICD-10-CM

## 2023-02-10 ENCOUNTER — APPOINTMENT (OUTPATIENT)
Dept: LAB | Facility: CLINIC | Age: 63
End: 2023-02-10

## 2023-02-10 ENCOUNTER — APPOINTMENT (OUTPATIENT)
Dept: RADIOLOGY | Facility: CLINIC | Age: 63
End: 2023-02-10

## 2023-02-10 DIAGNOSIS — M25.572 ACUTE LEFT ANKLE PAIN: ICD-10-CM

## 2023-02-10 DIAGNOSIS — Z79.01 ANTICOAGULATED: ICD-10-CM

## 2023-02-10 DIAGNOSIS — I26.99 PULMONARY EMBOLISM (HCC): ICD-10-CM

## 2023-02-10 DIAGNOSIS — Z86.711 HISTORY OF PULMONARY EMBOLUS (PE): ICD-10-CM

## 2023-02-11 LAB
APTT PPP: 34 SECONDS (ref 23–37)
D DIMER PPP FEU-MCNC: <0.27 UG/ML FEU
INR PPP: 1.25 (ref 0.84–1.19)
PROTHROMBIN TIME: 15.9 SECONDS (ref 11.6–14.5)

## 2023-02-13 DIAGNOSIS — B37.9 YEAST INFECTION: Primary | ICD-10-CM

## 2023-02-13 RX ORDER — NYSTATIN 100000 [USP'U]/G
POWDER TOPICAL 4 TIMES DAILY
COMMUNITY
End: 2023-02-13 | Stop reason: SDUPTHER

## 2023-02-13 RX ORDER — NYSTATIN 100000 [USP'U]/G
POWDER TOPICAL 4 TIMES DAILY
Qty: 15 G | Refills: 0 | Status: SHIPPED | OUTPATIENT
Start: 2023-02-13

## 2023-02-17 ENCOUNTER — TELEPHONE (OUTPATIENT)
Dept: FAMILY MEDICINE CLINIC | Facility: CLINIC | Age: 63
End: 2023-02-17

## 2023-02-17 NOTE — TELEPHONE ENCOUNTER
Pt called and got the results from her xray and now needs a referral for an orthopedic doctor for the left foot and left hand  She is also asking for something for the pain and inflammation  I explained that she may need an appointment for the medication but she wanted me to leave a message

## 2023-02-21 ENCOUNTER — TELEPHONE (OUTPATIENT)
Dept: FAMILY MEDICINE CLINIC | Facility: CLINIC | Age: 63
End: 2023-02-21

## 2023-02-21 NOTE — TELEPHONE ENCOUNTER
Pt called and stated that the referral that was placed for PT needs to be changed to Home PT so they can come to her home      Please review and advise   Thanks

## 2023-02-23 NOTE — PROGRESS NOTES
Name: Sharri Kayser      : 1960      MRN: [id number]  Encounter Provider: Donald Hoyt MD  Encounter Date: 2023   Encounter department: [address] [place] [address] Place     1  Obstructive sleep apnea syndrome  Assessment & Plan:  Patient instructed to continue using CPAP as directed to decrease episodes of apnea to minimize risk of cardiac complications  Pt instructed to kep  machine in clean working order and to call if any replacement parts are needed  2  Obesity, unspecified obesity severity, unspecified obesity type  Assessment & Plan:  Patient to increase exercise and partake of a diet with less calories to promote  weight loss      3  Other migraine without status migrainosus, not intractable  Assessment & Plan:  Patient is stable  and will continue present plan of care and reassess at next routine visit  All questions about this problem from patient were answered today  4  Mixed hyperlipidemia  Assessment & Plan:  Patient  is stable with current medication and we discussed a low fat low cholesterol diet  Weight loss also discussed for this will help lower cholesterol also  Recheck lipids in 6 months  5  Gastroesophageal reflux disease without esophagitis  Assessment & Plan:  Patient to continue with present therapy and decrease caffeine, avoid ETOH and smoking to decrease acid production  Pt should also cease eating prior to bedtime and avoid excessive fluid intake prior to sleep  May use antacids as needed for breakthrough GERD  All pateint questions answered today about this condition  6  Multiple subsegmental pulmonary emboli without acute cor pulmonale ([facility] [address] )  Assessment & Plan:  Patient is stable  and will continue present plan of care and reassess at next routine visit  All questions about this problem from patient were answered today        7  Screening mammogram for high-risk patient  -     Mammo screening bilateral w 3d & cad; Future; Expected date: 02/27/2023    8  Chronic pain of left ankle  -     Ambulatory Referral to Podiatry; Future    9  Pulmonary embolism, unspecified chronicity, unspecified pulmonary embolism type, unspecified whether acute cor pulmonale present [facility])  -     Ambulatory Referral to Pulmonology; Future    10  Social discord  -     Referral to [address] [place] [address]; Future      BMI Counseling: There is no height or weight on file to calculate BMI  The BMI is above normal  Nutrition recommendations include decreasing portion sizes, encouraging healthy choices of fruits and vegetables, decreasing fast food intake, consuming healthier snacks, limiting drinks that contain sugar, moderation in carbohydrate intake, increasing intake of lean protein, reducing intake of saturated and trans fat and reducing intake of cholesterol  Exercise recommendations include exercising 3-5 times per week  No pharmacotherapy was ordered  Patient referred to PCP  Rationale for BMI follow-up plan is due to patient being overweight or obese  Subjective     This is a 60 YOWF seen and examined for multiple medical problems  Pt  Has recently been hospitalized for DVT/PE  Due to covid  Pt also  With sleep apnea and lipid problems  Pt is doing well with her meds and is ok with anti coagulation  Pt also with a lot os problems with ankle pain  Pt is not walking anfd uses a wheel chair  Pt  With no trauma to ankle and is seeking ortho consult  She really needs  consult  With podiatry  Pt also looking to talk to a  in regards to her living arrangements  Pt  Accompanied by her son today  Review of Systems   Constitutional: Negative for activity change, appetite change, fatigue and fever  HENT: Negative for congestion, ear pain, postnasal drip, rhinorrhea, sinus pressure, sinus pain, sneezing and sore throat  Eyes: Negative for pain and redness     Respiratory: Negative for apnea, cough, chest tightness, shortness of breath and wheezing  Cardiovascular: Negative for chest pain, palpitations and leg swelling  Gastrointestinal: Negative for abdominal pain, constipation, diarrhea, nausea and vomiting  Endocrine: Negative for cold intolerance and heat intolerance  Genitourinary: Negative for difficulty urinating, dysuria, frequency, hematuria and urgency  Musculoskeletal: Negative for arthralgias, back pain, gait problem and myalgias  Skin: Negative for rash  Neurological: Negative for dizziness, speech difficulty, weakness, numbness and headaches  Hematological: Does not bruise/bleed easily  Psychiatric/Behavioral: Negative for agitation, confusion and hallucinations  Past Medical History:   Diagnosis Date   • Anemia    • Cancer [facility])     breast history of   • Migraine    • Mixed hyperlipidemia 2021   • Mixed hyperlipidemia 2021   • Skin cancer     Left leg lump   cryotherapy and excision, unsure of which kind      Past Surgical History:   Procedure Laterality Date   • ANKLE SURGERY Left 2017    pins and plate    •  SECTION      x2   • DILATION AND CURETTAGE OF UTERUS     • TONSILLECTOMY      age 4     Family History   Problem Relation Age of Onset   • No Known Problems Mother    • Lung cancer Brother      Social History     Socioeconomic History   • Marital status:      Spouse name: None   • Number of children: None   • Years of education: None   • Highest education level: None   Occupational History   • None   Tobacco Use   • Smoking status: Never   • Smokeless tobacco: Never   Vaping Use   • Vaping Use: Never used   Substance and Sexual Activity   • Alcohol use: Not Currently   • Drug use: No   • Sexual activity: Not Currently     Partners: Male     Birth control/protection: None   Other Topics Concern   • None   Social History Narrative   • None     Social Determinants of Health     Financial Resource Strain: Not on file   Food Insecurity: No Food Insecurity   • Worried About Running Out of Food in the Last Year: Never true   • Ran Out of Food in the Last Year: Never true   Transportation Needs: No Transportation Needs   • Lack of Transportation (Medical): No   • Lack of Transportation (Non-Medical): No   Physical Activity: Not on file   Stress: Not on file   Social Connections: Not on file   Intimate Partner Violence: Not on file   Housing Stability: High Risk   • Unable to Pay for Housing in the Last Year: No   • Number of Places Lived in the Last Year: 3   • Unstable Housing in the Last Year: No     Current Outpatient Medications on File Prior to Visit   Medication Sig   • apixaban (Eliquis) 5 mg Take 1 tablet (5 mg total) by mouth 2 (two) times a day   • atorvastatin (LIPITOR) 40 mg tablet take 1 tablet by mouth once daily   • Cholecalciferol (VITAMIN D3 PO) Take 1 tablet by mouth daily   • Myrbetriq 50 MG TB24 Take 1 tablet by mouth daily   • nystatin (MYCOSTATIN) powder Apply topically 4 (four) times a day   • omeprazole (PriLOSEC) 20 mg delayed release capsule Take 1 capsule (20 mg total) by mouth daily   • rizatriptan (Maxalt) 10 mg tablet Take 1 tablet (10 mg total) by mouth as needed for migraine Take at the onset of migraine; if symptoms continue or return, may take another dose at least 2 hours after first dose  Take no more than 2 doses in a day     • senna-docusate sodium (SENOKOT S) 8 6-50 mg per tablet Take 1 tablet by mouth daily at bedtime for 7 days   • traZODone (DESYREL) 100 mg tablet take 1 tablet by mouth daily at bedtime if needed for sleep (Patient not taking: Reported on 2/3/2023)     Allergies   Allergen Reactions   • Codeine Headache     Headaches  Other reaction(s): headache     Immunization History   Administered Date(s) Administered   • INFLUENZA 08/06/2014, 10/19/2015, 08/21/2016, 11/17/2017, 08/19/2018, 10/03/2022   • Influenza Quadrivalent Preservative Free 3 years and older IM 08/19/2018   • Influenza, injectable, quadrivalent, preservative free 0 5 mL 08/19/2018   • Influenza, recombinant, quadrivalent,injectable, preservative free 10/03/2022   • Pneumococcal Conjugate Vaccine 20-valent (Pcv20), Polysace 10/03/2022   • Pneumococcal Polysaccharide PPV23 05/25/2020   • Tdap 09/13/2012   • Tuberculin Skin Test-PPD Intradermal 08/06/2018, 08/23/2018   • Zoster 05/29/2015       Objective     /80 (BP Location: Left arm, Patient Position: Sitting, Cuff Size: Standard)   Pulse 93   Temp (!) 97 4 °F (36 3 °C) (Temporal)   SpO2 97%     Physical Exam  Vitals and nursing note reviewed  Constitutional:       Appearance: She is well-developed  HENT:      Head: Normocephalic and atraumatic  Nose: Nose normal    Eyes:      General: No scleral icterus  Conjunctiva/sclera: Conjunctivae normal       Pupils: Pupils are equal, round, and reactive to light  Neck:      Thyroid: No thyromegaly  Cardiovascular:      Rate and Rhythm: Normal rate and regular rhythm  Pulmonary:      Effort: Pulmonary effort is normal       Breath sounds: Normal breath sounds  No wheezing  Abdominal:      General: Bowel sounds are normal  There is no distension  Palpations: Abdomen is soft  Tenderness: There is no abdominal tenderness  There is no guarding or rebound  Musculoskeletal:         General: Tenderness present  No deformity  Normal range of motion  Cervical back: Normal range of motion and neck supple  Skin:     General: Skin is warm and dry  Findings: No erythema or rash  Neurological:      Mental Status: She is alert and oriented to person, place, and time  Sensory: No sensory deficit  Psychiatric:         Mood and Affect: Mood normal          Behavior: Behavior normal          Thought Content:  Thought content normal          Judgment: Judgment normal        Marylou Swain MD

## 2023-02-24 ENCOUNTER — OFFICE VISIT (OUTPATIENT)
Dept: FAMILY MEDICINE CLINIC | Facility: CLINIC | Age: 63
End: 2023-02-24

## 2023-02-24 VITALS
OXYGEN SATURATION: 97 % | SYSTOLIC BLOOD PRESSURE: 138 MMHG | TEMPERATURE: 97.4 F | DIASTOLIC BLOOD PRESSURE: 80 MMHG | HEART RATE: 93 BPM

## 2023-02-24 DIAGNOSIS — G89.29 CHRONIC PAIN OF LEFT ANKLE: ICD-10-CM

## 2023-02-24 DIAGNOSIS — E78.2 MIXED HYPERLIPIDEMIA: ICD-10-CM

## 2023-02-24 DIAGNOSIS — Z65.8 SOCIAL DISCORD: ICD-10-CM

## 2023-02-24 DIAGNOSIS — I26.99 PULMONARY EMBOLISM, UNSPECIFIED CHRONICITY, UNSPECIFIED PULMONARY EMBOLISM TYPE, UNSPECIFIED WHETHER ACUTE COR PULMONALE PRESENT (HCC): ICD-10-CM

## 2023-02-24 DIAGNOSIS — G47.33 OBSTRUCTIVE SLEEP APNEA SYNDROME: Primary | ICD-10-CM

## 2023-02-24 DIAGNOSIS — K21.9 GASTROESOPHAGEAL REFLUX DISEASE WITHOUT ESOPHAGITIS: ICD-10-CM

## 2023-02-24 DIAGNOSIS — Z12.31 SCREENING MAMMOGRAM FOR HIGH-RISK PATIENT: ICD-10-CM

## 2023-02-24 DIAGNOSIS — I26.94 MULTIPLE SUBSEGMENTAL PULMONARY EMBOLI WITHOUT ACUTE COR PULMONALE (HCC): ICD-10-CM

## 2023-02-24 DIAGNOSIS — M25.572 CHRONIC PAIN OF LEFT ANKLE: ICD-10-CM

## 2023-02-24 DIAGNOSIS — E66.9 OBESITY, UNSPECIFIED OBESITY SEVERITY, UNSPECIFIED OBESITY TYPE: ICD-10-CM

## 2023-02-24 DIAGNOSIS — G43.809 OTHER MIGRAINE WITHOUT STATUS MIGRAINOSUS, NOT INTRACTABLE: ICD-10-CM

## 2023-02-27 DIAGNOSIS — M25.572 CHRONIC PAIN OF LEFT ANKLE: ICD-10-CM

## 2023-02-27 DIAGNOSIS — G89.29 CHRONIC PAIN OF LEFT ANKLE: ICD-10-CM

## 2023-02-27 RX ORDER — NAPROXEN 500 MG/1
500 TABLET ORAL 2 TIMES DAILY WITH MEALS
Qty: 20 TABLET | Refills: 1 | Status: SHIPPED | OUTPATIENT
Start: 2023-02-27 | End: 2023-02-28

## 2023-02-28 ENCOUNTER — TELEPHONE (OUTPATIENT)
Dept: PULMONOLOGY | Facility: CLINIC | Age: 63
End: 2023-02-28

## 2023-02-28 RX ORDER — APIXABAN 5 MG/1
TABLET, FILM COATED ORAL
Qty: 20 TABLET | Refills: 1 | Status: SHIPPED | OUTPATIENT
Start: 2023-02-28

## 2023-02-28 NOTE — TELEPHONE ENCOUNTER
Left message for pt to call office to schedule appt for consult with dr Kade Aguilar as per referral in system for pulmonary embolism

## 2023-03-10 DIAGNOSIS — E78.2 MIXED HYPERLIPIDEMIA: ICD-10-CM

## 2023-03-10 RX ORDER — ATORVASTATIN CALCIUM 40 MG/1
40 TABLET, FILM COATED ORAL DAILY
Qty: 30 TABLET | Refills: 5 | Status: SHIPPED | OUTPATIENT
Start: 2023-03-10

## 2023-03-10 RX ORDER — MIRABEGRON 50 MG/1
1 TABLET, FILM COATED, EXTENDED RELEASE ORAL DAILY
Qty: 30 TABLET | Refills: 5 | Status: SHIPPED | OUTPATIENT
Start: 2023-03-10

## 2023-03-27 ENCOUNTER — TELEPHONE (OUTPATIENT)
Dept: OTHER | Facility: OTHER | Age: 63
End: 2023-03-27

## 2023-03-27 NOTE — TELEPHONE ENCOUNTER
Patient called and is asking if the office can give hear call regarding her pervious colonoscopy and who was the provider that did her colonoscopy

## 2023-04-14 PROBLEM — U07.1 COVID-19: Status: RESOLVED | Noted: 2022-12-18 | Resolved: 2023-04-14

## 2023-04-14 PROBLEM — E87.1 HYPONATREMIA: Status: RESOLVED | Noted: 2022-12-18 | Resolved: 2023-04-14

## 2023-04-14 PROBLEM — E87.6 HYPOKALEMIA: Status: RESOLVED | Noted: 2022-12-18 | Resolved: 2023-04-14

## 2023-04-26 ENCOUNTER — TELEPHONE (OUTPATIENT)
Dept: FAMILY MEDICINE CLINIC | Facility: CLINIC | Age: 63
End: 2023-04-26

## 2023-04-26 NOTE — TELEPHONE ENCOUNTER
Pt  called, she requests script for Depends, incontinence pads and wipes     She would then like me to fax script to [facility] @ 1 [phone] [facility] # [id number]

## 2023-05-05 DIAGNOSIS — G43.809 OTHER MIGRAINE WITHOUT STATUS MIGRAINOSUS, NOT INTRACTABLE: ICD-10-CM

## 2023-05-05 RX ORDER — RIZATRIPTAN BENZOATE 10 MG/1
TABLET ORAL
Qty: 9 TABLET | Refills: 1 | Status: SHIPPED | OUTPATIENT
Start: 2023-05-05

## 2023-05-14 DIAGNOSIS — G43.809 OTHER MIGRAINE WITHOUT STATUS MIGRAINOSUS, NOT INTRACTABLE: ICD-10-CM

## 2023-05-14 RX ORDER — RIZATRIPTAN BENZOATE 10 MG/1
TABLET ORAL
Qty: 9 TABLET | Refills: 1 | Status: SHIPPED | OUTPATIENT
Start: 2023-05-14

## 2023-05-18 ENCOUNTER — TELEPHONE (OUTPATIENT)
Dept: FAMILY MEDICINE CLINIC | Facility: CLINIC | Age: 63
End: 2023-05-18

## 2023-05-18 DIAGNOSIS — R53.1 WEAKNESS: Primary | ICD-10-CM

## 2023-05-18 NOTE — TELEPHONE ENCOUNTER
Needs new referral for Home P T  and OT faxed to [facility]  Phone [phone] ext [phone]  Fax [phone]

## 2023-06-02 ENCOUNTER — TELEPHONE (OUTPATIENT)
Dept: FAMILY MEDICINE CLINIC | Facility: CLINIC | Age: 63
End: 2023-06-02

## 2023-06-02 NOTE — TELEPHONE ENCOUNTER
Patient called requesting a letter stating that she will need a ramp installed to get in and out of house  She has upcoming appointment 07/07/2023, should she wait until then to discuss this  Please review and advise

## 2023-06-02 NOTE — TELEPHONE ENCOUNTER
I will write a script out for her and place in blue folder  We can address further at her upcoming OV

## 2023-06-05 DIAGNOSIS — B37.9 YEAST INFECTION: ICD-10-CM

## 2023-06-06 RX ORDER — NYSTATIN 100000 [USP'U]/G
POWDER TOPICAL
Qty: 15 G | Refills: 0 | Status: SHIPPED | OUTPATIENT
Start: 2023-06-06

## 2023-06-07 ENCOUNTER — PROCEDURE VISIT (OUTPATIENT)
Dept: NEUROLOGY | Facility: CLINIC | Age: 63
End: 2023-06-07
Payer: COMMERCIAL

## 2023-06-07 DIAGNOSIS — M21.372 FOOT DROP, LEFT FOOT: ICD-10-CM

## 2023-06-07 DIAGNOSIS — R60.0 LOCALIZED EDEMA: ICD-10-CM

## 2023-06-07 DIAGNOSIS — M79.672 PAIN IN LEFT FOOT: ICD-10-CM

## 2023-06-07 PROCEDURE — 95909 NRV CNDJ TST 5-6 STUDIES: CPT | Performed by: PHYSICAL MEDICINE & REHABILITATION

## 2023-06-07 PROCEDURE — 95886 MUSC TEST DONE W/N TEST COMP: CPT | Performed by: PHYSICAL MEDICINE & REHABILITATION

## 2023-06-07 NOTE — PROGRESS NOTES
EMG 1 Limb     Date/Time 6/7/2023 1:30 PM     Performed by  Gosia Lobo MD   Authorized by Destiny Grier DPM           EMG left lower extremity completed today

## 2023-07-14 ENCOUNTER — OFFICE VISIT (OUTPATIENT)
Dept: FAMILY MEDICINE CLINIC | Facility: CLINIC | Age: 63
End: 2023-07-14
Payer: COMMERCIAL

## 2023-07-14 VITALS
DIASTOLIC BLOOD PRESSURE: 72 MMHG | HEART RATE: 102 BPM | TEMPERATURE: 97.3 F | OXYGEN SATURATION: 98 % | SYSTOLIC BLOOD PRESSURE: 126 MMHG

## 2023-07-14 DIAGNOSIS — I26.94 MULTIPLE SUBSEGMENTAL PULMONARY EMBOLI WITHOUT ACUTE COR PULMONALE (HCC): ICD-10-CM

## 2023-07-14 DIAGNOSIS — G47.33 OBSTRUCTIVE SLEEP APNEA SYNDROME: Primary | ICD-10-CM

## 2023-07-14 DIAGNOSIS — E66.9 OBESITY, UNSPECIFIED OBESITY SEVERITY, UNSPECIFIED OBESITY TYPE: ICD-10-CM

## 2023-07-14 DIAGNOSIS — E78.2 MIXED HYPERLIPIDEMIA: ICD-10-CM

## 2023-07-14 DIAGNOSIS — G62.9 NEUROPATHY: ICD-10-CM

## 2023-07-14 DIAGNOSIS — K21.9 GASTROESOPHAGEAL REFLUX DISEASE WITHOUT ESOPHAGITIS: ICD-10-CM

## 2023-07-14 DIAGNOSIS — G43.809 OTHER MIGRAINE WITHOUT STATUS MIGRAINOSUS, NOT INTRACTABLE: ICD-10-CM

## 2023-07-14 DIAGNOSIS — E11.40 TYPE 2 DIABETES MELLITUS WITH DIABETIC NEUROPATHY, WITHOUT LONG-TERM CURRENT USE OF INSULIN (HCC): ICD-10-CM

## 2023-07-14 PROCEDURE — 99214 OFFICE O/P EST MOD 30 MIN: CPT | Performed by: FAMILY MEDICINE

## 2023-07-14 RX ORDER — HYDROXYZINE HYDROCHLORIDE 10 MG/1
10 TABLET, FILM COATED ORAL
COMMUNITY
Start: 2023-07-05

## 2023-07-14 RX ORDER — FLUOXETINE 10 MG/1
10 CAPSULE ORAL DAILY
COMMUNITY
Start: 2023-07-05

## 2023-07-14 NOTE — PROGRESS NOTES
Name: Brianne Gee      : 1960      MRN: [id number]  Encounter Provider: Steven Conte MD  Encounter Date: 2023   Encounter department: [address]     1. Obstructive sleep apnea syndrome  Assessment & Plan:  Patient instructed to continue using CPAP as directed to decrease episodes of apnea to minimize risk of cardiac complications. Pt instructed to kep  machine in clean working order and to call if any replacement parts are needed. 2. Obesity, unspecified obesity severity, unspecified obesity type  Assessment & Plan:  Patient to increase exercise and partake of a diet with less calories to promote  weight loss      3. Other migraine without status migrainosus, not intractable  Assessment & Plan:  Patient is stable  and will continue present plan of care and reassess at next routine visit. All questions about this problem from patient were answered today. 4. Mixed hyperlipidemia  Assessment & Plan:  Patient  is stable with current medication and we discussed a low fat low cholesterol diet. Weight loss also discussed for this will help lower cholesterol also. Recheck lipids in 6 months. 5. Gastroesophageal reflux disease without esophagitis  Assessment & Plan:  Patient to continue with present therapy and decrease caffeine, avoid ETOH and smoking to decrease acid production. Pt should also cease eating prior to bedtime and avoid excessive fluid intake prior to sleep. May use antacids as needed for breakthrough GERD. All pateint questions answered today about this condition. 6. Multiple subsegmental pulmonary emboli without acute cor pulmonale ([address])  Assessment & Plan:  Patient is stable  and will continue present plan of care and reassess at next routine visit. All questions about this problem from patient were answered today.       7. Type 2 diabetes mellitus with diabetic neuropathy, without long-term current use of insulin ([address] [facility])    8. Neuropathy  -     Ambulatory Referral to Neurology; Future           Subjective     Is a 57-year-old female here for checkup on multiple medical problems. Patient history of sleep apnea BMI of 35 history of COVID with pulmonary emboli. Since she had COVID patient has been having a decline. Patient is now in a wheelchair and she states she is not moving EMG of her left leg so she is getting some neuropathy that is preventing her from walking. Patient also is having some issues now with her with her left hand. I have some concerns about denervation process going on and she is to see neurology for this. Patient also is using a wheelchair and is asking about getting a ramp for her facility where she is living at which time no problems with doing that. Patient is a medical necessity of a ramp because she is having decline in her functional status and ADLs. Patient states that she is now having some interning of her toes on the left foot as well as the right foot and she is having some issues with her left hand movement and use of her fingers. Patient is having findings of denervation and I have some concerns about that. Review of Systems   Constitutional: Negative for activity change, appetite change, fatigue and fever. HENT: Negative for congestion, ear pain, postnasal drip, rhinorrhea, sinus pressure, sinus pain, sneezing and sore throat. Eyes: Negative for pain and redness. Respiratory: Negative for apnea, cough, chest tightness, shortness of breath and wheezing. Cardiovascular: Negative for chest pain, palpitations and leg swelling. Gastrointestinal: Negative for abdominal pain, constipation, diarrhea, nausea and vomiting. Endocrine: Negative for cold intolerance and heat intolerance. Genitourinary: Negative for difficulty urinating, dysuria, frequency, hematuria and urgency. Musculoskeletal: Positive for gait problem. Negative for arthralgias, back pain and myalgias.    Skin: Negative for rash. Neurological: Positive for weakness and headaches. Negative for dizziness and speech difficulty. Hematological: Does not bruise/bleed easily. Psychiatric/Behavioral: Negative for agitation, confusion and hallucinations. Past Medical History:   Diagnosis Date   • Anemia    • Cancer ([address])     breast history of   • Gastroesophageal reflux disease without esophagitis 2021   • Migraine    • Mixed hyperlipidemia 2021   • Mixed hyperlipidemia 2021   • Obesity, unspecified obesity severity, unspecified obesity type 2018   • Skin cancer     Left leg lump. cryotherapy and excision, unsure of which kind      Past Surgical History:   Procedure Laterality Date   • ANKLE SURGERY Left 2017    pins and plate    •  SECTION      x2   • DILATION AND CURETTAGE OF UTERUS     • TONSILLECTOMY      age 4     Family History   Problem Relation Age of Onset   • No Known Problems Mother    • Lung cancer Brother      Social History     Socioeconomic History   • Marital status:      Spouse name: None   • Number of children: None   • Years of education: None   • Highest education level: None   Occupational History   • None   Tobacco Use   • Smoking status: Never   • Smokeless tobacco: Never   Vaping Use   • Vaping Use: Never used   Substance and Sexual Activity   • Alcohol use: Not Currently   • Drug use: No   • Sexual activity: Not Currently     Partners: Male     Birth control/protection: None   Other Topics Concern   • None   Social History Narrative   • None     Social Determinants of Health     Financial Resource Strain: Not on file   Food Insecurity: No Food Insecurity (2022)    Hunger Vital Sign    • Worried About Running Out of Food in the Last Year: Never true    • Ran Out of Food in the Last Year: Never true   Transportation Needs: No Transportation Needs (2022)    PRAPARE - Transportation    • Lack of Transportation (Medical):  No    • Lack of Transportation (Non-Medical):  No   Physical Activity: Not on file   Stress: Not on file   Social Connections: Not on file   Intimate Partner Violence: Not on file   Housing Stability: High Risk (12/19/2022)    Housing Stability Vital Sign    • Unable to Pay for Housing in the Last Year: No    • Number of Places Lived in the Last Year: 3    • Unstable Housing in the Last Year: No     Current Outpatient Medications on File Prior to Visit   Medication Sig   • apixaban (Eliquis) 5 mg Take 1 tablet (5 mg total) by mouth 2 (two) times a day   • atorvastatin (LIPITOR) 40 mg tablet Take 1 tablet (40 mg total) by mouth daily   • Cholecalciferol (VITAMIN D3 PO) Take 1 tablet by mouth daily   • FLUoxetine (PROzac) 10 mg capsule Take 10 mg by mouth daily   • hydrOXYzine HCL (ATARAX) 10 mg tablet Take 10 mg by mouth daily at bedtime as needed   • Myrbetriq 50 MG TB24 Take 1 tablet (50 mg total) by mouth daily   • nystatin powder apply topically to affected area four times a day   • omeprazole (PriLOSEC) 20 mg delayed release capsule Take 1 capsule (20 mg total) by mouth daily   • rizatriptan (MAXALT) 10 mg tablet take 1 tablet by mouth if needed for migraines take ONSET OF MIGRAINE ; IF SYMOTOMS CONTINUE OR RETURN , MAY TAKE ANOTHER DOSE AT LEAST 2 HOURS AFTER FIRST DOSE,TAKE NO MORE THAN 2 DOSES IN A DAY   • senna-docusate sodium (SENOKOT S) 8.6-50 mg per tablet Take 1 tablet by mouth daily at bedtime for 7 days     Allergies   Allergen Reactions   • Codeine Headache     Headaches  Other reaction(s): headache     Immunization History   Administered Date(s) Administered   • INFLUENZA 08/06/2014, 10/19/2015, 08/21/2016, 11/17/2017, 08/19/2018, 10/03/2022   • Influenza Quadrivalent Preservative Free 3 years and older IM 08/19/2018   • Influenza, injectable, quadrivalent, preservative free 0.5 mL 08/19/2018   • Influenza, recombinant, quadrivalent,injectable, preservative free 10/03/2022   • Pneumococcal Conjugate Vaccine 20-valent (Pcv20), Polysace 10/03/2022   • Pneumococcal Polysaccharide PPV23 05/25/2020   • Tdap 09/13/2012   • Tuberculin Skin Test-PPD Intradermal 08/06/2018, 08/23/2018   • Zoster 05/29/2015       Objective     /72 (BP Location: Left arm, Patient Position: Sitting, Cuff Size: Large)   Pulse 102   Temp (!) 97.3 °F (36.3 °C) (Skin)   SpO2 98%     Physical Exam  Vitals and nursing note reviewed. Constitutional:       Appearance: She is well-developed. She is obese. She is ill-appearing. HENT:      Head: Normocephalic and atraumatic. Nose: Nose normal.      Mouth/Throat:      Mouth: Mucous membranes are moist.   Eyes:      General: No scleral icterus. Conjunctiva/sclera: Conjunctivae normal.      Pupils: Pupils are equal, round, and reactive to light. Neck:      Thyroid: No thyromegaly. Pulmonary:      Effort: Pulmonary effort is normal.      Breath sounds: Normal breath sounds. No wheezing. Abdominal:      General: Bowel sounds are normal. There is no distension. Palpations: Abdomen is soft. Tenderness: There is no abdominal tenderness. There is no guarding or rebound. Musculoskeletal:         General: No tenderness. Normal range of motion. Cervical back: Normal range of motion and neck supple. Comments: Curling of toes L  and L finger  Contracturess. Skin:     General: Skin is warm and dry. Findings: No erythema or rash. Neurological:      Mental Status: She is alert and oriented to person, place, and time. Sensory: No sensory deficit. Gait: Gait abnormal.   Psychiatric:         Behavior: Behavior normal.         Thought Content:  Thought content normal.         Judgment: Judgment normal.       Wanda Patiño MD

## 2023-07-14 NOTE — ASSESSMENT & PLAN NOTE
Patient instructed to continue using CPAP as directed to decrease episodes of apnea to minimize risk of cardiac complications. Pt instructed to kep  machine in clean working order and to call if any replacement parts are needed.

## 2023-07-17 ENCOUNTER — TELEPHONE (OUTPATIENT)
Dept: NEUROLOGY | Facility: CLINIC | Age: 63
End: 2023-07-17

## 2023-07-20 NOTE — TELEPHONE ENCOUNTER
Chart reviewed  Pt saw Dr Johana Decker for migraines. There is a referral entered by Dr Daylin Betts. Called pt c/o neuropathy. She cannot walk on her left foot. Tremors both hands. Right toes starting to curl. Her PCP recommended for her to schedule an appt w/ neurologist.  See referral  Pt lives in  but refused to see Dr Gonzalez Carey or Dr Ten Strange. Preferred [facility] or [facility]. Afternoon appt.    Cb [phone], ok to leave a detailed message     Pls assist

## 2023-07-20 NOTE — TELEPHONE ENCOUNTER
Dr. Sofia aBr, Pt is established with you, would you be ok with her seeing an AP for the neuropathy? If not we would need to do a MILES. Please let me know. Thank you.

## 2023-07-22 DIAGNOSIS — B37.9 YEAST INFECTION: ICD-10-CM

## 2023-07-22 RX ORDER — NYSTATIN 100000 [USP'U]/G
POWDER TOPICAL
Qty: 15 G | Refills: 0 | Status: SHIPPED | OUTPATIENT
Start: 2023-07-22 | End: 2023-07-25

## 2023-07-24 DIAGNOSIS — B37.9 YEAST INFECTION: ICD-10-CM

## 2023-07-25 RX ORDER — NYSTATIN 100000 [USP'U]/G
POWDER TOPICAL
Qty: 15 G | Refills: 0 | Status: SHIPPED | OUTPATIENT
Start: 2023-07-25

## 2023-08-09 ENCOUNTER — TELEPHONE (OUTPATIENT)
Dept: FAMILY MEDICINE CLINIC | Facility: CLINIC | Age: 63
End: 2023-08-09

## 2023-08-09 NOTE — TELEPHONE ENCOUNTER
Patient called and is applying for food stamps and needs a letter from you stating her medical conditions for her foot, hand, and migraines.       She would like to have the letter faxed to [phone]

## 2023-08-28 ENCOUNTER — OFFICE VISIT (OUTPATIENT)
Dept: LAB | Facility: HOSPITAL | Age: 63
End: 2023-08-28
Payer: COMMERCIAL

## 2023-08-28 ENCOUNTER — APPOINTMENT (OUTPATIENT)
Dept: LAB | Facility: HOSPITAL | Age: 63
End: 2023-08-28
Payer: COMMERCIAL

## 2023-08-28 DIAGNOSIS — F33.1 MAJOR DEPRESSIVE DISORDER, RECURRENT EPISODE, MODERATE (HCC): ICD-10-CM

## 2023-08-28 DIAGNOSIS — F41.1 GENERALIZED ANXIETY DISORDER: ICD-10-CM

## 2023-08-28 DIAGNOSIS — Z79.899 ENCOUNTER FOR LONG-TERM (CURRENT) USE OF OTHER MEDICATIONS: ICD-10-CM

## 2023-08-28 LAB
ALBUMIN SERPL BCP-MCNC: 4.5 G/DL (ref 3.5–5)
ALP SERPL-CCNC: 71 U/L (ref 34–104)
ALT SERPL W P-5'-P-CCNC: 12 U/L (ref 7–52)
ANION GAP SERPL CALCULATED.3IONS-SCNC: 9 MMOL/L
AST SERPL W P-5'-P-CCNC: 15 U/L (ref 13–39)
BILIRUB SERPL-MCNC: 0.63 MG/DL (ref 0.2–1)
BUN SERPL-MCNC: 11 MG/DL (ref 5–25)
CALCIUM SERPL-MCNC: 10.3 MG/DL (ref 8.4–10.2)
CHLORIDE SERPL-SCNC: 103 MMOL/L (ref 96–108)
CHOLEST SERPL-MCNC: 166 MG/DL
CO2 SERPL-SCNC: 27 MMOL/L (ref 21–32)
CREAT SERPL-MCNC: 0.83 MG/DL (ref 0.6–1.3)
ERYTHROCYTE [DISTWIDTH] IN BLOOD BY AUTOMATED COUNT: 12.7 % (ref 11.6–15.1)
GFR SERPL CREATININE-BSD FRML MDRD: 75 ML/MIN/1.73SQ M
GLUCOSE P FAST SERPL-MCNC: 91 MG/DL (ref 65–99)
HCT VFR BLD AUTO: 40.8 % (ref 34.8–46.1)
HDLC SERPL-MCNC: 64 MG/DL
HGB BLD-MCNC: 13.4 G/DL (ref 11.5–15.4)
LDLC SERPL CALC-MCNC: 85 MG/DL (ref 0–100)
MCH RBC QN AUTO: 30.5 PG (ref 26.8–34.3)
MCHC RBC AUTO-ENTMCNC: 32.8 G/DL (ref 31.4–37.4)
MCV RBC AUTO: 93 FL (ref 82–98)
NONHDLC SERPL-MCNC: 102 MG/DL
PLATELET # BLD AUTO: 517 THOUSANDS/UL (ref 149–390)
PMV BLD AUTO: 10.7 FL (ref 8.9–12.7)
POTASSIUM SERPL-SCNC: 4 MMOL/L (ref 3.5–5.3)
PROT SERPL-MCNC: 7.9 G/DL (ref 6.4–8.4)
RBC # BLD AUTO: 4.4 MILLION/UL (ref 3.81–5.12)
SODIUM SERPL-SCNC: 139 MMOL/L (ref 135–147)
TRIGL SERPL-MCNC: 86 MG/DL
WBC # BLD AUTO: 7.59 THOUSAND/UL (ref 4.31–10.16)

## 2023-08-28 PROCEDURE — 36415 COLL VENOUS BLD VENIPUNCTURE: CPT

## 2023-08-28 PROCEDURE — 85027 COMPLETE CBC AUTOMATED: CPT

## 2023-08-28 PROCEDURE — 80053 COMPREHEN METABOLIC PANEL: CPT

## 2023-08-28 PROCEDURE — 80061 LIPID PANEL: CPT

## 2023-08-28 PROCEDURE — 93005 ELECTROCARDIOGRAM TRACING: CPT

## 2023-08-29 LAB
ATRIAL RATE: 91 BPM
P AXIS: 106 DEGREES
PR INTERVAL: 152 MS
QRS AXIS: -19 DEGREES
QRSD INTERVAL: 84 MS
QT INTERVAL: 374 MS
QTC INTERVAL: 460 MS
T WAVE AXIS: 12 DEGREES
VENTRICULAR RATE: 91 BPM

## 2023-08-29 PROCEDURE — 93010 ELECTROCARDIOGRAM REPORT: CPT | Performed by: INTERNAL MEDICINE

## 2023-09-08 DIAGNOSIS — I26.99 PULMONARY EMBOLISM, UNSPECIFIED CHRONICITY, UNSPECIFIED PULMONARY EMBOLISM TYPE, UNSPECIFIED WHETHER ACUTE COR PULMONALE PRESENT (HCC): ICD-10-CM

## 2023-09-08 DIAGNOSIS — G43.809 OTHER MIGRAINE WITHOUT STATUS MIGRAINOSUS, NOT INTRACTABLE: ICD-10-CM

## 2023-09-08 DIAGNOSIS — E78.2 MIXED HYPERLIPIDEMIA: ICD-10-CM

## 2023-09-08 DIAGNOSIS — K21.9 GASTROESOPHAGEAL REFLUX DISEASE WITHOUT ESOPHAGITIS: ICD-10-CM

## 2023-09-09 RX ORDER — RIZATRIPTAN BENZOATE 10 MG/1
TABLET ORAL
Qty: 9 TABLET | Refills: 1 | Status: SHIPPED | OUTPATIENT
Start: 2023-09-09

## 2023-09-09 RX ORDER — OMEPRAZOLE 20 MG/1
20 CAPSULE, DELAYED RELEASE ORAL DAILY
Qty: 60 CAPSULE | Refills: 5 | Status: SHIPPED | OUTPATIENT
Start: 2023-09-09

## 2023-09-09 RX ORDER — APIXABAN 5 MG/1
5 TABLET, FILM COATED ORAL 2 TIMES DAILY
Qty: 60 TABLET | Refills: 5 | Status: SHIPPED | OUTPATIENT
Start: 2023-09-09

## 2023-09-09 RX ORDER — ATORVASTATIN CALCIUM 40 MG/1
40 TABLET, FILM COATED ORAL DAILY
Qty: 30 TABLET | Refills: 5 | Status: SHIPPED | OUTPATIENT
Start: 2023-09-09

## 2023-09-15 ENCOUNTER — TELEMEDICINE (OUTPATIENT)
Dept: FAMILY MEDICINE CLINIC | Facility: CLINIC | Age: 63
End: 2023-09-15
Payer: COMMERCIAL

## 2023-09-15 DIAGNOSIS — R52 PAIN: Primary | ICD-10-CM

## 2023-09-15 PROCEDURE — 99213 OFFICE O/P EST LOW 20 MIN: CPT | Performed by: FAMILY MEDICINE

## 2023-09-15 RX ORDER — GABAPENTIN 100 MG/1
100 CAPSULE ORAL 3 TIMES DAILY
Qty: 90 CAPSULE | Refills: 1 | Status: SHIPPED | OUTPATIENT
Start: 2023-09-15

## 2023-09-15 NOTE — PROGRESS NOTES
Virtual Brief Visit    This Visit is being completed by telephone. The Patient is located at Home and in the following state in which I hold an active license [place]    The patient was identified by name and date of birth. Margot Kussmaul was informed that this is a telemedicine visit and that the visit is being conducted through Telephone. My office door was closed. No one else was in the room. She acknowledged consent and understanding of privacy and security of the video platform. The patient has agreed to participate and understands they can discontinue the visit at any time. Patient is aware this is a billable service. Patient complaining pain side of her body she was been having some issues with contractures of both of the fingers in her hand and also with her foot on the left side she states she is having lots of pain with that would like something for chronic pain. I discussed with her about her neurological evaluation she is seeing neurology in the next 3 weeks for further evaluation of this problem. Assessment/Plan:    Problem List Items Addressed This Visit    None  Visit Diagnoses     Pain    -  Primary    Relevant Medications    gabapentin (Neurontin) 100 mg capsule          Recent Visits  No visits were found meeting these conditions. Showing recent visits within past 7 days and meeting all other requirements  Today's Visits  Date Type Provider Dept   09/15/23 [facility] [id number] today's visits and meeting all other requirements  Future Appointments  No visits were found meeting these conditions.   Showing future appointments within next 150 days and meeting all other requirements         Visit Time  Total Visit Duration: 15

## 2023-09-20 ENCOUNTER — TELEPHONE (OUTPATIENT)
Age: 63
End: 2023-09-20

## 2023-09-21 DIAGNOSIS — R26.9 NEUROLOGIC GAIT DYSFUNCTION: Primary | ICD-10-CM

## 2023-09-27 ENCOUNTER — TELEPHONE (OUTPATIENT)
Age: 63
End: 2023-09-27

## 2023-09-27 NOTE — TELEPHONE ENCOUNTER
[facility] Calling stating that they will fax over a form soon for Dr. Frank Navarro to sign for patient.  Keep an eye on fax  Berletitia Alexander

## 2023-10-11 ENCOUNTER — TELEPHONE (OUTPATIENT)
Age: 63
End: 2023-10-11

## 2023-10-11 ENCOUNTER — TELEPHONE (OUTPATIENT)
Dept: NEUROLOGY | Facility: CLINIC | Age: 63
End: 2023-10-11

## 2023-10-11 NOTE — TELEPHONE ENCOUNTER
[facility] calling.   Patient is Switching from [facility] to [facility]    Phone [phone]  Fax [phone]    They are calling to get a Script for External Ramp with letter of medical necessity       Incontinent wipes (6 packs per month)  Disposable chucks and washable chucks (bed pads)  Xlarge Pull Ups  Incontinent pads    Please fax over and if any issues please call number above    Thank you

## 2023-10-16 ENCOUNTER — OFFICE VISIT (OUTPATIENT)
Dept: NEUROLOGY | Facility: CLINIC | Age: 63
End: 2023-10-16
Payer: COMMERCIAL

## 2023-10-16 VITALS
HEART RATE: 89 BPM | BODY MASS INDEX: 35.34 KG/M2 | SYSTOLIC BLOOD PRESSURE: 126 MMHG | HEIGHT: 64 IN | WEIGHT: 207 LBS | TEMPERATURE: 98.2 F | DIASTOLIC BLOOD PRESSURE: 63 MMHG

## 2023-10-16 DIAGNOSIS — G81.90 HEMIPARESIS (HCC): Primary | ICD-10-CM

## 2023-10-16 PROCEDURE — 99215 OFFICE O/P EST HI 40 MIN: CPT | Performed by: PSYCHIATRY & NEUROLOGY

## 2023-10-16 RX ORDER — BUSPIRONE HYDROCHLORIDE 10 MG/1
10 TABLET ORAL 2 TIMES DAILY
COMMUNITY
Start: 2023-09-26

## 2023-10-16 RX ORDER — BUPROPION HYDROCHLORIDE 150 MG/1
TABLET ORAL
COMMUNITY
Start: 2023-10-07

## 2023-10-16 RX ORDER — FLUOXETINE HYDROCHLORIDE 20 MG/1
20 CAPSULE ORAL DAILY
COMMUNITY
Start: 2023-07-19

## 2023-10-16 NOTE — PROGRESS NOTES
1. Hemiparesis ([address])  Assessment & Plan:  It has been suspected that Ms. Bhanu Murillo may have lumbar radiculopathy accounting for her symptoms. I told her that this is not correct. Her EMG is insufficient to distinguish between L5 radiculopathy and peroneal mononeuropathy. From a purely electrical standpoint the latter appears to be more likely given the relative sparing of the short head of the biceps femoris, which is the only peroneal innervated muscle above the knee. No other L5 innervated muscles such as the tibialis posterior, FDL, or gluteus medius were studied. More importantly, her clinical picture is not in keeping with lumbar radiculopathy. She has asymmetrical spasticity of all extremities with left-sided hemiparesis that would not be explained by lumbar pathology. Cervical spine pathology is my main concern. Intracranial disease is also a consideration even though I was not able to elicit abnormalities above her neck. I am particularly concerned about her history of underlying malignancy. She needs an MRI of the brain and cervical spine to make sure that she does not have a structural abnormality such as neoplasia that might account for her symptoms. Further measures will be considered based on her clinical course. I spent 70 minutes on this visit. Orders:  -     MRI brain with and without contrast; Future; Expected date: 10/16/2023  -     MRI cervical spine with and without contrast; Future; Expected date: 10/16/2023      Problem List Items Addressed This Visit     Hemiparesis ([address]) - Primary     It has been suspected that Ms. Bhanu Murillo may have lumbar radiculopathy accounting for her symptoms. I told her that this is not correct. Her EMG is insufficient to distinguish between L5 radiculopathy and peroneal mononeuropathy.   From a purely electrical standpoint the latter appears to be more likely given the relative sparing of the short head of the biceps femoris, which is the only peroneal innervated muscle above the knee. No other L5 innervated muscles such as the tibialis posterior, FDL, or gluteus medius were studied. More importantly, her clinical picture is not in keeping with lumbar radiculopathy. She has asymmetrical spasticity of all extremities with left-sided hemiparesis that would not be explained by lumbar pathology. Cervical spine pathology is my main concern. Intracranial disease is also a consideration even though I was not able to elicit abnormalities above her neck. I am particularly concerned about her history of underlying malignancy. She needs an MRI of the brain and cervical spine to make sure that she does not have a structural abnormality such as neoplasia that might account for her symptoms. Further measures will be considered based on her clinical course. I spent 70 minutes on this visit. Relevant Orders    MRI brain with and without contrast    MRI cervical spine with and without contrast           I had the pleasure of seeing Margot Kussmaul, a 60 y.o. female, for neuromuscular consultation. The referral was for weakness. She was admitted to the hospital in December for pulmonary emboli in the setting of COVID infection. She has been maintained on anticoagulation since then. She has a history of breast cancer that is reported to be in remission. While in the hospital she was not able to put weight on the left leg. She "cannot put it flat" despite a course of rehabilitation after she was discharged. She has been able to walk with a walker but in the last week or 2 has been unable to do so. She denies low back pain, leg pain, or numbness. She was referred for an EMG which was interpreted as showing an L5 radiculopathy on the left. I was able to review the raw data. She had evidence of denervation in the tibialis anterior, EHL, and peroneus longus. The short head of the biceps femoris appeared to be relatively spared. No other L5 muscles were studied. In the same amount of time she has also reported difficulty using the left hand. She "cannot straighten it out" and it is stiff with soreness. She perceives this as not progressing but there has been more pain in the last month. Past Medical History:   Diagnosis Date   • Anemia    • Cancer ([address])     breast history of   • Gastroesophageal reflux disease without esophagitis 2021   • Migraine    • Mixed hyperlipidemia 2021   • Mixed hyperlipidemia 2021   • Obesity, unspecified obesity severity, unspecified obesity type 2018   • Skin cancer     Left leg lump.  cryotherapy and excision, unsure of which kind        Past Surgical History:   Procedure Laterality Date   • ANKLE SURGERY Left 2017    pins and plate    •  SECTION      x2   • DILATION AND CURETTAGE OF UTERUS     • TONSILLECTOMY      age 4       Codeine    Social History     Tobacco Use   Smoking Status Never   Smokeless Tobacco Never       Social History     Substance and Sexual Activity   Alcohol Use Not Currently       Social History     Substance and Sexual Activity   Drug Use No       Family History   Problem Relation Age of Onset   • No Known Problems Mother    • Lung cancer Brother          Current Outpatient Medications:   •  atorvastatin (LIPITOR) 40 mg tablet, Take 1 tablet (40 mg total) by mouth daily, Disp: 30 tablet, Rfl: 5  •  buPROPion (WELLBUTRIN XL) 150 mg 24 hr tablet, , Disp: , Rfl:   •  busPIRone (BUSPAR) 10 mg tablet, Take 10 mg by mouth 2 (two) times a day, Disp: , Rfl:   •  Cholecalciferol (VITAMIN D3 PO), Take 1 tablet by mouth daily, Disp: , Rfl:   •  Eliquis 5 MG, take 1 tablet by mouth twice a day, Disp: 60 tablet, Rfl: 5  •  FLUoxetine (PROzac) 20 mg capsule, Take 20 mg by mouth daily, Disp: , Rfl:   •  gabapentin (Neurontin) 100 mg capsule, Take 1 capsule (100 mg total) by mouth 3 (three) times a day, Disp: 90 capsule, Rfl: 1  •  hydrOXYzine HCL (ATARAX) 10 mg tablet, Take 10 mg by mouth daily at bedtime as needed, Disp: , Rfl:   •  Myrbetriq 50 MG TB24, Take 1 tablet (50 mg total) by mouth daily, Disp: 30 tablet, Rfl: 5  •  nystatin powder, apply topically to affected area four times a day, Disp: 15 g, Rfl: 0  •  omeprazole (PriLOSEC) 20 mg delayed release capsule, Take 1 capsule (20 mg total) by mouth daily, Disp: 60 capsule, Rfl: 5  •  rizatriptan (MAXALT) 10 mg tablet, take 1 tablet by mouth if needed for migraines take ONSET OF MIGRAINE ; IF SYMOTOMS CONTINUE OR RETURN , MAY TAKE ANOTHER DOSE AT LEAST 2 HOURS AFTER FIRST DOSE,TAKE NO MORE THAN 2 DOSES IN A DAY, Disp: 9 tablet, Rfl: 1  •  senna-docusate sodium (SENOKOT S) 8.6-50 mg per tablet, Take 1 tablet by mouth daily at bedtime for 7 days, Disp: 7 tablet, Rfl: 0    Office Visit on 08/28/2023   Component Date Value Ref Range Status   • Ventricular Rate 08/28/2023 91  BPM Final   • Atrial Rate 08/28/2023 91  BPM Final   • NE Interval 08/28/2023 152  ms Final   • QRSD Interval 08/28/2023 84  ms Final   • QT Interval 08/28/2023 374  ms Final   • QTC Interval 08/28/2023 460  ms Final   • P Axis 08/28/2023 106  degrees Final   • QRS Axis 08/28/2023 -19  degrees Final   • T Wave Axis 08/28/2023 12  degrees Final   Appointment on 08/28/2023   Component Date Value Ref Range Status   • WBC 08/28/2023 7.59  4.31 - 10.16 Thousand/uL Final   • RBC 08/28/2023 4.40  3.81 - 5.12 Million/uL Final   • Hemoglobin 08/28/2023 13.4  11.5 - 15.4 g/dL Final   • Hematocrit 08/28/2023 40.8  34.8 - 46.1 % Final   • MCV 08/28/2023 93  82 - 98 fL Final   • MCH 08/28/2023 30.5  26.8 - 34.3 pg Final   • MCHC 08/28/2023 32.8  31.4 - 37.4 g/dL Final   • RDW 08/28/2023 12.7  11.6 - 15.1 % Final   • Platelets 14/75/5736 517 (H)  149 - 390 Thousands/uL Final   • MPV 08/28/2023 10.7  8.9 - 12.7 fL Final   • Sodium 08/28/2023 139  135 - 147 mmol/L Final   • Potassium 08/28/2023 4.0  3.5 - 5.3 mmol/L Final   • Chloride 08/28/2023 103  96 - 108 mmol/L Final   • CO2 08/28/2023 27  21 - 32 mmol/L Final   • ANION GAP 08/28/2023 9  mmol/L Final   • BUN 08/28/2023 11  5 - 25 mg/dL Final   • Creatinine 08/28/2023 0.83  0.60 - 1.30 mg/dL Final    Standardized to IDMS reference method   • Glucose, Fasting 08/28/2023 91  65 - 99 mg/dL Final   • Calcium 08/28/2023 10.3 (H)  8.4 - 10.2 mg/dL Final   • AST 08/28/2023 15  13 - 39 U/L Final   • ALT 08/28/2023 12  7 - 52 U/L Final    Specimen collection should occur prior to Sulfasalazine administration due to the potential for falsely depressed results. • Alkaline Phosphatase 08/28/2023 71  34 - 104 U/L Final   • Total Protein 08/28/2023 7.9  6.4 - 8.4 g/dL Final   • Albumin 08/28/2023 4.5  3.5 - 5.0 g/dL Final   • Total Bilirubin 08/28/2023 0.63  0.20 - 1.00 mg/dL Final    Use of this assay is not recommended for patients undergoing treatment with eltrombopag due to the potential for falsely elevated results. N-acetyl-p-benzoquinone imine (metabolite of Acetaminophen) will generate erroneously low results in samples for patients that have taken an overdose of Acetaminophen. • eGFR 08/28/2023 75  ml/min/1.73sq m Final   • Cholesterol 08/28/2023 166  See Comment mg/dL Final    Cholesterol:         Pediatric <18 Years        Desirable          <170 mg/dL      Borderline High    170-199 mg/dL      High               >=200 mg/dL        Adult >=18 Years            Desirable         <200 mg/dL      Borderline High   200-239 mg/dL      High              >239 mg/dL     • Triglycerides 08/28/2023 86  See Comment mg/dL Final    Triglyceride:     0-9Y            <75mg/dL     10Y-17Y         <90 mg/dL       >=18Y     Normal          <150 mg/dL     Borderline High 150-199 mg/dL     High            200-499 mg/dL        Very High       >499 mg/dL    Specimen collection should occur prior to Metamizole administration due to the potential for falsely depressed results.    • HDL, Direct 08/28/2023 64  >=50 mg/dL Final   • LDL Calculated 08/28/2023 85  0 - 100 mg/dL Final LDL Cholesterol:     Optimal           <100 mg/dl     Near Optimal      100-129 mg/dl     Above Optimal       Borderline High 130-159 mg/dl       High            160-189 mg/dl       Very High       >189 mg/dl         This screening LDL is a calculated result. It does not have the accuracy of the Direct Measured LDL in the monitoring of patients with hyperlipidemia and/or statin therapy. Direct Measure LDL ([id number]) must be ordered separately in these patients.    • Non-HDL-Chol (CHOL-HDL) 08/28/2023 102  mg/dl Final   Transcribe Orders on 08/28/2023   Component Date Value Ref Range Status   • Buprenorphine 08/28/2023 Negative  CUTOFF:5 ng/mL Final   • ETHYL GLUCURONIDE 08/28/2023 Negative  CUTOFF:500 ng/mL Final   • Amphetamine Screen, Urine 08/28/2023 Negative  CUTOFF:300 ng/mL Final   • Barbiturate Screen, Ur 08/28/2023 Negative  CUTOFF:200 ng/mL Final   • Benzodiazepine Screen, Urine 08/28/2023 Negative  CUTOFF:50 ng/mL Final   • Cocaine Metabolite 08/28/2023 Negative  CUTOFF:150 ng/mL Final   • PCP Scrn, Ur 08/28/2023 Negative  CUTOFF:25 ng/mL Final   • Cannabinoid Scrn, Ur 08/28/2023 Negative  CUTOFF:20 ng/mL Final   • 6-Acetylmorphine, Urine 08/28/2023 Negative  CUTOFF:10 ng/mL Final   • Opiates Screen, Urine 08/28/2023 Negative  CUTOFF:100 ng/mL Final   • OXYCODONE/OXYMORPHONE 08/28/2023 Negative  CUTOFF:100 ng/mL Final   • Tapentadol 08/28/2023 Negative  CUTOFF:200 ng/mL Final   • FENTANYL 08/28/2023 Negative  CUTOFF:2.0 ng/mL Final   • Methadone Screen, Urine 08/28/2023 Negative  CUTOFF:100 ng/mL Final   • Propoxyphene Screen, Urine 08/28/2023 Negative  CUTOFF:300 ng/mL Final   • Tramadol Scrn, Ur 08/28/2023 Negative  CUTOFF:200 ng/mL Final   • CARISOPRODOL 08/28/2023 Negative  CUTOFF:100 ng/mL Final   • GABAPENTIN, IA 08/28/2023 Negative  CUTOFF:1.0 ug/mL Final   • CREATININE 08/28/2023 72  mg/dL Final    REFERENCE RANGE: Ref Range>=20   • pH 08/28/2023 6.9  4.5 - 8.9 Final   • Nitrites Urine 08/28/2023 Negative  NEGATIVE ug/mL Final    Some components of this panel were developed and performance  characteristics determined by [employer] [facility]. They have not been  cleared or approved by the Food and Drug Administration:  Gabapentin    For clinical consultation, please call [phone]. Results for orders placed in visit on 09/29/22    MRI brain wo contrast    Narrative  [id number]     No results found for this or any previous visit. No results found for this or any previous visit. No results found for this or any previous visit. No results found for this or any previous visit. No results found for this or any previous visit. No results found for this or any previous visit. No results found for this or any previous visit. No results found for this or any previous visit. No results found for this or any previous visit. No results found for this or any previous visit. No results found for this or any previous visit. Review of Systems   Constitutional:  Negative for appetite change, fatigue and fever. HENT: Negative. Negative for hearing loss, tinnitus, trouble swallowing and voice change. Eyes: Negative. Negative for photophobia, pain and visual disturbance. Respiratory: Negative. Negative for shortness of breath. Cardiovascular: Negative. Negative for palpitations. Gastrointestinal: Negative. Negative for nausea and vomiting. Endocrine: Negative. Negative for cold intolerance. Genitourinary: Negative. Negative for dysuria, frequency and urgency. Musculoskeletal:  Positive for gait problem. Negative for back pain, myalgias and neck pain. L ankle L hand can't walk and hand contracted   Skin: Negative. Negative for rash. Allergic/Immunologic: Negative.     Neurological:  Negative for dizziness, tremors, seizures, syncope, facial asymmetry, speech difficulty, weakness, light-headedness, numbness and headaches. Hematological: Negative. Does not bruise/bleed easily. Psychiatric/Behavioral: Negative. Negative for confusion, hallucinations and sleep disturbance. All other systems reviewed and are negative. On examination,     Blood pressure 126/63, pulse 89, temperature 98.2 °F (36.8 °C), height 5' 4" (1.626 m), weight 93.9 kg (207 lb). Well developed, well nourished, in no acute distress    Normocephalic, atraumatic    Heart: regular rate and rhythm    Extremities: no clubbing, cyanosis, or edema    Speech and cognition appeared normal    Cranial nerves:  II: Pupils equal, round, and reactive to light. No gross visual field defect. I did not appreciate optic disc edema. III, IV, VI: Extraocular movements intact  V: Normal facial sensation in all three divisions of the trigeminal nerve bilaterally  VII: Normal facial strength  VIII: Hearing intact to finger rub bilaterally  IX, X: Palate elevated symmetrically  XI: Sternocleidomastoid strength normal bilaterally  XII: Tongue protruded in midline without atrophy or fibrillations    Motor:  Normal bulk throughout. Increased tone in all 4 extremities, worse on the left with fingers and toes in spasm    Muscle testing by the MRC scale revealed:        Right   Left  Deltoid    4+   4  Biceps    5   4  Triceps    5   4  Wrist flexors   5   2  Wrist extensors  5   1  Finger flexors   5   1  Finger extensors  5   1  Interossei   5   1  Hip flexors   4+   4  Knee extensors  5   4+   Tibialis anterior  5   1   Plantar flexors   5   1    Deep tendon reflexes:       Right  Left  Biceps   3  3  Triceps   3  3  Brachioradialis 3  3  Patellas  2  2  Ankles   3  3    Babinski  up  mute    Hall  present absent      Sensation: Normal pinprick and light touch throughout    Cerebellar: normal finger to nose and right heel to shin testing    Gait: unable to walk            There are no Patient Instructions on file for this visit.       Thank you very much for allowing me to participate in your patient's care. Please feel free to contact me for any questions or concerns. Please be aware of the inherent limitations of voice recognition software, which may result in transcriptional errors.     Sunil Brennan MD

## 2023-10-17 ENCOUNTER — TELEPHONE (OUTPATIENT)
Age: 63
End: 2023-10-17

## 2023-10-17 NOTE — TELEPHONE ENCOUNTER
Received call from [facility] [place] for [address] reporting Patient had fall post OV 10/16 and landed on coccyx and support person assisting her fell on top of her. Paramedics were called; assisted patient up and patient refused to go to hospital.E    Lazaro reports bruise and pain in lower back; patient is not taking Eliquis so she can take Extra Strength Back and body 500 mg Aspirin-32.5 mg Caffeine. Missed Eliquis 10/16 and 10/17    Please follow up with patient for any medication orders and care advice.

## 2023-10-18 ENCOUNTER — TELEPHONE (OUTPATIENT)
Age: 63
End: 2023-10-18

## 2023-10-18 NOTE — TELEPHONE ENCOUNTER
Patient called with questions regarding transportation services. Is there any services in patients area.

## 2023-10-19 ENCOUNTER — TELEPHONE (OUTPATIENT)
Dept: NEUROLOGY | Facility: CLINIC | Age: 63
End: 2023-10-19

## 2023-10-19 DIAGNOSIS — R52 PAIN: ICD-10-CM

## 2023-10-19 DIAGNOSIS — Z65.8 SOCIAL DISCORD: Primary | ICD-10-CM

## 2023-10-19 RX ORDER — GABAPENTIN 100 MG/1
200 CAPSULE ORAL 3 TIMES DAILY
Qty: 270 CAPSULE | Refills: 1 | Status: SHIPPED | OUTPATIENT
Start: 2023-10-19

## 2023-10-19 NOTE — TELEPHONE ENCOUNTER
We need to get  on this case for I do not have any good advice on this problem. I will puit a referral in for [facility] or [facility] [place].

## 2023-10-19 NOTE — TELEPHONE ENCOUNTER
Spoke with patient and went over the lyft program. She said she needs assistance coming down the stairs as she does not have a brenda yet and wanted to know of any suggestions as she has an appointment with radiology on 11/4

## 2023-10-19 NOTE — TELEPHONE ENCOUNTER
Patient is asking for a order for a brace or boot for the  their Left leg to maybe give them some stability for walking to the bathroom as they are having difficulty getting into the bathroom at their home with the walker

## 2023-10-20 ENCOUNTER — TELEPHONE (OUTPATIENT)
Age: 63
End: 2023-10-20

## 2023-10-20 ENCOUNTER — APPOINTMENT (EMERGENCY)
Dept: CT IMAGING | Facility: HOSPITAL | Age: 63
End: 2023-10-20
Payer: COMMERCIAL

## 2023-10-20 ENCOUNTER — APPOINTMENT (EMERGENCY)
Dept: RADIOLOGY | Facility: HOSPITAL | Age: 63
End: 2023-10-20
Payer: COMMERCIAL

## 2023-10-20 ENCOUNTER — HOSPITAL ENCOUNTER (EMERGENCY)
Facility: HOSPITAL | Age: 63
Discharge: HOME | End: 2023-10-20
Attending: EMERGENCY MEDICINE
Payer: COMMERCIAL

## 2023-10-20 ENCOUNTER — PATIENT OUTREACH (OUTPATIENT)
Dept: FAMILY MEDICINE CLINIC | Facility: CLINIC | Age: 63
End: 2023-10-20

## 2023-10-20 VITALS
SYSTOLIC BLOOD PRESSURE: 131 MMHG | HEART RATE: 69 BPM | DIASTOLIC BLOOD PRESSURE: 58 MMHG | OXYGEN SATURATION: 93 % | TEMPERATURE: 97.8 F | RESPIRATION RATE: 18 BRPM
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
## 2023-10-20 DIAGNOSIS — R26.2 AMBULATORY DYSFUNCTION: ICD-10-CM

## 2023-10-20 DIAGNOSIS — M54.50 LOW BACK PAIN: Primary | ICD-10-CM

## 2023-10-20 LAB
ALBUMIN SERPL BCP-MCNC: 4 G/DL (ref 3.5–5)
ALP SERPL-CCNC: 66 U/L (ref 34–104)
ALT SERPL W P-5'-P-CCNC: 8 U/L (ref 7–52)
ANION GAP SERPL CALCULATED.3IONS-SCNC: 7 MMOL/L
APTT PPP: 28 SECONDS (ref 23–37)
AST SERPL W P-5'-P-CCNC: 10 U/L (ref 13–39)
BACTERIA UR QL AUTO: ABNORMAL /HPF
BASOPHILS # BLD AUTO: 0.03 THOUSANDS/ÂΜL (ref 0–0.1)
BASOPHILS NFR BLD AUTO: 0 % (ref 0–1)
BILIRUB SERPL-MCNC: 0.25 MG/DL (ref 0.2–1)
BILIRUB UR QL STRIP: NEGATIVE
BUN SERPL-MCNC: 11 MG/DL (ref 5–25)
CALCIUM SERPL-MCNC: 9.2 MG/DL (ref 8.4–10.2)
CARDIAC TROPONIN I PNL SERPL HS: <2 NG/L
CHLORIDE SERPL-SCNC: 108 MMOL/L (ref 96–108)
CLARITY UR: CLEAR
CO2 SERPL-SCNC: 25 MMOL/L (ref 21–32)
COLOR UR: COLORLESS
CREAT SERPL-MCNC: 0.9 MG/DL (ref 0.6–1.3)
EOSINOPHIL # BLD AUTO: 0.17 THOUSAND/ÂΜL (ref 0–0.61)
EOSINOPHIL NFR BLD AUTO: 3 % (ref 0–6)
ERYTHROCYTE [DISTWIDTH] IN BLOOD BY AUTOMATED COUNT: 12.6 % (ref 11.6–15.1)
FLUAV RNA RESP QL NAA+PROBE: NEGATIVE
FLUBV RNA RESP QL NAA+PROBE: NEGATIVE
GFR SERPL CREATININE-BSD FRML MDRD: 68 ML/MIN/1.73SQ M
GLUCOSE SERPL-MCNC: 93 MG/DL (ref 65–140)
GLUCOSE UR STRIP-MCNC: NEGATIVE MG/DL
HCT VFR BLD AUTO: 36.5 % (ref 34.8–46.1)
HGB BLD-MCNC: 12.3 G/DL (ref 11.5–15.4)
HGB UR QL STRIP.AUTO: NEGATIVE
IMM GRANULOCYTES # BLD AUTO: 0.02 THOUSAND/UL (ref 0–0.2)
IMM GRANULOCYTES NFR BLD AUTO: 0 % (ref 0–2)
INR PPP: 1.03 (ref 0.84–1.19)
KETONES UR STRIP-MCNC: NEGATIVE MG/DL
LEUKOCYTE ESTERASE UR QL STRIP: ABNORMAL
LYMPHOCYTES # BLD AUTO: 2.07 THOUSANDS/ÂΜL (ref 0.6–4.47)
LYMPHOCYTES NFR BLD AUTO: 30 % (ref 14–44)
MCH RBC QN AUTO: 31.5 PG (ref 26.8–34.3)
MCHC RBC AUTO-ENTMCNC: 33.7 G/DL (ref 31.4–37.4)
MCV RBC AUTO: 93 FL (ref 82–98)
MONOCYTES # BLD AUTO: 0.44 THOUSAND/ÂΜL (ref 0.17–1.22)
MONOCYTES NFR BLD AUTO: 6 % (ref 4–12)
NEUTROPHILS # BLD AUTO: 4.16 THOUSANDS/ÂΜL (ref 1.85–7.62)
NEUTS SEG NFR BLD AUTO: 61 % (ref 43–75)
NITRITE UR QL STRIP: NEGATIVE
NON-SQ EPI CELLS URNS QL MICRO: ABNORMAL /HPF
NRBC BLD AUTO-RTO: 0 /100 WBCS
PH UR STRIP.AUTO: 5.5 [PH]
PLATELET # BLD AUTO: 432 THOUSANDS/UL (ref 149–390)
PMV BLD AUTO: 10 FL (ref 8.9–12.7)
POTASSIUM SERPL-SCNC: 3.9 MMOL/L (ref 3.5–5.3)
PROT SERPL-MCNC: 7.2 G/DL (ref 6.4–8.4)
PROT UR STRIP-MCNC: NEGATIVE MG/DL
PROTHROMBIN TIME: 14.1 SECONDS (ref 11.6–14.5)
RBC # BLD AUTO: 3.91 MILLION/UL (ref 3.81–5.12)
RBC #/AREA URNS AUTO: ABNORMAL /HPF
RSV RNA RESP QL NAA+PROBE: NEGATIVE
SARS-COV-2 RNA RESP QL NAA+PROBE: NEGATIVE
SODIUM SERPL-SCNC: 140 MMOL/L (ref 135–147)
SP GR UR STRIP.AUTO: 1.01 (ref 1–1.03)
UROBILINOGEN UR STRIP-ACNC: <2 MG/DL
WBC # BLD AUTO: 6.89 THOUSAND/UL (ref 4.31–10.16)
WBC #/AREA URNS AUTO: ABNORMAL /HPF

## 2023-10-20 PROCEDURE — 99285 EMERGENCY DEPT VISIT HI MDM: CPT | Performed by: EMERGENCY MEDICINE

## 2023-10-20 PROCEDURE — 85025 COMPLETE CBC W/AUTO DIFF WBC: CPT | Performed by: EMERGENCY MEDICINE

## 2023-10-20 PROCEDURE — G1004 CDSM NDSC: HCPCS

## 2023-10-20 PROCEDURE — 85730 THROMBOPLASTIN TIME PARTIAL: CPT | Performed by: EMERGENCY MEDICINE

## 2023-10-20 PROCEDURE — 0241U HB NFCT DS VIR RESP RNA 4 TRGT: CPT | Performed by: EMERGENCY MEDICINE

## 2023-10-20 PROCEDURE — 84484 ASSAY OF TROPONIN QUANT: CPT | Performed by: EMERGENCY MEDICINE

## 2023-10-20 PROCEDURE — 99284 EMERGENCY DEPT VISIT MOD MDM: CPT

## 2023-10-20 PROCEDURE — 74176 CT ABD & PELVIS W/O CONTRAST: CPT

## 2023-10-20 PROCEDURE — 81001 URINALYSIS AUTO W/SCOPE: CPT | Performed by: EMERGENCY MEDICINE

## 2023-10-20 PROCEDURE — 96361 HYDRATE IV INFUSION ADD-ON: CPT

## 2023-10-20 PROCEDURE — 85610 PROTHROMBIN TIME: CPT | Performed by: EMERGENCY MEDICINE

## 2023-10-20 PROCEDURE — 96374 THER/PROPH/DIAG INJ IV PUSH: CPT

## 2023-10-20 PROCEDURE — 71045 X-RAY EXAM CHEST 1 VIEW: CPT

## 2023-10-20 PROCEDURE — 36415 COLL VENOUS BLD VENIPUNCTURE: CPT | Performed by: EMERGENCY MEDICINE

## 2023-10-20 PROCEDURE — 80053 COMPREHEN METABOLIC PANEL: CPT | Performed by: EMERGENCY MEDICINE

## 2023-10-20 RX ORDER — OXYCODONE HYDROCHLORIDE 5 MG/1
5 TABLET ORAL ONCE
Status: COMPLETED | OUTPATIENT
Start: 2023-10-20 | End: 2023-10-20

## 2023-10-20 RX ORDER — ACETAMINOPHEN 325 MG/1
650 TABLET ORAL EVERY 6 HOURS PRN
Qty: 40 TABLET | Refills: 0 | Status: SHIPPED | OUTPATIENT
Start: 2023-10-20

## 2023-10-20 RX ORDER — OXYCODONE HYDROCHLORIDE 5 MG/1
5 TABLET ORAL EVERY 4 HOURS PRN
Qty: 7 TABLET | Refills: 0 | Status: SHIPPED | OUTPATIENT
Start: 2023-10-20

## 2023-10-20 RX ORDER — ACETAMINOPHEN 10 MG/ML
1000 INJECTION, SOLUTION INTRAVENOUS ONCE
Status: COMPLETED | OUTPATIENT
Start: 2023-10-20 | End: 2023-10-20

## 2023-10-20 RX ADMIN — ACETAMINOPHEN 1000 MG: 10 INJECTION INTRAVENOUS at 12:35

## 2023-10-20 RX ADMIN — SODIUM CHLORIDE 1000 ML: 0.9 INJECTION, SOLUTION INTRAVENOUS at 12:53

## 2023-10-20 RX ADMIN — OXYCODONE HYDROCHLORIDE 5 MG: 5 TABLET ORAL at 19:58

## 2023-10-20 NOTE — ED PROVIDER NOTES
Pt Name: John Arce  MRN: [id number]  [facility] [place] 1960  Age/Sex: 60 y.o. female  Date of evaluation: 10/20/2023  PCP: Traci Mason MD    [address]    Chief Complaint   Patient presents with    Fall     Pt c/o lower back pain after falling down the stairs ion Monday, denies head strike, +BT         HPI    60 y.o. female presenting with bilateral low back pain. Patient states that she fell down some stairs on Monday, denies hitting her head, complains of bilateral low back pain but denies any other pain or symptoms. The pain is currently dull, severe, in both sides of the lower back as well as the middle, worse with movement better at rest.  Patient states she is unable to walk but is unable to walk at baseline. She denies numbness, weakness, change in speech or vision, fevers, shortness of breath, chest pain, abdominal pain, vomiting, other symptoms. HPI      Past Medical and Surgical History    Past Medical History:   Diagnosis Date    Anemia     Cancer ([address])     breast history of    Gastroesophageal reflux disease without esophagitis 2021    Migraine     Mixed hyperlipidemia 2021    Mixed hyperlipidemia 2021    Obesity, unspecified obesity severity, unspecified obesity type 2018    Skin cancer     Left leg lump.  cryotherapy and excision, unsure of which kind        Past Surgical History:   Procedure Laterality Date    ANKLE SURGERY Left 2017    pins and plate      SECTION      x2    DILATION AND CURETTAGE OF UTERUS      TONSILLECTOMY      age 4       Family History   Problem Relation Age of Onset    No Known Problems Mother     Lung cancer Brother        Social History     Tobacco Use    Smoking status: Never    Smokeless tobacco: Never   Vaping Use    Vaping Use: Never used   Substance Use Topics    Alcohol use: Not Currently    Drug use: No           Allergies    Allergies   Allergen Reactions    Codeine Headache     Headaches  Other reaction(s): headache Home Medications    Prior to Admission medications    Medication Sig Start Date End Date Taking? Authorizing Provider   atorvastatin (LIPITOR) 40 mg tablet Take 1 tablet (40 mg total) by mouth daily 9/9/23   Anila Burrell MD   buPROPion (WELLBUTRIN XL) 150 mg 24 hr tablet  10/7/23   Historical Provider, MD   busPIRone (BUSPAR) 10 mg tablet Take 10 mg by mouth 2 (two) times a day 9/26/23   Historical Provider, MD   Cholecalciferol (VITAMIN D3 PO) Take 1 tablet by mouth daily    Historical Provider, MD   Eliquis 5 MG take 1 tablet by mouth twice a day 9/9/23   Anila Burrell MD   FLUoxetine (PROzac) 20 mg capsule Take 20 mg by mouth daily 7/19/23   Historical Provider, MD   gabapentin (Neurontin) 100 mg capsule Take 2 capsules (200 mg total) by mouth 3 (three) times a day 10/19/23   Anila Burrell MD   hydrOXYzine HCL (ATARAX) 10 mg tablet Take 10 mg by mouth daily at bedtime as needed 7/5/23   Historical Provider, MD   Myrbetriq 50 MG TB24 Take 1 tablet (50 mg total) by mouth daily 3/10/23   Anila Burrell MD   nystatin powder apply topically to affected area four times a day 7/25/23   Anila Burrell MD   omeprazole (PriLOSEC) 20 mg delayed release capsule Take 1 capsule (20 mg total) by mouth daily 9/9/23   Anila Burrell MD   rizatriptan (MAXALT) 10 mg tablet take 1 tablet by mouth if needed for migraines take ONSET OF MIGRAINE ; IF SYMOTOMS CONTINUE OR RETURN , MAY TAKE ANOTHER DOSE AT LEAST 2 HOURS AFTER FIRST DOSE,TAKE NO MORE THAN 2 DOSES IN A DAY 9/9/23   Anila Burrell MD   senna-docusate sodium (SENOKOT S) 8.6-50 mg per tablet Take 1 tablet by mouth daily at bedtime for 7 days 12/21/22 2/24/23  Frank Walls MD           Review of Systems    Review of Systems   Constitutional:  Negative for activity change, chills and fever. HENT:  Negative for drooling and facial swelling. Eyes:  Negative for pain, discharge and visual disturbance.    Respiratory:  Negative for apnea, cough, chest tightness, shortness of breath and wheezing. Cardiovascular:  Negative for chest pain and leg swelling. Gastrointestinal:  Negative for abdominal pain, constipation, diarrhea, nausea and vomiting. Genitourinary:  Negative for difficulty urinating, dysuria and urgency. Musculoskeletal:  Positive for back pain. Negative for arthralgias and gait problem. Skin:  Negative for color change and rash. Neurological:  Negative for dizziness, speech difficulty, weakness and headaches. Psychiatric/Behavioral:  Negative for agitation, behavioral problems and confusion. All other systems reviewed and negative. Physical Exam      ED Triage Vitals [10/20/23 1221]   Temperature Pulse Respirations Blood Pressure SpO2   97.8 °F (36.6 °C) 87 17 135/70 97 %      Temp Source Heart Rate Source Patient Position - Orthostatic VS BP Location FiO2 (%)   Temporal Monitor Sitting Right arm --      Pain Score       --               Physical Exam  Vitals and nursing note reviewed. Constitutional:       General: She is not in acute distress. Appearance: She is well-developed. She is not ill-appearing, toxic-appearing or diaphoretic. HENT:      Head: Normocephalic and atraumatic. Right Ear: External ear normal.      Left Ear: External ear normal.      Nose: Nose normal. No rhinorrhea. Mouth/Throat:      Mouth: Mucous membranes are dry. Pharynx: Oropharynx is clear. Eyes:      Conjunctiva/sclera: Conjunctivae normal.      Pupils: Pupils are equal, round, and reactive to light. Cardiovascular:      Rate and Rhythm: Normal rate and regular rhythm. Pulses: Normal pulses. Heart sounds: Normal heart sounds. No murmur heard. No friction rub. No gallop. Pulmonary:      Effort: Pulmonary effort is normal. No respiratory distress. Breath sounds: Normal breath sounds. No wheezing or rales. Abdominal:      General: There is no distension. Palpations: Abdomen is soft.       Tenderness: There is no abdominal tenderness. There is no guarding or rebound. Musculoskeletal:         General: No deformity. Normal range of motion. Cervical back: Normal range of motion and neck supple. No rigidity or tenderness. Comments: Deformity of left ankle, chronic per patient. Tender to palpation bilateral low back as well as midline lumbar spine at approximately L4-L5, no deformity noted. Skin:     General: Skin is warm and dry. Capillary Refill: Capillary refill takes less than 2 seconds. Findings: No erythema or rash. Neurological:      Mental Status: She is alert and oriented to person, place, and time. Cranial Nerves: No cranial nerve deficit. Motor: No weakness. Coordination: Coordination normal.   Psychiatric:         Behavior: Behavior normal.         Thought Content:  Thought content normal.         Judgment: Judgment normal.              Diagnostic Results      Labs:    Results Reviewed       Procedure Component Value Units Date/Time    Urine Microscopic [[id number]]  (Abnormal) Collected: 10/20/23 1513    Lab Status: Final result Specimen: Urine Updated: 10/20/23 1537     RBC, UA 1-2 /hpf      WBC, UA 4-10 /hpf      Epithelial Cells Occasional /hpf      Bacteria, UA Occasional /hpf     UA (URINE) with reflex to Scope [[id number]]  (Abnormal) Collected: 10/20/23 1513    Lab Status: Final result Specimen: Urine Updated: 10/20/23 1516     Color, UA Colorless     Clarity, UA Clear     Specific Gravity, UA 1.007     pH, UA 5.5     Leukocytes, UA Small     Nitrite, UA Negative     Protein, UA Negative mg/dl      Glucose, UA Negative mg/dl      Ketones, UA Negative mg/dl      Urobilinogen, UA <2.0 mg/dl      Bilirubin, UA Negative     Occult Blood, UA Negative    FLU/RSV/COVID - if FLU/RSV clinically relevant [[id number]]  (Normal) Collected: 10/20/23 1256    Lab Status: Final result Specimen: Nares from Nose Updated: 10/20/23 1344     SARS-CoV-2 Negative     INFLUENZA A PCR Negative     INFLUENZA B PCR Negative     RSV PCR Negative    Narrative:      FOR PEDIATRIC PATIENTS - copy/paste COVID Guidelines URL to browser: [email or web address]    SARS-CoV-2 assay is a Nucleic Acid Amplification assay intended for the  qualitative detection of nucleic acid from SARS-CoV-2 in nasopharyngeal  swabs. Results are for the presumptive identification of SARS-CoV-2 RNA. Positive results are indicative of infection with SARS-CoV-2, the virus  causing COVID-19, but do not rule out bacterial infection or co-infection  with other viruses. Laboratories within the [facility] and its  territories are required to report all positive results to the appropriate  public health authorities. Negative results do not preclude SARS-CoV-2  infection and should not be used as the sole basis for treatment or other  patient management decisions. Negative results must be combined with  clinical observations, patient history, and epidemiological information. This test has not been FDA cleared or approved. This test has been authorized by FDA under an Emergency Use Authorization  (EUA). This test is only authorized for the duration of time the  declaration that circumstances exist justifying the authorization of the  emergency use of an in vitro diagnostic tests for detection of SARS-CoV-2  virus and/or diagnosis of COVID-19 infection under section 564(b)(1) of  the Act, 21 U. S.C. 879UOU-9(L)(5), unless the authorization is terminated  or revoked sooner. The test has been validated but independent review by FDA  and CLIA is pending. Test performed using [employer] Gene[employer]pert: This RT-PCR assay targets N2,  a region unique to SARS-CoV-2. A conserved region in the E-gene was chosen  for pan-Sarbecovirus detection which includes SARS-CoV-2. According to CMS-2020-01-R, this platform meets the definition of high-throughput technology.     HS Troponin 0hr (reflex protocol) [[id number]]  (Normal) Collected: 10/20/23 1237    Lab Status: Final result Specimen: Blood from Arm, Left Updated: 10/20/23 1307     hs TnI 0hr <2 ng/L     Comprehensive metabolic panel [[id number]]  (Abnormal) Collected: 10/20/23 1237    Lab Status: Final result Specimen: Blood from Arm, Left Updated: 10/20/23 1300     Sodium 140 mmol/L      Potassium 3.9 mmol/L      Chloride 108 mmol/L      CO2 25 mmol/L      ANION GAP 7 mmol/L      BUN 11 mg/dL      Creatinine 0.90 mg/dL      Glucose 93 mg/dL      Calcium 9.2 mg/dL      AST 10 U/L      ALT 8 U/L      Alkaline Phosphatase 66 U/L      Total Protein 7.2 g/dL      Albumin 4.0 g/dL      Total Bilirubin 0.25 mg/dL      eGFR 68 ml/min/1.73sq m     Narrative:      [facility] Kid[facility] Disease (CKD):     Stage 1 with normal or high GFR (GFR > 90 mL/min/1.73 square meters)    Stage 2 Mild CKD (GFR = 60-89 mL/min/1.73 square meters)    Stage 3A Moderate CKD (GFR = 45-59 mL/min/1.73 square meters)    Stage 3B Moderate CKD (GFR = 30-44 mL/min/1.73 square meters)    Stage 4 Severe CKD (GFR = 15-29 mL/min/1.73 square meters)    Stage 5 End Stage CKD (GFR <15 mL/min/1.73 square meters)  Note: GFR calculation is accurate only with a steady state creatinine    Protime-INR [[id number]]  (Normal) Collected: 10/20/23 1237    Lab Status: Final result Specimen: Blood from Arm, Left Updated: 10/20/23 1258     Protime 14.1 seconds      INR 1.03    APTT [[id number]]  (Normal) Collected: 10/20/23 1237    Lab Status: Final result Specimen: Blood from Arm, Left Updated: 10/20/23 1258     PTT 28 seconds     CBC and differential [[id number]]  (Abnormal) Collected: 10/20/23 1237    Lab Status: Final result Specimen: Blood from Arm, Left Updated: 10/20/23 1242     WBC 6.89 Thousand/uL      RBC 3.91 Million/uL      Hemoglobin 12.3 g/dL      Hematocrit 36.5 %      MCV 93 fL      MCH 31.5 pg      MCHC 33.7 g/dL      RDW 12.6 %      MPV 10.0 fL Platelets 962 Thousands/uL      nRBC 0 /100 WBCs      Neutrophils Relative 61 %      Immat GRANS % 0 %      Lymphocytes Relative 30 %      Monocytes Relative 6 %      Eosinophils Relative 3 %      Basophils Relative 0 %      Neutrophils Absolute 4.16 Thousands/µL      Immature Grans Absolute 0.02 Thousand/uL      Lymphocytes Absolute 2.07 Thousands/µL      Monocytes Absolute 0.44 Thousand/µL      Eosinophils Absolute 0.17 Thousand/µL      Basophils Absolute 0.03 Thousands/µL             All labs reviewed and utilized in the medical decision making process    Radiology:    CT abdomen pelvis wo contrast   Final Result      No acute traumatic findings in the abdomen or pelvis. Workstation performed: [id number]         CT recon only lumbar spine   Final Result      No fracture or traumatic subluxation. Workstation performed: [id number]         XR chest 1 view portable   Final Result   Previous right-sided groundglass opacities have resolved      No acute cardiopulmonary disease. Workstation performed: [id number]             All radiology studies independently viewed by me and interpreted by the radiologist.    Procedure    Procedures        ED Course of Care and Re-Assessments      Symptoms much improved over meds of, resuscitated with IV fluids based on clinical evidence of dehydration    Medications   sodium chloride 0.9 % bolus 1,000 mL (0 mL Intravenous Stopped 10/20/23 1353)   acetaminophen (Ofirmev) injection 1,000 mg (0 mg Intravenous Stopped 10/20/23 1250)           FINAL IMPRESSION    Final diagnoses:   Fall, initial encounter   Low back pain   Ambulatory dysfunction - chronic         DISPOSITION/PLAN    Presentation as above with acute low back pain, suspected to be musculoskeletal in origin based on characteristic history and examination.   Vital signs reassuring, nonfocal neurologic exam.  Very low suspicion for unstable fracture, dislocation, critical cord or nerve root compression, cauda equina syndrome, spinal epidural hematoma or abscess, compartment syndrome, other acute threat to life, limb, or nerve at this time. No evidence of other acute life-threatening traumatic injury from this fall 5 days ago on careful history and physical.  Patient also suffering from chronic ambulatory dysfunction but this is not changed from before the fall 5 days ago. Patient is well supported at home by son, home PT and OT, as well as visiting nurse. Patient was offered admission but after thorough discussion of risk and benefit she declined, preferring to go home which seems reasonable. Treated symptomatically as above with significant improvement, discharged with strict return precautions, follow up with primary care doctor. Time reflects when diagnosis was documented in both MDM as applicable and the Disposition within this note       Time User Action Codes Description Comment    10/20/2023  2:59 PM Aimee Moron Add Nicko. IRRE] Fall, initial encounter     10/20/2023  2:59 PM Aimee Moron Add [M54.50] Low back pain     10/20/2023  2:59 PM Aimee Moron Add [R26.2] Ambulatory dysfunction     10/20/2023  2:59 PM Aimee Moron Modify [R26.2] Ambulatory dysfunction chronic    10/20/2023  3:30 PM Aimee Moron Modify [U07. DWHR] Fall, initial encounter     10/20/2023  3:30 PM Aimee Moron Modify [M54.50] Low back pain     10/20/2023  3:30 PM Aimee Moron Modify [R26.2] Ambulatory dysfunction chronic          ED Disposition       ED Disposition   Discharge    Condition   Stable    Date/Time   Fri Oct 20, 2023  2:59 PM    Comment   Milderd Mater discharge to home/self care.                    Follow-up Information       Follow up With Specialties Details Why Contact Info Additional [facility] Emergency Department Emergency Medicine Go to  If symptoms worsen [address] [facility] Emergency Department, [address] [place], [place], [address]urnima MD Family Medicine Call in 3 days To discuss this visit and [place] [address] Iris Encarnacion Male [address] [facility]  [phone]                 PATIENT REFERRED TO:    [address] [place] [address] Emergency Department  [address] [address] [phone]  Go to   If symptoms worsen    Barrie Corcoran MD  [address] Iris Encarnacion Male [address] [place] [address]  [phone]    Call in 3 days  To discuss this visit and schedule close follow up      DISCHARGE MEDICATIONS:    Patient's Medications   Discharge Prescriptions    ACETAMINOPHEN (TYLENOL) 325 MG TABLET    Take 2 tablets (650 mg total) by mouth every 6 (six) hours as needed for mild pain       Start Date: 10/20/2023End Date: --       Order Dose: 650 mg       Quantity: 40 tablet    Refills: 0    OXYCODONE (ROXICODONE) 5 IMMEDIATE RELEASE TABLET    Take 1 tablet (5 mg total) by mouth every 4 (four) hours as needed for moderate pain for up to 7 doses Max Daily Amount: 30 mg       Start Date: 10/20/2023End Date: --       Order Dose: 5 mg       Quantity: 7 tablet    Refills: 0       No discharge procedures on file. Mavis Bean MD    Portions of the record may have been created with voice recognition software. Occasional wrong word or "sound alike" substitutions may have occurred due to the inherent limitations of voice recognition software.   Please read the chart carefully and recognize, using context, where substitutions have occurred     Mavis Bean MD  10/20/23 1950

## 2023-10-20 NOTE — TELEPHONE ENCOUNTER
Danya Orellana called ([facility] )stating that she will need scripts faxed to her for   Incontinent wipes 6 packs per month  Briefs XL pull up  Disposable and washable bed pad/ chugs  Incontinent pads   Attn: wilder   Fax:[phone]   Phone number [phone]        Also wanted to let the doctor know that patient fell outside and since then has been in excruciating pain. Patient son will be taking her to the ER for an Eval.     Patient PT was denied because they are saying that patient is mobile when patient cannot walk    She is also having issues with transportation. She requires a  service. I stated that the only servise [facility] offers is the curb to curb . and that is not going to work for her. I stated that even if patient does a Virtual visit she would need to be seen if patient would liek something for pain.   Adrienne Noe

## 2023-10-20 NOTE — PROGRESS NOTES
Covering OP SWCM received referral from PCP Delaney Orona MD requesting that OP [facility] outreach patient and assess for needs. Per chart review, patient currently in the ER due to fall. Covering OP SWCM will route note to OP SWCM Sherie.

## 2023-10-21 NOTE — ED NOTES
Pt is discharged home. Ambulance picked up the pt in the stretcher. IV is removed.       Rodriguez Dimas RN  10/20/23 2032

## 2023-10-23 ENCOUNTER — TELEPHONE (OUTPATIENT)
Dept: FAMILY MEDICINE CLINIC | Facility: CLINIC | Age: 63
End: 2023-10-23

## 2023-10-23 ENCOUNTER — PATIENT OUTREACH (OUTPATIENT)
Dept: FAMILY MEDICINE CLINIC | Facility: CLINIC | Age: 63
End: 2023-10-23

## 2023-10-23 NOTE — TELEPHONE ENCOUNTER
Disregard my last message that was the new script of the gabapentin I did tell her it is at the pharmacy she is still in a great deal of pain and was in the hospital she wanted to make sure you know that.

## 2023-10-23 NOTE — PROGRESS NOTES
OPCM SW received inbasket message from [place] [address]. Referral was received and reviewed. Patient was in ER at time. SW will follow up with patient.

## 2023-10-23 NOTE — TELEPHONE ENCOUNTER
received vm from 10/19 at [address], december 20, 1960. I was there on Monday to see the doctor. I forgot to ask him, if maybe he can order me a boot or braces, something for my ankle so could be able to put some weight on it to walk. Please give me a call back as soon as possible. Thank you.   [phone]  -------------------------------------------  Please advise

## 2023-10-24 NOTE — TELEPHONE ENCOUNTER
Called patient. She states that she will wait to see what her MRI's say.  She is scheduled for MRI's on 11/4/23    She will contact the office if anything changes

## 2023-10-25 ENCOUNTER — TELEPHONE (OUTPATIENT)
Age: 63
End: 2023-10-25

## 2023-10-25 NOTE — TELEPHONE ENCOUNTER
Received call from [facility] regarding orders for incontnence products and where they were faxed to. RN found orders in Media tab but could not tell where they were faxed to. Transfer call into office due to Kole Cruz having requirement to update and close patient's file today. Office took over call to [employer]

## 2023-10-31 ENCOUNTER — PATIENT OUTREACH (OUTPATIENT)
Dept: FAMILY MEDICINE CLINIC | Facility: CLINIC | Age: 63
End: 2023-10-31

## 2023-10-31 ENCOUNTER — TELEPHONE (OUTPATIENT)
Age: 63
End: 2023-10-31

## 2023-10-31 NOTE — LETTER
[address] [facility]. [id number] [phone]    Re: Home services, Transportation   10/31/2023       Dear Lucho Dickerson am a 1960 [facility] Work  and wanted to be certain you had information to contact me should you desire assistance with or have questions about non-medical aspects of your care such as [but not limited to] medical insurance, housing, transportation, material needs, or emergency needs. If I do not have an answer I will assist you in finding the appropriate agency or individual who can help. I have tried to reach you by phone and have not been successful. If you feel that you need assistance with any of the above areas, please feel free to contact me at [phone]. Thank You.     Sincerely,         ЕЛЕНА SerranoW

## 2023-10-31 NOTE — TELEPHONE ENCOUNTER
Aydin Mcmanus from [address] [place] [address] is asking for a call back.  The orders for her Incontinent supplies is not correct per the supply company

## 2023-10-31 NOTE — PROGRESS NOTES
OPCM SW placed 2nd outreach phone call to patient to discuss in home needs and possible transportation. Patient was not available at time of phone call and message was left on voicemail with this SW contact number. Awaiting return call from patient. SW sent unable to reach letter to patient by Lisa Edmond. SW will close referral at this time due to not being able to connect with patient. SW remains available if patient should reach out.

## 2023-11-01 ENCOUNTER — PATIENT OUTREACH (OUTPATIENT)
Dept: FAMILY MEDICINE CLINIC | Facility: CLINIC | Age: 63
End: 2023-11-01

## 2023-11-01 NOTE — PROGRESS NOTES
[facility] received phone call from patient. Patient reports that she has 49h a week in waiver services. Patient has regular communication with her . Patient also has transportation. Patient is receiving incontinence supplies. Patient reports that she needs a prescription for wipes sent to [facility] with phone number [phone]. Message sent to Dr. Iván Newby and [facility] clinical staff.

## 2023-11-01 NOTE — TELEPHONE ENCOUNTER
Attempted to [address] and waited on hold for 7 minutes unable to hold any longer. Will try again at another time.

## 2023-11-03 ENCOUNTER — TELEPHONE (OUTPATIENT)
Age: 63
End: 2023-11-03

## 2023-11-03 LAB

## 2023-11-03 NOTE — TELEPHONE ENCOUNTER
Tosha Reyes from [facility] would like a order placed for a 18 inch wheelchair with elevated bilateral leg rests, 18 inch gel foam cushion as well. Would like order placed with [employer].  Any questions can be reached at [phone]

## 2023-11-04 ENCOUNTER — HOSPITAL ENCOUNTER (OUTPATIENT)
Dept: MRI IMAGING | Facility: HOSPITAL | Age: 63
Discharge: HOME | End: 2023-11-04
Attending: PSYCHIATRY & NEUROLOGY
Payer: COMMERCIAL

## 2023-11-04 DIAGNOSIS — G81.90 HEMIPARESIS (HCC): ICD-10-CM

## 2023-11-04 PROCEDURE — G1004 CDSM NDSC: HCPCS

## 2023-11-04 PROCEDURE — 72156 MRI NECK SPINE W/O & W/DYE: CPT

## 2023-11-04 PROCEDURE — 70553 MRI BRAIN STEM W/O & W/DYE: CPT

## 2023-11-04 PROCEDURE — A9585 GADOBUTROL INJECTION: HCPCS | Performed by: PSYCHIATRY & NEUROLOGY

## 2023-11-04 RX ORDER — GADOBUTROL 604.72 MG/ML
9 INJECTION INTRAVENOUS
Status: COMPLETED | OUTPATIENT
Start: 2023-11-04 | End: 2023-11-04

## 2023-11-04 RX ADMIN — GADOBUTROL 9 ML: 604.72 INJECTION INTRAVENOUS at 16:23

## 2023-11-08 LAB

## 2023-11-13 ENCOUNTER — TELEPHONE (OUTPATIENT)
Dept: NEUROLOGY | Facility: CLINIC | Age: 63
End: 2023-11-13

## 2023-11-13 DIAGNOSIS — G43.809 OTHER MIGRAINE WITHOUT STATUS MIGRAINOSUS, NOT INTRACTABLE: ICD-10-CM

## 2023-11-13 RX ORDER — RIZATRIPTAN BENZOATE 10 MG/1
TABLET ORAL
Qty: 9 TABLET | Refills: 1 | Status: SHIPPED | OUTPATIENT
Start: 2023-11-13

## 2023-11-13 NOTE — TELEPHONE ENCOUNTER
Pt called in she had her MRI done on 11/4. She said she left a VM last Wednesday. Pt said she would like a call from Dr. Jimenez Brady to go over results due to transportation being hard for her.    Please assist.

## 2023-11-13 NOTE — TELEPHONE ENCOUNTER
Received VM transcription from 11/10/23, 11:55 AM:    Lidia Light, [phone]. The YOB: 1960. I'm calling because of the results of my MRI I had last Saturday. [facility]. Been waiting for a call from the doctor stating what he wants to do next. I'd appreciate a call back today.  Thank you very much.  ------------------------    Scottie Spencer MD  11/7/2023 12:07 PM EST       Looks good, and the same as the last time in 2020

## 2023-11-17 DIAGNOSIS — R53.1 WEAKNESS: Primary | ICD-10-CM

## 2023-11-17 NOTE — TELEPHONE ENCOUNTER
I called her and discussed it with her. I looked at the films myself; the brain and the cervical spine did not show a reason why. So I want to have her work with physical therapy and to follow-up with me afterwards. [facility] [place]; I wrote a prescription but I am not sure how to send it specifically to them if you can check on that please.

## 2023-11-17 NOTE — TELEPHONE ENCOUNTER
Patient called to ask about the providers response after looking at the MRI    Relayed the response to the patient     Patient is still asking what is the next step?  Does the provider want to see them again, prescribe medications or PT for the issues     Patient asking for a CB with providers response     No follow up instructions on the LOV notes    Please call # [phone]

## 2023-12-06 ENCOUNTER — TELEPHONE (OUTPATIENT)
Age: 63
End: 2023-12-06

## 2023-12-06 NOTE — TELEPHONE ENCOUNTER
Theo Puckettutant from [facility] [address] called for prescription for Evaluation of bilateral lower extremities ankle/foot orthosis. Would like faxed to [facility] at [phone].  Any questions Theo Mercado can be reached at [phone]

## 2023-12-07 NOTE — TELEPHONE ENCOUNTER
Ying Hunter from [facility] needs the office notes faxed to them regarding the prescription.    Fax number: [phone]

## 2023-12-11 NOTE — TELEPHONE ENCOUNTER
Spoke with Jennifer Brennan and explained to her that we have not seen the patient since 07/2023. She stated that she would call [facility] for more information.

## 2023-12-22 ENCOUNTER — TELEPHONE (OUTPATIENT)
Age: 63
End: 2023-12-22

## 2024-01-11 ENCOUNTER — TELEPHONE (OUTPATIENT)
Dept: NEUROLOGY | Facility: CLINIC | Age: 64
End: 2024-01-11

## 2024-01-11 NOTE — TELEPHONE ENCOUNTER
Patient called and is requesting that a script for PT gets faxed over to [facility] in [place].  Fax number [phone]  Please assist

## 2024-01-12 DIAGNOSIS — G43.809 OTHER MIGRAINE WITHOUT STATUS MIGRAINOSUS, NOT INTRACTABLE: ICD-10-CM

## 2024-01-12 RX ORDER — RIZATRIPTAN BENZOATE 10 MG/1
TABLET ORAL
Qty: 9 TABLET | Refills: 1 | Status: SHIPPED | OUTPATIENT
Start: 2024-01-12

## 2024-02-23 ENCOUNTER — TELEPHONE (OUTPATIENT)
Age: 64
End: 2024-02-23

## 2024-02-23 NOTE — TELEPHONE ENCOUNTER
Laron called stating that he took a look at what they could do for Kristina, and he does not think they can do much. Pt left leg is too far contracted to make an orthosist; right leg at this point is a maybe as it is still very contracted. Other treatment may be needed if they wanted to go forward with the right leg, they will try to figure that out.

## 2024-02-26 NOTE — TELEPHONE ENCOUNTER
Pt was calling to see if Dr. Crane is aware of her foot, and informed her that he is. Pt is inquiring about what the following steps are going to be. Please advise with the pt in regards to this matter thank you.

## 2024-02-27 NOTE — TELEPHONE ENCOUNTER
That will be up to the orthosist. It sounds to me like they are still trying to figure that one out.

## 2024-03-03 DIAGNOSIS — G43.809 OTHER MIGRAINE WITHOUT STATUS MIGRAINOSUS, NOT INTRACTABLE: ICD-10-CM

## 2024-03-04 RX ORDER — RIZATRIPTAN BENZOATE 10 MG/1
TABLET ORAL
Qty: 9 TABLET | Refills: 1 | Status: SHIPPED | OUTPATIENT
Start: 2024-03-04

## 2024-03-05 ENCOUNTER — TELEPHONE (OUTPATIENT)
Age: 64
End: 2024-03-05

## 2024-03-05 NOTE — TELEPHONE ENCOUNTER
Spoke with Nu at [facility]. I faxed over the physician's orders from [facility] for the correlating dates of the order. They will review these and see if that will work. Will call us back if they need anything further.

## 2024-03-05 NOTE — TELEPHONE ENCOUNTER
Laron measured patient for her orthotics today.  Per Laron, pt has severe contractures on b/l feet.  He does not feel like orthotics are appropriate for patient and he does not think that [facility] will be able to assist her at all.  He is happy to talk to Dr. Crane about this at the above number.

## 2024-03-05 NOTE — TELEPHONE ENCOUNTER
Nu from [facility] called requesting office notes in relation to the script for braces that was placed on 12/7/23. Please fax records to their office at fax number: [phone].

## 2024-03-07 DIAGNOSIS — E78.2 MIXED HYPERLIPIDEMIA: ICD-10-CM

## 2024-03-07 DIAGNOSIS — I26.99 PULMONARY EMBOLISM, UNSPECIFIED CHRONICITY, UNSPECIFIED PULMONARY EMBOLISM TYPE, UNSPECIFIED WHETHER ACUTE COR PULMONALE PRESENT (HCC): ICD-10-CM

## 2024-03-07 RX ORDER — ATORVASTATIN CALCIUM 40 MG/1
40 TABLET, FILM COATED ORAL DAILY
Qty: 30 TABLET | Refills: 5 | Status: SHIPPED | OUTPATIENT
Start: 2024-03-07

## 2024-03-07 RX ORDER — APIXABAN 5 MG/1
5 TABLET, FILM COATED ORAL 2 TIMES DAILY
Qty: 60 TABLET | Refills: 5 | Status: SHIPPED | OUTPATIENT
Start: 2024-03-07

## 2024-03-13 ENCOUNTER — TELEPHONE (OUTPATIENT)
Age: 64
End: 2024-03-13

## 2024-03-13 NOTE — TELEPHONE ENCOUNTER
Patient requests a doctors referral to an orthopedic sports physician as she would like a second opinion on her ankles. She went back to Dr Bautista but he doesn't know what to do for her and in the meantime she thought she should seek another opinion..

## 2024-03-14 NOTE — TELEPHONE ENCOUNTER
Patient is overdue for an OV.  I can refer her when I update her medical status. I have not seen patient since July.

## 2024-03-18 ENCOUNTER — TELEPHONE (OUTPATIENT)
Age: 64
End: 2024-03-18

## 2024-03-18 NOTE — TELEPHONE ENCOUNTER
Laron called and stated he hasn't heard back from the office regarding patients leg braces.Laron stated if the office wants to proceed a new order is needed.

## 2024-04-02 ENCOUNTER — TELEPHONE (OUTPATIENT)
Age: 64
End: 2024-04-02

## 2024-04-02 NOTE — TELEPHONE ENCOUNTER
Patient has F/U with [facility] on Monday 4/8/24.  She is hesitant to go as provider is not sure how to treat her issues.  She wanted to talk to Dr. Crane does she really need to F/U with [facility].

## 2024-04-04 NOTE — TELEPHONE ENCOUNTER
Call patient.  If the patient does not feel [facility] the doctor she needs to see then she should not see them.  She may want to see a different podiatrist if they are not helping her.

## 2024-04-09 DIAGNOSIS — M79.673 PAIN OF FOOT, UNSPECIFIED LATERALITY: Primary | ICD-10-CM

## 2024-04-09 NOTE — TELEPHONE ENCOUNTER
Spoke to patient and she did not go to her appointment yesterday with [facility] and she wants to try going to a sports medicine doctor because she feels that they are used to putting people back together.    She is asking for a recommendation from you as to who to see and I will call her back with the information.

## 2024-04-10 NOTE — TELEPHONE ENCOUNTER
Dr Chen is a sports medicine doctor with [facility] . Also let her know she is also overdue for an OV with us. I need to see her to update her medical problems. We have not seen her since July 2023.

## 2024-04-29 ENCOUNTER — TELEPHONE (OUTPATIENT)
Age: 64
End: 2024-04-29

## 2024-05-01 ENCOUNTER — OFFICE VISIT (OUTPATIENT)
Dept: FAMILY MEDICINE CLINIC | Facility: CLINIC | Age: 64
End: 2024-05-01
Payer: COMMERCIAL

## 2024-05-01 VITALS
HEART RATE: 80 BPM | TEMPERATURE: 98 F | HEIGHT: 64 IN | DIASTOLIC BLOOD PRESSURE: 74 MMHG | SYSTOLIC BLOOD PRESSURE: 124 MMHG | WEIGHT: 225 LBS | OXYGEN SATURATION: 96 % | BODY MASS INDEX: 38.41 KG/M2 | RESPIRATION RATE: 18 BRPM

## 2024-05-01 DIAGNOSIS — M24.572 CONTRACTURE OF JOINT OF ANKLE AND FOOT, LEFT: ICD-10-CM

## 2024-05-01 DIAGNOSIS — M24.549 CONTRACTURE OF HAND: ICD-10-CM

## 2024-05-01 DIAGNOSIS — M24.575 CONTRACTURE OF JOINT OF ANKLE AND FOOT, LEFT: ICD-10-CM

## 2024-05-01 DIAGNOSIS — I26.99 PULMONARY EMBOLISM, UNSPECIFIED CHRONICITY, UNSPECIFIED PULMONARY EMBOLISM TYPE, UNSPECIFIED WHETHER ACUTE COR PULMONALE PRESENT (HCC): ICD-10-CM

## 2024-05-01 DIAGNOSIS — J30.1 SEASONAL ALLERGIC RHINITIS DUE TO POLLEN: ICD-10-CM

## 2024-05-01 DIAGNOSIS — G62.9 NEUROPATHY: ICD-10-CM

## 2024-05-01 DIAGNOSIS — F41.9 ANXIETY: ICD-10-CM

## 2024-05-01 DIAGNOSIS — R52 PAIN: ICD-10-CM

## 2024-05-01 DIAGNOSIS — R26.9 NEUROLOGIC GAIT DYSFUNCTION: ICD-10-CM

## 2024-05-01 DIAGNOSIS — G43.809 OTHER MIGRAINE WITHOUT STATUS MIGRAINOSUS, NOT INTRACTABLE: ICD-10-CM

## 2024-05-01 DIAGNOSIS — F32.1 CURRENT MODERATE EPISODE OF MAJOR DEPRESSIVE DISORDER WITHOUT PRIOR EPISODE (HCC): ICD-10-CM

## 2024-05-01 DIAGNOSIS — K21.9 GASTROESOPHAGEAL REFLUX DISEASE WITHOUT ESOPHAGITIS: ICD-10-CM

## 2024-05-01 DIAGNOSIS — Z12.31 ENCOUNTER FOR SCREENING MAMMOGRAM FOR BREAST CANCER: Primary | ICD-10-CM

## 2024-05-01 DIAGNOSIS — E66.9 OBESITY, UNSPECIFIED OBESITY SEVERITY, UNSPECIFIED OBESITY TYPE: ICD-10-CM

## 2024-05-01 DIAGNOSIS — F51.01 PRIMARY INSOMNIA: ICD-10-CM

## 2024-05-01 DIAGNOSIS — Z00.00 WELL ADULT EXAM: ICD-10-CM

## 2024-05-01 DIAGNOSIS — E78.2 MIXED HYPERLIPIDEMIA: ICD-10-CM

## 2024-05-01 LAB
DME PARACHUTE DELIVERY DATE ACTUAL: NORMAL
DME PARACHUTE DELIVERY DATE REQUESTED: NORMAL
DME PARACHUTE ITEM DESCRIPTION: NORMAL
DME PARACHUTE ORDER STATUS: NORMAL
DME PARACHUTE SUPPLIER NAME: NORMAL
DME PARACHUTE SUPPLIER PHONE: NORMAL

## 2024-05-01 PROCEDURE — 99396 PREV VISIT EST AGE 40-64: CPT | Performed by: FAMILY MEDICINE

## 2024-05-01 RX ORDER — GABAPENTIN 100 MG/1
200 CAPSULE ORAL 4 TIMES DAILY
Qty: 120 CAPSULE | Refills: 5 | Status: SHIPPED | OUTPATIENT
Start: 2024-05-01

## 2024-05-01 RX ORDER — FLUTICASONE PROPIONATE 50 MCG
1 SPRAY, SUSPENSION (ML) NASAL DAILY
Qty: 9.9 ML | Refills: 1 | Status: SHIPPED | OUTPATIENT
Start: 2024-05-01

## 2024-05-01 RX ORDER — RIZATRIPTAN BENZOATE 10 MG/1
10 TABLET ORAL AS NEEDED
Qty: 9 TABLET | Refills: 3 | Status: SHIPPED | OUTPATIENT
Start: 2024-05-01

## 2024-05-01 RX ORDER — ATORVASTATIN CALCIUM 40 MG/1
40 TABLET, FILM COATED ORAL DAILY
Qty: 30 TABLET | Refills: 5 | Status: SHIPPED | OUTPATIENT
Start: 2024-05-01

## 2024-05-01 RX ORDER — BUSPIRONE HYDROCHLORIDE 10 MG/1
10 TABLET ORAL 2 TIMES DAILY
Qty: 60 TABLET | Refills: 5 | Status: SHIPPED | OUTPATIENT
Start: 2024-05-01

## 2024-05-01 RX ORDER — OMEPRAZOLE 20 MG/1
20 CAPSULE, DELAYED RELEASE ORAL DAILY
Qty: 60 CAPSULE | Refills: 5 | Status: SHIPPED | OUTPATIENT
Start: 2024-05-01

## 2024-05-01 RX ORDER — TRAZODONE HYDROCHLORIDE 100 MG/1
100 TABLET ORAL
Qty: 30 TABLET | Refills: 5 | Status: SHIPPED | OUTPATIENT
Start: 2024-05-01

## 2024-05-01 RX ORDER — LORATADINE 10 MG/1
10 TABLET ORAL DAILY
Qty: 30 TABLET | Refills: 5 | Status: SHIPPED | OUTPATIENT
Start: 2024-05-01

## 2024-05-01 RX ORDER — FLUOXETINE HYDROCHLORIDE 20 MG/1
20 CAPSULE ORAL DAILY
Qty: 30 CAPSULE | Refills: 5 | Status: SHIPPED | OUTPATIENT
Start: 2024-05-01

## 2024-05-01 RX ORDER — BUPROPION HYDROCHLORIDE 150 MG/1
150 TABLET ORAL EVERY MORNING
Qty: 30 TABLET | Refills: 5 | Status: SHIPPED | OUTPATIENT
Start: 2024-05-01

## 2024-05-01 NOTE — PROGRESS NOTES
Name: Kristina Russell      : 1960      MRN: [id number]  Encounter Provider: Alfred Crane MD  Encounter Date: 2024   Encounter department: [facility]    Assessment & Plan     1. Encounter for screening mammogram for breast cancer  -     Mammo screening bilateral w 3d & cad; Future; Expected date: 2024    2. Well adult exam    3. Gastroesophageal reflux disease without esophagitis  Assessment & Plan:  Patient to continue with present therapy and decrease caffeine, avoid ETOH and smoking to decrease acid production. Pt should also cease eating prior to bedtime and avoid excessive fluid intake prior to sleep. May use antacids as needed for breakthrough GERD. All pateint questions answered today about this condition.     Orders:  -     omeprazole (PriLOSEC) 20 mg delayed release capsule; Take 1 capsule (20 mg total) by mouth daily    4. Mixed hyperlipidemia  Assessment & Plan:  Patient  is stable with current medication and we discussed a low fat low cholesterol diet. Weight loss also discussed for this will help lower cholesterol also. Recheck lipids in 6 months.     Orders:  -     atorvastatin (LIPITOR) 40 mg tablet; Take 1 tablet (40 mg total) by mouth daily    5. Obesity, unspecified obesity severity, unspecified obesity type  Assessment & Plan:  Patient to increase exercise and partake of a diet with less calories to promote  weight loss       6. Neuropathy    7. Neurologic gait dysfunction    8. Contracture of joint of ankle and foot, left  -     Ambulatory Referral to Neurology; Future    9. Contracture of hand    10. Pain  -     gabapentin (Neurontin) 100 mg capsule; Take 2 capsules (200 mg total) by mouth 4 (four) times a day    11. Other migraine without status migrainosus, not intractable  -     rizatriptan (MAXALT) 10 mg tablet; Take 1 tablet (10 mg total) by mouth as needed for migraine Take at the onset of migraine; if symptoms continue or return, may take  another dose at least 2 hours after first dose. Take no more than 2 doses in a day.    12. Current moderate episode of major depressive disorder without prior episode (HCC)  -     FLUoxetine (PROzac) 20 mg capsule; Take 1 capsule (20 mg total) by mouth daily  -     buPROPion (WELLBUTRIN XL) 150 mg 24 hr tablet; Take 1 tablet (150 mg total) by mouth every morning    13. Pulmonary embolism, unspecified chronicity, unspecified pulmonary embolism type, unspecified whether acute cor pulmonale present (HCC)  -     apixaban (Eliquis) 5 mg; Take 1 tablet (5 mg total) by mouth 2 (two) times a day    14. Anxiety  -     busPIRone (BUSPAR) 10 mg tablet; Take 1 tablet (10 mg total) by mouth 2 (two) times a day    15. Primary insomnia  -     traZODone (DESYREL) 100 mg tablet; Take 1 tablet (100 mg total) by mouth daily at bedtime        Depression Screening and Follow-up Plan: Patient's depression screening was positive with a PHQ-2 score of 4. Their PHQ-9 score was 16. Patient declines further evaluation by mental health professional and/or medications. Brief counseling provided. Will re-evaluate at next office visit. Patient with underlying depression and was advised to continue current medications as prescribed.       Subjective     83-year-old female known to our practice who is back for a visit after 10 months hiatus.  Patient was seen last back in July with the development of numbness and tingling and weakness in the left side of her leg as well as in her hand.  Subsequently since then she has developed some problems with her right foot as well as contractures of both feet more in the left than the right side as well as contracture in her left hand and she is also developed some pain in her left foot.  Patient also has developed some tremor in her left hand that will come and go intermittently.  Patient is now wheelchair-bound and had been seeing help from a podiatrist who states that he really cannot help her for the  problem that she been having she is getting a second opinion from somebody else from the orthopedic world and she is going to use need follow-up with neurology to get a diagnosis as to why she is having contractures of her extremities progressive weakness and now tremor in her left upper extremity.  I suspect she is having some degenerative neurological disorder going on but at this point I do not have a diagnosis.  Patient also with hyperlipidemia some GERD as well as insomnia.  She also has some leg depression and anxiety she states that her medicine at night for sleep is not really working so were going to see about trying her some some trazodone she also is taking Neurontin for chronic pain which is new and not effective for her left foot and working to see about going from 3 times a day to 4 times a day at this point.  Discussed with patient about need for close follow-up we will see her back in approximately 6 weeks for her seeing how she is doing with the changes in her medical regimen at this point and making sure she is doing follow-up with her specialist.  We also reviewed the results of her MRI she had done of her brain and her neck which are pretty unremarkable that she had done back in October of last year.      Review of Systems   Constitutional:  Negative for activity change, appetite change, fatigue and fever.   HENT:  Negative for congestion, ear pain, postnasal drip, rhinorrhea, sinus pressure, sinus pain, sneezing and sore throat.    Eyes:  Negative for pain and redness.   Respiratory:  Negative for apnea, cough, chest tightness, shortness of breath and wheezing.    Cardiovascular:  Negative for chest pain, palpitations and leg swelling.   Gastrointestinal:  Negative for abdominal pain, constipation, diarrhea, nausea and vomiting.   Endocrine: Negative for cold intolerance and heat intolerance.   Genitourinary:  Negative for difficulty urinating, dysuria, frequency, hematuria and urgency.    Musculoskeletal:  Positive for gait problem. Negative for arthralgias, back pain and myalgias.   Skin:  Negative for rash.   Neurological:  Positive for weakness. Negative for dizziness, speech difficulty, numbness and headaches.   Hematological:  Does not bruise/bleed easily.   Psychiatric/Behavioral:  Negative for agitation, confusion and hallucinations.        Past Medical History:   Diagnosis Date   • Anemia    • Cancer (HCC)     breast history of   • Gastroesophageal reflux disease without esophagitis 2021   • Migraine    • Mixed hyperlipidemia 2021   • Mixed hyperlipidemia 2021   • Obesity, unspecified obesity severity, unspecified obesity type 2018   • Skin cancer     Left leg lump. cryotherapy and excision, unsure of which kind      Past Surgical History:   Procedure Laterality Date   • ANKLE SURGERY Left 2017    pins and plate    •  SECTION      x2   • DILATION AND CURETTAGE OF UTERUS     • TONSILLECTOMY      age 8     Family History   Problem Relation Age of Onset   • No Known Problems Mother    • Lung cancer Brother      Social History     Socioeconomic History   • Marital status:      Spouse name: None   • Number of children: None   • Years of education: None   • Highest education level: None   Occupational History   • None   Tobacco Use   • Smoking status: Never     Passive exposure: Never   • Smokeless tobacco: Never   Vaping Use   • Vaping status: Never Used   Substance and Sexual Activity   • Alcohol use: Not Currently   • Drug use: No   • Sexual activity: Not Currently     Partners: Male     Birth control/protection: None   Other Topics Concern   • None   Social History Narrative   • None     Social Determinants of Health     Financial Resource Strain: Not on file   Food Insecurity: No Food Insecurity (2022)    Hunger Vital Sign    • Worried About Running Out of Food in the Last Year: Never true    • Ran Out of Food in the Last Year: Never true    Transportation Needs: No Transportation Needs (12/19/2022)    PRAPARE - Transportation    • Lack of Transportation (Medical): No    • Lack of Transportation (Non-Medical): No   Physical Activity: Not on file   Stress: Not on file   Social Connections: Not on file   Intimate Partner Violence: Not on file   Housing Stability: High Risk (12/19/2022)    Housing Stability Vital Sign    • Unable to Pay for Housing in the Last Year: No    • Number of Places Lived in the Last Year: 3    • Unstable Housing in the Last Year: No     Current Outpatient Medications on File Prior to Visit   Medication Sig   • acetaminophen (TYLENOL) 325 mg tablet Take 2 tablets (650 mg total) by mouth every 6 (six) hours as needed for mild pain   • Cholecalciferol (VITAMIN D3 PO) Take 1 tablet by mouth daily   • Myrbetriq 50 MG TB24 Take 1 tablet (50 mg total) by mouth daily   • nystatin powder apply topically to affected area four times a day   • oxyCODONE (Roxicodone) 5 immediate release tablet Take 1 tablet (5 mg total) by mouth every 4 (four) hours as needed for moderate pain for up to 7 doses Max Daily Amount: 30 mg   • [DISCONTINUED] atorvastatin (LIPITOR) 40 mg tablet take 1 tablet by mouth once daily   • [DISCONTINUED] buPROPion (WELLBUTRIN XL) 150 mg 24 hr tablet    • [DISCONTINUED] busPIRone (BUSPAR) 10 mg tablet Take 10 mg by mouth 2 (two) times a day   • [DISCONTINUED] Eliquis 5 MG take 1 tablet by mouth twice a day   • [DISCONTINUED] FLUoxetine (PROzac) 20 mg capsule Take 20 mg by mouth daily   • [DISCONTINUED] gabapentin (Neurontin) 100 mg capsule Take 2 capsules (200 mg total) by mouth 3 (three) times a day   • [DISCONTINUED] hydrOXYzine HCL (ATARAX) 10 mg tablet Take 10 mg by mouth daily at bedtime as needed   • [DISCONTINUED] omeprazole (PriLOSEC) 20 mg delayed release capsule Take 1 capsule (20 mg total) by mouth daily   • [DISCONTINUED] rizatriptan (MAXALT) 10 mg tablet take 1 tablet by mouth if needed for migraines take  "ONSET OF MIGRAINE ; IF SYMOTOMS CONTINUE OR RETURN , MAY TAKE ANOTHER DOSE AT LEAST 2 HOURS AFTER FIRST DOSE,TAKE NO MORE THAN 2 DOSES IN A DAY   • senna-docusate sodium (SENOKOT S) 8.6-50 mg per tablet Take 1 tablet by mouth daily at bedtime for 7 days     Allergies   Allergen Reactions   • Codeine Headache     Headaches  Other reaction(s): headache     Immunization History   Administered Date(s) Administered   • INFLUENZA 08/06/2014, 10/19/2015, 08/21/2016, 11/17/2017, 08/19/2018, 10/03/2022   • Influenza Quadrivalent Preservative Free 3 years and older IM 08/19/2018   • Influenza, injectable, quadrivalent, preservative free 0.5 mL 08/19/2018   • Influenza, recombinant, quadrivalent,injectable, preservative free 10/03/2022   • Pneumococcal Conjugate Vaccine 20-valent (Pcv20), Polysace 10/03/2022   • Pneumococcal Polysaccharide PPV23 05/25/2020   • Tdap 09/13/2012   • Tuberculin Skin Test-PPD Intradermal 08/06/2018, 08/23/2018   • Zoster 05/29/2015       Objective     /74 (BP Location: Left arm, Patient Position: Sitting, Cuff Size: Large)   Pulse 80   Temp 98 °F (36.7 °C)   Resp 18   Ht 5' 4\" (1.626 m)   Wt 102 kg (225 lb)   SpO2 96%   BMI 38.62 kg/m²     Physical Exam  Alfred Crane MD    "

## 2024-05-06 ENCOUNTER — TELEPHONE (OUTPATIENT)
Dept: NEUROLOGY | Facility: CLINIC | Age: 64
End: 2024-05-06

## 2024-05-06 NOTE — TELEPHONE ENCOUNTER
Pt called in to see if there was another day to move her appt to due to her aid being unavailable on 5/20. Offered sooner but pt had appts on those days.   R/s to 5/30 @ 12:30 with Dr. Porter and added to wait list.

## 2024-05-07 ENCOUNTER — APPOINTMENT (OUTPATIENT)
Dept: RADIOLOGY | Facility: AMBULARY SURGERY CENTER | Age: 64
End: 2024-05-07
Attending: ORTHOPAEDIC SURGERY
Payer: COMMERCIAL

## 2024-05-07 ENCOUNTER — OFFICE VISIT (OUTPATIENT)
Dept: OBGYN CLINIC | Facility: CLINIC | Age: 64
End: 2024-05-07
Payer: COMMERCIAL

## 2024-05-07 VITALS — HEIGHT: 64 IN | WEIGHT: 225 LBS | BODY MASS INDEX: 38.41 KG/M2

## 2024-05-07 DIAGNOSIS — M79.672 PAIN IN LEFT FOOT: ICD-10-CM

## 2024-05-07 DIAGNOSIS — M79.671 PAIN IN RIGHT FOOT: ICD-10-CM

## 2024-05-07 DIAGNOSIS — M21.541 ACQUIRED EQUINOVARUS DEFORMITY OF RIGHT FOOT: ICD-10-CM

## 2024-05-07 DIAGNOSIS — M21.542 ACQUIRED EQUINOVARUS DEFORMITY OF LEFT FOOT: Primary | ICD-10-CM

## 2024-05-07 PROCEDURE — 99244 OFF/OP CNSLTJ NEW/EST MOD 40: CPT | Performed by: ORTHOPAEDIC SURGERY

## 2024-05-07 PROCEDURE — 73630 X-RAY EXAM OF FOOT: CPT

## 2024-05-07 NOTE — PROGRESS NOTES
James R Lachman, M.D.  Attending, Orthopaedic Surgery  Foot and Ankle  [facility]      ORTHOPAEDIC FOOT AND ANKLE CLINIC VISIT       Assessment:     Encounter Diagnoses   Name Primary?    Pain in right foot     Pain in left foot     Acquired equinovarus deformity of left foot Yes    Acquired equinovarus deformity of right foot        Plan:   The patient verbalized understanding of exam findings and treatment plan. We engaged in the shared decision-making process and treatment options were discussed at length with the patient.   Patient has bilateral, acquired equinus cavovarus foot deformities (left worse than right) that requires surgical intervention to achieve plantigrade foot position  Detailed discussion was had with the patient regarding treatment options and postoperative recovery  Patient will follow-up to schedule surgery after obtaining and setting up the necessary help postoperatively  She would be a candidate for Tarsal tunnel release, TN release, PT lengthening, Tru and Hall frame correction  Return if symptoms worsen or fail to improve.    History of Present Illness:   Chief Complaint:   Chief Complaint   Patient presents with    Left Foot - Pain     They hurt and don't hold her up.     Right Foot - Pain       Kristina Russell is a 63 y.o. female who is being seen for bilateral foot contractures.       Her left foot contracture started in December 2022 after she was admitted to the hospital with COVID and found to have multiple pulmonary emboli.  Since that time she is lost the ability to dorsiflex her left ankle and toes.  Her right foot contracture started this past December 2023 and is similar in nature to her left ankle/foot.  She has mild associated pain is localized at posterior aspect of the ankle with minimal radiating and described as sharp and severe.  Patient does report decreased sensation to first dorsal webspace, left worse than right. Patient denies tingling or  radicular pain.  Denies history of neuropathy.  Patient does not smoke, does not have diabetes and does take blood thinners.  Patient denies family history of anesthesia complications and has not had any complications with anesthesia.     Pain/symptom timing:  Worse during the day when active  Pain/symptom context:  Worse with activites and work  Pain/symptom modifying factors:  Rest makes better, activities make worse  Pain/symptom associated signs/symptoms: none    Prior treatment   NSAIDsYes    Injections No   Bracing/Orthotics Yes    Physical Therapy Yes     Orthopedic Surgical History:   See below    Past Medical History:  Past Medical History:   Diagnosis Date    Anemia     Cancer (HCC)     breast history of    Gastroesophageal reflux disease without esophagitis 2021    Migraine     Mixed hyperlipidemia 2021    Mixed hyperlipidemia 2021    Obesity, unspecified obesity severity, unspecified obesity type 2018    Skin cancer     Left leg lump. cryotherapy and excision, unsure of which kind        Past Surgical History:   Procedure Laterality Date    ANKLE SURGERY Left 2017    pins and plate      SECTION      x2    DILATION AND CURETTAGE OF UTERUS      TONSILLECTOMY      age 8       The patient has a family history of        Current Outpatient Medications:     acetaminophen (TYLENOL) 325 mg tablet, Take 2 tablets (650 mg total) by mouth every 6 (six) hours as needed for mild pain, Disp: 40 tablet, Rfl: 0    apixaban (Eliquis) 5 mg, Take 1 tablet (5 mg total) by mouth 2 (two) times a day, Disp: 60 tablet, Rfl: 5    atorvastatin (LIPITOR) 40 mg tablet, Take 1 tablet (40 mg total) by mouth daily, Disp: 30 tablet, Rfl: 5    buPROPion (WELLBUTRIN XL) 150 mg 24 hr tablet, Take 1 tablet (150 mg total) by mouth every morning, Disp: 30 tablet, Rfl: 5    busPIRone (BUSPAR) 10 mg tablet, Take 1 tablet (10 mg total) by mouth 2 (two) times a day, Disp: 60 tablet, Rfl: 5    Cholecalciferol  "(VITAMIN D3 PO), Take 1 tablet by mouth daily, Disp: , Rfl:     FLUoxetine (PROzac) 20 mg capsule, Take 1 capsule (20 mg total) by mouth daily, Disp: 30 capsule, Rfl: 5    fluticasone (FLONASE) 50 mcg/act nasal spray, 1 spray into each nostril daily, Disp: 9.9 mL, Rfl: 1    gabapentin (Neurontin) 100 mg capsule, Take 2 capsules (200 mg total) by mouth 4 (four) times a day, Disp: 120 capsule, Rfl: 5    loratadine (CLARITIN) 10 mg tablet, Take 1 tablet (10 mg total) by mouth daily, Disp: 30 tablet, Rfl: 5    Myrbetriq 50 MG TB24, Take 1 tablet (50 mg total) by mouth daily, Disp: 30 tablet, Rfl: 5    nystatin powder, apply topically to affected area four times a day, Disp: 15 g, Rfl: 0    omeprazole (PriLOSEC) 20 mg delayed release capsule, Take 1 capsule (20 mg total) by mouth daily, Disp: 60 capsule, Rfl: 5    oxyCODONE (Roxicodone) 5 immediate release tablet, Take 1 tablet (5 mg total) by mouth every 4 (four) hours as needed for moderate pain for up to 7 doses Max Daily Amount: 30 mg, Disp: 7 tablet, Rfl: 0    rizatriptan (MAXALT) 10 mg tablet, Take 1 tablet (10 mg total) by mouth as needed for migraine Take at the onset of migraine; if symptoms continue or return, may take another dose at least 2 hours after first dose. Take no more than 2 doses in a day., Disp: 9 tablet, Rfl: 3    traZODone (DESYREL) 100 mg tablet, Take 1 tablet (100 mg total) by mouth daily at bedtime, Disp: 30 tablet, Rfl: 5    senna-docusate sodium (SENOKOT S) 8.6-50 mg per tablet, Take 1 tablet by mouth daily at bedtime for 7 days, Disp: 7 tablet, Rfl: 0      Allergies   Allergen Reactions    Codeine Headache     Headaches  Other reaction(s): headache       Review of Systems:  A comprehensive 14 point ROS was performed, reviewed, and the pertinent orthopaedic findings are documented in the HPI.    Physical Exam:   Ht 5' 4\" (1.626 m)   Wt 102 kg (225 lb)   BMI 38.62 kg/m²   General/Constitutional: No apparent distress: well-nourished and well " developed.  Eyes: normal ocular motion  Lymphatic: No appreciable lymphadenopathy  Respiratory: Non-labored breathing  Vascular: No edema, swelling or tenderness, except as noted in detailed exam.  Integumentary: No impressive skin lesions present, except as noted in detailed exam.  Neuro: No ataxia or tremors noted  Psych: Normal mood and affect, oriented to person, place and time. Appropriate affect.  Musculoskeletal: Normal, except as noted in detailed exam and in HPI.    Examination      Right Left   Gait Nonambulatory   Musculoskeletal Tender to palpation at posterior aspect of the ankle Tender to palpation at posterior aspect of the ankle   Skin Normal.   Normal.     Nails Normal Normal   Range of Motion  Equina cavovarus deformity is not correctable passively Equina cavovarus deformity is not correctable passively   Stability Stable Stable   Muscle Strength 3/5 tibialis anterior  5/5 gastrocnemius-soleus  5/5 posterior tibialis  5/5 peroneal/eversion strength  2/5 EHL  5/5 FHL 1/5 tibialis anterior  5/5 gastrocnemius-soleus  5/5 posterior tibialis  5/5 peroneal/eversion strength  0/5 EHL  5/5 FHL   Neurologic Normal Normal   Sensation Decree sensation in first dorsal webspace; intact to light touch throughout sural, saphenous, superficial peroneal, deep peroneal and medial/lateral plantar nerve distributions.  [place]-Remi 5.07 filament (10g) testing deferred. Decree sensation in first dorsal webspace ;intact to light touch throughout sural, saphenous, superficial peroneal, deep peroneal and medial/lateral plantar nerve distributions.  [place]-Remi 5.07 filament (10g) testing deferred.   Cardiovascular Brisk capillary refill < 2 seconds,intact DP and PT pulses Brisk capillary refill < 2 seconds,intact DP and PT pulses   Special Tests None None       Imaging Studies:   3 views of the left bilateral feet.  Were taken, reviewed and interpreted independently that demonstrate a equina cavovarus foot  deformities bilaterally, left worse than right. Reviewed by me personally.         James R. Lachman, MD  Attending, Foot & Ankle Service  Department of Orthopaedic Surgery  [facility]      I personally performed the service.    James R. Lachman, MD

## 2024-05-22 ENCOUNTER — TELEPHONE (OUTPATIENT)
Age: 64
End: 2024-05-22

## 2024-05-22 NOTE — TELEPHONE ENCOUNTER
Tonia from [facility] is calling regarding patient's chronica medical history. She states she is having a ramp built and they need this information for their claim.  No further action required at this time.

## 2024-05-24 ENCOUNTER — HOSPITAL ENCOUNTER (OUTPATIENT)
Age: 64
Discharge: HOME | End: 2024-05-24
Payer: COMMERCIAL

## 2024-05-24 VITALS — BODY MASS INDEX: 38.41 KG/M2 | WEIGHT: 225 LBS | HEIGHT: 64 IN

## 2024-05-24 DIAGNOSIS — Z12.31 ENCOUNTER FOR SCREENING MAMMOGRAM FOR BREAST CANCER: ICD-10-CM

## 2024-05-24 PROCEDURE — 77067 SCR MAMMO BI INCL CAD: CPT

## 2024-05-24 PROCEDURE — 77063 BREAST TOMOSYNTHESIS BI: CPT

## 2024-06-05 ENCOUNTER — OFFICE VISIT (OUTPATIENT)
Dept: OBGYN CLINIC | Facility: CLINIC | Age: 64
End: 2024-06-05
Payer: COMMERCIAL

## 2024-06-05 VITALS — HEIGHT: 64 IN | BODY MASS INDEX: 38.41 KG/M2 | WEIGHT: 225 LBS

## 2024-06-05 DIAGNOSIS — M21.542 ACQUIRED EQUINOVARUS DEFORMITY OF LEFT FOOT: Primary | ICD-10-CM

## 2024-06-05 PROCEDURE — 99214 OFFICE O/P EST MOD 30 MIN: CPT | Performed by: ORTHOPAEDIC SURGERY

## 2024-06-05 RX ORDER — CHLORHEXIDINE GLUCONATE ORAL RINSE 1.2 MG/ML
15 SOLUTION DENTAL ONCE
OUTPATIENT
Start: 2024-06-05 | End: 2024-06-05

## 2024-06-05 RX ORDER — CHLORHEXIDINE GLUCONATE 40 MG/ML
SOLUTION TOPICAL DAILY PRN
OUTPATIENT
Start: 2024-06-05

## 2024-06-05 NOTE — PATIENT INSTRUCTIONS
James R Lachman, M.D.  Attending, Orthopaedic Surgery  [facility]  [place] Office Phone: [phone] ? Fax: [phone]  [place] Office Phone: [phone] ? Fax:[phone]    : Nesha Magallanes MA     Surgery Coordinators [place]: Esther Mitchell, [phone]  Amalia Chua, [phone]  Surgery Coordinator [place]:  Ashcodey Jadon, [phone]                                                       Lay Lawson, [phone]                                                            [email or web address]   PRE-OPERATIVE AND POST-OPERATIVE INSTRUCTIONS    General Information:  Your surgery is with Dr. Lachman.  Dates can change (although rare) depending on emergencies.  Typical post operative visits are at the following intervals:  3 weeks post surgery(except 1 week for bunions and wound monitoring), 6 weeks post surgery, 3 months post surgery, 6 months post surgery, and then on a yearly basis.  However, this may change based on Dr. Lachmans’ recommendation.  #1 post-operative rule for foot/ankle surgery:  ONCE YOU ARE OUT OF YOUR CAST AND/OR REMOVABLE BOOT, SWELLING MAY PERSIST FOR MANY MONTHS.  YOU MIGHT ALSO EXPERIENCE A BLUISH DISCOLORATION OF YOUR LEG.  THIS IS NORMAL AND PART OF THE USUAL POSTOPERATIVE EXPERIENCE.    SMOKING:  Smoking results in incomplete healing of fractures (broken bones) and joints that my have been fused.  Smoking and nicotine also prevents the growth of bone into ankle replacements and bone healing.  It also slows the healing of muscles and skin (soft tissue).  Therefore, please do not have surgery if you continue to smoke.  We reserve the right to cancel your surgery if we suspect that you are smoking.  DO NOT use nicorette gum or other patches.  Please find an alternative method to quit smoking before your surgery.    Pre-Operative Information:  Surgery date and preoperative visits:  If you have  medical problems, such as an abnormal EKG, history of BLOOD CLOT, ANEURYSM, and any other heart condition, please inform us so that we can get your medical clearance several weeks before the surgery.  Please bring any important medical information, such as an EKG, chest x-ray, or echocardiogram, with you to ensure that your surgery will not be delayed.  If needed, you will receive your preoperative appointments in the mail or by phone from our scheduling office.  The location of the preoperative appointment will be given to you also.  You may not eat after midnight the night before surgery.  If you do, your surgery will be cancelled.   You will receive a phone call from your surgery center the day before your surgery (if your surgery is on a Monday, you will get a call the Friday before).   If you do not hear from someone by 4pm the day before your surgery, please call the Surgical coordinator (number above) to notify us.  Start taking Vitamin D3 4000 units per day and Calcium 1200mg per day immediately. You will continue this until your 3 month post-op visit. These are over the counter and available at all pharmacies and supermarkets.  FOR THOSE HAVING SURGERY AT [facility]- IF YOU WILL NEED CRUTCHES OR A ROLLING WALKER AFTER SURGERY, ASK FOR A PRESCRIPTION FOR THIS FROM OUR OFFICE TODAY.  THIS CANNOT BE HANDLED THE DAY OF SURGERY AS [facility] DOES NOT STOCK THESE.    Because bacterial can often enter any defect in the skin, it is important to avoid any cuts before surgery.  Any breaks in the skin on the leg will often result in your surgery being postponed.  Please avoid going on a very long walk the day prior to surgery, or doing other activities that could lead to irritation of the skin, including yard work, extra athletic activity, or shaving.   This could result in surgery cancellation.  You MUST be fasting the day of your surgery.  Therefore, please do not consume any foot or beverage  after midnight the night before surgery.  The morning of surgery you may take your usual medications with a sip of water.  It is important not to take anti-inflammatory medication like Ibuprofen, Motrin, Naproxen (Aleve), or Aspirin 7-10 days before surgery because they will make you bleed more than usual.  Vitamin, E, Plavix and Coumadin also have the same effect.  Stop Aspirin and Vitamin E two weeks before surgery.  YOUR MEDICAL DOCTOR SHOULD TELL YOU WHEN TO STOP COUMADIN OR PLAVIX.  If your surgery involves any bone healing, please do not take anti-inflammatories for at least 6 weeks after surgery.  This can impede bone healing (ibuprofen, Aleve, Relafen, iodine).  Tylenol is fine to take.    PREOPERATIVE BATHING INSTRUCTIONS:    Before your surgery, bathe with Hibiclens (4% Chlorhexidene) as instructed below.  This skin cleanser will help reduce the bacteria on your skin before surgery.  To avoid irritating your eyes, do not apply Hibiclens above the level of your neck.  On the evening before AND the morning of surgery, bathe your entire body except the face and scalp, then rinse freely.  DO NOT apply to your face or scalp, as Hibiclens can irritate your eyes.  Purchasing information:   Hibiclens is available without a prescription at most retail pharmacies.     ADDITIONAL INSTRUCTIONS:  PATIENTS HAVING FOOT/ANKLE SURGERY     In preparation for your upcoming surgery, we kindly request and advise the following:  Notify our office if you are taking any of the following:  Coumadin (warfarin):  Persantine (dipyridamole); Pletal (cilostazol); Plavix (clopidogrel); Ticlid (ticlopidine); Agrylin (anagrelide); Aggrenox (dipyridamole and aspirin) or other blood thinners,.  In addition, stop taking Vitamin E and herbal supplements.  Do not schedule any elective dental work for at least 6 months after surgery.  If you had an ankle replacement, you will need to take antibiotics before any future dental procedures. Your  dentist or our office can prescribe these for you.  1000mg of Amoxicillin 1 hour prior to any dental procedure is the recommended dosing.      THREE RULES:    After surgery you will most likely be given the instructions “KEEP YOUR TOES ABOVE YOUR NOSE.”  This means that you MUST have your feet elevated higher than your heart.  Keeping your toes above your nose helps to heal the muscles and skin (soft tissues) by reducing swelling in your leg.  This position also helps to prevent infection, and is very important in avoiding deep venous thrombosis (blood clots).    In order to keep the blood circulating in your legs and in order to avoid deep vein   thrombosis (blood clots), we ask patients to GET UP ONCE AN HOUR during the day.  This means you should at least cross the room and come back.  It does not mean you have to be up for long periods of time.  In most cases we will not have people immediately put any weight on their operated part.  This is important to prevent loosening of metal or other devices holding the bones together.  It also prevents irritation of the soft tissues which can lead to prolonged healing.  When we say get up once an hour, please walk, hop or move with an assisted device.  This is important!    Do not do any excessive walking during the first few days after surgery.  Recovering from surgery is a full-time task for the patient.  Postoperative care is important to avoid irritating the skin incision, which can lead to infection.  Please do not plan activities or go out of town for several weeks after surgery.  If you are unsure about your future activities, please schedule surgery only when you know it is acceptable for you.  Scheduling surgery and then canceling the date, prevents other people from having surgery on that date as it takes time to line everything up effectively.  If you cancel your surgery the week of your planned surgery, we reserve the right to cancel all future surgical  procedures.    THE DAY OF SURGERY:    Arrival to the hospital or outpatient surgical center on time is imperative.  If you arrive late, then your surgery will be cancelled.  You MUST have a family member/friend bring you, stay with you throughout the DURATION of your surgery, and drive you home.  You MUST be fasting the day of your surgery.  Therefore, do not consume any food or beverage after midnight the night before surgery.  At your pre-operative visit with the anesthesia staff, or during your phone screen, a nurse will instruct you what medications you will need to take the day of surgery.  MAKE SURE THAT THE PHARMACY LISTED IN THE ELECTRONIC MEDICAL RECORD (EPIC) IS YOUR PREFERRED PHARMACY. For example, if you are staying with family or a friend, and will not be near your preferred pharmacy, YOU MUST, tell the nurses checking you in the day of surgery so that this can be changed in the system.  If your prescriptions are sent to a pharmacy, this cannot be changed.      AFTER YOUR SURGERY:  Bleeding through the bandage almost always occurs.  Do not let this alarm you.  Simply add more gauze or a towel, call us, and come in for a dressing change.   If you think it is excessive, contact us immediately or go to the local emergency room.    Do not get the bandage wet.  Showering is possible with plastic protectors.   Be very careful, as the bathroom can be wet and slippery.  If you do get your dressing wet, it should be changed immediately.  Please contact us.      ONCE YOUR ARE OUT OF YOUR CAST AND/OR REMOVABLE BOOT, SWELLING MAY PERSIST FOR MANY MONTHS.  YOU MIGHT ALSO EXPERIENCE A BLUISH DISCOLORATION OF YOUR LEG.  THIS IS NORMAL AND PART OF THE USUAL POSTOPERATIVE EXPERIENCE.  WEARING COMPRESSION HOSE (ELASTIC STOCKINGS) CAN HELP AVOID SOME OF THIS SWELLING.      DRESSING:   The purpose of the surgical dressing is to keep your wound and the surgical site protected from the environment.  Most dressings contain  splints, which help to hold your foot and ankle in a corrected position, and also allow the surgical site to heal properly. Dressings will remain in place and undisturbed until the first postop visit.   If you have a drain in place, this will need to be removed in 1-3 days after surgery.  The time for the drain to be pulled will be written on your discharge instruction sheet.    CAST  INSTRUCTIONS:  You may or may not get a cast following surgery.  If you do, pay close attention to the following:    After application of a splint or cast, it is very important to elevate your leg for 24 to 72 hours.   The injured area should be elevated well above the heart.   Remember “Toes above your Nose”.  Rest and elevation greatly reduce pain and speed the healing process by minimizing early swelling.    CALL YOUR DOCTORS OFFICE OR VISIT LOCATION EMERGENCY ROOM IF YOU HAVE ANY OF THE FOLLOWING:    Significant increased pain, which may be caused by swelling, and the feeling that the splint or cast is too tight  Numbness and tingling in your hand or foot, which may be caused by too much pressure on the nerves  Burning and stinging, which may be caused by too much pressure on the skin  Excessive swelling below the cast, which may mean the cast is slowing your blood circulation  Loss of active movement of toes, which request an urgent evaluation  Loss of “capillary refill”.  Pinch the tip of toes and christa the skin.  Release pressure and if the skin does not return pink then call the office immediately.      DO NOT GET YOUR CAST WET.   Bacteria thrive in moist dark areas.  We do not want this.   If your cast becomes wet, return to the office and we will apply another one.    PAIN AFTER SURGERY:  Narcotic pain medication can and will depress your respiratory system if taken in excess.  The goal of pain management with narcotics is to be comfortable not pain free.  If you take enough narcotics to be pain free then you run the risk of  stopping breathing.  If this happens, call 911 immediately!  Pain in the heel is often  caused by pressure from the weight of your foot on the bed.  Make sure your heel is suspended off the bed by keeping a pillow underneath your calf not your knee.    Medications:  You will be given narcotic pain medication. Do NOT drive while taking narcotic medications. Medications such as Darvocet, Percocet, Vicoden or Tylenol #3, also contain acetaminophen (Tylenol). Do not take acetaminophen or Tylenol from home when taking theses medications. When you fill your prescription, you may ask the pharmacist if your pain medication has acetaminophen/Tylenol in it. It is okay to take Tylenol with Oxycontin/Oxycodone. Should you have pain after taking your prescription medication, ibuprophen (Motrin, Advil, and Alleve) is a common over the counter preparation and may often be taken with the prescription pain medication as long as you take them with food. These medications can irritate the stomach lining.   Unless you are allergic to aspirin or currently taking a blood thinner, Dr. Lachmans’ patients are requested to take one 325 mg aspirin every 12 hours until you are back to walking normally after surgery (This can be up to 6 weeks).  Narcotic medications commonly cause nausea. Taking them with food will decrease this side effect. If you are having extreme nausea, please contact us for an alternative medication or for something that can be taken with this medication to decrease the nausea.   Also, narcotic medications frequently cause constipation. An increase of fiber, fruits and vegetables in your diet may alleviate this problem, or if necessary, you may use an over-the-counter medication such as senekot, colace, or Fibercon for constipation problems.   You should resume all medications you were taking prior to the surgery unless otherwise specified.     Activity:   Because of your recent foot surgery, your activity level will  decrease. You will need to elevate your foot ABOVE the level of your heart for a minimum of four days. The length of time necessary for the swelling to go down, and for your wounds to heal properly depends greatly on your efforts here. Elevation is extremely important to avoid compromising the blood supply to your foot. Remember when your foot is down it will swell, which will increase pain and slow healing. Wiggle your toes frequently if possible.   If you go home with a regional block, (a type of anesthesia) the foot and leg will be numb. Think of ways to get into your house and around the house until the block wears off.   Keep in mind that it may be a legal issue if you drive while in a cast or splint, especially when the splint is on the right foot. You may call the Department of [employer] to schedule a road test if you have adaptive equipment applied to your car.   The amount of weight you are allowed to bear on your foot will be written on your discharge sheet filled out at the time of surgery. The following is an explanation of the possibilities:       COVID-19 Policies  [facility] has the following policies when it comes to ELECTIVE surgery  No elective surgery requiring anesthesia until 7 weeks after a patient tested positive for COVID-19   No elective surgery requiring anesthesia until 3 months after a patient was hospitalized for COVID-19      Non-weight bearing:   You are to put NO weight whatsoever on your foot. When using crutches or a walker, your foot should not touch the ground, except when you are standing. Then, it may rest on the ground. If you are to be non-weight bearing, and you are not compliant, you could compromise the surgery.     Some of our patients have been requesting prescriptions for a roll-a-bout knee scooter. [employer] and other insurances have been denying these claims, and you may either have to rent one or pay out of pocket to purchase one.  THIS SHOULD BE  PURCHASED PRIOR TO THE SURGERY AND YOU SHOULD BRING IT WITH YOU THE DAY OF THE SURGERY TO AIDE IN GETTING FROM THE CAR INTO THE HOUSE AFTER SURGERY.

## 2024-06-05 NOTE — PROGRESS NOTES
James R Lachman, M.D.  Attending, Orthopaedic Surgery  Foot and Ankle  [facility]      ORTHOPAEDIC FOOT AND ANKLE CLINIC VISIT     Assessment:     Encounter Diagnosis   Name Primary?    Acquired equinovarus deformity of left foot Yes            Plan:   The patient verbalized understanding of exam findings and treatment plan. We engaged in the shared decision-making process and treatment options were discussed at length with the patient. Surgical and conservative management discussed today along with risks and benefits.  Patient has bilateral, acquired equinus cavovarus foot deformities (left worse than right) that requires surgical intervention to achieve plantigrade foot position  Detailed discussion was had with the patient regarding treatment options and postoperative recovery  Patient has set up the necessary help postoperatively and would like to schedule surgery for the left foot  She was consented for application of external fixator left lower extremity, Achilles lengthening, talonavicular joint capsule release, posterior tibial tendon lengthening, plantar fascial release  Return in about 3 weeks (around 6/26/2024) for surgery in 3 weeks.    CONSENT FOR SOFT TISSUE PROCEDURES:   Patient understands that there is no guarantee that the surgery will relieve all of their pain and also understands that there may be a prolonged course of protected weight-bearing status required which will restrict them from driving and other activities as discussed at today's visit. Patient recognizes that there are risks with surgery including bleeding, numbness, nerve irritation, wound complications, infection, continued pain, anesthetic complications, death, failure of procedure and possible need for further surgery. The patient understands that there is no guarantee that this surgery will relieve all of Her pain and symptoms.  Patient understands that there is no guarantee that they will return to  full function after the procedure.  Patient has provided informed consent for the procedure.        History of Present Illness:   Chief Complaint:   Chief Complaint   Patient presents with    Follow-up     Wants to sing papers for surgery. Follow up of the left foot.     Kristina Russell is a 63 y.o. female who is being seen in follow-up for left acquired equinus cavovarus foot deformity. When we last saw she we recommended the last measurement for her to set up the necessary postoperative care to allow her to be nonweightbearing for an extended period of time after surgery.  Patient states that she has home aides that will provide 24 hours of care in the immediate postoperative period.  Pain has not changed since her prior visit. Residual pain is localized at left foot with minimal radiating and described as sharp and severe.      Pain/symptom timing:  Worse during the day when active  Pain/symptom context:  Worse with activites and work  Pain/symptom modifying factors:  Rest makes better, activities make worse  Pain/symptom associated signs/symptoms: none    Prior treatment   NSAIDsYes   Injections No   Bracing/Orthotics Yes    Physical Therapy Yes     Orthopedic Surgical History:   See below    Past Medical, Surgical and Social History:  Past Medical History:  has a past medical history of Anemia, Cancer (HCC) (), Gastroesophageal reflux disease without esophagitis (2021), Migraine, Mixed hyperlipidemia (2021), Mixed hyperlipidemia (2021), Obesity, unspecified obesity severity, unspecified obesity type (2018), and Skin cancer.  Problem List: does not have any pertinent problems on file.  Past Surgical History:  has a past surgical history that includes Tonsillectomy; Dilation and curettage of uterus;  section; Ankle surgery (Left, 2017); Breast lumpectomy (Right); and Reduction mammaplasty.  Family History: family history includes Lung cancer in her brother; No Known Problems in her  "father and mother.  Social History:  reports that she has never smoked. She has never been exposed to tobacco smoke. She has never used smokeless tobacco. She reports that she does not currently use alcohol. She reports that she does not use drugs.  Current Medications: has a current medication list which includes the following prescription(s): acetaminophen, apixaban, atorvastatin, bupropion, buspirone, cholecalciferol, fluoxetine, fluticasone, gabapentin, loratadine, myrbetriq, nystatin, omeprazole, oxycodone, rizatriptan, trazodone, and senna-docusate sodium.  Allergies: is allergic to codeine.     Review of Systems:  General- denies fever/chills  HEENT- denies hearing loss or sore throat  Eyes- denies eye pain or visual disturbances, denies red eyes  Respiratory- denies cough or SOB  Cardio- denies chest pain or palpitations  GI- denies abdominal pain  Endocrine- denies urinary frequency  Urinary- denies pain with urination  Musculoskeletal- Negative except noted above  Skin- denies rashes or wounds  Neurological- denies dizziness or headache  Psychiatric- denies anxiety or difficulty concentrating    Physical Exam:   Ht 5' 4\" (1.626 m)   Wt 102 kg (225 lb)   BMI 38.62 kg/m²   General/Constitutional: No apparent distress: well-nourished and well developed.  Eyes: normal ocular motion  Lymphatic: No appreciable lymphadenopathy  Respiratory: Non-labored breathing  Vascular: No edema, swelling or tenderness, except as noted in detailed exam.  Integumentary: No impressive skin lesions present, except as noted in detailed exam.  Neuro: No ataxia or tremors noted  Psych: Normal mood and affect, oriented to person, place and time. Appropriate affect.  Musculoskeletal: Normal, except as noted in detailed exam and in HPI.    Examination    Left    Gait Nonambulatory   Musculoskeletal Tender to palpation at posterior aspect of ankle    Skin Normal.     Nails Normal    Range of Motion  Equina cavovarus deformity is not " correctable passivel    Stability Stable    Muscle Strength 5/5 tibialis anterior  5/5 gastrocnemius-soleus  5/5 posterior tibialis  5/5 peroneal/eversion strength  5/5 EHL  5/5 FHL    Neurologic Normal    Sensation Decreased Intact to light touch throughout sural, saphenous, superficial peroneal, deep peroneal and medial/lateral plantar nerve distributions.  [place]-Remi 5.07 filament (10g) testing deferred.    Cardiovascular Brisk capillary refill < 2 seconds,intact DP and PT pulses    Special Tests None      Imaging Studies:   No new imaging      James R. Lachman, MD  Foot & Ankle Surgery   Department of Orthopaedic Surgery  [facility]      I personally performed the service.    James R. Lachman, MD

## 2024-06-05 NOTE — H&P (VIEW-ONLY)
James R Lachman, M.D.  Attending, Orthopaedic Surgery  Foot and Ankle  [facility]      ORTHOPAEDIC FOOT AND ANKLE CLINIC VISIT     Assessment:     Encounter Diagnosis   Name Primary?    Acquired equinovarus deformity of left foot Yes            Plan:   The patient verbalized understanding of exam findings and treatment plan. We engaged in the shared decision-making process and treatment options were discussed at length with the patient. Surgical and conservative management discussed today along with risks and benefits.  Patient has bilateral, acquired equinus cavovarus foot deformities (left worse than right) that requires surgical intervention to achieve plantigrade foot position  Detailed discussion was had with the patient regarding treatment options and postoperative recovery  Patient has set up the necessary help postoperatively and would like to schedule surgery for the left foot  She was consented for application of external fixator left lower extremity, Achilles lengthening, talonavicular joint capsule release, posterior tibial tendon lengthening, plantar fascial release  Return in about 3 weeks (around 6/26/2024) for surgery in 3 weeks.    CONSENT FOR SOFT TISSUE PROCEDURES:   Patient understands that there is no guarantee that the surgery will relieve all of their pain and also understands that there may be a prolonged course of protected weight-bearing status required which will restrict them from driving and other activities as discussed at today's visit. Patient recognizes that there are risks with surgery including bleeding, numbness, nerve irritation, wound complications, infection, continued pain, anesthetic complications, death, failure of procedure and possible need for further surgery. The patient understands that there is no guarantee that this surgery will relieve all of Her pain and symptoms.  Patient understands that there is no guarantee that they will return to  full function after the procedure.  Patient has provided informed consent for the procedure.        History of Present Illness:   Chief Complaint:   Chief Complaint   Patient presents with    Follow-up     Wants to sing papers for surgery. Follow up of the left foot.     Kristina Russell is a 63 y.o. female who is being seen in follow-up for left acquired equinus cavovarus foot deformity. When we last saw she we recommended the last measurement for her to set up the necessary postoperative care to allow her to be nonweightbearing for an extended period of time after surgery.  Patient states that she has home aides that will provide 24 hours of care in the immediate postoperative period.  Pain has not changed since her prior visit. Residual pain is localized at left foot with minimal radiating and described as sharp and severe.      Pain/symptom timing:  Worse during the day when active  Pain/symptom context:  Worse with activites and work  Pain/symptom modifying factors:  Rest makes better, activities make worse  Pain/symptom associated signs/symptoms: none    Prior treatment   NSAIDsYes   Injections No   Bracing/Orthotics Yes    Physical Therapy Yes     Orthopedic Surgical History:   See below    Past Medical, Surgical and Social History:  Past Medical History:  has a past medical history of Anemia, Cancer (HCC) (), Gastroesophageal reflux disease without esophagitis (2021), Migraine, Mixed hyperlipidemia (2021), Mixed hyperlipidemia (2021), Obesity, unspecified obesity severity, unspecified obesity type (2018), and Skin cancer.  Problem List: does not have any pertinent problems on file.  Past Surgical History:  has a past surgical history that includes Tonsillectomy; Dilation and curettage of uterus;  section; Ankle surgery (Left, 2017); Breast lumpectomy (Right); and Reduction mammaplasty.  Family History: family history includes Lung cancer in her brother; No Known Problems in her  "father and mother.  Social History:  reports that she has never smoked. She has never been exposed to tobacco smoke. She has never used smokeless tobacco. She reports that she does not currently use alcohol. She reports that she does not use drugs.  Current Medications: has a current medication list which includes the following prescription(s): acetaminophen, apixaban, atorvastatin, bupropion, buspirone, cholecalciferol, fluoxetine, fluticasone, gabapentin, loratadine, myrbetriq, nystatin, omeprazole, oxycodone, rizatriptan, trazodone, and senna-docusate sodium.  Allergies: is allergic to codeine.     Review of Systems:  General- denies fever/chills  HEENT- denies hearing loss or sore throat  Eyes- denies eye pain or visual disturbances, denies red eyes  Respiratory- denies cough or SOB  Cardio- denies chest pain or palpitations  GI- denies abdominal pain  Endocrine- denies urinary frequency  Urinary- denies pain with urination  Musculoskeletal- Negative except noted above  Skin- denies rashes or wounds  Neurological- denies dizziness or headache  Psychiatric- denies anxiety or difficulty concentrating    Physical Exam:   Ht 5' 4\" (1.626 m)   Wt 102 kg (225 lb)   BMI 38.62 kg/m²   General/Constitutional: No apparent distress: well-nourished and well developed.  Eyes: normal ocular motion  Lymphatic: No appreciable lymphadenopathy  Respiratory: Non-labored breathing  Vascular: No edema, swelling or tenderness, except as noted in detailed exam.  Integumentary: No impressive skin lesions present, except as noted in detailed exam.  Neuro: No ataxia or tremors noted  Psych: Normal mood and affect, oriented to person, place and time. Appropriate affect.  Musculoskeletal: Normal, except as noted in detailed exam and in HPI.    Examination    Left    Gait Nonambulatory   Musculoskeletal Tender to palpation at posterior aspect of ankle    Skin Normal.     Nails Normal    Range of Motion  Equina cavovarus deformity is not " correctable passivel    Stability Stable    Muscle Strength 5/5 tibialis anterior  5/5 gastrocnemius-soleus  5/5 posterior tibialis  5/5 peroneal/eversion strength  5/5 EHL  5/5 FHL    Neurologic Normal    Sensation Decreased Intact to light touch throughout sural, saphenous, superficial peroneal, deep peroneal and medial/lateral plantar nerve distributions.  Milroy-Remi 5.07 filament (10g) testing deferred.    Cardiovascular Brisk capillary refill < 2 seconds,intact DP and PT pulses    Special Tests None      Imaging Studies:   No new imaging      James R. Lachman, MD  Foot & Ankle Surgery   Department of Orthopaedic Surgery  [facility]      I personally performed the service.    James R. Lachman, MD

## 2024-06-10 ENCOUNTER — ANESTHESIA EVENT (OUTPATIENT)
Dept: PERIOP | Facility: HOSPITAL | Age: 64
End: 2024-06-10
Payer: COMMERCIAL

## 2024-06-10 DIAGNOSIS — R52 PAIN: ICD-10-CM

## 2024-06-10 RX ORDER — GABAPENTIN 100 MG/1
200 CAPSULE ORAL 4 TIMES DAILY
Qty: 120 CAPSULE | Refills: 5 | Status: SHIPPED | OUTPATIENT
Start: 2024-06-10 | End: 2024-06-21 | Stop reason: SDUPTHER

## 2024-06-10 NOTE — TELEPHONE ENCOUNTER
Pt called requesting refill of gabpentin to the [id number] pharmacy.  She also asked if Dr. Amaya could order her a bed pain and transfer board.  She is going to have sx and will not be able to put weight on her foot to make it to her commode.  Please review.

## 2024-06-11 ENCOUNTER — OFFICE VISIT (OUTPATIENT)
Dept: OBGYN CLINIC | Facility: CLINIC | Age: 64
End: 2024-06-11
Payer: COMMERCIAL

## 2024-06-11 ENCOUNTER — TELEPHONE (OUTPATIENT)
Age: 64
End: 2024-06-11

## 2024-06-11 ENCOUNTER — APPOINTMENT (OUTPATIENT)
Dept: RADIOLOGY | Facility: AMBULARY SURGERY CENTER | Age: 64
End: 2024-06-11
Payer: COMMERCIAL

## 2024-06-11 VITALS
RESPIRATION RATE: 17 BRPM | HEART RATE: 86 BPM | DIASTOLIC BLOOD PRESSURE: 77 MMHG | HEIGHT: 64 IN | SYSTOLIC BLOOD PRESSURE: 114 MMHG | BODY MASS INDEX: 38.41 KG/M2 | WEIGHT: 225 LBS

## 2024-06-11 DIAGNOSIS — M21.942 DEFORMITY OF LEFT HAND: Primary | ICD-10-CM

## 2024-06-11 DIAGNOSIS — M21.942 DEFORMITY OF LEFT HAND: ICD-10-CM

## 2024-06-11 PROCEDURE — 99214 OFFICE O/P EST MOD 30 MIN: CPT | Performed by: STUDENT IN AN ORGANIZED HEALTH CARE EDUCATION/TRAINING PROGRAM

## 2024-06-11 PROCEDURE — 73130 X-RAY EXAM OF HAND: CPT

## 2024-06-11 NOTE — PROGRESS NOTES
ORTHOPAEDIC HAND, WRIST, AND ELBOW OFFICE  VISIT      ASSESSMENT/PLAN:      Diagnoses and all orders for this visit:    Deformity of left hand  -     XR hand 3+ vw left; Future  -     EMG 1 Limb; Future  -     Ambulatory Referral to PT/OT Hand Therapy; Future  -     Ambulatory Referral to Neurology; Future          63 y.o. female with chronic left hand deformity and weakness - possibly from brain/cspine cause?  Xrays reviewed with pt today and that there really isn't a lot to point to arthritis/rheum findings on here.  No findings of Dupuytren's on exam either.  This could be related to possible stroke or cervical spine cause.  Treatment options and expected outcomes were discussed.  At this time, would recommend EMG evaluation as well as neurology follow up.  In addition, therapy to see if they are able to improve her flexion contractures at all.       Follow Up:  2 months       To Do Next Visit:  Re-evaluation of current issue        Vaughn Lima MD  Attending, Orthopaedic Surgery  Hand, Wrist, and Elbow Surgery  [facility]    ______________________________________________________________________________________________    CHIEF COMPLAINT:  Chief Complaint   Patient presents with   • Left Hand - Pain       SUBJECTIVE:  Patient is a 63 y.o. RHD female who presents today for evaluation and treatment of left hand deformity and pain x 2 years. Pt states that the pain has gotten worse with time. Describes this as intermittent and can even be at rest. No injury to the hand. No rheumatological history. Pt states that, at this point, her deformity is so bad she can't use the hand for much anymore.        I have personally reviewed all the relevant PMH, PSH, SH, FH, Medications and allergies      PAST MEDICAL HISTORY:  Past Medical History:   Diagnosis Date   • Anemia    • Cancer (HCC) 2007    breast history of   • Gastroesophageal reflux disease without esophagitis 5/21/2021   • Migraine    •  Mixed hyperlipidemia 2021   • Mixed hyperlipidemia 2021   • Obesity, unspecified obesity severity, unspecified obesity type 2018   • Skin cancer     Left leg lump. cryotherapy and excision, unsure of which kind        PAST SURGICAL HISTORY:  Past Surgical History:   Procedure Laterality Date   • ANKLE SURGERY Left     pins and plate    • BREAST LUMPECTOMY Right      - malignant   •  SECTION      x2   • DILATION AND CURETTAGE OF UTERUS     • REDUCTION MAMMAPLASTY     • TONSILLECTOMY      age 8       FAMILY HISTORY:  Family History   Problem Relation Age of Onset   • No Known Problems Mother    • No Known Problems Father    • Lung cancer Brother        SOCIAL HISTORY:  Social History     Tobacco Use   • Smoking status: Never     Passive exposure: Never   • Smokeless tobacco: Never   Vaping Use   • Vaping status: Never Used   Substance Use Topics   • Alcohol use: Not Currently   • Drug use: No       MEDICATIONS:    Current Outpatient Medications:   •  acetaminophen (TYLENOL) 325 mg tablet, Take 2 tablets (650 mg total) by mouth every 6 (six) hours as needed for mild pain, Disp: 40 tablet, Rfl: 0  •  apixaban (Eliquis) 5 mg, Take 1 tablet (5 mg total) by mouth 2 (two) times a day, Disp: 60 tablet, Rfl: 5  •  atorvastatin (LIPITOR) 40 mg tablet, Take 1 tablet (40 mg total) by mouth daily, Disp: 30 tablet, Rfl: 5  •  buPROPion (WELLBUTRIN XL) 150 mg 24 hr tablet, Take 1 tablet (150 mg total) by mouth every morning, Disp: 30 tablet, Rfl: 5  •  busPIRone (BUSPAR) 10 mg tablet, Take 1 tablet (10 mg total) by mouth 2 (two) times a day, Disp: 60 tablet, Rfl: 5  •  Cholecalciferol (VITAMIN D3 PO), Take 1 tablet by mouth daily, Disp: , Rfl:   •  FLUoxetine (PROzac) 20 mg capsule, Take 1 capsule (20 mg total) by mouth daily, Disp: 30 capsule, Rfl: 5  •  fluticasone (FLONASE) 50 mcg/act nasal spray, 1 spray into each nostril daily, Disp: 9.9 mL, Rfl: 1  •  gabapentin (Neurontin) 100 mg capsule, Take 2  "capsules (200 mg total) by mouth 4 (four) times a day, Disp: 120 capsule, Rfl: 5  •  loratadine (CLARITIN) 10 mg tablet, Take 1 tablet (10 mg total) by mouth daily, Disp: 30 tablet, Rfl: 5  •  Myrbetriq 50 MG TB24, Take 1 tablet (50 mg total) by mouth daily, Disp: 30 tablet, Rfl: 5  •  nystatin powder, apply topically to affected area four times a day, Disp: 15 g, Rfl: 0  •  omeprazole (PriLOSEC) 20 mg delayed release capsule, Take 1 capsule (20 mg total) by mouth daily, Disp: 60 capsule, Rfl: 5  •  oxyCODONE (Roxicodone) 5 immediate release tablet, Take 1 tablet (5 mg total) by mouth every 4 (four) hours as needed for moderate pain for up to 7 doses Max Daily Amount: 30 mg, Disp: 7 tablet, Rfl: 0  •  rizatriptan (MAXALT) 10 mg tablet, Take 1 tablet (10 mg total) by mouth as needed for migraine Take at the onset of migraine; if symptoms continue or return, may take another dose at least 2 hours after first dose. Take no more than 2 doses in a day., Disp: 9 tablet, Rfl: 3  •  traZODone (DESYREL) 100 mg tablet, Take 1 tablet (100 mg total) by mouth daily at bedtime, Disp: 30 tablet, Rfl: 5  •  senna-docusate sodium (SENOKOT S) 8.6-50 mg per tablet, Take 1 tablet by mouth daily at bedtime for 7 days, Disp: 7 tablet, Rfl: 0    ALLERGIES:  Allergies   Allergen Reactions   • Codeine Headache     Headaches  Other reaction(s): headache           REVIEW OF SYSTEMS:  Musculoskeletal:        As noted in HPI.   All other systems reviewed and are negative.    VITALS:  Vitals:    06/11/24 1239   BP: 114/77   Pulse: 86   Resp: 17       LABS:  HgA1c: No results found for: \"HGBA1C\"  BMP:   Lab Results   Component Value Date    CALCIUM 9.2 10/20/2023    K 3.9 10/20/2023    CO2 25 10/20/2023     10/20/2023    BUN 11 10/20/2023    CREATININE 0.90 10/20/2023       _____________________________________________________  PHYSICAL EXAMINATION:  General: Well developed and well nourished, alert & oriented x 3, appears " comfortable  Psychiatric: Normal  HEENT: Normocephalic, Atraumatic Trachea Midline, No torticollis  Pulmonary: No audible wheezing or respiratory distress   Abdomen/GI: Non tender, non distended   Cardiovascular: No pitting edema, 2+ radial pulse   Skin: No masses, erythema, lacerations, fluctation, ulcerations  Neurovascular: Sensation Intact to the Median, Ulnar, Radial Nerve, Motor Intact to the Median, Ulnar, Radial Nerve, and Pulses Intact  Musculoskeletal: Normal, except as noted in detailed exam and in HPI.      MUSCULOSKELETAL EXAMINATION:  Left Hand:  Pt with MCP flexion of all fingers.  Edema primarily along the index/middle MCPs.  [place] neck deformity of index finger.   Unable to flex index.   Able to flex middle-small finger.  PIP index hyperextends 25 degrees.   Index MCP 55 shy full extension.   Middle MCP 55 shy.  Ring MCP 60 shy.  Small MCP 65 shy.  Good IP extension middle-small.   Pt noted to have radial deviation with wrist extension.  Weakness of ECU and finger extension.   Triceps 5/5.  Brisk capillary refill.     ___________________________________________________  STUDIES REVIEWED:  Xrays of the left hand were reviewed and independently interpreted in PACS by Dr. Lima and demonstrate no acute osseous abnormalities. Pt appears to still have maintained space within her MCP joints. No obvious signs evidence of severe/rheumatologic degenerative changes to cause these deformities.          PROCEDURES PERFORMED:  Procedures  No Procedures performed today    _____________________________________________________      Scribe Attestation    I,:  Kemi Carrillo PA-C am acting as a scribe while in the presence of the attending physician.:       I,:  Vaughn Lima MD personally performed the services described in this documentation    as scribed in my presence.:

## 2024-06-14 ENCOUNTER — TELEPHONE (OUTPATIENT)
Dept: OBGYN CLINIC | Facility: HOSPITAL | Age: 64
End: 2024-06-14

## 2024-06-14 RX ORDER — HYDROXYZINE HYDROCHLORIDE 10 MG/1
10 TABLET, FILM COATED ORAL EVERY 6 HOURS PRN
COMMUNITY
Start: 2024-06-07

## 2024-06-14 NOTE — TELEPHONE ENCOUNTER
Caller: Kristina    Doctor: Lachman    Reason for call: Patient is scheduled for surgery on 06/24/24, asking if you think a hospital bed would be better for her recovery than her full size bed?   I did advise that physician is not in the office today but I would send a message to ask for her. Patient verbalized understanding.      Call back#: [phone]

## 2024-06-14 NOTE — PRE-PROCEDURE INSTRUCTIONS
Pre-Surgery Instructions:   Medication Instructions    apixaban (Eliquis) 5 mg Stop taking 2 days prior to surgery. Last dose 6/21, confirm with PCP    atorvastatin (LIPITOR) 40 mg tablet Take day of surgery.    buPROPion (WELLBUTRIN XL) 150 mg 24 hr tablet Take day of surgery.    busPIRone (BUSPAR) 10 mg tablet Take day of surgery.    Cholecalciferol (VITAMIN D3 PO) Hold day of surgery.    FLUoxetine (PROzac) 20 mg capsule Take day of surgery.    fluticasone (FLONASE) 50 mcg/act nasal spray Uses PRN- OK to take day of surgery    gabapentin (Neurontin) 100 mg capsule Take day of surgery.    hydrOXYzine HCL (ATARAX) 10 mg tablet Uses PRN- OK to take day of surgery    loratadine (CLARITIN) 10 mg tablet Uses PRN- OK to take day of surgery    Myrbetriq 50 MG TB24 Take night before surgery    nystatin powder Hold day of surgery.    omeprazole (PriLOSEC) 20 mg delayed release capsule Uses PRN- OK to take day of surgery    rizatriptan (MAXALT) 10 mg tablet Uses PRN- DO NOT take day of surgery    traZODone (DESYREL) 100 mg tablet Take night before surgery    Medication instructions for day surgery reviewed. Please use only a sip of water to take your instructed medications. Avoid all over the counter vitamins, supplements and NSAIDS for one week prior to surgery per anesthesia guidelines. Tylenol is ok to take as needed.     You will receive a call one business day prior to surgery with an arrival time and hospital directions. If your surgery is scheduled on a Monday, the hospital will be calling you on the Friday prior to your surgery. If you have not heard from anyone by 8pm, please call the hospital supervisor through the hospital  at [phone]. ([place] [phone] or [place] [phone]).    Do not eat or drink anything after midnight the night before your surgery, including candy, mints, lifesavers, or chewing gum. Do not drink alcohol 24hrs before your surgery. Try not to smoke at least 24hrs before  your surgery.       Follow the pre surgery showering instructions as listed in the “My Surgical Experience Booklet” or otherwise provided by your surgeon's office. Do not use a blade to shave the surgical area 1 week before surgery. It is okay to use a clean electric clippers up to 24 hours before surgery. Do not apply any lotions, creams, including makeup, cologne, deodorant, or perfumes after showering on the day of your surgery. Do not use dry shampoo, hair spray, hair gel, or any type of hair products.     No contact lenses, eye make-up, or artificial eyelashes. Remove nail polish, including gel polish, and any artificial, gel, or acrylic nails if possible. Remove all jewelry including rings and body piercing jewelry.     Wear causal clothing that is easy to take on and off. Consider your type of surgery.    Keep any valuables, jewelry, piercings at home. Please bring any specially ordered equipment (sling, braces) if indicated.    Arrange for a responsible person to drive you to and from the hospital on the day of your surgery. Please confirm the visitor policy for the day of your procedure when you receive your phone call with an arrival time.     Call the surgeon's office with any new illnesses, exposures, or additional questions prior to surgery.    Please reference your “My Surgical Experience Booklet” for additional information to prepare for your upcoming surgery.

## 2024-06-17 ENCOUNTER — TELEPHONE (OUTPATIENT)
Dept: FAMILY MEDICINE CLINIC | Facility: CLINIC | Age: 64
End: 2024-06-17

## 2024-06-17 NOTE — TELEPHONE ENCOUNTER
Patient called the RX Refill Line. Message is being forwarded to the office.     Patient is requesting a change on the medication Gabapentin (Neurontin) 100 mg Take 2 capsules (200 mg total) by mouth 4 (four) times a day   Patient stated her insurance will not pay for 8 pills a day.  Patient wants to know what to do.    Please contact patient at  [phone]

## 2024-06-18 NOTE — TELEPHONE ENCOUNTER
Call pt. She needs to let me know what she wants me to do. I cannot make her insurance pay for 2 tablets 4x a day.  She can set up an OV( it can be a virtual ) to see about changing the medicine to another medicine or she can take a dose that her insurance will pay  or she pays for the 2 tabs 4x a day out of pocket.

## 2024-06-19 NOTE — TELEPHONE ENCOUNTER
PA for GABAPENTIN 100 MG SUBMITTED    Submitted via    []CMM-KEY   [x]SurescriM-Audio-Case ID # NO CASE ID  []Faxed to plan   []Other website   []Phone call Case ID #     Office notes sent, clinical questions answered. Awaiting determination    Turnaround time for your insurance to make a decision on your Prior Authorization can take 7-21 business days.

## 2024-06-20 NOTE — TELEPHONE ENCOUNTER
Patient called to follow up on the status of the order for a bed pan.  Patient sttaed she received the transfer board, but has not received the bed pan as of yet.  Please advise.  Patient is also scheduled for a follow up on 6/21.

## 2024-06-21 ENCOUNTER — OFFICE VISIT (OUTPATIENT)
Dept: FAMILY MEDICINE CLINIC | Facility: CLINIC | Age: 64
End: 2024-06-21
Payer: COMMERCIAL

## 2024-06-21 VITALS
RESPIRATION RATE: 16 BRPM | HEIGHT: 64 IN | TEMPERATURE: 98.2 F | DIASTOLIC BLOOD PRESSURE: 60 MMHG | HEART RATE: 92 BPM | BODY MASS INDEX: 38.62 KG/M2 | SYSTOLIC BLOOD PRESSURE: 92 MMHG | OXYGEN SATURATION: 95 %

## 2024-06-21 DIAGNOSIS — E78.2 MIXED HYPERLIPIDEMIA: ICD-10-CM

## 2024-06-21 DIAGNOSIS — E66.9 OBESITY, UNSPECIFIED OBESITY SEVERITY, UNSPECIFIED OBESITY TYPE: ICD-10-CM

## 2024-06-21 DIAGNOSIS — R52 PAIN: ICD-10-CM

## 2024-06-21 DIAGNOSIS — G43.809 OTHER MIGRAINE WITHOUT STATUS MIGRAINOSUS, NOT INTRACTABLE: ICD-10-CM

## 2024-06-21 DIAGNOSIS — M21.542 ACQUIRED EQUINOVARUS DEFORMITY OF LEFT FOOT: Primary | ICD-10-CM

## 2024-06-21 DIAGNOSIS — K21.9 GASTROESOPHAGEAL REFLUX DISEASE WITHOUT ESOPHAGITIS: ICD-10-CM

## 2024-06-21 LAB
DME PARACHUTE DELIVERY DATE REQUESTED: NORMAL
DME PARACHUTE ITEM DESCRIPTION: NORMAL
DME PARACHUTE ITEM DESCRIPTION: NORMAL
DME PARACHUTE ORDER STATUS: NORMAL
DME PARACHUTE SUPPLIER NAME: NORMAL
DME PARACHUTE SUPPLIER PHONE: NORMAL

## 2024-06-21 PROCEDURE — 99214 OFFICE O/P EST MOD 30 MIN: CPT | Performed by: FAMILY MEDICINE

## 2024-06-21 RX ORDER — GABAPENTIN 100 MG/1
200 CAPSULE ORAL 4 TIMES DAILY
Qty: 120 CAPSULE | Refills: 5 | Status: SHIPPED | OUTPATIENT
Start: 2024-06-21

## 2024-06-21 NOTE — PROGRESS NOTES
Ambulatory Visit  Name: Kristina Russell      : 1960      MRN: [id number]  Encounter Provider: Alfred Crane MD  Encounter Date: 2024   Encounter department: [facility]    Assessment & Plan   1. Acquired equinovarus deformity of left foot  Assessment & Plan:  Patient is stable  and will continue present plan of care and reassess at next routine visit. All questions about this problem from patient were answered today.   2. Gastroesophageal reflux disease without esophagitis  Assessment & Plan:  Patient to continue with present therapy and decrease caffeine, avoid ETOH and smoking to decrease acid production. Pt should also cease eating prior to bedtime and avoid excessive fluid intake prior to sleep. May use antacids as needed for breakthrough GERD. All pateint questions answered today about this condition.   3. Other migraine without status migrainosus, not intractable  Assessment & Plan:  Patient is stable  and will continue present plan of care and reassess at next routine visit. All questions about this problem from patient were answered today.   4. Mixed hyperlipidemia  Assessment & Plan:  Patient  is stable with current medication and we discussed a low fat low cholesterol diet. Weight loss also discussed for this will help lower cholesterol also. Recheck lipids in 6 months.   5. Obesity, unspecified obesity severity, unspecified obesity type  Assessment & Plan:  Patient to increase exercise and partake of a diet with less calories to promote  weight loss   6. Pain  -     gabapentin (Neurontin) 100 mg capsule; Take 2 capsules (200 mg total) by mouth 4 (four) times a day         History of Present Illness     63-year-old female today for checkup on multimedical problems are.  Patient with gait dysfunction since that she had COVID patient has degenerative muscle atrophy of both lower extremities as well as her left hand since then she is going to see orthopedics to see about  getting repairs of both of her feet starting on Monday.  She also had seen the hand surgeon for her left hand which she really cannot do anything to repair that.  She will be seeing neurology after seeing her surgeon to try and find out why she has had this degenerative process of her extremities.  At this point I do not understand why this has happened to her but we are going to see about having a neurological evaluation to see why.  Patient doing well with her medications her blood pressure is on the low side today but she is a stable she is wheelchair-bound at this point.  After she has her surgery she will have her foot in a halo should be off her feet for 3 months at a time and that she will have round-the-clock care with home health aides.  Patient also will be able to need help with getting out of her wheelchair she will have a halo on her foot and we will see about ordering a Cheri lift for her.      Review of Systems   Constitutional:  Negative for activity change, appetite change, fatigue and fever.   HENT:  Negative for congestion, ear pain, postnasal drip, rhinorrhea, sinus pressure, sinus pain, sneezing and sore throat.    Eyes:  Negative for pain and redness.   Respiratory:  Negative for apnea, cough, chest tightness, shortness of breath and wheezing.    Cardiovascular:  Negative for chest pain, palpitations and leg swelling.   Gastrointestinal:  Negative for abdominal pain, constipation, diarrhea, nausea and vomiting.   Endocrine: Negative for cold intolerance and heat intolerance.   Genitourinary:  Negative for difficulty urinating, dysuria, frequency, hematuria and urgency.   Musculoskeletal:  Positive for gait problem. Negative for arthralgias, back pain and myalgias.   Skin:  Negative for rash.   Neurological:  Positive for weakness. Negative for dizziness, speech difficulty, numbness and headaches.   Hematological:  Does not bruise/bleed easily.   Psychiatric/Behavioral:  Negative for agitation,  confusion and hallucinations.      Past Medical History:   Diagnosis Date   • Anemia    • Cancer (HCC)     breast history of   • Gastroesophageal reflux disease without esophagitis 2021   • Migraine    • Mixed hyperlipidemia 2021   • Mixed hyperlipidemia 2021   • Obesity, unspecified obesity severity, unspecified obesity type 2018   • PE (pulmonary thromboembolism) (HCC)    • Skin cancer     Left leg lump. cryotherapy and excision, unsure of which kind    • Sleep apnea    • Stroke (HCC)     questionable history per pt     Past Surgical History:   Procedure Laterality Date   • ANKLE SURGERY Left     pins and plate    • BREAST LUMPECTOMY Right      - malignant   •  SECTION      x2   • DILATION AND CURETTAGE OF UTERUS     • REDUCTION MAMMAPLASTY     • TONSILLECTOMY      age 8     Family History   Problem Relation Age of Onset   • No Known Problems Mother    • No Known Problems Father    • Lung cancer Brother      Social History     Tobacco Use   • Smoking status: Never     Passive exposure: Never   • Smokeless tobacco: Never   Vaping Use   • Vaping status: Never Used   Substance and Sexual Activity   • Alcohol use: Not Currently   • Drug use: No   • Sexual activity: Not Currently     Partners: Male     Birth control/protection: None     Current Outpatient Medications on File Prior to Visit   Medication Sig   • acetaminophen (TYLENOL) 325 mg tablet Take 2 tablets (650 mg total) by mouth every 6 (six) hours as needed for mild pain   • apixaban (Eliquis) 5 mg Take 1 tablet (5 mg total) by mouth 2 (two) times a day   • atorvastatin (LIPITOR) 40 mg tablet Take 1 tablet (40 mg total) by mouth daily   • buPROPion (WELLBUTRIN XL) 150 mg 24 hr tablet Take 1 tablet (150 mg total) by mouth every morning   • Cholecalciferol (VITAMIN D3 PO) Take 1 tablet by mouth daily   • FLUoxetine (PROzac) 20 mg capsule Take 1 capsule (20 mg total) by mouth daily   • fluticasone (FLONASE) 50 mcg/act  nasal spray 1 spray into each nostril daily (Patient taking differently: 1 spray into each nostril daily As needed)   • hydrOXYzine HCL (ATARAX) 10 mg tablet Take 10 mg by mouth every 6 (six) hours as needed Anxiety   • loratadine (CLARITIN) 10 mg tablet Take 1 tablet (10 mg total) by mouth daily (Patient taking differently: Take 10 mg by mouth daily As needed)   • Myrbetriq 50 MG TB24 Take 1 tablet (50 mg total) by mouth daily (Patient taking differently: Take 1 tablet by mouth daily at bedtime)   • nystatin powder apply topically to affected area four times a day (Patient taking differently: As needed under breasts)   • omeprazole (PriLOSEC) 20 mg delayed release capsule Take 1 capsule (20 mg total) by mouth daily (Patient taking differently: Take 20 mg by mouth daily As needed)   • rizatriptan (MAXALT) 10 mg tablet Take 1 tablet (10 mg total) by mouth as needed for migraine Take at the onset of migraine; if symptoms continue or return, may take another dose at least 2 hours after first dose. Take no more than 2 doses in a day.   • traZODone (DESYREL) 100 mg tablet Take 1 tablet (100 mg total) by mouth daily at bedtime   • [DISCONTINUED] gabapentin (Neurontin) 100 mg capsule Take 2 capsules (200 mg total) by mouth 4 (four) times a day   • oxyCODONE (Roxicodone) 5 immediate release tablet Take 1 tablet (5 mg total) by mouth every 4 (four) hours as needed for moderate pain for up to 7 doses Max Daily Amount: 30 mg (Patient not taking: Reported on 6/21/2024)   • senna-docusate sodium (SENOKOT S) 8.6-50 mg per tablet Take 1 tablet by mouth daily at bedtime for 7 days   • [DISCONTINUED] busPIRone (BUSPAR) 10 mg tablet Take 1 tablet (10 mg total) by mouth 2 (two) times a day     Allergies   Allergen Reactions   • Codeine Headache     Headaches  Other reaction(s): headache     Immunization History   Administered Date(s) Administered   • INFLUENZA 08/06/2014, 10/19/2015, 08/21/2016, 11/17/2017, 08/19/2018, 10/03/2022   •  "Influenza Quadrivalent Preservative Free 3 years and older IM 08/19/2018   • Influenza, injectable, quadrivalent, preservative free 0.5 mL 08/19/2018   • Influenza, recombinant, quadrivalent,injectable, preservative free 10/03/2022   • Pneumococcal Conjugate Vaccine 20-valent (Pcv20), Polysace 10/03/2022   • Pneumococcal Polysaccharide PPV23 05/25/2020   • Tdap 09/13/2012   • Tuberculin Skin Test-PPD Intradermal 08/06/2018, 08/23/2018   • Zoster 05/29/2015     Objective     BP 92/60 (BP Location: Left arm, Patient Position: Sitting, Cuff Size: Large)   Pulse 92   Temp 98.2 °F (36.8 °C) (Temporal)   Resp 16   Ht 5' 4\" (1.626 m)   SpO2 95%   BMI 38.62 kg/m²     Physical Exam  Constitutional:       Appearance: Normal appearance. She is not ill-appearing.   HENT:      Head: Normocephalic and atraumatic.      Right Ear: Tympanic membrane normal.      Left Ear: Tympanic membrane normal.      Nose: Nose normal.      Mouth/Throat:      Mouth: Mucous membranes are moist.   Eyes:      Extraocular Movements: Extraocular movements intact.      Conjunctiva/sclera: Conjunctivae normal.      Pupils: Pupils are equal, round, and reactive to light.   Cardiovascular:      Rate and Rhythm: Normal rate and regular rhythm.   Pulmonary:      Effort: Pulmonary effort is normal. No respiratory distress.      Breath sounds: Normal breath sounds. No wheezing.   Abdominal:      General: Bowel sounds are normal.      Palpations: Abdomen is soft.      Tenderness: There is no abdominal tenderness.   Musculoskeletal:         General: No tenderness. Normal range of motion.      Cervical back: Normal range of motion and neck supple.      Right lower leg: No edema.      Left lower leg: No edema.   Skin:     General: Skin is warm and dry.   Neurological:      Mental Status: She is alert and oriented to person, place, and time. Mental status is at baseline.      Motor: Weakness present.      Gait: Gait abnormal.   Psychiatric:         Mood and " Affect: Mood normal.         Behavior: Behavior normal.         Thought Content: Thought content normal.         Judgment: Judgment normal.       Administrative Statements

## 2024-06-23 PROBLEM — D75.839 THROMBOCYTOSIS: Status: RESOLVED | Noted: 2021-05-21 | Resolved: 2024-06-23

## 2024-06-23 NOTE — ANESTHESIA PREPROCEDURE EVALUATION
Procedure:  APPLICATION EXTERNAL FIXATION DEVICE LOWER EXTREMITY, Achilles lengthening, talonavicular joint capsule release, posterior tibial tendon lengthening, plantar fascia release (Left: Leg Lower)  Achilles lengthening, talonavicular joint capsule release, posterior tibial tendon lengthening, plantar fascia release (Left: Foot)  plantar fascia release (Left: Foot)  Achilles lengthening, talonavicular joint capsule release, posterior tibial tendon lengthening, plantar fascia release (Left: Ankle)    Relevant Problems   ANESTHESIA   (-) History of anesthesia complications      CARDIO   (+) Migraine   (+) Mixed hyperlipidemia   (+) Multiple subsegmental pulmonary emboli without acute cor pulmonale (HCC) (H/o, on eliquis)   (-) Chest pain      GI/HEPATIC   (+) Gastroesophageal reflux disease without esophagitis (Well controlled)      NEURO/PSYCH   (+) Migraine      PULMONARY   (+) Apnea, sleep   (-) URI (upper respiratory infection)      Orthopedic/Musculoskeletal   (+) Acquired equinovarus deformity of left foot (Mild decreased sensation and motor function to LLE)      Other   (+) Obesity, unspecified obesity severity, unspecified obesity type        Physical Exam    Airway    Mallampati score: II  TM Distance: >3 FB  Neck ROM: full     Dental   Comment: Denies loose     Cardiovascular      Pulmonary      Other Findings  post-pubertal.      Anesthesia Plan  ASA Score- 3     Anesthesia Type- general with ASA Monitors.         Additional Monitors:     Airway Plan: LMA.           Plan Factors-Exercise comment: Wheelchair bound.    Chart reviewed. EKG reviewed.  Existing labs reviewed. Patient summary reviewed.                  Induction- intravenous.    Postoperative Plan-     Perioperative Resuscitation Plan - Level 1 - Full Code.       Informed Consent- Anesthetic plan and risks discussed with patient.  I personally reviewed this patient with the CRNA. Discussed and agreed on the Anesthesia Plan with the  CRNA..

## 2024-06-24 ENCOUNTER — HOSPITAL ENCOUNTER (OUTPATIENT)
Facility: HOSPITAL | Age: 64
Setting detail: OUTPATIENT SURGERY
Discharge: HOME | End: 2024-06-24
Attending: ORTHOPAEDIC SURGERY | Admitting: ORTHOPAEDIC SURGERY
Payer: COMMERCIAL

## 2024-06-24 ENCOUNTER — ANESTHESIA (OUTPATIENT)
Dept: PERIOP | Facility: HOSPITAL | Age: 64
End: 2024-06-24
Payer: COMMERCIAL

## 2024-06-24 ENCOUNTER — APPOINTMENT (OUTPATIENT)
Dept: RADIOLOGY | Facility: HOSPITAL | Age: 64
End: 2024-06-24
Payer: COMMERCIAL

## 2024-06-24 ENCOUNTER — TELEPHONE (OUTPATIENT)
Age: 64
End: 2024-06-24

## 2024-06-24 VITALS
OXYGEN SATURATION: 90 % | SYSTOLIC BLOOD PRESSURE: 153 MMHG | BODY MASS INDEX: 38.39 KG/M2 | HEART RATE: 100 BPM | HEIGHT: 64 IN | DIASTOLIC BLOOD PRESSURE: 87 MMHG | RESPIRATION RATE: 15 BRPM | TEMPERATURE: 97.6 F | WEIGHT: 224.87 LBS

## 2024-06-24 DIAGNOSIS — M21.542 ACQUIRED EQUINOVARUS DEFORMITY OF LEFT FOOT: Primary | ICD-10-CM

## 2024-06-24 PROCEDURE — 28262 REVISION OF FOOT AND ANKLE: CPT | Performed by: PHYSICIAN ASSISTANT

## 2024-06-24 PROCEDURE — 27685 REVISION OF LOWER LEG TENDON: CPT | Performed by: ORTHOPAEDIC SURGERY

## 2024-06-24 PROCEDURE — C1713 ANCHOR/SCREW BN/BN,TIS/BN: HCPCS | Performed by: ORTHOPAEDIC SURGERY

## 2024-06-24 PROCEDURE — 73600 X-RAY EXAM OF ANKLE: CPT

## 2024-06-24 PROCEDURE — 28035 DECOMPRESSION OF TIBIA NERVE: CPT | Performed by: PHYSICIAN ASSISTANT

## 2024-06-24 PROCEDURE — 20696 APP MLTPLN UNI XTRNL FIX 1ST: CPT | Performed by: PHYSICIAN ASSISTANT

## 2024-06-24 PROCEDURE — 28035 DECOMPRESSION OF TIBIA NERVE: CPT | Performed by: ORTHOPAEDIC SURGERY

## 2024-06-24 PROCEDURE — 28262 REVISION OF FOOT AND ANKLE: CPT | Performed by: ORTHOPAEDIC SURGERY

## 2024-06-24 PROCEDURE — C9290 INJ, BUPIVACAINE LIPOSOME: HCPCS | Performed by: ORTHOPAEDIC SURGERY

## 2024-06-24 PROCEDURE — 20696 APP MLTPLN UNI XTRNL FIX 1ST: CPT | Performed by: ORTHOPAEDIC SURGERY

## 2024-06-24 PROCEDURE — 27685 REVISION OF LOWER LEG TENDON: CPT | Performed by: PHYSICIAN ASSISTANT

## 2024-06-24 DEVICE — FOOT ARCH LRF
Type: IMPLANTABLE DEVICE | Site: FOOT | Status: FUNCTIONAL
Brand: HOFFMANN

## 2024-06-24 DEVICE — WIRE BOLT - LONG LRF FOR WIRE DIA1.5 TO 2.0MM
Type: IMPLANTABLE DEVICE | Site: FOOT | Status: FUNCTIONAL
Brand: HOFFMANN

## 2024-06-24 DEVICE — LRF HEXAPOD STRUT - MEDIUM - LENGTH 131-191MM
Type: IMPLANTABLE DEVICE | Site: FOOT | Status: FUNCTIONAL
Brand: HOFFMANN

## 2024-06-24 DEVICE — KIRSCHNER WIRE: Type: IMPLANTABLE DEVICE | Site: FOOT | Status: FUNCTIONAL

## 2024-06-24 DEVICE — RING CONNECTION BOLT LRF
Type: IMPLANTABLE DEVICE | Site: FOOT | Status: FUNCTIONAL
Brand: HOFFMANN

## 2024-06-24 DEVICE — WASHER LRF (BLUE)
Type: IMPLANTABLE DEVICE | Site: FOOT | Status: FUNCTIONAL
Brand: HOFFMANN

## 2024-06-24 DEVICE — WIRE BOLT - MEDIUM LRF FOR WIRE DIA1.5 TO 2.0MM
Type: IMPLANTABLE DEVICE | Site: FOOT | Status: FUNCTIONAL
Brand: HOFFMANN

## 2024-06-24 DEVICE — APEX PIN BOLT LRF FOR PIN DIA 3-4-5-6MM
Type: IMPLANTABLE DEVICE | Site: FOOT | Status: FUNCTIONAL
Brand: HOFFMANN

## 2024-06-24 DEVICE — FULL RING LRF
Type: IMPLANTABLE DEVICE | Site: FOOT | Status: FUNCTIONAL
Brand: HOFFMANN

## 2024-06-24 DEVICE — IMPLANTABLE DEVICE: Type: IMPLANTABLE DEVICE | Site: FOOT | Status: FUNCTIONAL

## 2024-06-24 DEVICE — FOOT RING LONG LRF
Type: IMPLANTABLE DEVICE | Site: FOOT | Status: FUNCTIONAL
Brand: HOFFMANN

## 2024-06-24 DEVICE — SELF-DRILLING HALF PIN
Type: IMPLANTABLE DEVICE | Site: FOOT | Status: FUNCTIONAL
Brand: APEX

## 2024-06-24 DEVICE — CONNECTING NUT - SHORT LRF M8 X 6MM
Type: IMPLANTABLE DEVICE | Site: FOOT | Status: FUNCTIONAL
Brand: HOFFMANN

## 2024-06-24 DEVICE — LRF HEXAPOD STRUT ID KIT
Type: IMPLANTABLE DEVICE | Site: FOOT | Status: FUNCTIONAL
Brand: HOFFMANN

## 2024-06-24 DEVICE — MEDIUM ROCKER SHOE - LONG LRF 140MM - 155MM
Type: IMPLANTABLE DEVICE | Site: FOOT | Status: FUNCTIONAL
Brand: HOFFMANN

## 2024-06-24 DEVICE — WIRE WITH OLIVE, DIAMOND POINT LRF
Type: IMPLANTABLE DEVICE | Site: FOOT | Status: FUNCTIONAL
Brand: HOFFMANN

## 2024-06-24 DEVICE — K-WIRE 1.6MM X 9INL SMTH XMOND/XMOND: Type: IMPLANTABLE DEVICE | Site: FOOT | Status: FUNCTIONAL

## 2024-06-24 RX ORDER — FENTANYL CITRATE 50 UG/ML
INJECTION, SOLUTION INTRAMUSCULAR; INTRAVENOUS AS NEEDED
Status: DISCONTINUED | OUTPATIENT
Start: 2024-06-24 | End: 2024-06-24

## 2024-06-24 RX ORDER — MIDAZOLAM HYDROCHLORIDE 2 MG/2ML
INJECTION, SOLUTION INTRAMUSCULAR; INTRAVENOUS AS NEEDED
Status: DISCONTINUED | OUTPATIENT
Start: 2024-06-24 | End: 2024-06-24

## 2024-06-24 RX ORDER — FENTANYL CITRATE/PF 50 MCG/ML
50 SYRINGE (ML) INJECTION
Status: DISCONTINUED | OUTPATIENT
Start: 2024-06-24 | End: 2024-06-24 | Stop reason: HOSPADM

## 2024-06-24 RX ORDER — HYDROMORPHONE HCL/PF 1 MG/ML
0.5 SYRINGE (ML) INJECTION
Status: DISCONTINUED | OUTPATIENT
Start: 2024-06-24 | End: 2024-06-24 | Stop reason: HOSPADM

## 2024-06-24 RX ORDER — ONDANSETRON 4 MG/1
4 TABLET, FILM COATED ORAL EVERY 8 HOURS PRN
Qty: 10 TABLET | Refills: 0 | Status: SHIPPED | OUTPATIENT
Start: 2024-06-24

## 2024-06-24 RX ORDER — BUPIVACAINE HYDROCHLORIDE 2.5 MG/ML
INJECTION, SOLUTION EPIDURAL; INFILTRATION; INTRACAUDAL AS NEEDED
Status: DISCONTINUED | OUTPATIENT
Start: 2024-06-24 | End: 2024-06-24

## 2024-06-24 RX ORDER — BUPIVACAINE HYDROCHLORIDE 5 MG/ML
INJECTION, SOLUTION EPIDURAL; INTRACAUDAL AS NEEDED
Status: DISCONTINUED | OUTPATIENT
Start: 2024-06-24 | End: 2024-06-24

## 2024-06-24 RX ORDER — VANCOMYCIN HYDROCHLORIDE 1 G/20ML
INJECTION, POWDER, LYOPHILIZED, FOR SOLUTION INTRAVENOUS AS NEEDED
Status: DISCONTINUED | OUTPATIENT
Start: 2024-06-24 | End: 2024-06-24 | Stop reason: HOSPADM

## 2024-06-24 RX ORDER — LIDOCAINE HYDROCHLORIDE 10 MG/ML
INJECTION, SOLUTION EPIDURAL; INFILTRATION; INTRACAUDAL; PERINEURAL AS NEEDED
Status: DISCONTINUED | OUTPATIENT
Start: 2024-06-24 | End: 2024-06-24

## 2024-06-24 RX ORDER — ONDANSETRON 2 MG/ML
INJECTION INTRAMUSCULAR; INTRAVENOUS AS NEEDED
Status: DISCONTINUED | OUTPATIENT
Start: 2024-06-24 | End: 2024-06-24

## 2024-06-24 RX ORDER — OXYCODONE HYDROCHLORIDE 5 MG/1
5 TABLET ORAL ONCE AS NEEDED
Status: DISCONTINUED | OUTPATIENT
Start: 2024-06-24 | End: 2024-06-24 | Stop reason: HOSPADM

## 2024-06-24 RX ORDER — SODIUM CHLORIDE, SODIUM LACTATE, POTASSIUM CHLORIDE, CALCIUM CHLORIDE 600; 310; 30; 20 MG/100ML; MG/100ML; MG/100ML; MG/100ML
INJECTION, SOLUTION INTRAVENOUS CONTINUOUS PRN
Status: DISCONTINUED | OUTPATIENT
Start: 2024-06-24 | End: 2024-06-24

## 2024-06-24 RX ORDER — PROPOFOL 10 MG/ML
INJECTION, EMULSION INTRAVENOUS AS NEEDED
Status: DISCONTINUED | OUTPATIENT
Start: 2024-06-24 | End: 2024-06-24

## 2024-06-24 RX ORDER — OXYCODONE HYDROCHLORIDE 5 MG/1
5 TABLET ORAL EVERY 4 HOURS PRN
Qty: 30 TABLET | Refills: 0 | Status: SHIPPED | OUTPATIENT
Start: 2024-06-24

## 2024-06-24 RX ORDER — LIDOCAINE HYDROCHLORIDE 10 MG/ML
0.5 INJECTION, SOLUTION EPIDURAL; INFILTRATION; INTRACAUDAL; PERINEURAL ONCE AS NEEDED
Status: DISCONTINUED | OUTPATIENT
Start: 2024-06-24 | End: 2024-06-24 | Stop reason: HOSPADM

## 2024-06-24 RX ORDER — CHLORHEXIDINE GLUCONATE 40 MG/ML
SOLUTION TOPICAL DAILY PRN
Status: DISCONTINUED | OUTPATIENT
Start: 2024-06-24 | End: 2024-06-24 | Stop reason: HOSPADM

## 2024-06-24 RX ORDER — SODIUM CHLORIDE, SODIUM LACTATE, POTASSIUM CHLORIDE, CALCIUM CHLORIDE 600; 310; 30; 20 MG/100ML; MG/100ML; MG/100ML; MG/100ML
125 INJECTION, SOLUTION INTRAVENOUS CONTINUOUS
Status: DISCONTINUED | OUTPATIENT
Start: 2024-06-24 | End: 2024-06-24 | Stop reason: HOSPADM

## 2024-06-24 RX ORDER — CEFAZOLIN SODIUM 2 G/50ML
2000 SOLUTION INTRAVENOUS ONCE
Status: COMPLETED | OUTPATIENT
Start: 2024-06-24 | End: 2024-06-24

## 2024-06-24 RX ORDER — CHLORHEXIDINE GLUCONATE ORAL RINSE 1.2 MG/ML
15 SOLUTION DENTAL ONCE
Status: DISCONTINUED | OUTPATIENT
Start: 2024-06-24 | End: 2024-06-24 | Stop reason: HOSPADM

## 2024-06-24 RX ORDER — DEXAMETHASONE SODIUM PHOSPHATE 10 MG/ML
INJECTION, SOLUTION INTRAMUSCULAR; INTRAVENOUS AS NEEDED
Status: DISCONTINUED | OUTPATIENT
Start: 2024-06-24 | End: 2024-06-24

## 2024-06-24 RX ORDER — ONDANSETRON 2 MG/ML
4 INJECTION INTRAMUSCULAR; INTRAVENOUS ONCE AS NEEDED
Status: DISCONTINUED | OUTPATIENT
Start: 2024-06-24 | End: 2024-06-24 | Stop reason: HOSPADM

## 2024-06-24 RX ADMIN — ONDANSETRON 4 MG: 2 INJECTION INTRAMUSCULAR; INTRAVENOUS at 09:37

## 2024-06-24 RX ADMIN — BUPIVACAINE 5 ML: 13.3 INJECTION, SUSPENSION, LIPOSOMAL INFILTRATION at 07:14

## 2024-06-24 RX ADMIN — SODIUM CHLORIDE, SODIUM LACTATE, POTASSIUM CHLORIDE, AND CALCIUM CHLORIDE: .6; .31; .03; .02 INJECTION, SOLUTION INTRAVENOUS at 07:26

## 2024-06-24 RX ADMIN — CEFAZOLIN SODIUM 2000 MG: 2 SOLUTION INTRAVENOUS at 07:31

## 2024-06-24 RX ADMIN — BUPIVACAINE HYDROCHLORIDE 10 ML: 2.5 INJECTION, SOLUTION EPIDURAL; INFILTRATION; INTRACAUDAL; PERINEURAL at 07:14

## 2024-06-24 RX ADMIN — FENTANYL CITRATE 50 MCG: 50 INJECTION INTRAMUSCULAR; INTRAVENOUS at 07:44

## 2024-06-24 RX ADMIN — LIDOCAINE HYDROCHLORIDE 50 MG: 10 INJECTION, SOLUTION EPIDURAL; INFILTRATION; INTRACAUDAL; PERINEURAL at 07:31

## 2024-06-24 RX ADMIN — MIDAZOLAM 1 MG: 1 INJECTION INTRAMUSCULAR; INTRAVENOUS at 07:26

## 2024-06-24 RX ADMIN — FENTANYL CITRATE 50 MCG: 50 INJECTION INTRAMUSCULAR; INTRAVENOUS at 07:09

## 2024-06-24 RX ADMIN — DEXAMETHASONE SODIUM PHOSPHATE 10 MG: 10 INJECTION INTRAMUSCULAR; INTRAVENOUS at 07:31

## 2024-06-24 RX ADMIN — BUPIVACAINE HYDROCHLORIDE 10 ML: 5 INJECTION, SOLUTION EPIDURAL; INTRACAUDAL; PERINEURAL at 07:12

## 2024-06-24 RX ADMIN — PROPOFOL 150 MG: 10 INJECTION, EMULSION INTRAVENOUS at 07:31

## 2024-06-24 RX ADMIN — ONDANSETRON 4 MG: 2 INJECTION INTRAMUSCULAR; INTRAVENOUS at 07:31

## 2024-06-24 RX ADMIN — HYDROMORPHONE HYDROCHLORIDE 0.5 MG: 1 INJECTION, SOLUTION INTRAMUSCULAR; INTRAVENOUS; SUBCUTANEOUS at 09:39

## 2024-06-24 RX ADMIN — MIDAZOLAM 1 MG: 1 INJECTION INTRAMUSCULAR; INTRAVENOUS at 07:09

## 2024-06-24 RX ADMIN — BUPIVACAINE 15 ML: 13.3 INJECTION, SUSPENSION, LIPOSOMAL INFILTRATION at 07:12

## 2024-06-24 NOTE — OP NOTE
OPERATIVE REPORT  PATIENT NAME: Kristina Russell    :  1960  MRN: [id number]  Pt Location: [facility] OR ROOM 02    SURGERY DATE: 2024    Surgeons and Role:     * James R Lachman, MD - Primary     * Jessica Yusuf PA-C - Assisting    Preop Diagnosis:  Acquired equinovarus deformity of left foot [M21.542]    Post-Op Diagnosis Codes:     * Acquired equinovarus deformity of left foot [M21.542]    Procedure(s):  Left - APPLICATION EXTERNAL FIXATION DEVICE LOWER EXTREMITY. Achilles lengthening. talonavicular joint capsule release. posterior tibial tendon lengthening. plantar fascia release  Left - Achilles lengthening. talonavicular joint capsule release. posterior tibial tendon lengthening. plantar fascia release  Left - plantar fascia release  Left - Achilles lengthening. talonavicular joint capsule release. posterior tibial tendon lengthening. plantar fascia release    Specimen(s):  * No specimens in log *    Estimated Blood Loss:   Minimal    Drains:  External Urinary Catheter (Active)   Number of days: 554       Anesthesia Type:   Choice    Operative Indications:  Acquired equinovarus deformity of left foot [M21.542]      Operative Findings:  Consistent with diagnosis      Complications:   None    Procedure and Technique:  1. Tarsal tunnel release  2. Tru triple hemisection achilles lengthening  3. Z lengthening of posterior tibial tendon  4. Complete release of medial talonavicular joint capsule  5. Percutaneous tenotomy of FHL tendon  6. Percutaneous tenotomy of FDL tendon to 2nd,3rd, 4th, and 5th toes  7. Pinning of the 1st MTP and IP joint  8. Pinning of the 2nd, 3rd, 4th, and 5th DIP, PIP and MTP joints  9.  Application of dynamic, computer programmed, multiplane circular ankle spanning external fixator  10. Fluoroscopy without benefit of radiologist     After informed consent and preoperative medical clearance were obtained, the patient was taken to the preoperative holding area. Allergies were properly  assessed and patient was given appropriate perioperative IV antibiotics without complication. Please see the anesthesia report for details of the anesthesia administered.      Patient was taken the operating room placed supine on the operating room table.  All bony prominences and skin were well padded, airway maintained, genitalia protected and brachial plexi/ulnar nerves at the elbows protected. Patient's operative lower extremity was prepped and draped in sterile fashion.      The operative lower extremity was exsanguinated with esmarch elastic bandage and a thigh tourniquet was utilized. A time-out was performed with the attending surgeon in the room.       Next, a curvilinear incision was made intermediate between the achilles tendon and medial malleolus from the level of the tibiotalar joint to the soft spot plantar medial near the adductor hallucis muscle belly.  We used sharp dissection to get down to the level of the fascia and then released it.       We immediately encountered the tibial vein and artery and deep to this was the nerve.  We traced the nerve proximally and released all adhesions. We were cautious to avoid the calcaneal branch proximally.  We continued our dissection distally and reached the nerve bifurcation point and individually released the lateral and medial plantar branches. This required a complete circumferential release of the fascia surrounding the adductor hallucis muscle belly.       We then excised the septum between the two branches and traced them as far laterally as possible to ensure no further areas of compression.  We traced the first branch of the lateral plantar nerve and ensured its complete release from the quatradus plantae muscle fascia.       We used bipolar to cauterize any vessels crossing the field that were in the way to avoid thermal injury to the nerve as we released it.  We inspected the full course of the nerve to ensure a complete release and were satisfied with  the release.     We then addressed the equinus contracture. We first made a poke hole incision using a fresh #15 blade into the achilles tendon 2cm above its insertion. We hemisected the medial half of the tendon through this incision. Next, we made another pokehole incision 2cm above the first and hemisected the lateral half of the achilles. Finally, we performed the same sequence one more time, this time medial again to complete the triple hemisection.  We then applied a gentle dorsiflexion force with the calcaneus held in the appropriate position until we achieved our desired correction.     Next we made an open linear incision medially over the talonavicular joint. We Z lengthened the posterior tibial tendon at this point and then completely released the medial capsule of the talonavicular joint. This allowed for correction of the forefoot abduction.      Next we performed a percutaneous release of the FHL tendon to permit correction of the hallux flexion contracture. We repeated this procedure for the FDL tendons of the 2nd-5th toes plantarly releasing the tendon from the distal phalanx.  We then placed pins across each of these toes from distal to proximal across the MTP joints in the desired position.     We then placed the pre-assembled [employer] frame in place. We placed 2 thin wires through the tibia and secured them to the frame, a half pin above these in the tibia from anterior to posterior, then we placed two olive wires, one from each direction, through the calcaneus and secured it to the frame, and then two more wires through the midfoot and secured them to the frame. We then tensioned the wires with the appropriate tensioning device.  We used the struts on the frame to correct the varus noted on fluoroscopy and gained good correction.     Sterile dressings were applied and the pin sites were covered with gauze and xeroform.  Satisfactory capillary refill remained in all toes. Patient tolerated  the procedure well. There were no complications. He was taken to the recovery room in stable condition.        I was present for the entire procedure., A qualified resident physician was not available., and A physician assistant was required during the procedure for retraction, tissue handling, dissection and suturing.    Patient Disposition:  PACU         SIGNATURE: James R Lachman, MD  DATE: June 24, 2024  TIME: 7:12 AM

## 2024-06-24 NOTE — INTERVAL H&P NOTE
H&P reviewed. After examining the patient I find no changes in the patients condition since the H&P had been written.    Vitals:    06/24/24 0601   BP: 133/65   Pulse: 95   Resp: 18   Temp: 97.7 °F (36.5 °C)   SpO2: 93%     Plan for left ankle equinocavovarus soft tissue releases and application of ringed-external fixator.

## 2024-06-24 NOTE — ANESTHESIA PROCEDURE NOTES
Peripheral Block    Patient location during procedure: holding area  Start time: 6/24/2024 7:12 AM  Reason for block: at surgeon's request and post-op pain management  Staffing  Performed by: Sd Savage MD  Authorized by: Sd Savage MD    Preanesthetic Checklist  Completed: patient identified, IV checked, site marked, risks and benefits discussed, surgical consent, monitors and equipment checked, pre-op evaluation and timeout performed  Peripheral Block  Patient position: right lateral  Prep: ChloraPrep  Patient monitoring: frequent blood pressure checks, continuous pulse oximetry and heart rate  Block type: Popliteal  Laterality: left  Injection technique: single-shot  Procedures: ultrasound guided, Ultrasound guidance required for the procedure to increase accuracy and safety of medication placement and decrease risk of complications.  Ultrasound permanent image saved    Needle  Needle type: Stimuplex   Needle gauge: 20 G  Needle length: 4 in  Needle localization: anatomical landmarks and ultrasound guidance  Assessment  Injection assessment: incremental injection, frequent aspiration, injected with ease, negative aspiration, negative for heart rate change, no paresthesia on injection, no symptoms of intraneural/intravenous injection and needle tip visualized at all times  Paresthesia pain: none  Post-procedure:  site cleaned  patient tolerated the procedure well with no immediate complications

## 2024-06-24 NOTE — DISCHARGE INSTR - AVS FIRST PAGE
James R Lachman, M.D.  Attending, Orthopaedic Surgery  [facility]  [place] Office Phone: [phone] ? Fax: [phone]  [place] Office Phone: [phone] ? Fax:[phone]    : Nesha Magallanes MA    Surgery Coordinators [place]: Esther Mitchell, [phone]  Amalia Harshil, [phone]  Surgery Coordinator [place]:  Santana Diop, [phone]                                                        Lay Lawson, [phone]                                                                                                                      [email or web address]   POST-OPERATIVE INSTRUCTIONS    General Information:  Typical post operative visits are at the following intervals:  2-3 weeks post surgery, 6 weeks post surgery, 3 months post surgery, 6 months post surgery, and then on a yearly basis.  However, this may change based on Dr. Lachmans’ recommendation.  #1 post-operative rule for foot/ankle surgery:  ONCE YOU ARE OUT OF YOUR CAST AND/OR REMOVABLE BOOT, SWELLING MAY PERSIST FOR MANY MONTHS.  YOU MIGHT ALSO EXPERIENCE A BLUISH DISCOLORATION OF YOUR LEG.  THIS IS NORMAL AND PART OF THE USUAL POSTOPERATIVE EXPERIENCE.  DO NOT WAIT UNTIL YOUR BLOCK WEARS OFF TO TAKE YOUR PAIN MEDICATION.  IT TAKES A FEW DOSES OF THE PAIN MEDICATION TO REACH A THERAPEUTIC LEVEL.  TAKE A TABLET PROACTIVELY BEFORE YOU HAVE ANY PAIN AND AGAIN 4 HOURS LATER SO WHEN THE BLOCK WEARS OFF, YOU ARE NOT CAUGHT OFF GUARD.    SMOKING:  Smoking results in incomplete healing of fractures (broken bones) and joints that my have been fused.  Smoking and nicotine also prevents the growth of bone into ankle replacements and bone healing.  It also slows the healing of muscles and skin (soft tissue).  Therefore, please do not have surgery if you continue to smoke.  We reserve the right to cancel your surgery if we suspect that you are smoking.  DO NOT use  nicorette gum or other patches.  Please find an alternative method to quit smoking before your surgery and do not restart after surgery to allow for healing.      THREE RULES:    After surgery you will most likely be given the instructions “KEEP YOUR TOES ABOVE YOUR NOSE.”  This means that you MUST have your feet elevated higher than your heart.  Keeping your toes above your nose helps to heal the muscles and skin (soft tissues) by reducing swelling in your leg.  This position also helps to prevent infection, and is very important in avoiding deep venous thrombosis (blood clots).    In order to keep the blood circulating in your legs and in order to avoid deep vein   thrombosis (blood clots), we ask patients to GET UP ONCE AN HOUR during the day.  This means you should at least cross the room and come back.  It does not mean you have to be up for long periods of time.  In most cases we will not have people immediately put any weight on their operated part.  This is important to prevent loosening of metal or other devices holding the bones together.  It also prevents irritation of the soft tissues which can lead to prolonged healing.  When we say get up once an hour, please walk, hop or move with an assisted device.  This is important!    Do not do any excessive walking during the first few days after surgery.  Recovering from surgery is a full-time task for the patient.  Postoperative care is important to avoid irritating the skin incision, which can lead to infection.  Please do not plan activities or go out of town for several weeks after surgery.        AFTER YOUR SURGERY:  Bleeding through the bandage almost always occurs.  Do not let this alarm you.  Simply overwrap with an ABD pad and Ace bandage (The nurses discharging your from the day of surgery will provide this.)   If you think it is excessive, you can come in early for a dressing change (at around 1 week instead of 3 weeks postop.)    Do not get the  bandage wet.  Showering is possible with plastic protectors.   Be very careful, as the bathroom can be wet and slippery.  If you do get your dressing wet, it should be changed immediately.  Please contact us.      ONCE YOUR ARE OUT OF YOUR CAST AND/OR REMOVABLE BOOT, SWELLING MAY PERSIST FOR MANY MONTHS.  THERE WILL ALSO BE A BLUISH DISCOLORATION OF YOUR LEG FOR MONTHS.  THIS IS NORMAL AND PART OF THE USUAL POSTOPERATIVE EXPERIENCE.  WEARING COMPRESSION HOSE (ELASTIC STOCKINGS) CAN HELP AVOID SOME OF THIS SWELLING.    Ice the area 20 minutes every hour once the nerve block wears off. If you are in a cast or a splint, you may need to leave the ice on longer than 20 minutes in order to feel any benefits.       DRESSING:   The purpose of the surgical dressing is to keep your wound and the surgical site protected from the environment.  Most dressings contain splints, which help to hold your foot and ankle in a corrected position, and also allow the surgical site to heal properly. IF YOU GO TO THE EMERGENCY ROOM FOR ANY REASON, AND THEY REMOVE YOUR DRESSING OR SPLINT, YOU MUST BE SEEN IN DR. LACHMANS CLINIC TO HAVE IT REPLACED) AS SOON AS POSSIBLE (IDEALLY THE NEXT DAY).   If you have a drain in place, this will need to be removed in 1 day after surgery.  The time for the drain to be pulled will be written on your discharge instruction sheet.    CAST  INSTRUCTIONS:  You may or may not get a cast following surgery.  If you do, pay close attention to the following:    After application of a splint or cast, it is very important to elevate your leg for 24 to 72 hours.   The injured area should be elevated well above the heart.   Remember “Toes above your Nose”.  Rest and elevation greatly reduce pain and speed the healing process by minimizing early swelling.    CALL YOUR DOCTORS OFFICE OR VISIT LOCATION EMERGENCY ROOM IF YOU HAVE ANY OF THE FOLLOWING:    Significant increased pain, which may be caused by swelling (Strict  elevation will alleviate this)  Numbness and tingling in your hand or foot, which may be caused by too much pressure on the nerves (There is always numbness after surgery due to nerve blocks)  Burning and stinging, which may be caused by too much pressure on the skin  Excessive swelling below the cast, which may mean the cast is slowing your blood circulation  Loss of active movement of toes, which request an urgent evaluation (if you have had a nerve block, this is an expected thing and totally normal)  Loss of “capillary refill”.  Pinch the tip of toes and christa the skin.  Release pressure and if the skin does not return pink then call the office immediately.      DO NOT GET YOUR CAST WET.   Bacteria thrive in moist dark areas.  We do not want this.   If your cast becomes wet, return to the office and we will apply another one.    PAIN AFTER SURGERY:  Narcotic pain medication can and will depress your respiratory system if taken in excess.  The goal of pain management with narcotics is to be comfortable not pain free.  If you take enough narcotics to be pain free then you run the risk of stopping breathing.  If this happens, call 911 immediately!  Pain in the heel is often  caused by pressure from the weight of your foot on the bed.  Make sure your heel is suspended off the bed by keeping a pillow underneath your calf not your knee.    Medications:  You will be given narcotic pain medication. Do NOT drive while taking narcotic medications. Medications such as Darvocet, Percocet, Vicoden or Tylenol #3, also contain acetaminophen (Tylenol). Do not take acetaminophen or Tylenol from home when taking theses medications. When you fill your prescription, you may ask the pharmacist if your pain medication has acetaminophen/Tylenol in it. It is okay to take Tylenol with Oxycontin/Oxycodone.   Continue home Eliquis for blood clot prevention.  Narcotic medications commonly cause nausea. Taking them with food will decrease  this side effect. If you are having extreme nausea, please contact us for an alternative medication or for something that can be taken with this medication to decrease the nausea.   Also, narcotic medications frequently cause constipation. An increase of fiber, fruits and vegetables in your diet may alleviate this problem, or if necessary, you may use an over-the-counter medication such as senekot, colace, or Fibercon for constipation problems.   You should resume all medications you were taking prior to the surgery unless otherwise specified.   If you had fracture surgery, bony surgery like an osteotomy or fusion, or a surgery that requires bone healing, you are advised to take Vitamin D and Calcium to improve healing potential.  Vitamin D3 4000 units/day and Calcium 1200mg/day. These are over the counter medications so please pick them up at the pharmacy when you are picking up your prescriptions.    Activity:   Because of your recent foot surgery, your activity level will decrease. You will need to elevate your foot ABOVE the level of your heart for a minimum of four days. The length of time necessary for the swelling to go down, and for your wounds to heal properly depends greatly on your efforts here. Elevation is extremely important to avoid compromising the blood supply to your foot. Remember when your foot is down it will swell, which will increase pain and slow healing. Wiggle your toes frequently if possible.   If you go home with a regional block, (a type of anesthesia) the foot and leg will be numb. Think of ways to get into your house and around the house until the block wears off.   Keep in mind that it may be a legal issue if you drive while in a cast or splint, especially when the splint is on the right foot. You may call the Department of Motor Vehicles to schedule a road test if you have adaptive equipment applied to your car.   The amount of weight you are allowed to bear on your foot will be  written on your discharge sheet filled out at the time of surgery. The following is an explanation of the possibilities:     Non-weight bearing:   You are to put NO weight whatsoever on your foot. When using crutches or a walker, your foot should not touch the ground, except when you are standing. Then, it may rest on the ground. If you are to be non-weight bearing, and you are not compliant, you could compromise the surgery.     Some of our patients have been requesting prescriptions for a roll-a-bout knee scooter. [employer] and other insurances have been denying these claims, and you may either have to rent one or pay out of pocket to purchase one.

## 2024-06-24 NOTE — ANESTHESIA PROCEDURE NOTES
Peripheral Block    Patient location during procedure: holding area  Start time: 6/24/2024 7:14 AM  Reason for block: at surgeon's request and post-op pain management  Staffing  Performed by: Sd Savage MD  Authorized by: Sd Savage MD    Preanesthetic Checklist  Completed: patient identified, IV checked, site marked, risks and benefits discussed, surgical consent, monitors and equipment checked, pre-op evaluation and timeout performed  Peripheral Block  Patient position: supine  Prep: ChloraPrep  Patient monitoring: frequent blood pressure checks, continuous pulse oximetry and heart rate  Block type: Adductor Canal  Laterality: left  Injection technique: single-shot  Procedures: ultrasound guided, Ultrasound guidance required for the procedure to increase accuracy and safety of medication placement and decrease risk of complications.  Ultrasound permanent image saved    Needle  Needle type: Stimuplex   Needle gauge: 20 G  Needle length: 4 in  Needle localization: anatomical landmarks and ultrasound guidance  Assessment  Injection assessment: incremental injection, frequent aspiration, injected with ease, negative aspiration, negative for heart rate change, no paresthesia on injection, no symptoms of intraneural/intravenous injection and needle tip visualized at all times  Paresthesia pain: none  Post-procedure:  site cleaned  patient tolerated the procedure well with no immediate complications

## 2024-06-24 NOTE — ANESTHESIA POSTPROCEDURE EVALUATION
Post-Op Assessment Note    CV Status:  Stable  Pain Score: 0    Pain management: adequate    Multimodal analgesia used between 6 hours prior to anesthesia start to PACU discharge    Mental Status:  Alert and awake   Hydration Status:  Euvolemic and stable   PONV Controlled:  Controlled   Airway Patency:  Patent  Two or more mitigation strategies used for obstructive sleep apnea   Post Op Vitals Reviewed: Yes    No anethesia notable event occurred.    Staff: CRNA               BP   128/65   Temp   97   Pulse  76   Resp   12   SpO2   99

## 2024-06-28 ENCOUNTER — TELEPHONE (OUTPATIENT)
Age: 64
End: 2024-06-28

## 2024-06-28 LAB
DME PARACHUTE DELIVERY DATE REQUESTED: NORMAL
DME PARACHUTE ITEM DESCRIPTION: NORMAL
DME PARACHUTE ORDER STATUS: NORMAL
DME PARACHUTE SUPPLIER NAME: NORMAL
DME PARACHUTE SUPPLIER PHONE: NORMAL

## 2024-06-28 NOTE — TELEPHONE ENCOUNTER
Varsha the patients  called because the patient would like to know if the doctor is going to put in an order for the hospital bed. Please call patient to advise. Thank you.

## 2024-06-28 NOTE — TELEPHONE ENCOUNTER
Patient requesting hospital bed order.sent to [facility] .  Varsha will be call with additional info about bed.

## 2024-07-02 ENCOUNTER — OFFICE VISIT (OUTPATIENT)
Dept: OBGYN CLINIC | Facility: CLINIC | Age: 64
End: 2024-07-02

## 2024-07-02 VITALS — WEIGHT: 224 LBS | HEIGHT: 64 IN | BODY MASS INDEX: 38.24 KG/M2

## 2024-07-02 DIAGNOSIS — M21.542 ACQUIRED EQUINOVARUS DEFORMITY OF LEFT FOOT: Primary | ICD-10-CM

## 2024-07-02 DIAGNOSIS — M21.541 ACQUIRED EQUINOVARUS DEFORMITY OF RIGHT FOOT: ICD-10-CM

## 2024-07-02 PROCEDURE — 99024 POSTOP FOLLOW-UP VISIT: CPT | Performed by: ORTHOPAEDIC SURGERY

## 2024-07-02 NOTE — PATIENT INSTRUCTIONS
PIN SITE CARE:  To be done Daily    Supplies needed: hydrogen peroxide, water, gauze (4in x 4in)  Make a solution ½ water, ½ hydrogen peroxide  Soak the gauze in the solution, squeeze excess fluid from gauze  Use the moistened gauze in a “shoeshine” motion around the base of each pin, this is to remove any scabs and debris from the base of pin  If any cream medication is prescribed for you, place the cream around the base at the skin, otherwise, keep this clean and dry  The pin site may be slightly pink or red, however it is important to monitor for any yellow drainage (Pus), red srteaking or any significant changes  Contact our office if there are any problems.

## 2024-07-02 NOTE — PROGRESS NOTES
"      James R Lachman, M.D.  Attending, Orthopaedic Surgery  Foot and Ankle  [facility]      ORTHOPAEDIC FOOT AND ANKLE POST-OP VISIT     Procedure:      Left application of external fixation device lower extremity, achilles lengthening, TN joint capsule release, posterior tibial tendon lengthening, plantar fascia release        Date of surgery:   6/24/2024    Dressing change and pin site care performed today. Instructions in AVS for pin site care   Reviewed strut adjustment instructions, hand-out provided     PLAN  1. Weightbearing Status - NWB operative extremity  2. DVT prophylaxis - continue home eliquis  3. Continue to elevate 23 hrs/day getting up 1x per hour to prevent a blood clot  4. Pain control - OTC pain medication  5. RTC in 1 week(s)  6. Xrays needed next visit - No    History of Present Illness:   Chief Complaint:   Chief Complaint   Patient presents with    Post-op     Post op of the left ankle.        Kristina Russell is a 63 y.o. female who is being seen for 1 week post-operative visit for the above procedure. Pain is well controlled and the patient has successfully transitioned to OTC pain medicines.  she is taking  home eliquis  for DVT prophylaxis. Patient has been NWB in a Splint      Review of Systems:  General- denies fever/chills  Respiratory- denies cough or SOB  Cardio- denies chest pain or palpitations  GI- denies abdominal pain  Musculoskeletal- Negative except noted above  Skin- denies rashes or wounds    Physical Exam:   Ht 5' 4\" (1.626 m)   Wt 102 kg (224 lb)   BMI 38.45 kg/m²   General/Constitutional: No apparent distress: well-nourished and well developed.  Eyes: normal ocular motion  Lymphatic: No appreciable lymphadenopathy  Respiratory: Non-labored breathing  Vascular: No edema, swelling or tenderness, except as noted in detailed exam.  Integumentary: No impressive skin lesions present, except as noted in detailed exam.  Neuro: No ataxia or tremors noted  Psych: " Normal mood and affect, oriented to person, place and time. Appropriate affect.  Musculoskeletal: Normal, except as noted in detailed exam and in HPI.    Examination    left        Incision Clean, dry, intact  Sutures In    Ecchymosis mild    Swelling mild    Sensation Intact to light touch throughout sural, saphenous, superficial peroneal, deep peroneal and medial/lateral plantar nerve distributions.  [place]-Remi 5.07 filament (10g) testing deferred.    Cardiovascular Brisk capillary refill < 2 seconds,intact DP and PT pulses    Special Tests None      Imaging Studies:   No new images      Scribe Attestation      I,:  Jessica Yusuf PA-C am acting as a scribe while in the presence of the attending physician.:       I,:  James R Lachman, MD personally performed the services described in this documentation    as scribed in my presence.:                James R. Lachman, MD  Foot & Ankle Surgery   Department of Orthopaedic Surgery  [facility]      I personally performed the service.    James R. Lachman, MD

## 2024-07-05 ENCOUNTER — TELEPHONE (OUTPATIENT)
Age: 64
End: 2024-07-05

## 2024-07-05 LAB
DME PARACHUTE DELIVERY DATE ACTUAL: NORMAL
DME PARACHUTE DELIVERY DATE EXPECTED: NORMAL
DME PARACHUTE DELIVERY DATE REQUESTED: NORMAL
DME PARACHUTE ITEM DESCRIPTION: NORMAL
DME PARACHUTE ORDER STATUS: NORMAL
DME PARACHUTE SUPPLIER NAME: NORMAL
DME PARACHUTE SUPPLIER PHONE: NORMAL

## 2024-07-05 NOTE — TELEPHONE ENCOUNTER
Pt requested an order for the Unisex stretch free with tabs diapers. Please contact pt when this is done. Thank you for your help.

## 2024-07-10 ENCOUNTER — OFFICE VISIT (OUTPATIENT)
Dept: OBGYN CLINIC | Facility: CLINIC | Age: 64
End: 2024-07-10

## 2024-07-10 VITALS — HEIGHT: 64 IN | WEIGHT: 224 LBS | BODY MASS INDEX: 38.24 KG/M2

## 2024-07-10 DIAGNOSIS — M21.542 ACQUIRED EQUINOVARUS DEFORMITY OF LEFT FOOT: Primary | ICD-10-CM

## 2024-07-10 PROCEDURE — 99024 POSTOP FOLLOW-UP VISIT: CPT | Performed by: ORTHOPAEDIC SURGERY

## 2024-07-10 NOTE — PROGRESS NOTES
"      James R Lachman, M.D.  Attending, Orthopaedic Surgery  Foot and Ankle  [facility]      ORTHOPAEDIC FOOT AND ANKLE POST-OP VISIT     Procedure:      Left application of external fixation device lower extremity, achilles lengthening, TN joint capsule release, posterior tibial tendon lengthening, plantar fascia release        Date of surgery:   6/24/2024    Dressing change and pin site care performed today. Calcium alginate applied to medial calcaneal incision site. Instructions for pin site care in AVS.   Strut adjustments performed and confirmed according to personalized plan.     PLAN  1. Weightbearing Status - NWB operative extremity  2. DVT prophylaxis -  Continue home eliquis  3. Continue to elevate 23 hrs/day getting up 1x per hour to prevent a blood clot  4. Pain control - OTC pain medication  5. RTC in 1 week(s)  6. Xrays needed next visit - No    History of Present Illness:   Chief Complaint:   Chief Complaint   Patient presents with    Follow-up     Follow up of the left foot.       Kristina Russell is a 63 y.o. female who is being seen for 2 week post-operative visit for the above procedure. Pain is well controlled and the patient has successfully transitioned to OTC pain medicines.  she is taking  home eliquis  for DVT prophylaxis. Patient has been NWB in a Splint      Review of Systems:  General- denies fever/chills  Respiratory- denies cough or SOB  Cardio- denies chest pain or palpitations  GI- denies abdominal pain  Musculoskeletal- Negative except noted above  Skin- denies rashes or wounds    Physical Exam:   Ht 5' 4\" (1.626 m)   Wt 102 kg (224 lb)   BMI 38.45 kg/m²   General/Constitutional: No apparent distress: well-nourished and well developed.  Eyes: normal ocular motion  Lymphatic: No appreciable lymphadenopathy  Respiratory: Non-labored breathing  Vascular: No edema, swelling or tenderness, except as noted in detailed exam.  Integumentary: No impressive skin lesions " present, except as noted in detailed exam.  Neuro: No ataxia or tremors noted  Psych: Normal mood and affect, oriented to person, place and time. Appropriate affect.  Musculoskeletal: Normal, except as noted in detailed exam and in HPI.    Examination    left     Ankle-spanning ex-fix in place    Incision Clean, dry, intact  Sutures In    Ecchymosis mild    Swelling mild    Sensation Intact to light touch throughout sural, saphenous, superficial peroneal, deep peroneal and medial/lateral plantar nerve distributions.  [place]-Remi 5.07 filament (10g) testing deferred.    Cardiovascular Brisk capillary refill < 2 seconds,intact DP and PT pulses    Special Tests None      Imaging Studies:   No new images    Scribe Attestation      I,:  Jessica Yusuf PA-C am acting as a scribe while in the presence of the attending physician.:       I,:  James R Lachman, MD personally performed the services described in this documentation    as scribed in my presence.:              James R. Lachman, MD  Foot & Ankle Surgery   Department of Orthopaedic Surgery  [facility]      I personally performed the service.    James R. Lachman, MD

## 2024-07-10 NOTE — H&P (VIEW-ONLY)
"      James R Lachman, M.D.  Attending, Orthopaedic Surgery  Foot and Ankle  [facility]      ORTHOPAEDIC FOOT AND ANKLE POST-OP VISIT     Procedure:      Left application of external fixation device lower extremity, achilles lengthening, TN joint capsule release, posterior tibial tendon lengthening, plantar fascia release        Date of surgery:   6/24/2024    Dressing change and pin site care performed today. Calcium alginate applied to medial calcaneal incision site. Instructions for pin site care in AVS.   Strut adjustments performed and confirmed according to personalized plan.     PLAN  1. Weightbearing Status - NWB operative extremity  2. DVT prophylaxis -  Continue home eliquis  3. Continue to elevate 23 hrs/day getting up 1x per hour to prevent a blood clot  4. Pain control - OTC pain medication  5. RTC in 1 week(s)  6. Xrays needed next visit - No    History of Present Illness:   Chief Complaint:   Chief Complaint   Patient presents with    Follow-up     Follow up of the left foot.       Kristina Russell is a 63 y.o. female who is being seen for 2 week post-operative visit for the above procedure. Pain is well controlled and the patient has successfully transitioned to OTC pain medicines.  she is taking  home eliquis  for DVT prophylaxis. Patient has been NWB in a Splint      Review of Systems:  General- denies fever/chills  Respiratory- denies cough or SOB  Cardio- denies chest pain or palpitations  GI- denies abdominal pain  Musculoskeletal- Negative except noted above  Skin- denies rashes or wounds    Physical Exam:   Ht 5' 4\" (1.626 m)   Wt 102 kg (224 lb)   BMI 38.45 kg/m²   General/Constitutional: No apparent distress: well-nourished and well developed.  Eyes: normal ocular motion  Lymphatic: No appreciable lymphadenopathy  Respiratory: Non-labored breathing  Vascular: No edema, swelling or tenderness, except as noted in detailed exam.  Integumentary: No impressive skin lesions " present, except as noted in detailed exam.  Neuro: No ataxia or tremors noted  Psych: Normal mood and affect, oriented to person, place and time. Appropriate affect.  Musculoskeletal: Normal, except as noted in detailed exam and in HPI.    Examination    left     Ankle-spanning ex-fix in place    Incision Clean, dry, intact  Sutures In    Ecchymosis mild    Swelling mild    Sensation Intact to light touch throughout sural, saphenous, superficial peroneal, deep peroneal and medial/lateral plantar nerve distributions.  [place]-Remi 5.07 filament (10g) testing deferred.    Cardiovascular Brisk capillary refill < 2 seconds,intact DP and PT pulses    Special Tests None      Imaging Studies:   No new images    Scribe Attestation      I,:  Jessica Yusuf PA-C am acting as a scribe while in the presence of the attending physician.:       I,:  James R Lachman, MD personally performed the services described in this documentation    as scribed in my presence.:              James R. Lachman, MD  Foot & Ankle Surgery   Department of Orthopaedic Surgery  [facility]      I personally performed the service.    James R. Lachman, MD

## 2024-07-17 ENCOUNTER — PREP FOR PROCEDURE (OUTPATIENT)
Dept: OBGYN CLINIC | Facility: CLINIC | Age: 64
End: 2024-07-17

## 2024-07-17 ENCOUNTER — TELEPHONE (OUTPATIENT)
Dept: OBGYN CLINIC | Facility: CLINIC | Age: 64
End: 2024-07-17

## 2024-07-17 ENCOUNTER — TELEPHONE (OUTPATIENT)
Age: 64
End: 2024-07-17

## 2024-07-17 DIAGNOSIS — M21.6X2 CAVOVARUS DEFORMITY OF FOOT, ACQUIRED, LEFT: Primary | ICD-10-CM

## 2024-07-17 RX ORDER — CHLORHEXIDINE GLUCONATE ORAL RINSE 1.2 MG/ML
15 SOLUTION DENTAL ONCE
OUTPATIENT
Start: 2024-07-17 | End: 2024-07-17

## 2024-07-17 RX ORDER — CEFAZOLIN SODIUM 2 G/50ML
2000 SOLUTION INTRAVENOUS ONCE
OUTPATIENT
Start: 2024-07-29 | End: 2024-07-17

## 2024-07-17 RX ORDER — CHLORHEXIDINE GLUCONATE 40 MG/ML
SOLUTION TOPICAL DAILY PRN
OUTPATIENT
Start: 2024-07-17

## 2024-07-17 NOTE — TELEPHONE ENCOUNTER
Dr Lachman I spoke with patient & scheduled her sx! I went over everything again! She will sign the consent through [email or web address]

## 2024-07-17 NOTE — PATIENT INSTRUCTIONS
James R Lachman, M.D.  Attending, Orthopaedic Surgery  [facility]  [place] Office Phone: [phone] ? Fax: [phone]  [place] Office Phone: [phone] ? Fax:[phone]    : Nesha Magallanes MA     Surgery Coordinators [place]: Esther Mitchell, [phone]  Amalia Chua, [phone]  Surgery Coordinator [place]:  Ashcodey Jadon, [phone]                                                       Lay Lawson, [phone]                                                            [email or web address]   PRE-OPERATIVE AND POST-OPERATIVE INSTRUCTIONS    General Information:  Your surgery is with Dr. Lachman.  Dates can change (although rare) depending on emergencies.  Typical post operative visits are at the following intervals:  3 weeks post surgery(except 1 week for bunions and wound monitoring), 6 weeks post surgery, 3 months post surgery, 6 months post surgery, and then on a yearly basis.  However, this may change based on Dr. Lachmans’ recommendation.  #1 post-operative rule for foot/ankle surgery:  ONCE YOU ARE OUT OF YOUR CAST AND/OR REMOVABLE BOOT, SWELLING MAY PERSIST FOR MANY MONTHS.  YOU MIGHT ALSO EXPERIENCE A BLUISH DISCOLORATION OF YOUR LEG.  THIS IS NORMAL AND PART OF THE USUAL POSTOPERATIVE EXPERIENCE.    SMOKING:  Smoking results in incomplete healing of fractures (broken bones) and joints that my have been fused.  Smoking and nicotine also prevents the growth of bone into ankle replacements and bone healing.  It also slows the healing of muscles and skin (soft tissue).  Therefore, please do not have surgery if you continue to smoke.  We reserve the right to cancel your surgery if we suspect that you are smoking.  DO NOT use nicorette gum or other patches.  Please find an alternative method to quit smoking before your surgery.    Pre-Operative Information:  Surgery date and preoperative visits:  If you have  medical problems, such as an abnormal EKG, history of BLOOD CLOT, ANEURYSM, and any other heart condition, please inform us so that we can get your medical clearance several weeks before the surgery.  Please bring any important medical information, such as an EKG, chest x-ray, or echocardiogram, with you to ensure that your surgery will not be delayed.  If needed, you will receive your preoperative appointments in the mail or by phone from our scheduling office.  The location of the preoperative appointment will be given to you also.  You may not eat after midnight the night before surgery.  If you do, your surgery will be cancelled.   You will receive a phone call from your surgery center the day before your surgery (if your surgery is on a Monday, you will get a call the Friday before).   If you do not hear from someone by 4pm the day before your surgery, please call the Surgical coordinator (number above) to notify us.  Start taking Vitamin D3 4000 units per day and Calcium 1200mg per day immediately. You will continue this until your 3 month post-op visit. These are over the counter and available at all pharmacies and supermarkets.  FOR THOSE HAVING SURGERY AT [facility]- IF YOU WILL NEED CRUTCHES OR A ROLLING WALKER AFTER SURGERY, ASK FOR A PRESCRIPTION FOR THIS FROM OUR OFFICE TODAY.  THIS CANNOT BE HANDLED THE DAY OF SURGERY AS [facility] DOES NOT STOCK THESE.    Because bacterial can often enter any defect in the skin, it is important to avoid any cuts before surgery.  Any breaks in the skin on the leg will often result in your surgery being postponed.  Please avoid going on a very long walk the day prior to surgery, or doing other activities that could lead to irritation of the skin, including yard work, extra athletic activity, or shaving.   This could result in surgery cancellation.  You MUST be fasting the day of your surgery.  Therefore, please do not consume any foot or beverage  after midnight the night before surgery.  The morning of surgery you may take your usual medications with a sip of water.  It is important not to take anti-inflammatory medication like Ibuprofen, Motrin, Naproxen (Aleve), or Aspirin 7-10 days before surgery because they will make you bleed more than usual.  Vitamin, E, Plavix and Coumadin also have the same effect.  Stop Aspirin and Vitamin E two weeks before surgery.  YOUR MEDICAL DOCTOR SHOULD TELL YOU WHEN TO STOP COUMADIN OR PLAVIX.  If your surgery involves any bone healing, please do not take anti-inflammatories for at least 6 weeks after surgery.  This can impede bone healing (ibuprofen, Aleve, Relafen, iodine).  Tylenol is fine to take.    PREOPERATIVE BATHING INSTRUCTIONS:    Before your surgery, bathe with Hibiclens (4% Chlorhexidene) as instructed below.  This skin cleanser will help reduce the bacteria on your skin before surgery.  To avoid irritating your eyes, do not apply Hibiclens above the level of your neck.  On the evening before AND the morning of surgery, bathe your entire body except the face and scalp, then rinse freely.  DO NOT apply to your face or scalp, as Hibiclens can irritate your eyes.  Purchasing information:   Hibiclens is available without a prescription at most retail pharmacies.     ADDITIONAL INSTRUCTIONS:  PATIENTS HAVING FOOT/ANKLE SURGERY     In preparation for your upcoming surgery, we kindly request and advise the following:  Notify our office if you are taking any of the following:  Coumadin (warfarin):  Persantine (dipyridamole); Pletal (cilostazol); Plavix (clopidogrel); Ticlid (ticlopidine); Agrylin (anagrelide); Aggrenox (dipyridamole and aspirin) or other blood thinners,.  In addition, stop taking Vitamin E and herbal supplements.  Do not schedule any elective dental work for at least 6 months after surgery.  If you had an ankle replacement, you will need to take antibiotics before any future dental procedures. Your  dentist or our office can prescribe these for you.  1000mg of Amoxicillin 1 hour prior to any dental procedure is the recommended dosing.      THREE RULES:    After surgery you will most likely be given the instructions “KEEP YOUR TOES ABOVE YOUR NOSE.”  This means that you MUST have your feet elevated higher than your heart.  Keeping your toes above your nose helps to heal the muscles and skin (soft tissues) by reducing swelling in your leg.  This position also helps to prevent infection, and is very important in avoiding deep venous thrombosis (blood clots).    In order to keep the blood circulating in your legs and in order to avoid deep vein   thrombosis (blood clots), we ask patients to GET UP ONCE AN HOUR during the day.  This means you should at least cross the room and come back.  It does not mean you have to be up for long periods of time.  In most cases we will not have people immediately put any weight on their operated part.  This is important to prevent loosening of metal or other devices holding the bones together.  It also prevents irritation of the soft tissues which can lead to prolonged healing.  When we say get up once an hour, please walk, hop or move with an assisted device.  This is important!    Do not do any excessive walking during the first few days after surgery.  Recovering from surgery is a full-time task for the patient.  Postoperative care is important to avoid irritating the skin incision, which can lead to infection.  Please do not plan activities or go out of town for several weeks after surgery.  If you are unsure about your future activities, please schedule surgery only when you know it is acceptable for you.  Scheduling surgery and then canceling the date, prevents other people from having surgery on that date as it takes time to line everything up effectively.  If you cancel your surgery the week of your planned surgery, we reserve the right to cancel all future surgical  procedures.    THE DAY OF SURGERY:    Arrival to the hospital or outpatient surgical center on time is imperative.  If you arrive late, then your surgery will be cancelled.  You MUST have a family member/friend bring you, stay with you throughout the DURATION of your surgery, and drive you home.  You MUST be fasting the day of your surgery.  Therefore, do not consume any food or beverage after midnight the night before surgery.  At your pre-operative visit with the anesthesia staff, or during your phone screen, a nurse will instruct you what medications you will need to take the day of surgery.  MAKE SURE THAT THE PHARMACY LISTED IN THE ELECTRONIC MEDICAL RECORD (EPIC) IS YOUR PREFERRED PHARMACY. For example, if you are staying with family or a friend, and will not be near your preferred pharmacy, YOU MUST, tell the nurses checking you in the day of surgery so that this can be changed in the system.  If your prescriptions are sent to a pharmacy, this cannot be changed.      AFTER YOUR SURGERY:  Bleeding through the bandage almost always occurs.  Do not let this alarm you.  Simply add more gauze or a towel, call us, and come in for a dressing change.   If you think it is excessive, contact us immediately or go to the local emergency room.    Do not get the bandage wet.  Showering is possible with plastic protectors.   Be very careful, as the bathroom can be wet and slippery.  If you do get your dressing wet, it should be changed immediately.  Please contact us.      ONCE YOUR ARE OUT OF YOUR CAST AND/OR REMOVABLE BOOT, SWELLING MAY PERSIST FOR MANY MONTHS.  YOU MIGHT ALSO EXPERIENCE A BLUISH DISCOLORATION OF YOUR LEG.  THIS IS NORMAL AND PART OF THE USUAL POSTOPERATIVE EXPERIENCE.  WEARING COMPRESSION HOSE (ELASTIC STOCKINGS) CAN HELP AVOID SOME OF THIS SWELLING.      DRESSING:   The purpose of the surgical dressing is to keep your wound and the surgical site protected from the environment.  Most dressings contain  splints, which help to hold your foot and ankle in a corrected position, and also allow the surgical site to heal properly. Dressings will remain in place and undisturbed until the first postop visit.   If you have a drain in place, this will need to be removed in 1-3 days after surgery.  The time for the drain to be pulled will be written on your discharge instruction sheet.    CAST  INSTRUCTIONS:  You may or may not get a cast following surgery.  If you do, pay close attention to the following:    After application of a splint or cast, it is very important to elevate your leg for 24 to 72 hours.   The injured area should be elevated well above the heart.   Remember “Toes above your Nose”.  Rest and elevation greatly reduce pain and speed the healing process by minimizing early swelling.    CALL YOUR DOCTORS OFFICE OR VISIT LOCATION EMERGENCY ROOM IF YOU HAVE ANY OF THE FOLLOWING:    Significant increased pain, which may be caused by swelling, and the feeling that the splint or cast is too tight  Numbness and tingling in your hand or foot, which may be caused by too much pressure on the nerves  Burning and stinging, which may be caused by too much pressure on the skin  Excessive swelling below the cast, which may mean the cast is slowing your blood circulation  Loss of active movement of toes, which request an urgent evaluation  Loss of “capillary refill”.  Pinch the tip of toes and christa the skin.  Release pressure and if the skin does not return pink then call the office immediately.      DO NOT GET YOUR CAST WET.   Bacteria thrive in moist dark areas.  We do not want this.   If your cast becomes wet, return to the office and we will apply another one.    PAIN AFTER SURGERY:  Narcotic pain medication can and will depress your respiratory system if taken in excess.  The goal of pain management with narcotics is to be comfortable not pain free.  If you take enough narcotics to be pain free then you run the risk of  stopping breathing.  If this happens, call 911 immediately!  Pain in the heel is often  caused by pressure from the weight of your foot on the bed.  Make sure your heel is suspended off the bed by keeping a pillow underneath your calf not your knee.    Medications:  You will be given narcotic pain medication. Do NOT drive while taking narcotic medications. Medications such as Darvocet, Percocet, Vicoden or Tylenol #3, also contain acetaminophen (Tylenol). Do not take acetaminophen or Tylenol from home when taking theses medications. When you fill your prescription, you may ask the pharmacist if your pain medication has acetaminophen/Tylenol in it. It is okay to take Tylenol with Oxycontin/Oxycodone. Should you have pain after taking your prescription medication, ibuprophen (Motrin, Advil, and Alleve) is a common over the counter preparation and may often be taken with the prescription pain medication as long as you take them with food. These medications can irritate the stomach lining.   Unless you are allergic to aspirin or currently taking a blood thinner, Dr. Lachmans’ patients are requested to take one 325 mg aspirin every 12 hours until you are back to walking normally after surgery (This can be up to 6 weeks).  Narcotic medications commonly cause nausea. Taking them with food will decrease this side effect. If you are having extreme nausea, please contact us for an alternative medication or for something that can be taken with this medication to decrease the nausea.   Also, narcotic medications frequently cause constipation. An increase of fiber, fruits and vegetables in your diet may alleviate this problem, or if necessary, you may use an over-the-counter medication such as senekot, colace, or Fibercon for constipation problems.   You should resume all medications you were taking prior to the surgery unless otherwise specified.     Activity:   Because of your recent foot surgery, your activity level will  decrease. You will need to elevate your foot ABOVE the level of your heart for a minimum of four days. The length of time necessary for the swelling to go down, and for your wounds to heal properly depends greatly on your efforts here. Elevation is extremely important to avoid compromising the blood supply to your foot. Remember when your foot is down it will swell, which will increase pain and slow healing. Wiggle your toes frequently if possible.   If you go home with a regional block, (a type of anesthesia) the foot and leg will be numb. Think of ways to get into your house and around the house until the block wears off.   Keep in mind that it may be a legal issue if you drive while in a cast or splint, especially when the splint is on the right foot. You may call the Department of [employer] to schedule a road test if you have adaptive equipment applied to your car.   The amount of weight you are allowed to bear on your foot will be written on your discharge sheet filled out at the time of surgery. The following is an explanation of the possibilities:       COVID-19 Policies  [facility] has the following policies when it comes to ELECTIVE surgery  No elective surgery requiring anesthesia until 7 weeks after a patient tested positive for COVID-19   No elective surgery requiring anesthesia until 3 months after a patient was hospitalized for COVID-19      Non-weight bearing:   You are to put NO weight whatsoever on your foot. When using crutches or a walker, your foot should not touch the ground, except when you are standing. Then, it may rest on the ground. If you are to be non-weight bearing, and you are not compliant, you could compromise the surgery.     Some of our patients have been requesting prescriptions for a roll-a-bout knee scooter. [employer] and other insurances have been denying these claims, and you may either have to rent one or pay out of pocket to purchase one.  THIS SHOULD BE  PURCHASED PRIOR TO THE SURGERY AND YOU SHOULD BRING IT WITH YOU THE DAY OF THE SURGERY TO AIDE IN GETTING FROM THE CAR INTO THE HOUSE AFTER SURGERY.

## 2024-07-17 NOTE — TELEPHONE ENCOUNTER
Caller: Patient    Doctor: Lachman     Reason for call: has an appt today but her insuracne never requested her transportation and she is unable to make this appt. No other openings until 7/30 and patient would like to know what she should do. Stating that she has to be seen weekly      Call back#: [phone]

## 2024-07-17 NOTE — TELEPHONE ENCOUNTER
Esther,     I spoke with this patient over the phone.  I put in a case request for us to remove her ExFix.    Can we schedule her for that surgery?    How can we get her surgical consent signed?

## 2024-07-17 NOTE — TELEPHONE ENCOUNTER
Nanda has an appt today but her insuracne never requested her transportation and she is unable to make this appt. No other openings until 7/30 and patient would likew to know what she should do. Stating that she has to be seen weekly

## 2024-07-18 ENCOUNTER — ANESTHESIA EVENT (OUTPATIENT)
Dept: PERIOP | Facility: HOSPITAL | Age: 64
End: 2024-07-18
Payer: COMMERCIAL

## 2024-07-18 NOTE — PRE-PROCEDURE INSTRUCTIONS
Pre-Surgery Instructions:   Medication Instructions    apixaban (Eliquis) 5 mg Instructions provided by GERMAN ackerman did not receive instructions for this procedure, will send msg to office    atorvastatin (LIPITOR) 40 mg tablet Take day of surgery.    buPROPion (WELLBUTRIN XL) 150 mg 24 hr tablet Take day of surgery.    Cholecalciferol (VITAMIN D3 PO) Stop taking 7 days prior to surgery.    FLUoxetine (PROzac) 20 mg capsule Take day of surgery.    fluticasone (FLONASE) 50 mcg/act nasal spray Uses PRN- OK to take day of surgery    hydrOXYzine HCL (ATARAX) 10 mg tablet Uses PRN- OK to take day of surgery    loratadine (CLARITIN) 10 mg tablet Uses PRN- OK to take day of surgery    Myrbetriq 50 MG TB24 Take night before surgery    nystatin powder Stop taking 1 day prior to surgery.    omeprazole (PriLOSEC) 20 mg delayed release capsule Uses PRN- OK to take day of surgery    ondansetron (ZOFRAN) 4 mg tablet Uses PRN- OK to take day of surgery    oxyCODONE (Roxicodone) 5 immediate release tablet Uses PRN- OK to take day of surgery    rizatriptan (MAXALT) 10 mg tablet Uses PRN- OK to take day of surgery    traZODone (DESYREL) 100 mg tablet Take night before surgery   Medication instructions for day surgery reviewed. Please use only a sip of water to take your instructed medications. Avoid all over the counter vitamins, supplements and NSAIDS for one week prior to surgery per anesthesia guidelines. Tylenol is ok to take as needed.     You will receive a call one business day prior to surgery with an arrival time and hospital directions. If your surgery is scheduled on a Monday, the hospital will be calling you on the Friday prior to your surgery. If you have not heard from anyone by 8pm, please call the hospital supervisor through the hospital  at [phone]. ([place] [phone] or [place] [phone]).    Do not eat or drink anything after midnight the night before your surgery, including candy, mints,  lifesavers, or chewing gum. Do not drink alcohol 24hrs before your surgery. Try not to smoke at least 24hrs before your surgery.       Follow the pre surgery showering instructions as listed in the “My Surgical Experience Booklet” or otherwise provided by your surgeon's office. Do not use a blade to shave the surgical area 1 week before surgery. It is okay to use a clean electric clippers up to 24 hours before surgery. Do not apply any lotions, creams, including makeup, cologne, deodorant, or perfumes after showering on the day of your surgery. Do not use dry shampoo, hair spray, hair gel, or any type of hair products.     No contact lenses, eye make-up, or artificial eyelashes. Remove nail polish, including gel polish, and any artificial, gel, or acrylic nails if possible. Remove all jewelry including rings and body piercing jewelry.     Wear causal clothing that is easy to take on and off. Consider your type of surgery.    Keep any valuables, jewelry, piercings at home. Please bring any specially ordered equipment (sling, braces) if indicated.    Arrange for a responsible person to drive you to and from the hospital on the day of your surgery. Please confirm the visitor policy for the day of your procedure when you receive your phone call with an arrival time.     Call the surgeon's office with any new illnesses, exposures, or additional questions prior to surgery.    Please reference your “My Surgical Experience Booklet” for additional information to prepare for your upcoming surgery.

## 2024-07-25 NOTE — DISCHARGE INSTR - AVS FIRST PAGE
James R Lachman, M.D.  Attending, Orthopaedic Surgery  [facility]  [place] Office Phone: [phone] ? Fax: [phone]  [place] Office Phone: [phone] ? Fax:[phone]    : Nesha Magallanes MA    Surgery Coordinators [place]: Esther Mitchell, [phone]  Amalia Harshil, [phone]  Surgery Coordinator [place]:  Santana Diop, [phone]                                                        Lay Lawson, [phone]                                                                                                                      [email or web address]   POST-OPERATIVE INSTRUCTIONS    General Information:  Typical post operative visits are at the following intervals:  2-3 weeks post surgery, 6 weeks post surgery, 3 months post surgery, 6 months post surgery, and then on a yearly basis.  However, this may change based on Dr. Lachmans’ recommendation.  #1 post-operative rule for foot/ankle surgery:  ONCE YOU ARE OUT OF YOUR CAST AND/OR REMOVABLE BOOT, SWELLING MAY PERSIST FOR MANY MONTHS.  YOU MIGHT ALSO EXPERIENCE A BLUISH DISCOLORATION OF YOUR LEG.  THIS IS NORMAL AND PART OF THE USUAL POSTOPERATIVE EXPERIENCE.  DO NOT WAIT UNTIL YOUR BLOCK WEARS OFF TO TAKE YOUR PAIN MEDICATION.  IT TAKES A FEW DOSES OF THE PAIN MEDICATION TO REACH A THERAPEUTIC LEVEL.  TAKE A TABLET PROACTIVELY BEFORE YOU HAVE ANY PAIN AND AGAIN 4 HOURS LATER SO WHEN THE BLOCK WEARS OFF, YOU ARE NOT CAUGHT OFF GUARD.    SMOKING:  Smoking results in incomplete healing of fractures (broken bones) and joints that my have been fused.  Smoking and nicotine also prevents the growth of bone into ankle replacements and bone healing.  It also slows the healing of muscles and skin (soft tissue).  Therefore, please do not have surgery if you continue to smoke.  We reserve the right to cancel your surgery if we suspect that you are smoking.  DO NOT use  nicorette gum or other patches.  Please find an alternative method to quit smoking before your surgery and do not restart after surgery to allow for healing.      THREE RULES:    After surgery you will most likely be given the instructions “KEEP YOUR TOES ABOVE YOUR NOSE.”  This means that you MUST have your feet elevated higher than your heart.  Keeping your toes above your nose helps to heal the muscles and skin (soft tissues) by reducing swelling in your leg.  This position also helps to prevent infection, and is very important in avoiding deep venous thrombosis (blood clots).    In order to keep the blood circulating in your legs and in order to avoid deep vein   thrombosis (blood clots), we ask patients to GET UP ONCE AN HOUR during the day.  This means you should at least cross the room and come back.  It does not mean you have to be up for long periods of time.  In most cases we will not have people immediately put any weight on their operated part.  This is important to prevent loosening of metal or other devices holding the bones together.  It also prevents irritation of the soft tissues which can lead to prolonged healing.  When we say get up once an hour, please walk, hop or move with an assisted device.  This is important!    Do not do any excessive walking during the first few days after surgery.  Recovering from surgery is a full-time task for the patient.  Postoperative care is important to avoid irritating the skin incision, which can lead to infection.  Please do not plan activities or go out of town for several weeks after surgery.        AFTER YOUR SURGERY:  Bleeding through the bandage almost always occurs.  Do not let this alarm you.  Simply overwrap with an ABD pad and Ace bandage (The nurses discharging your from the day of surgery will provide this.)   If you think it is excessive, you can come in early for a dressing change (at around 1 week instead of 3 weeks postop.)    Do not get the  bandage wet.  Showering is possible with plastic protectors.   Be very careful, as the bathroom can be wet and slippery.  If you do get your dressing wet, it should be changed immediately.  Please contact us.      ONCE YOUR ARE OUT OF YOUR CAST AND/OR REMOVABLE BOOT, SWELLING MAY PERSIST FOR MANY MONTHS.  THERE WILL ALSO BE A BLUISH DISCOLORATION OF YOUR LEG FOR MONTHS.  THIS IS NORMAL AND PART OF THE USUAL POSTOPERATIVE EXPERIENCE.  WEARING COMPRESSION HOSE (ELASTIC STOCKINGS) CAN HELP AVOID SOME OF THIS SWELLING.    Ice the area 20 minutes every hour once the nerve block wears off. If you are in a cast or a splint, you may need to leave the ice on longer than 20 minutes in order to feel any benefits.       DRESSING:   The purpose of the surgical dressing is to keep your wound and the surgical site protected from the environment.  Most dressings contain splints, which help to hold your foot and ankle in a corrected position, and also allow the surgical site to heal properly. IF YOU GO TO THE EMERGENCY ROOM FOR ANY REASON, AND THEY REMOVE YOUR DRESSING OR SPLINT, YOU MUST BE SEEN IN DR. LACHMANS CL[facility] TO HAVE IT REPLACED) AS SOON AS POSSIBLE (IDEALLY THE NEXT DAY).   If you have a drain in place, this will need to be removed in 1 day after surgery.  The time for the drain to be pulled will be written on your discharge instruction sheet.    CAST  INSTRUCTIONS:  You may or may not get a cast following surgery.  If you do, pay close attention to the following:    After application of a splint or cast, it is very important to elevate your leg for 24 to 72 hours.   The injured area should be elevated well above the heart.   Remember “Toes above your Nose”.  Rest and elevation greatly reduce pain and speed the healing process by minimizing early swelling.    CALL YOUR DOCTORS OFFICE OR VISIT LOCATION EMERGENCY ROOM IF YOU HAVE ANY OF THE FOLLOWING:    Significant increased pain, which may be caused by swelling (Strict  elevation will alleviate this)  Numbness and tingling in your hand or foot, which may be caused by too much pressure on the nerves (There is always numbness after surgery due to nerve blocks)  Burning and stinging, which may be caused by too much pressure on the skin  Excessive swelling below the cast, which may mean the cast is slowing your blood circulation  Loss of active movement of toes, which request an urgent evaluation (if you have had a nerve block, this is an expected thing and totally normal)  Loss of “capillary refill”.  Pinch the tip of toes and christa the skin.  Release pressure and if the skin does not return pink then call the office immediately.      DO NOT GET YOUR CAST WET.   Bacteria thrive in moist dark areas.  We do not want this.   If your cast becomes wet, return to the office and we will apply another one.    PAIN AFTER SURGERY:  Narcotic pain medication can and will depress your respiratory system if taken in excess.  The goal of pain management with narcotics is to be comfortable not pain free.  If you take enough narcotics to be pain free then you run the risk of stopping breathing.  If this happens, call 911 immediately!  Pain in the heel is often  caused by pressure from the weight of your foot on the bed.  Make sure your heel is suspended off the bed by keeping a pillow underneath your calf not your knee.    Medications:  You will be given narcotic pain medication. Do NOT drive while taking narcotic medications. Medications such as Darvocet, Percocet, Vicoden or Tylenol #3, also contain acetaminophen (Tylenol). Do not take acetaminophen or Tylenol from home when taking theses medications. When you fill your prescription, you may ask the pharmacist if your pain medication has acetaminophen/Tylenol in it. It is okay to take Tylenol with Oxycontin/Oxycodone.   Unless you are allergic to aspirin or currently taking a blood thinner, Dr. Lachmans’ patients are requested to take one 325 mg  aspirin every 12 hours until you are back to walking normally after surgery (This can be up to 6 weeks). Ecotrin (Enteric-coated aspirin) is more sensitive to the stomach and we recommend purchasing this instead of regular aspirin to minimize the risk of stomach irritation.  Narcotic medications commonly cause nausea. Taking them with food will decrease this side effect. If you are having extreme nausea, please contact us for an alternative medication or for something that can be taken with this medication to decrease the nausea.   Also, narcotic medications frequently cause constipation. An increase of fiber, fruits and vegetables in your diet may alleviate this problem, or if necessary, you may use an over-the-counter medication such as senekot, colace, or Fibercon for constipation problems.   You should resume all medications you were taking prior to the surgery unless otherwise specified.   If you had fracture surgery, bony surgery like an osteotomy or fusion, or a surgery that requires bone healing, you are advised to take Vitamin D and Calcium to improve healing potential.  Vitamin D3 4000 units/day and Calcium 1200mg/day. These are over the counter medications so please pick them up at the pharmacy when you are picking up your prescriptions.    Activity:   Because of your recent foot surgery, your activity level will decrease. You will need to elevate your foot ABOVE the level of your heart for a minimum of four days. The length of time necessary for the swelling to go down, and for your wounds to heal properly depends greatly on your efforts here. Elevation is extremely important to avoid compromising the blood supply to your foot. Remember when your foot is down it will swell, which will increase pain and slow healing. Wiggle your toes frequently if possible.   If you go home with a regional block, (a type of anesthesia) the foot and leg will be numb. Think of ways to get into your house and around the  house until the block wears off.   Keep in mind that it may be a legal issue if you drive while in a cast or splint, especially when the splint is on the right foot. You may call the Department of Motor Vehicles to schedule a road test if you have adaptive equipment applied to your car.   The amount of weight you are allowed to bear on your foot will be written on your discharge sheet filled out at the time of surgery. The following is an explanation of the possibilities:     Weight bearing as tolerated to the left lower extremity for transfers only.  Otherwise patient is nonweightbearing on the left lower extremity.    Some of our patients have been requesting prescriptions for a roll-a-bout knee scooter. [employer] and other insurances have been denying these claims, and you may either have to rent one or pay out of pocket to purchase one.

## 2024-07-26 ENCOUNTER — TELEPHONE (OUTPATIENT)
Age: 64
End: 2024-07-26

## 2024-07-29 ENCOUNTER — HOSPITAL ENCOUNTER (OUTPATIENT)
Facility: HOSPITAL | Age: 64
Setting detail: OUTPATIENT SURGERY
Discharge: HOME | End: 2024-07-29
Attending: ORTHOPAEDIC SURGERY | Admitting: ORTHOPAEDIC SURGERY
Payer: COMMERCIAL

## 2024-07-29 ENCOUNTER — ANESTHESIA (OUTPATIENT)
Dept: PERIOP | Facility: HOSPITAL | Age: 64
End: 2024-07-29
Payer: COMMERCIAL

## 2024-07-29 VITALS
BODY MASS INDEX: 34.15 KG/M2 | OXYGEN SATURATION: 95 % | HEIGHT: 64 IN | WEIGHT: 200 LBS | DIASTOLIC BLOOD PRESSURE: 64 MMHG | RESPIRATION RATE: 17 BRPM | HEART RATE: 76 BPM | TEMPERATURE: 97 F | SYSTOLIC BLOOD PRESSURE: 146 MMHG

## 2024-07-29 DIAGNOSIS — M21.542 ACQUIRED EQUINOVARUS DEFORMITY OF LEFT FOOT: ICD-10-CM

## 2024-07-29 PROCEDURE — 20694 RMVL EXT FIXJ SYS UNDER ANES: CPT | Performed by: PHYSICIAN ASSISTANT

## 2024-07-29 PROCEDURE — 20694 RMVL EXT FIXJ SYS UNDER ANES: CPT | Performed by: ORTHOPAEDIC SURGERY

## 2024-07-29 RX ORDER — DIPHENHYDRAMINE HYDROCHLORIDE 50 MG/ML
12.5 INJECTION INTRAMUSCULAR; INTRAVENOUS ONCE AS NEEDED
OUTPATIENT
Start: 2024-07-29

## 2024-07-29 RX ORDER — LIDOCAINE HYDROCHLORIDE 10 MG/ML
INJECTION, SOLUTION EPIDURAL; INFILTRATION; INTRACAUDAL; PERINEURAL AS NEEDED
Status: DISCONTINUED | OUTPATIENT
Start: 2024-07-29 | End: 2024-07-29

## 2024-07-29 RX ORDER — DEXAMETHASONE SODIUM PHOSPHATE 10 MG/ML
INJECTION, SOLUTION INTRAMUSCULAR; INTRAVENOUS AS NEEDED
Status: DISCONTINUED | OUTPATIENT
Start: 2024-07-29 | End: 2024-07-29

## 2024-07-29 RX ORDER — SODIUM CHLORIDE, SODIUM LACTATE, POTASSIUM CHLORIDE, CALCIUM CHLORIDE 600; 310; 30; 20 MG/100ML; MG/100ML; MG/100ML; MG/100ML
INJECTION, SOLUTION INTRAVENOUS CONTINUOUS PRN
Status: DISCONTINUED | OUTPATIENT
Start: 2024-07-29 | End: 2024-07-29

## 2024-07-29 RX ORDER — OXYCODONE HYDROCHLORIDE 5 MG/1
5 TABLET ORAL EVERY 6 HOURS PRN
Qty: 20 TABLET | Refills: 0 | Status: SHIPPED | OUTPATIENT
Start: 2024-07-29

## 2024-07-29 RX ORDER — CHLORHEXIDINE GLUCONATE ORAL RINSE 1.2 MG/ML
15 SOLUTION DENTAL ONCE
Status: COMPLETED | OUTPATIENT
Start: 2024-07-29 | End: 2024-07-29

## 2024-07-29 RX ORDER — ONDANSETRON 2 MG/ML
INJECTION INTRAMUSCULAR; INTRAVENOUS AS NEEDED
Status: DISCONTINUED | OUTPATIENT
Start: 2024-07-29 | End: 2024-07-29

## 2024-07-29 RX ORDER — ONDANSETRON 2 MG/ML
4 INJECTION INTRAMUSCULAR; INTRAVENOUS ONCE AS NEEDED
OUTPATIENT
Start: 2024-07-29

## 2024-07-29 RX ORDER — FENTANYL CITRATE 50 UG/ML
INJECTION, SOLUTION INTRAMUSCULAR; INTRAVENOUS AS NEEDED
Status: DISCONTINUED | OUTPATIENT
Start: 2024-07-29 | End: 2024-07-29

## 2024-07-29 RX ORDER — CEFAZOLIN SODIUM 2 G/50ML
2000 SOLUTION INTRAVENOUS ONCE
Status: COMPLETED | OUTPATIENT
Start: 2024-07-29 | End: 2024-07-29

## 2024-07-29 RX ORDER — PROPOFOL 10 MG/ML
INJECTION, EMULSION INTRAVENOUS AS NEEDED
Status: DISCONTINUED | OUTPATIENT
Start: 2024-07-29 | End: 2024-07-29

## 2024-07-29 RX ORDER — FENTANYL CITRATE/PF 50 MCG/ML
25 SYRINGE (ML) INJECTION
OUTPATIENT
Start: 2024-07-29

## 2024-07-29 RX ORDER — CHLORHEXIDINE GLUCONATE 40 MG/ML
SOLUTION TOPICAL DAILY PRN
Status: DISCONTINUED | OUTPATIENT
Start: 2024-07-29 | End: 2024-07-29 | Stop reason: HOSPADM

## 2024-07-29 RX ORDER — HYDROMORPHONE HCL IN WATER/PF 6 MG/30 ML
0.2 PATIENT CONTROLLED ANALGESIA SYRINGE INTRAVENOUS
OUTPATIENT
Start: 2024-07-29

## 2024-07-29 RX ADMIN — ONDANSETRON 4 MG: 2 INJECTION INTRAMUSCULAR; INTRAVENOUS at 07:19

## 2024-07-29 RX ADMIN — FENTANYL CITRATE 50 MCG: 50 INJECTION, SOLUTION INTRAMUSCULAR; INTRAVENOUS at 07:19

## 2024-07-29 RX ADMIN — DEXAMETHASONE SODIUM PHOSPHATE 10 MG: 10 INJECTION, SOLUTION INTRAMUSCULAR; INTRAVENOUS at 07:19

## 2024-07-29 RX ADMIN — LIDOCAINE HYDROCHLORIDE 50 MG: 10 INJECTION, SOLUTION EPIDURAL; INFILTRATION; INTRACAUDAL; PERINEURAL at 07:19

## 2024-07-29 RX ADMIN — CEFAZOLIN SODIUM 2000 MG: 2 SOLUTION INTRAVENOUS at 07:18

## 2024-07-29 RX ADMIN — SODIUM CHLORIDE, SODIUM LACTATE, POTASSIUM CHLORIDE, AND CALCIUM CHLORIDE: .6; .31; .03; .02 INJECTION, SOLUTION INTRAVENOUS at 07:18

## 2024-07-29 RX ADMIN — PROPOFOL 140 MG: 10 INJECTION, EMULSION INTRAVENOUS at 07:19

## 2024-07-29 RX ADMIN — CHLORHEXIDINE GLUCONATE 0.12% ORAL RINSE 15 ML: 1.2 LIQUID ORAL at 06:39

## 2024-07-29 NOTE — OP NOTE
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 Removal multiplaned external fixator system  2. Removal of Pins through toes 1-5.  3. Placement into short leg weightbearing fiberglass cast.        OPERATIVE REPORT:    After informed consent and preoperative medical clearance were obtained, the patient was taken to the preoperative holding area. Allergies were properly assessed and patient was given appropriate perioperative IV antibiotics without complication. Please see the anesthesia report for details of the anesthesia administered.     Patient was taken the operating room placed prone on the stretcher.  All bony prominences and skin were well padded, airway maintained, genitalia protected and brachial plexi/ulnar nerves at the elbows protected.     We used the pin cutter to cut the thin wires.    We removed the frame from the leg.    We removed the pins from the toes using the wrench.    We prepared them with alcohol and removed the wires from the appropriate sides.    We removed the sutures and then dressed the wounds.    Sterile dressings were applied with the ankle in neutral position and over-abundant padding with a short leg fiberglass cast applied.  Satisfactory capillary refill remained in all toes. Patient tolerated the procedure well. There were no complications. He was taken to the recovery room in stable condition.         DISPOSITION:   1. Patient is stable to PACU.  2. Weightbearing for transfers only.  3. Pain control.  4. DVT prophylaxis with ASA 325mg BID.  5. Follow-up at 3 weeks          I was present for the entire procedure., A qualified resident physician was not available., and A physician assistant was required during the procedure for retraction, tissue handling, dissection and suturing.    Patient Disposition:  PACU         SIGNATURE: James R Lachman, MD  DATE: July 29, 2024  TIME: 1:49 PM

## 2024-07-29 NOTE — INTERVAL H&P NOTE
H&P reviewed. After examining the patient I find no changes in the patients condition since the H&P had been written.    Vitals:    07/29/24 0615   BP: 116/67   Pulse: 94   Resp: 16   Temp: (!) 97 °F (36.1 °C)   SpO2: 95%       Removal of external fixator left ankle.

## 2024-07-29 NOTE — ANESTHESIA PREPROCEDURE EVALUATION
Procedure:  REMOVAL EXTERNAL FIXATOR (EX FIX),  Placement of short leg cast (Left: Leg Upper)    Relevant Problems   CARDIO   (+) Migraine   (+) Mixed hyperlipidemia      GI/HEPATIC   (+) Gastroesophageal reflux disease without esophagitis      NEURO/PSYCH   (+) Migraine      PULMONARY   (+) Apnea, sleep        Physical Exam    Airway    Mallampati score: I  TM Distance: >3 FB  Neck ROM: full     Dental       Cardiovascular  Cardiovascular exam normal    Pulmonary  Pulmonary exam normal     Other Findings  post-pubertal.      Anesthesia Plan  ASA Score- 3     Anesthesia Type- general with ASA Monitors.         Additional Monitors:     Airway Plan: LMA.           Plan Factors-Exercise tolerance (METS): >4 METS.    Chart reviewed. EKG reviewed. Imaging results reviewed. Existing labs reviewed. Patient summary reviewed.    Patient is not a current smoker.  Patient did not smoke on day of surgery.    Obstructive sleep apnea risk education given perioperatively.        Induction- intravenous.    Postoperative Plan- Plan for postoperative opioid use. Planned trial extubation        Informed Consent- Anesthetic plan and risks discussed with patient.  I personally reviewed this patient with the CRNA. Discussed and agreed on the Anesthesia Plan with the CRNA..

## 2024-07-29 NOTE — ANESTHESIA POSTPROCEDURE EVALUATION
Post-Op Assessment Note    CV Status:  Stable  Pain Score: 0    Pain management: adequate    Multimodal analgesia used between 6 hours prior to anesthesia start to PACU discharge    Mental Status:  Alert and awake   Hydration Status:  Euvolemic and stable   PONV Controlled:  Controlled   Airway Patency:  Patent  Two or more mitigation strategies used for obstructive sleep apnea   Post Op Vitals Reviewed: Yes    No anethesia notable event occurred.    Staff: CRNA               BP   114/58   Temp   97   Pulse  69   Resp   12   SpO2   99

## 2024-08-13 NOTE — PROGRESS NOTES
Ambulatory Visit  Name: Kristina Russell      : 1960      MRN: [id number]  Encounter Provider: Alfred Crane MD  Encounter Date: 2024   Encounter department: [facility]    Assessment & Plan   1. Obesity, unspecified obesity severity, unspecified obesity type  Assessment & Plan:  Patient to increase exercise and partake of a diet with less calories to promote  weight loss   2. Mixed hyperlipidemia  Assessment & Plan:  Patient  is stable with current medication and we discussed a low fat low cholesterol diet. Weight loss also discussed for this will help lower cholesterol also. Recheck lipids in 6 months.   Orders:  -     Myrbetriq 50 MG TB24; Take 1 tablet (50 mg total) by mouth daily  3. Hemiparesis, unspecified hemiparesis etiology, unspecified laterality (HCC)  Assessment & Plan:  Patient is stable  and will continue present plan of care and reassess at next routine visit. All questions about this problem from patient were answered today.   4. Gastroesophageal reflux disease without esophagitis  Assessment & Plan:  Patient to continue with present therapy and decrease caffeine, avoid ETOH and smoking to decrease acid production. Pt should also cease eating prior to bedtime and avoid excessive fluid intake prior to sleep. May use antacids as needed for breakthrough GERD. All pateint questions answered today about this condition.   5. Acquired equinovarus deformity of left foot  Assessment & Plan:  For surgical correction  6. Acute cystitis without hematuria  -     sulfamethoxazole-trimethoprim (BACTRIM DS) 800-160 mg per tablet; Take 1 tablet by mouth 2 (two) times a day for 7 days  7. Pain  -     gabapentin (Neurontin) 100 mg capsule; Take 3 capsules (300 mg total) by mouth 3 (three) times a day  8. Other migraine without status migrainosus, not intractable  -     rizatriptan (MAXALT) 10 mg tablet; Take 1 tablet (10 mg total) by mouth as needed for migraine Take at the onset  of migraine; if symptoms continue or return, may take another dose at least 2 hours after first dose. Take no more than 2 doses in a day.  9. Current moderate episode of major depressive disorder without prior episode (HCC)  -     buPROPion (WELLBUTRIN XL) 150 mg 24 hr tablet; Take 1 tablet (150 mg total) by mouth every morning  10. Anxiety  -     busPIRone (BUSPAR) 15 mg tablet; Take 1 tablet (15 mg total) by mouth 3 (three) times a day         History of Present Illness     83-year-old female here today for checkup for multimedical problems.  Patient history with partially spastic paralysis of the left side as well as also with the right foot status post COVID.  Patient also with hyperlipidemia depression anxiety GERD BMI of 34.  Patient recently had surgery to fix her of contracture on her left foot by orthopedics and has her foot in a cast currently.  Patient was scheduled to see neurology about this deformity she has with her left side as well as also with her right foot but had to reschedule due to her surgery.  She is seen today in a room with in a [facility] [employer] with her aide.  She is doing better she is fairly good pain control although she does have a constipation.  She does need refills on her medications today which was done she also has symptoms of a UTI and she is not able to give us a urine specimen due to the fact that she is confined to her gurney and is not ambulatory we will empirically treat her with some Bactrim DS today.  Will see her back in approximately 3 months.      Review of Systems   Constitutional:  Negative for activity change, appetite change, fatigue and fever.   HENT:  Negative for congestion, ear pain, postnasal drip, rhinorrhea, sinus pressure, sinus pain, sneezing and sore throat.    Eyes:  Negative for pain and redness.   Respiratory:  Negative for apnea, cough, chest tightness, shortness of breath and wheezing.    Cardiovascular:  Negative for chest pain, palpitations and leg swelling.    Gastrointestinal:  Negative for abdominal pain, constipation, diarrhea, nausea and vomiting.   Endocrine: Negative for cold intolerance and heat intolerance.   Genitourinary:  Negative for difficulty urinating, dysuria, frequency, hematuria and urgency.   Musculoskeletal:  Positive for gait problem. Negative for arthralgias and myalgias.   Skin:  Negative for rash.   Neurological:  Negative for dizziness, speech difficulty, weakness, numbness and headaches.   Hematological:  Does not bruise/bleed easily.   Psychiatric/Behavioral:  Negative for agitation, confusion and hallucinations.      Past Medical History:   Diagnosis Date   • Anemia    • Anxiety [id number]   • Cancer (HCC) 2007    breast history of   • Depression [id number]   • Gastroesophageal reflux disease without esophagitis 2021   • Migraine    • Mixed hyperlipidemia 2021   • Mixed hyperlipidemia 2021   • Obesity, unspecified obesity severity, unspecified obesity type 2018   • PE (pulmonary thromboembolism) ([facility])    • Skin cancer     Left leg lump. cryotherapy and excision, unsure of which kind    • Sleep apnea    • Stroke ([facility])     questionable history per pt     Past Surgical History:   Procedure Laterality Date   • ACHILLES TENDON SURGERY Left 2024    Procedure: Achilles lengthening, talonavicular joint capsule release, posterior tibial tendon lengthening, plantar fascia release;  Surgeon: James R Lachman, MD;  Location:  MAIN OR;  Service: Orthopedics   • ANKLE SURGERY Left 2017    pins and plate    • BREAST LUMPECTOMY Right      - malignant   •  SECTION      x2   • DILATION AND CURETTAGE OF UTERUS     • EXTERNAL FIXATOR (EX FIX) REMOVAL Left 2024    Procedure: REMOVAL EXTERNAL FIXATOR (EX FIX),  Placement of short leg cast;  Surgeon: James R Lachman, MD;  Location:  MAIN OR;  Service: Orthopedics   • FOOT CAPSULE RELEASE W/ PERCUTANEOUS HEEL CORD LENGTHENING, TIBIAL TENDON TRANSFER Left 2024     Procedure: Achilles lengthening, talonavicular joint capsule release, posterior tibial tendon lengthening, plantar fascia release;  Surgeon: James R Lachman, MD;  Location:  MAIN OR;  Service: Orthopedics   • PLANTAR FASCIA SURGERY Left 6/24/2024    Procedure: plantar fascia release;  Surgeon: James R Lachman, MD;  Location:  MAIN OR;  Service: Orthopedics   • ID APPLICATION MULTIPLANE EXTERNAL FIXATION SYSTEM Left 6/24/2024    Procedure: APPLICATION EXTERNAL FIXATION DEVICE LOWER EXTREMITY, Achilles lengthening, talonavicular joint capsule release, posterior tibial tendon lengthening, plantar fascia release;  Surgeon: James R Lachman, MD;  Location:  MAIN OR;  Service: Orthopedics   • REDUCTION MAMMAPLASTY     • TONSILLECTOMY      age 8     Family History   Problem Relation Age of Onset   • No Known Problems Mother    • No Known Problems Father    • Stroke Maternal Grandmother    • Stroke Maternal Grandfather    • Lung cancer Brother      Social History     Tobacco Use   • Smoking status: Never     Passive exposure: Never   • Smokeless tobacco: Never   Vaping Use   • Vaping status: Never Used   Substance and Sexual Activity   • Alcohol use: Never   • Drug use: Never   • Sexual activity: Not Currently     Partners: Male     Birth control/protection: None     Current Outpatient Medications on File Prior to Visit   Medication Sig   • apixaban (Eliquis) 5 mg Take 1 tablet (5 mg total) by mouth 2 (two) times a day   • atorvastatin (LIPITOR) 40 mg tablet Take 1 tablet (40 mg total) by mouth daily   • Cholecalciferol (VITAMIN D3 PO) Take 1 tablet by mouth daily   • FLUoxetine (PROzac) 20 mg capsule Take 1 capsule (20 mg total) by mouth daily   • fluticasone (FLONASE) 50 mcg/act nasal spray 1 spray into each nostril daily (Patient taking differently: 1 spray into each nostril daily As needed)   • loratadine (CLARITIN) 10 mg tablet Take 1 tablet (10 mg total) by mouth daily (Patient taking differently: Take 10 mg by  mouth daily As needed)   • nystatin powder apply topically to affected area four times a day (Patient taking differently: As needed under breasts)   • omeprazole (PriLOSEC) 20 mg delayed release capsule Take 1 capsule (20 mg total) by mouth daily (Patient taking differently: Take 20 mg by mouth daily As needed)   • ondansetron (ZOFRAN) 4 mg tablet Take 1 tablet (4 mg total) by mouth every 8 (eight) hours as needed for nausea or vomiting   • oxyCODONE (Roxicodone) 5 immediate release tablet Take 1 tablet (5 mg total) by mouth every 6 (six) hours as needed for severe pain Max Daily Amount: 20 mg   • traZODone (DESYREL) 100 mg tablet Take 1 tablet (100 mg total) by mouth daily at bedtime   • [DISCONTINUED] buPROPion (WELLBUTRIN XL) 150 mg 24 hr tablet Take 1 tablet (150 mg total) by mouth every morning   • [DISCONTINUED] busPIRone (BUSPAR) 15 mg tablet    • [DISCONTINUED] gabapentin (Neurontin) 100 mg capsule Take 2 capsules (200 mg total) by mouth 4 (four) times a day   • [DISCONTINUED] hydrOXYzine HCL (ATARAX) 10 mg tablet Take 10 mg by mouth every 6 (six) hours as needed Anxiety   • [DISCONTINUED] Myrbetriq 50 MG TB24 Take 1 tablet (50 mg total) by mouth daily (Patient taking differently: Take 1 tablet by mouth daily at bedtime)   • [DISCONTINUED] rizatriptan (MAXALT) 10 mg tablet Take 1 tablet (10 mg total) by mouth as needed for migraine Take at the onset of migraine; if symptoms continue or return, may take another dose at least 2 hours after first dose. Take no more than 2 doses in a day.   • senna-docusate sodium (SENOKOT S) 8.6-50 mg per tablet Take 1 tablet by mouth daily at bedtime for 7 days     Allergies   Allergen Reactions   • Codeine Headache     Headaches  Other reaction(s): headache     Immunization History   Administered Date(s) Administered   • INFLUENZA 08/06/2014, 10/19/2015, 08/21/2016, 11/17/2017, 08/19/2018, 10/03/2022   • Influenza Quadrivalent Preservative Free 3 years and older IM 08/19/2018    • Influenza, injectable, quadrivalent, preservative free 0.5 mL 08/19/2018   • Influenza, recombinant, quadrivalent,injectable, preservative free 10/03/2022   • Pneumococcal Conjugate Vaccine 20-valent (Pcv20), Polysace 10/03/2022   • Pneumococcal Polysaccharide PPV23 05/25/2020   • Tdap 09/13/2012   • Tuberculin Skin Test-PPD Intradermal 08/06/2018, 08/23/2018   • Zoster 05/29/2015     Objective     /74 (BP Location: Left arm, Patient Position: Supine, Cuff Size: Large)   Pulse 82   Temp 98.4 °F (36.9 °C) (Skin)   SpO2 97%     Physical Exam  Constitutional:       Appearance: Normal appearance. She is obese. She is not ill-appearing.   HENT:      Head: Normocephalic and atraumatic.      Right Ear: Tympanic membrane normal.      Left Ear: Tympanic membrane normal.      Nose: Nose normal.      Mouth/Throat:      Mouth: Mucous membranes are moist.   Eyes:      Extraocular Movements: Extraocular movements intact.      Conjunctiva/sclera: Conjunctivae normal.      Pupils: Pupils are equal, round, and reactive to light.   Cardiovascular:      Rate and Rhythm: Normal rate and regular rhythm.   Pulmonary:      Effort: Pulmonary effort is normal. No respiratory distress.      Breath sounds: Normal breath sounds. No wheezing.   Abdominal:      General: Bowel sounds are normal.      Palpations: Abdomen is soft.      Tenderness: There is no abdominal tenderness.   Musculoskeletal:         General: Tenderness and deformity present.      Cervical back: Normal range of motion and neck supple.      Right lower leg: No edema.      Left lower leg: No edema.      Comments: Cast on L foot.   Skin:     General: Skin is warm and dry.   Neurological:      Mental Status: She is alert and oriented to person, place, and time.   Psychiatric:         Mood and Affect: Mood normal.         Behavior: Behavior normal.         Thought Content: Thought content normal.         Judgment: Judgment normal.

## 2024-08-14 ENCOUNTER — OFFICE VISIT (OUTPATIENT)
Dept: FAMILY MEDICINE CLINIC | Facility: CLINIC | Age: 64
End: 2024-08-14
Payer: COMMERCIAL

## 2024-08-14 VITALS
HEART RATE: 82 BPM | SYSTOLIC BLOOD PRESSURE: 118 MMHG | OXYGEN SATURATION: 97 % | DIASTOLIC BLOOD PRESSURE: 74 MMHG | TEMPERATURE: 98.4 F

## 2024-08-14 DIAGNOSIS — E78.2 MIXED HYPERLIPIDEMIA: ICD-10-CM

## 2024-08-14 DIAGNOSIS — E66.9 OBESITY, UNSPECIFIED OBESITY SEVERITY, UNSPECIFIED OBESITY TYPE: Primary | ICD-10-CM

## 2024-08-14 DIAGNOSIS — F32.1 CURRENT MODERATE EPISODE OF MAJOR DEPRESSIVE DISORDER WITHOUT PRIOR EPISODE (HCC): ICD-10-CM

## 2024-08-14 DIAGNOSIS — G43.809 OTHER MIGRAINE WITHOUT STATUS MIGRAINOSUS, NOT INTRACTABLE: ICD-10-CM

## 2024-08-14 DIAGNOSIS — R52 PAIN: ICD-10-CM

## 2024-08-14 DIAGNOSIS — K21.9 GASTROESOPHAGEAL REFLUX DISEASE WITHOUT ESOPHAGITIS: ICD-10-CM

## 2024-08-14 DIAGNOSIS — F41.9 ANXIETY: ICD-10-CM

## 2024-08-14 DIAGNOSIS — N30.00 ACUTE CYSTITIS WITHOUT HEMATURIA: ICD-10-CM

## 2024-08-14 DIAGNOSIS — G81.90 HEMIPARESIS, UNSPECIFIED HEMIPARESIS ETIOLOGY, UNSPECIFIED LATERALITY (HCC): ICD-10-CM

## 2024-08-14 DIAGNOSIS — M21.542 ACQUIRED EQUINOVARUS DEFORMITY OF LEFT FOOT: ICD-10-CM

## 2024-08-14 PROCEDURE — 99214 OFFICE O/P EST MOD 30 MIN: CPT | Performed by: FAMILY MEDICINE

## 2024-08-14 RX ORDER — RIZATRIPTAN BENZOATE 10 MG/1
10 TABLET ORAL AS NEEDED
Qty: 9 TABLET | Refills: 3 | Status: SHIPPED | OUTPATIENT
Start: 2024-08-14

## 2024-08-14 RX ORDER — GABAPENTIN 100 MG/1
300 CAPSULE ORAL 3 TIMES DAILY
Qty: 120 CAPSULE | Refills: 1 | Status: SHIPPED | OUTPATIENT
Start: 2024-08-14

## 2024-08-14 RX ORDER — BUSPIRONE HYDROCHLORIDE 15 MG/1
TABLET ORAL
COMMUNITY
Start: 2024-07-31 | End: 2024-08-14 | Stop reason: SDUPTHER

## 2024-08-14 RX ORDER — BUSPIRONE HYDROCHLORIDE 15 MG/1
15 TABLET ORAL 3 TIMES DAILY
Qty: 90 TABLET | Refills: 5 | Status: SHIPPED | OUTPATIENT
Start: 2024-08-14

## 2024-08-14 RX ORDER — BUPROPION HYDROCHLORIDE 150 MG/1
150 TABLET ORAL EVERY MORNING
Qty: 30 TABLET | Refills: 5 | Status: SHIPPED | OUTPATIENT
Start: 2024-08-14

## 2024-08-14 RX ORDER — SULFAMETHOXAZOLE/TRIMETHOPRIM 800-160 MG
1 TABLET ORAL 2 TIMES DAILY
Qty: 14 TABLET | Refills: 0 | Status: SHIPPED | OUTPATIENT
Start: 2024-08-14 | End: 2024-08-21

## 2024-08-14 RX ORDER — MIRABEGRON 50 MG/1
1 TABLET, FILM COATED, EXTENDED RELEASE ORAL DAILY
Qty: 30 TABLET | Refills: 5 | Status: SHIPPED | OUTPATIENT
Start: 2024-08-14

## 2024-08-16 NOTE — TELEPHONE ENCOUNTER
Needs OV to evaluate  Phone: [phone]  [facility]  Outpatient Speech Language Pathology  Fax: [phone]          DAILY TREATMENT NOTE    Date: 8/16/2024  Patient’s Name:  Danilo Patel  YOB: 2015 (9 y.o.)  Gender:  male  MRN:  [id number]  [facility] #: [id number]  Referring physician: Ben Brar       INSURANCE  SLP Insurance Information: [employer]       Total # of Visits to Date: 4   No Show: 0   Canceled Appointment: 0   Total # of Visits Since Initial Evaluation: 4     PAIN  Pain:  No    Pain Rating (0-10 pain scale):  0    SUBJECTIVE  Patient presents to clinic with his mother. Patient pleasant and cooperative. No new speech concerns reported.     GOALS/ TREATMENT SESSION:  Goal 1: Danilo will read closed syllable words with targeted spelling patterns (tch, dge, etc.) with 80% accuracy.   75% []Met  [x]Partially met  []Not met   Goal 2: Danilo will spell closed syllable words with targeted spelling patterns (tch, dge, etc.) with 80% accuracy.   Not directly addressed today []Met  []Partially met  []Not met   Goal 3: Danilo will read [employer] [place] sight words with 90% accuracy.   Being vs begin - 100% read/spell  White, side- spell/read with min cues []Met  [x]Partially met  []Not met   Goal 4: Danilo will read two syllable words featuring closed and open syllables with 70% accuracy.   50% mixed set  []Met  [x]Partially met  []Not met            LONG TERM GOALS:  Goal 1: Danilo will read multisyllabic words with closed and open syllables with 80% accuracy.   Goal progressing. See STG data  []Met  [x]Partially met  []Not met          EDUCATION  New education provided to patient/family/caregiver:  No; continued review of prior education  Method of education:  Discussion  Evaluation of patient’s response to education:  Patient and/or caregiver verbalized understanding    ASSESSMENT  Patient tolerated today’s treatment session:  Good     Comments:    PLAN  Continue with current plan of care  Mother to bring 504

## 2024-08-21 ENCOUNTER — OFFICE VISIT (OUTPATIENT)
Dept: OBGYN CLINIC | Facility: CLINIC | Age: 64
End: 2024-08-21

## 2024-08-21 ENCOUNTER — APPOINTMENT (OUTPATIENT)
Dept: RADIOLOGY | Facility: AMBULARY SURGERY CENTER | Age: 64
End: 2024-08-21
Attending: ORTHOPAEDIC SURGERY
Payer: COMMERCIAL

## 2024-08-21 VITALS — HEIGHT: 64 IN | WEIGHT: 200 LBS | BODY MASS INDEX: 34.15 KG/M2

## 2024-08-21 DIAGNOSIS — M21.542 ACQUIRED EQUINOVARUS DEFORMITY OF LEFT FOOT: Primary | ICD-10-CM

## 2024-08-21 DIAGNOSIS — M21.542 ACQUIRED EQUINOVARUS DEFORMITY OF LEFT FOOT: ICD-10-CM

## 2024-08-21 PROCEDURE — 99024 POSTOP FOLLOW-UP VISIT: CPT | Performed by: ORTHOPAEDIC SURGERY

## 2024-08-21 PROCEDURE — 73610 X-RAY EXAM OF ANKLE: CPT

## 2024-08-21 PROCEDURE — 73630 X-RAY EXAM OF FOOT: CPT

## 2024-08-21 NOTE — PROGRESS NOTES
"      James R Lachman, M.D.  Attending, Orthopaedic Surgery  Foot and Ankle  [facility]      ORTHOPAEDIC FOOT AND ANKLE POST-OP VISIT     Procedure:     Left equinocavovarus correction       Date of surgery:   7/29/24      PLAN  1. Weightbearing Status- WBAT operative extremity in a CAM boot LLE  2. DVT prophylaxis- she is on chronic eliquis  3. Continue to elevate as needed for swelling control  4. Pain control- OTC pain medication  5. RTC in 6 week(s)  6. Xrays needed next visit - yes Weightbearing  flafoot series    History of Present Illness:   Chief Complaint:   Chief Complaint   Patient presents with    Post-op     Post op- cast off   APPLICATION EXTERNAL FIXATION DEVICE LOWER EXTREMITY, Achilles lengthening, talonavicular joint capsule release, posterior tibial tendon lengthening, plantar fascia release - Left   Achilles lengthening, talonavicular joint capsule release, posterior tibial tendon lengthening, plantar fascia release - Left   plantar fascia release - Left   Achilles lengthening, talonavicular joint capsule release, posterior tibial tendon lengthening, plantar fascia release - Left      Kristina Russell is a 63 y.o. female who is being seen for post-operative visit for the above procedure. Pain is well controlled and the patient has successfully transitioned to OTC pain medicines.  she was taking eliquis for DVT prophylaxis. Patient has been NWB in a short leg cast.      Review of Systems:  General- denies fever/chills  Respiratory- denies cough or SOB  Cardio- denies chest pain or palpitations  GI- denies abdominal pain  Musculoskeletal- Negative except noted above  Skin- denies rashes or wounds    Physical Exam:   Ht 5' 4\" (1.626 m)   Wt 90.7 kg (200 lb)   BMI 34.33 kg/m²   General/Constitutional: No apparent distress: well-nourished and well developed.  Eyes: normal ocular motion  Lymphatic: No appreciable lymphadenopathy  Respiratory: Non-labored breathing  Vascular: No edema, " swelling or tenderness, except as noted in detailed exam.  Integumentary: No impressive skin lesions present, except as noted in detailed exam.  Neuro: No ataxia or tremors noted  Psych: Normal mood and affect, oriented to person, place and time. Appropriate affect.  Musculoskeletal: Normal, except as noted in detailed exam and in HPI.    Examination    left        Incision Clean, dry, intact  Sutures Previously removed.    Ecchymosis none    Swelling Mild    Sensation Intact to light touch throughout sural, saphenous, superficial peroneal, deep peroneal and medial/lateral plantar nerve distributions.  [place]-Remi 5.07 filament (10g) testing deferred.    Cardiovascular Brisk capillary refill < 2 seconds,intact DP and PT pulses    Special Tests None      Imaging Studies:   3 views of the LEFT FOOT were taken, reviewed and interpreted independently that demonstrate equinus correction noted.. Reviewed by me personally.        James R. Lachman, MD  Foot & Ankle Surgery   Department of Orthopaedic Surgery  [facility]      I personally performed the service.    James R. Lachman, MD

## 2024-08-21 NOTE — PATIENT INSTRUCTIONS
May shower, do not soak in a tub/pool/ocean/etc for another 4 weeks.  Begin PT  Scar massage- pea sized amount of lotion, massage into scar for 5 minutes each day.  Compression stocking (Knee high, 20-30mm Hg) to be worn at all times while awake.        Wear the boot at all times when walking on it.        James R Lachman, M.D.  Attending, Orthopaedic Surgery  Foot and Ankle  [facility]      ORTHOPAEDIC FOOT AND ANKLE POST-OP VISIT     Procedure:     Left equinocavovarus correction       Date of surgery:   7/29/24      PLAN  1. Weightbearing Status- WBAT operative extremity in a CAM boot LLE  2. DVT prophylaxis- she is on chronic eliquis  3. Continue to elevate as needed for swelling control  4. Pain control- OTC pain medication  5. RTC in 6 week(s)  6. Xrays needed next visit - yes Weightbearing flafoot series    History of Present Illness:   Chief Complaint:   Chief Complaint   Patient presents with    Post-op     Post op- cast off   APPLICATION EXTERNAL FIXATION DEVICE LOWER EXTREMITY, Achilles lengthening, talonavicular joint capsule release, posterior tibial tendon lengthening, plantar fascia release - Left   Achilles lengthening, talonavicular joint capsule release, posterior tibial tendon lengthening, plantar fascia release - Left   plantar fascia release - Left   Achilles lengthening, talonavicular joint capsule release, posterior tibial tendon lengthening, plantar fascia release - Left      Kristina Russell is a 63 y.o. female who is being seen for post-operative visit for the above procedure. Pain is well controlled and the patient has successfully transitioned to OTC pain medicines.  she was taking eliquis for DVT prophylaxis. Patient has been NWB in a short leg cast.      Review of Systems:  General- denies fever/chills  Respiratory- denies cough or SOB  Cardio- denies chest pain or palpitations  GI- denies abdominal pain  Musculoskeletal- Negative except noted above  Skin- denies  "rashes or wounds    Physical Exam:   Ht 5' 4\" (1.626 m)   Wt 90.7 kg (200 lb)   BMI 34.33 kg/m²   General/Constitutional: No apparent distress: well-nourished and well developed.  Eyes: normal ocular motion  Lymphatic: No appreciable lymphadenopathy  Respiratory: Non-labored breathing  Vascular: No edema, swelling or tenderness, except as noted in detailed exam.  Integumentary: No impressive skin lesions present, except as noted in detailed exam.  Neuro: No ataxia or tremors noted  Psych: Normal mood and affect, oriented to person, place and time. Appropriate affect.  Musculoskeletal: Normal, except as noted in detailed exam and in HPI.    Examination    left        Incision Clean, dry, intact  Sutures Previously removed.    Ecchymosis none    Swelling Mild    Sensation Intact to light touch throughout sural, saphenous, superficial peroneal, deep peroneal and medial/lateral plantar nerve distributions.  [place]-Remi 5.07 filament (10g) testing deferred.    Cardiovascular Brisk capillary refill < 2 seconds,intact DP and PT pulses    Special Tests None      Imaging Studies:   3 views of the LEFT FOOT were taken, reviewed and interpreted independently that demonstrate equinus correction noted.. Reviewed by me personally.        James R. Lachman, MD  Foot & Ankle Surgery   Department of Orthopaedic Surgery  [facility]      I personally performed the service.    James R. Lachman, MD  "

## 2024-08-26 ENCOUNTER — TELEPHONE (OUTPATIENT)
Dept: OBGYN CLINIC | Facility: HOSPITAL | Age: 64
End: 2024-08-26

## 2024-08-26 NOTE — TELEPHONE ENCOUNTER
Caller: Patient    Doctor:Lachman    Reason for call: Please fax PT script to:  [phone]    Call back#: [phone]

## 2024-09-05 ENCOUNTER — TELEPHONE (OUTPATIENT)
Age: 64
End: 2024-09-05

## 2024-09-05 NOTE — TELEPHONE ENCOUNTER
An auto remote implantable loop \"event\" transmission was received from patient's LINQ monitor. Will Fwd to Rn to Review. Media Uploaded .    ?    Tachy Episode(s)       Physician: Reyna Cunha OV: 3/14/2022     Roseann with [employer] called states fax received 9/4/2024 (scanned in media) needs to have refills and diagnosis and refaxed to [phone].

## 2024-09-10 ENCOUNTER — TELEPHONE (OUTPATIENT)
Age: 64
End: 2024-09-10

## 2024-09-10 NOTE — TELEPHONE ENCOUNTER
Vaishali called, from [employer], requesting for refills that needs to be added for recent script for the  following medical supplies: pull  up diapers, bed pads, gloves, wipes and skin barrier creams. Per Vaishali, once refill is added to script, to please send script back. Please assist

## 2024-10-11 ENCOUNTER — TELEPHONE (OUTPATIENT)
Age: 64
End: 2024-10-11

## 2024-10-24 ENCOUNTER — TELEPHONE (OUTPATIENT)
Age: 64
End: 2024-10-24

## 2024-10-24 NOTE — TELEPHONE ENCOUNTER
Vaishali called in to see if the request form for medical necessities was received today. Kristina's insurance would only odder 2 boxes of gloves. She originally had 5 boxes. Could the forms be fax after Dr Crane signs it please. The fax # [phone]

## 2024-11-22 DIAGNOSIS — E78.2 MIXED HYPERLIPIDEMIA: ICD-10-CM

## 2024-11-22 RX ORDER — ATORVASTATIN CALCIUM 40 MG/1
40 TABLET, FILM COATED ORAL DAILY
Qty: 30 TABLET | Refills: 0 | Status: SHIPPED | OUTPATIENT
Start: 2024-11-22

## 2024-12-03 ENCOUNTER — OFFICE VISIT (OUTPATIENT)
Dept: OBGYN CLINIC | Facility: CLINIC | Age: 64
End: 2024-12-03
Payer: COMMERCIAL

## 2024-12-03 ENCOUNTER — APPOINTMENT (OUTPATIENT)
Dept: RADIOLOGY | Facility: AMBULARY SURGERY CENTER | Age: 64
End: 2024-12-03
Attending: ORTHOPAEDIC SURGERY
Payer: COMMERCIAL

## 2024-12-03 VITALS — BODY MASS INDEX: 34.15 KG/M2 | HEIGHT: 64 IN | WEIGHT: 200 LBS

## 2024-12-03 DIAGNOSIS — Z01.89 ENCOUNTER FOR LOWER EXTREMITY COMPARISON IMAGING STUDY: ICD-10-CM

## 2024-12-03 DIAGNOSIS — M21.542 ACQUIRED EQUINOVARUS DEFORMITY OF LEFT FOOT: ICD-10-CM

## 2024-12-03 DIAGNOSIS — M21.541 ACQUIRED EQUINOVARUS DEFORMITY OF RIGHT FOOT: ICD-10-CM

## 2024-12-03 DIAGNOSIS — M21.542 ACQUIRED EQUINOVARUS DEFORMITY OF LEFT FOOT: Primary | ICD-10-CM

## 2024-12-03 PROCEDURE — 73600 X-RAY EXAM OF ANKLE: CPT

## 2024-12-03 PROCEDURE — 73630 X-RAY EXAM OF FOOT: CPT

## 2024-12-03 PROCEDURE — 99213 OFFICE O/P EST LOW 20 MIN: CPT | Performed by: ORTHOPAEDIC SURGERY

## 2024-12-03 NOTE — PROGRESS NOTES
James R Lachman, M.D.  Attending, Orthopaedic Surgery  Foot and Ankle  [facility]      ORTHOPAEDIC FOOT AND ANKLE POST-OP VISIT     Procedure:      Left equinocavovarus correction       Date of surgery:   7/29/24    Discontinue use of CAM boot and transition into a supportive shoe on the LEFT.     She has a similar deformity on the RIGHT side. If she wishes to perform the same surgery on the RIGHT then she can schedule a follow up visit to schedule this surgery. She has other medical co morbities that may preclude her from this surgery    PLAN  1. Weightbearing Status - WBAT operative extremity in supportive shoe  2. DVT prophylaxis - Completed  3. Continue compression stocking for swelling control  4. Pain control - OTC pain medication  5. RTC PRN    History of Present Illness:   Chief Complaint:   Chief Complaint   Patient presents with    Follow-up     Follow up of the left ankle. In cam boot.   APPLICATION EXTERNAL FIXATION DEVICE LOWER EXTREMITY, Achilles lengthening, talonavicular joint capsule release, posterior tibial tendon lengthening, plantar fascia release - Left   Achilles lengthening, talonavicular joint capsule release, posterior tibial tendon lengthening, plantar fascia release - Left   plantar fascia release - Left   Achilles lengthening, talonavicular joint capsule release, posterior tibial tendon lengthening, plantar fascia release        Kristina Russell is a 63 y.o. female who is being seen for  4 months  post-operative visit for the above procedure. Pain is well controlled and the patient has successfully transitioned to OTC pain medicines.  she has completed ASA 325mg BID for DVT prophylaxis. Patient has been NWB  in a CAM boot. Patients that she hasn't been bearing weight at her own accord due to potential discomfort.      Review of Systems:  General- denies fever/chills  Respiratory- denies cough or SOB  Cardio- denies chest pain or palpitations  GI- denies abdominal  "pain  Musculoskeletal- Negative except noted above  Skin- denies rashes or wounds    Physical Exam:   Ht 5' 4\" (1.626 m)   Wt 90.7 kg (200 lb)   BMI 34.33 kg/m²   General/Constitutional: No apparent distress: well-nourished and well developed.  Eyes: normal ocular motion  Lymphatic: No appreciable lymphadenopathy  Respiratory: Non-labored breathing  Vascular: No edema, swelling or tenderness, except as noted in detailed exam.  Integumentary: No impressive skin lesions present, except as noted in detailed exam.  Neuro: No ataxia or tremors noted  Psych: Normal mood and affect, oriented to person, place and time. Appropriate affect.  Musculoskeletal: Normal, except as noted in detailed exam and in HPI.    Examination    left        Incision Clean, dry, intact  Sutures Previously removed.    Ecchymosis none    Swelling mild    Sensation Intact to light touch throughout sural, saphenous, superficial peroneal, deep peroneal and medial/lateral plantar nerve distributions.  [place]-Remi 5.07 filament (10g) testing deferred.    Cardiovascular Brisk capillary refill < 2 seconds,intact DP and PT pulses    Special Tests None      Imaging Studies:   3 views of the left foot were taken, reviewed and interpreted independently that demonstrate hardware in expected position without signs of failure or loosening. Equinus correction noted in stable alignment and position. Reviewed by me personally.      Scribe Attestation      I,:  David Naranjo am acting as a scribe while in the presence of the attending physician.:       I,:  James R Lachman, MD personally performed the services described in this documentation    as scribed in my presence.:                James R. Lachman, MD  Foot & Ankle Surgery   Department of Orthopaedic Surgery  [facility]      I personally performed the service.    James R. Lachman, MD  "

## 2024-12-03 NOTE — PATIENT INSTRUCTIONS
Discontinue use of CAM boot and get into a supportive sneaker    [employer], [employer], [employer] are good brands but I recommend going to a dedicate shoe store (not [employer] or [employer].) At these types of stores, they have experts that can fit you for shoes appropriate for your foot problem. Shoe choice is essential to solving/improving most types of foot pain.  Even after a surgery, good shoes are necessary to keep the foot as comfortable as possible.    [employer]  [address] [facility] [address]  [phone]    [employer]  [address], [place] [address]  [phone]    [employer]  [address] [place] [address] [phone]    [employer]   [address]  [phone]    [employer]  [address], [place], [place] [id number] [phone]    [employer]  [address] [place], [place] [id number] [phone]    [facility] [employer] [facility]   [address] [place] [address]  [phone]    [employer]  [address] [place], [place]  [phone]    [employer] [address], [address]  [phone]    [employer] [address], [address], [place] [address] [phone]

## 2025-01-06 ENCOUNTER — TELEPHONE (OUTPATIENT)
Age: 65
End: 2025-01-06

## 2025-01-06 DIAGNOSIS — R30.0 DYSURIA: Primary | ICD-10-CM

## 2025-01-06 NOTE — TELEPHONE ENCOUNTER
Spoke with patient, patient is requesting to get a script  for a Uti. Patient has some burning and urine odor. Please advise.

## 2025-01-06 NOTE — TELEPHONE ENCOUNTER
Phone call to pt, scheduled her for an appointment on 01/15/2025. I advised pt to go to [facility], she declined. She stated that she wanted to see her PCP. She also stated that she needs a 3-day window with appts to be able to schedule her rides.

## 2025-01-08 ENCOUNTER — APPOINTMENT (OUTPATIENT)
Age: 65
End: 2025-01-08
Payer: MEDICARE

## 2025-01-08 DIAGNOSIS — R30.0 DYSURIA: ICD-10-CM

## 2025-01-08 LAB
BACTERIA UR QL AUTO: ABNORMAL /HPF
BILIRUB UR QL STRIP: NEGATIVE
CLARITY UR: CLEAR
COLOR UR: ABNORMAL
GLUCOSE UR STRIP-MCNC: NEGATIVE MG/DL
HGB UR QL STRIP.AUTO: NEGATIVE
KETONES UR STRIP-MCNC: NEGATIVE MG/DL
LEUKOCYTE ESTERASE UR QL STRIP: ABNORMAL
NITRITE UR QL STRIP: POSITIVE
NON-SQ EPI CELLS URNS QL MICRO: ABNORMAL /HPF
PH UR STRIP.AUTO: 6 [PH]
PROT UR STRIP-MCNC: NEGATIVE MG/DL
RBC #/AREA URNS AUTO: ABNORMAL /HPF
SP GR UR STRIP.AUTO: 1.01 (ref 1–1.03)
UROBILINOGEN UR STRIP-ACNC: <2 MG/DL
WBC #/AREA URNS AUTO: ABNORMAL /HPF

## 2025-01-08 PROCEDURE — 87077 CULTURE AEROBIC IDENTIFY: CPT

## 2025-01-08 PROCEDURE — 81001 URINALYSIS AUTO W/SCOPE: CPT

## 2025-01-08 PROCEDURE — 87086 URINE CULTURE/COLONY COUNT: CPT

## 2025-01-08 PROCEDURE — 87186 SC STD MICRODIL/AGAR DIL: CPT

## 2025-01-10 LAB
BACTERIA UR CULT: ABNORMAL
BACTERIA UR CULT: ABNORMAL

## 2025-01-14 ENCOUNTER — RESULTS FOLLOW-UP (OUTPATIENT)
Dept: FAMILY MEDICINE CLINIC | Facility: CLINIC | Age: 65
End: 2025-01-14

## 2025-01-14 DIAGNOSIS — R30.0 DYSURIA: Primary | ICD-10-CM

## 2025-01-14 RX ORDER — NITROFURANTOIN 25; 75 MG/1; MG/1
100 CAPSULE ORAL 2 TIMES DAILY
Qty: 14 CAPSULE | Refills: 0 | Status: SHIPPED | OUTPATIENT
Start: 2025-01-14

## 2025-01-17 ENCOUNTER — TELEPHONE (OUTPATIENT)
Age: 65
End: 2025-01-17

## 2025-01-17 NOTE — TELEPHONE ENCOUNTER
Patient called back. Said she got a message from [place] regarding if she will be coming in on a stretcher. Patient advised that she would be because she can't get up. Please call her if there's any other questions.

## 2025-01-30 ENCOUNTER — TELEPHONE (OUTPATIENT)
Age: 65
End: 2025-01-30

## 2025-01-30 NOTE — TELEPHONE ENCOUNTER
Axel called Kristina for Pt today and she canceled again this time in was for emergency in house. Just an FYI for Dr Crane.

## 2025-02-04 ENCOUNTER — TELEPHONE (OUTPATIENT)
Age: 65
End: 2025-02-04

## 2025-02-04 NOTE — TELEPHONE ENCOUNTER
FANII:Axel of [facility] called in an stated that patient canceled to days therapy.

## 2025-03-05 DIAGNOSIS — G43.809 OTHER MIGRAINE WITHOUT STATUS MIGRAINOSUS, NOT INTRACTABLE: ICD-10-CM

## 2025-03-05 RX ORDER — RIZATRIPTAN BENZOATE 10 MG/1
10 TABLET ORAL AS NEEDED
Qty: 9 TABLET | Refills: 3 | Status: SHIPPED | OUTPATIENT
Start: 2025-03-05

## 2025-03-05 NOTE — TELEPHONE ENCOUNTER
Reason for call:   [x] Refill   [] Prior Auth  [] Other:     Office:   [x] PCP/Provider -   [] Specialty/Provider -     Medication: rizatriptan (MAXALT) 10 mg tablet Take 1 tablet (10 mg total) by mouth as needed for migraine Take at the onset of migraine; if symptoms continue or return, may take another dose at least 2 hours after first dose. Take no more than 2 doses in a day.,       Pharmacy: [facility] [id number]      Does the patient have enough for 3 days?   [] Yes   [x] No - Send as HP to POD

## 2025-03-06 DIAGNOSIS — E78.2 MIXED HYPERLIPIDEMIA: ICD-10-CM

## 2025-03-06 RX ORDER — ATORVASTATIN CALCIUM 40 MG/1
40 TABLET, FILM COATED ORAL DAILY
Qty: 100 TABLET | Refills: 1 | Status: SHIPPED | OUTPATIENT
Start: 2025-03-06

## 2025-05-19 ENCOUNTER — HOSPITAL ENCOUNTER (INPATIENT)
Facility: HOSPITAL | Age: 65
LOS: 5 days | Discharge: HOME HEALTH | DRG: 812 | End: 2025-05-24
Attending: EMERGENCY MEDICINE | Admitting: STUDENT IN AN ORGANIZED HEALTH CARE EDUCATION/TRAINING PROGRAM
Payer: COMMERCIAL

## 2025-05-19 ENCOUNTER — APPOINTMENT (EMERGENCY)
Dept: CT IMAGING | Facility: HOSPITAL | Age: 65
DRG: 812 | End: 2025-05-19
Payer: COMMERCIAL

## 2025-05-19 DIAGNOSIS — R19.7 DIARRHEA, UNSPECIFIED TYPE: Primary | ICD-10-CM

## 2025-05-19 DIAGNOSIS — E66.811 CLASS 1 OBESITY DUE TO EXCESS CALORIES WITH SERIOUS COMORBIDITY AND BODY MASS INDEX (BMI) OF 32.0 TO 32.9 IN ADULT: ICD-10-CM

## 2025-05-19 DIAGNOSIS — G47.33 OBSTRUCTIVE SLEEP APNEA SYNDROME: ICD-10-CM

## 2025-05-19 DIAGNOSIS — E66.09 CLASS 1 OBESITY DUE TO EXCESS CALORIES WITH SERIOUS COMORBIDITY AND BODY MASS INDEX (BMI) OF 32.0 TO 32.9 IN ADULT: ICD-10-CM

## 2025-05-19 DIAGNOSIS — D64.9 ANEMIA: ICD-10-CM

## 2025-05-19 DIAGNOSIS — K21.9 GASTROESOPHAGEAL REFLUX DISEASE WITHOUT ESOPHAGITIS: ICD-10-CM

## 2025-05-19 DIAGNOSIS — K59.00 CONSTIPATION, UNSPECIFIED CONSTIPATION TYPE: ICD-10-CM

## 2025-05-19 DIAGNOSIS — K52.89 STERCORAL COLITIS: ICD-10-CM

## 2025-05-19 DIAGNOSIS — J30.1 SEASONAL ALLERGIC RHINITIS DUE TO POLLEN: ICD-10-CM

## 2025-05-19 LAB
2HR DELTA HS TROPONIN: 0 NG/L
ABO GROUP BLD: NORMAL
ABO GROUP BLD: NORMAL
ALBUMIN SERPL BCG-MCNC: 2.9 G/DL (ref 3.5–5)
ALP SERPL-CCNC: 57 U/L (ref 34–104)
ALT SERPL W P-5'-P-CCNC: 7 U/L (ref 7–52)
ANION GAP SERPL CALCULATED.3IONS-SCNC: 10 MMOL/L (ref 4–13)
APTT PPP: 28 SECONDS (ref 23–34)
AST SERPL W P-5'-P-CCNC: 17 U/L (ref 13–39)
ATRIAL RATE: 96 BPM
BASOPHILS # BLD AUTO: 0.01 THOUSANDS/ÂΜL (ref 0–0.1)
BASOPHILS NFR BLD AUTO: 0 % (ref 0–1)
BILIRUB SERPL-MCNC: 0.55 MG/DL (ref 0.2–1)
BLD GP AB SCN SERPL QL: NEGATIVE
BUN SERPL-MCNC: 13 MG/DL (ref 5–25)
CALCIUM ALBUM COR SERPL-MCNC: 8.8 MG/DL (ref 8.3–10.1)
CALCIUM SERPL-MCNC: 7.9 MG/DL (ref 8.4–10.2)
CARDIAC TROPONIN I PNL SERPL HS: 3 NG/L (ref ?–50)
CARDIAC TROPONIN I PNL SERPL HS: 3 NG/L (ref ?–50)
CHLORIDE SERPL-SCNC: 106 MMOL/L (ref 96–108)
CO2 SERPL-SCNC: 21 MMOL/L (ref 21–32)
CREAT SERPL-MCNC: 0.47 MG/DL (ref 0.6–1.3)
EOSINOPHIL # BLD AUTO: 0.04 THOUSAND/ÂΜL (ref 0–0.61)
EOSINOPHIL NFR BLD AUTO: 1 % (ref 0–6)
ERYTHROCYTE [DISTWIDTH] IN BLOOD BY AUTOMATED COUNT: 12.6 % (ref 11.6–15.1)
FERRITIN SERPL-MCNC: 128 NG/ML (ref 30–307)
FLUAV RNA RESP QL NAA+PROBE: NEGATIVE
FLUBV RNA RESP QL NAA+PROBE: NEGATIVE
FOLATE SERPL-MCNC: >22.3 NG/ML
GFR SERPL CREATININE-BSD FRML MDRD: 103 ML/MIN/1.73SQ M
GLUCOSE SERPL-MCNC: 86 MG/DL (ref 65–140)
HCT VFR BLD AUTO: 24.7 % (ref 34.8–46.1)
HGB BLD-MCNC: 7.5 G/DL (ref 11.5–15.4)
IMM GRANULOCYTES # BLD AUTO: 0.01 THOUSAND/UL (ref 0–0.2)
IMM GRANULOCYTES NFR BLD AUTO: 0 % (ref 0–2)
INR PPP: 1.03 (ref 0.85–1.19)
IRON SATN MFR SERPL: 10 % (ref 15–50)
IRON SERPL-MCNC: 26 UG/DL (ref 50–212)
LACTATE SERPL-SCNC: 1 MMOL/L (ref 0.5–2)
LDH SERPL-CCNC: 145 U/L (ref 140–271)
LYMPHOCYTES # BLD AUTO: 0.77 THOUSANDS/ÂΜL (ref 0.6–4.47)
LYMPHOCYTES NFR BLD AUTO: 13 % (ref 14–44)
MAGNESIUM SERPL-MCNC: 1.9 MG/DL (ref 1.9–2.7)
MCH RBC QN AUTO: 30.2 PG (ref 26.8–34.3)
MCHC RBC AUTO-ENTMCNC: 30.4 G/DL (ref 31.4–37.4)
MCV RBC AUTO: 100 FL (ref 82–98)
MONOCYTES # BLD AUTO: 0.39 THOUSAND/ÂΜL (ref 0.17–1.22)
MONOCYTES NFR BLD AUTO: 7 % (ref 4–12)
NEUTROPHILS # BLD AUTO: 4.54 THOUSANDS/ÂΜL (ref 1.85–7.62)
NEUTS SEG NFR BLD AUTO: 79 % (ref 43–75)
NRBC BLD AUTO-RTO: 0 /100 WBCS
PLATELET # BLD AUTO: 152 THOUSANDS/UL (ref 149–390)
PMV BLD AUTO: 10.1 FL (ref 8.9–12.7)
POTASSIUM SERPL-SCNC: 4.1 MMOL/L (ref 3.5–5.3)
PROT SERPL-MCNC: 5.8 G/DL (ref 6.4–8.4)
PROTHROMBIN TIME: 14.3 SECONDS (ref 12.3–15)
QRS AXIS: -27 DEGREES
QRSD INTERVAL: 84 MS
QT INTERVAL: 348 MS
QTC INTERVAL: 444 MS
RBC # BLD AUTO: 2.48 MILLION/UL (ref 3.81–5.12)
RETICS # AUTO: ABNORMAL 10*3/UL (ref 14097–95744)
RETICS # CALC: 2.72 % (ref 0.37–1.87)
RH BLD: POSITIVE
RH BLD: POSITIVE
RSV RNA RESP QL NAA+PROBE: NEGATIVE
SARS-COV-2 RNA RESP QL NAA+PROBE: NEGATIVE
SODIUM SERPL-SCNC: 137 MMOL/L (ref 135–147)
SPECIMEN EXPIRATION DATE: NORMAL
T WAVE AXIS: 13 DEGREES
TIBC SERPL-MCNC: 261.8 UG/DL (ref 250–450)
TRANSFERRIN SERPL-MCNC: 187 MG/DL (ref 203–362)
TSH SERPL DL<=0.05 MIU/L-ACNC: 0.54 UIU/ML (ref 0.45–4.5)
UIBC SERPL-MCNC: 236 UG/DL (ref 155–355)
VENTRICULAR RATE: 98 BPM
VIT B12 SERPL-MCNC: 191 PG/ML (ref 180–914)
WBC # BLD AUTO: 5.76 THOUSAND/UL (ref 4.31–10.16)

## 2025-05-19 PROCEDURE — 36415 COLL VENOUS BLD VENIPUNCTURE: CPT | Performed by: EMERGENCY MEDICINE

## 2025-05-19 PROCEDURE — 82728 ASSAY OF FERRITIN: CPT | Performed by: INTERNAL MEDICINE

## 2025-05-19 PROCEDURE — 85045 AUTOMATED RETICULOCYTE COUNT: CPT | Performed by: INTERNAL MEDICINE

## 2025-05-19 PROCEDURE — 82746 ASSAY OF FOLIC ACID SERUM: CPT | Performed by: INTERNAL MEDICINE

## 2025-05-19 PROCEDURE — 36430 TRANSFUSION BLD/BLD COMPNT: CPT

## 2025-05-19 PROCEDURE — 85610 PROTHROMBIN TIME: CPT | Performed by: EMERGENCY MEDICINE

## 2025-05-19 PROCEDURE — 83540 ASSAY OF IRON: CPT | Performed by: INTERNAL MEDICINE

## 2025-05-19 PROCEDURE — 96360 HYDRATION IV INFUSION INIT: CPT

## 2025-05-19 PROCEDURE — 86920 COMPATIBILITY TEST SPIN: CPT

## 2025-05-19 PROCEDURE — 74177 CT ABD & PELVIS W/CONTRAST: CPT

## 2025-05-19 PROCEDURE — 86901 BLOOD TYPING SEROLOGIC RH(D): CPT | Performed by: EMERGENCY MEDICINE

## 2025-05-19 PROCEDURE — 85025 COMPLETE CBC W/AUTO DIFF WBC: CPT | Performed by: EMERGENCY MEDICINE

## 2025-05-19 PROCEDURE — 96361 HYDRATE IV INFUSION ADD-ON: CPT

## 2025-05-19 PROCEDURE — 80053 COMPREHEN METABOLIC PANEL: CPT | Performed by: EMERGENCY MEDICINE

## 2025-05-19 PROCEDURE — 83550 IRON BINDING TEST: CPT | Performed by: INTERNAL MEDICINE

## 2025-05-19 PROCEDURE — 84484 ASSAY OF TROPONIN QUANT: CPT | Performed by: EMERGENCY MEDICINE

## 2025-05-19 PROCEDURE — 86900 BLOOD TYPING SEROLOGIC ABO: CPT | Performed by: EMERGENCY MEDICINE

## 2025-05-19 PROCEDURE — 99223 1ST HOSP IP/OBS HIGH 75: CPT | Performed by: INTERNAL MEDICINE

## 2025-05-19 PROCEDURE — 93005 ELECTROCARDIOGRAM TRACING: CPT

## 2025-05-19 PROCEDURE — 30233N1 TRANSFUSION OF NONAUTOLOGOUS RED BLOOD CELLS INTO PERIPHERAL VEIN, PERCUTANEOUS APPROACH: ICD-10-PCS | Performed by: EMERGENCY MEDICINE

## 2025-05-19 PROCEDURE — 84443 ASSAY THYROID STIM HORMONE: CPT | Performed by: EMERGENCY MEDICINE

## 2025-05-19 PROCEDURE — 82607 VITAMIN B-12: CPT | Performed by: INTERNAL MEDICINE

## 2025-05-19 PROCEDURE — 83735 ASSAY OF MAGNESIUM: CPT | Performed by: EMERGENCY MEDICINE

## 2025-05-19 PROCEDURE — P9016 RBC LEUKOCYTES REDUCED: HCPCS

## 2025-05-19 PROCEDURE — 85730 THROMBOPLASTIN TIME PARTIAL: CPT | Performed by: EMERGENCY MEDICINE

## 2025-05-19 PROCEDURE — 0241U HB NFCT DS VIR RESP RNA 4 TRGT: CPT | Performed by: EMERGENCY MEDICINE

## 2025-05-19 PROCEDURE — 83615 LACTATE (LD) (LDH) ENZYME: CPT | Performed by: INTERNAL MEDICINE

## 2025-05-19 PROCEDURE — 83605 ASSAY OF LACTIC ACID: CPT | Performed by: EMERGENCY MEDICINE

## 2025-05-19 PROCEDURE — 86850 RBC ANTIBODY SCREEN: CPT | Performed by: EMERGENCY MEDICINE

## 2025-05-19 PROCEDURE — 99285 EMERGENCY DEPT VISIT HI MDM: CPT | Performed by: EMERGENCY MEDICINE

## 2025-05-19 PROCEDURE — 99284 EMERGENCY DEPT VISIT MOD MDM: CPT

## 2025-05-19 RX ORDER — BUPROPION HYDROCHLORIDE 150 MG/1
150 TABLET ORAL EVERY MORNING
Status: DISCONTINUED | OUTPATIENT
Start: 2025-05-20 | End: 2025-05-24 | Stop reason: HOSPADM

## 2025-05-19 RX ORDER — BUSPIRONE HYDROCHLORIDE 5 MG/1
15 TABLET ORAL 3 TIMES DAILY
Status: DISCONTINUED | OUTPATIENT
Start: 2025-05-19 | End: 2025-05-24 | Stop reason: HOSPADM

## 2025-05-19 RX ORDER — ACETAMINOPHEN 325 MG/1
650 TABLET ORAL EVERY 6 HOURS PRN
Status: DISCONTINUED | OUTPATIENT
Start: 2025-05-19 | End: 2025-05-24 | Stop reason: HOSPADM

## 2025-05-19 RX ORDER — OXYBUTYNIN CHLORIDE 5 MG/1
10 TABLET, EXTENDED RELEASE ORAL DAILY
Status: DISCONTINUED | OUTPATIENT
Start: 2025-05-20 | End: 2025-05-24 | Stop reason: HOSPADM

## 2025-05-19 RX ORDER — ACETAMINOPHEN 10 MG/ML
1000 INJECTION, SOLUTION INTRAVENOUS ONCE
Status: COMPLETED | OUTPATIENT
Start: 2025-05-19 | End: 2025-05-19

## 2025-05-19 RX ORDER — HEPARIN SODIUM 5000 [USP'U]/ML
5000 INJECTION, SOLUTION INTRAVENOUS; SUBCUTANEOUS EVERY 12 HOURS SCHEDULED
Status: DISCONTINUED | OUTPATIENT
Start: 2025-05-20 | End: 2025-05-19

## 2025-05-19 RX ORDER — GABAPENTIN 300 MG/1
300 CAPSULE ORAL 3 TIMES DAILY
Status: DISCONTINUED | OUTPATIENT
Start: 2025-05-19 | End: 2025-05-24 | Stop reason: HOSPADM

## 2025-05-19 RX ORDER — TRAZODONE HYDROCHLORIDE 100 MG/1
100 TABLET ORAL
Status: DISCONTINUED | OUTPATIENT
Start: 2025-05-19 | End: 2025-05-24 | Stop reason: HOSPADM

## 2025-05-19 RX ORDER — OXYCODONE HYDROCHLORIDE 5 MG/1
5 TABLET ORAL EVERY 6 HOURS PRN
Status: DISCONTINUED | OUTPATIENT
Start: 2025-05-19 | End: 2025-05-24 | Stop reason: HOSPADM

## 2025-05-19 RX ORDER — PANTOPRAZOLE SODIUM 40 MG/1
40 TABLET, DELAYED RELEASE ORAL
Status: DISCONTINUED | OUTPATIENT
Start: 2025-05-20 | End: 2025-05-24 | Stop reason: HOSPADM

## 2025-05-19 RX ORDER — ATORVASTATIN CALCIUM 40 MG/1
40 TABLET, FILM COATED ORAL DAILY
Status: DISCONTINUED | OUTPATIENT
Start: 2025-05-20 | End: 2025-05-24 | Stop reason: HOSPADM

## 2025-05-19 RX ORDER — NITROFURANTOIN 25; 75 MG/1; MG/1
100 CAPSULE ORAL 2 TIMES DAILY
Status: DISCONTINUED | OUTPATIENT
Start: 2025-05-19 | End: 2025-05-19

## 2025-05-19 RX ADMIN — ACETAMINOPHEN 1000 MG: 10 INJECTION INTRAVENOUS at 18:35

## 2025-05-19 RX ADMIN — IOHEXOL 100 ML: 350 INJECTION, SOLUTION INTRAVENOUS at 13:18

## 2025-05-19 RX ADMIN — SODIUM CHLORIDE 1000 ML: 0.9 INJECTION, SOLUTION INTRAVENOUS at 11:35

## 2025-05-19 NOTE — ASSESSMENT & PLAN NOTE
Presents with diarrhea for the past couple days CT scan revealed constipation  Suspect likely had constipation which is since improved and now with loose stool  Will monitor for now  Suspect will improve with conservative management spontaneous if not consider bacterial culture  C. difficile ordered in the emergency department

## 2025-05-19 NOTE — ASSESSMENT & PLAN NOTE
Presented with hemoglobin of 7.5  Previous hemoglobin 12 over 1-year ago  Anemia noted to be macrocytic  Will check B12, folate, LDH, reticulocyte, haptoglobin, iron panel  If iron deficient anemia would recommend GI consultation

## 2025-05-19 NOTE — ED PROVIDER NOTES
Time reflects when diagnosis was documented in both MDM as applicable and the Disposition within this note       Time User Action Codes Description Comment    5/19/2025  2:58 PM Cheyenne Lawler Add [R19.7] Diarrhea, unspecified type     5/19/2025  2:59 PM Cheyenne Lawler Add [K52.89] Stercoral colitis     5/19/2025  2:59 PM Cheyenne Lawler Add [D64.9] Anemia           ED Disposition       ED Disposition   Admit    Condition   Stable    Date/Time   Mon May 19, 2025  2:58 PM    Comment                  Assessment & Plan       Medical Decision Making  64-year female with history of stroke and left-sided hemiparesis lives at home and has caregivers at home is coming in with diarrhea that has not been controlled for the last multiple days and is not improving now with poor p.o. intake.  Has had some fatigue and poor appetite.  No fever chills vomiting or chest pain or shortness of breath.  Patient tried taking Pepto and multiple doses of Imodium and has not improved the diarrhea at all.  Denies any real associated abdominal pain.  No recent travel or antibiotics.  Given continued diarrhea with failure of outpatient treatment will get labs for abdominal labs and dehydration and for associated fatigue.  Will get EKG and troponin and will get stool studies for potential C. difficile versus other potential source.  They do report the stool was somewhat dark but also took Pepto.  Will get fecal occult blood test and will CT scan her abdomen.  Given diarrhea and not improved outpatient will need admission for evaluation for potential infectious cause.    Amount and/or Complexity of Data Reviewed  Labs: ordered.     Details: Reviewed patient labs and has new anemia with hemoglobin in the sevens compared to normal in the past.  Diarrhea could be related to melena and GI bleed patient consented and unit of blood ordered will need serial hemoglobins and continued workup and admission.  Patient is on blood thinner because of her  prior stroke and with the new anemia will need serial H&H and evaluation.  Radiology: ordered.  ECG/medicine tests: ordered and independent interpretation performed.    Risk  Prescription drug management.  Decision regarding hospitalization.        ED Course as of 05/19/25 1905   Mon May 19, 2025   1320 Blood consent obtained       Medications   acetaminophen (TYLENOL) tablet 650 mg (has no administration in time range)   atorvastatin (LIPITOR) tablet 40 mg (has no administration in time range)   buPROPion (WELLBUTRIN XL) 24 hr tablet 150 mg (has no administration in time range)   busPIRone (BUSPAR) tablet 15 mg (has no administration in time range)   FLUoxetine (PROzac) capsule 20 mg (has no administration in time range)   gabapentin (NEURONTIN) capsule 300 mg (has no administration in time range)   oxybutynin (DITROPAN-XL) 24 hr tablet 10 mg (has no administration in time range)   pantoprazole (PROTONIX) EC tablet 40 mg (has no administration in time range)   oxyCODONE (ROXICODONE) IR tablet 5 mg (has no administration in time range)   traZODone (DESYREL) tablet 100 mg (has no administration in time range)   apixaban (ELIQUIS) tablet 5 mg (has no administration in time range)   sodium chloride 0.9 % bolus 1,000 mL (1,000 mL Intravenous New Bag 5/19/25 1135)   iohexol (OMNIPAQUE) 350 MG/ML injection (MULTI-DOSE) 100 mL (100 mL Intravenous Given 5/19/25 1318)       ED Risk Strat Scores                    No data recorded                            History of Present Illness       Chief Complaint   Patient presents with    Diarrhea     Pt arrives via EMS from home with complaints of diarrhea over the last 4 days, generalized weakness as a result, as well and abd cramping. Reports poor PO intake.       Past Medical History:   Diagnosis Date    Anemia     Anxiety [id number]    Cancer (HCC) 2007    breast history of    Depression [id number]    Gastroesophageal reflux disease without esophagitis 05/21/2021    Migraine      Mixed hyperlipidemia 2021    Mixed hyperlipidemia 2021    Obesity, unspecified obesity severity, unspecified obesity type 2018    PE (pulmonary thromboembolism) (HCC)     Skin cancer     Left leg lump. cryotherapy and excision, unsure of which kind     Sleep apnea     Stroke (HCC)     questionable history per pt      Past Surgical History:   Procedure Laterality Date    ACHILLES TENDON SURGERY Left 2024    Procedure: Achilles lengthening, talonavicular joint capsule release, posterior tibial tendon lengthening, plantar fascia release;  Surgeon: James R Lachman, MD;  Location:  MAIN OR;  Service: Orthopedics    ANKLE SURGERY Left     pins and plate     BREAST LUMPECTOMY Right      - malignant     SECTION      x2    DILATION AND CURETTAGE OF UTERUS      EXTERNAL FIXATOR (EX FIX) REMOVAL Left 2024    Procedure: REMOVAL EXTERNAL FIXATOR (EX FIX),  Placement of short leg cast;  Surgeon: James R Lachman, MD;  Location:  MAIN OR;  Service: Orthopedics    FOOT CAPSULE RELEASE W/ PERCUTANEOUS HEEL CORD LENGTHENING, TIBIAL TENDON TRANSFER Left 2024    Procedure: Achilles lengthening, talonavicular joint capsule release, posterior tibial tendon lengthening, plantar fascia release;  Surgeon: James R Lachman, MD;  Location:  MAIN OR;  Service: Orthopedics    PLANTAR FASCIA SURGERY Left 2024    Procedure: plantar fascia release;  Surgeon: James R Lachman, MD;  Location:  MAIN OR;  Service: Orthopedics    FL APPLICATION MULTIPLANE EXTERNAL FIXATION SYSTEM Left 2024    Procedure: APPLICATION EXTERNAL FIXATION DEVICE LOWER EXTREMITY, Achilles lengthening, talonavicular joint capsule release, posterior tibial tendon lengthening, plantar fascia release;  Surgeon: James R Lachman, MD;  Location:  MAIN OR;  Service: Orthopedics    REDUCTION MAMMAPLASTY      TONSILLECTOMY      age 8      Family History   Problem Relation Age of Onset    No Known Problems Mother     No  Known Problems Father     Stroke Maternal Grandmother     Stroke Maternal Grandfather     Lung cancer Brother       Social History[1]   E-Cigarette/Vaping    E-Cigarette Use Never User       E-Cigarette/Vaping Substances    Nicotine No     THC No     CBD No     Flavoring No     Other No     Unknown No       I have reviewed and agree with the history as documented.       History provided by:  Patient and caregiver  Diarrhea  Quality:  Copious  Severity:  Moderate  Onset quality:  Sudden  Number of episodes:  Multiple times a day  Duration:  3 days  Timing:  Constant  Progression:  Worsening  Relieved by:  Nothing  Worsened by:  Nothing  Ineffective treatments:  Anti-motility medications (took a few doses immodium and pepto an did not help sx at all)  Associated symptoms: no abdominal pain, no chills, no fever and no vomiting    Risk factors: no recent antibiotic use        Review of Systems   Constitutional:  Negative for chills and fever.   Gastrointestinal:  Positive for diarrhea. Negative for abdominal pain and vomiting.   All other systems reviewed and are negative.          Objective       ED Triage Vitals   Temperature Pulse Blood Pressure Respirations SpO2 Patient Position - Orthostatic VS   05/19/25 1037 05/19/25 1037 05/19/25 1037 05/19/25 1037 05/19/25 1037 05/19/25 1037   97.7 °F (36.5 °C) 99 129/60 20 98 % Lying      Temp Source Heart Rate Source BP Location FiO2 (%) Pain Score    05/19/25 1037 05/19/25 1037 05/19/25 1037 -- 05/19/25 1802    Oral Monitor Right arm  9      Vitals      Date and Time Temp Pulse SpO2 Resp BP Pain Score FACES Pain Rating User   05/19/25 1842 -- -- -- 19 -- -- -- AS   05/19/25 1842 97.7 °F (36.5 °C) 85 96 % -- 132/73 -- -- DII   05/19/25 1839 -- -- 97 % 19 -- -- -- AS   05/19/25 1838 -- 93 95 % -- 132/73 -- -- DII   05/19/25 1802 -- -- -- -- -- 9 -- AR   05/19/25 1720 97.8 °F (36.6 °C) -- 96 % -- -- -- -- AS   05/19/25 1717 -- -- -- 18 -- -- -- AR   05/19/25 1717 97.8 °F (36.6  °C) 95 95 % -- 133/71 -- -- DII   05/19/25 1700 97.8 °F (36.6 °C) 95 95 % 19 133/71 -- -- AS   05/19/25 1640 97.5 °F (36.4 °C) 97 96 % 20 131/63 -- -- AM   05/19/25 1625 97.6 °F (36.4 °C) 99 96 % 20 131/68 -- -- AM   05/19/25 1618 97.5 °F (36.4 °C) 80 -- 18 133/74 -- -- AM   05/19/25 1530 -- 87 94 % 15 114/56 -- -- AM   05/19/25 1500 -- 85 96 % 17 121/58 -- -- AM   05/19/25 1500 97.5 °F (36.4 °C) -- -- -- -- -- -- IN   05/19/25 1430 -- 87 93 % 17 114/57 -- -- CB   05/19/25 1400 -- 87 94 % 17 125/56 -- -- SB   05/19/25 1300 -- 84 97 % 21 141/63 -- -- SB   05/19/25 1230 -- 83 96 % 21 135/64 -- -- AM   05/19/25 1200 -- 82 98 % 20 124/60 -- -- AM   05/19/25 1145 -- 86 97 % 21 120/58 -- -- AM   05/19/25 1100 -- 90 95 % 22 115/55 -- -- AM   05/19/25 1037 97.7 °F (36.5 °C) 99 98 % 20 129/60 -- -- LF            Physical Exam  Vitals and nursing note reviewed.   Constitutional:       General: She is not in acute distress.     Appearance: She is well-developed. She is ill-appearing. She is not toxic-appearing or diaphoretic.   HENT:      Head: Normocephalic and atraumatic.      Nose: Nose normal.      Mouth/Throat:      Mouth: Mucous membranes are dry.     Eyes:      Extraocular Movements: Extraocular movements intact.      Conjunctiva/sclera: Conjunctivae normal.       Cardiovascular:      Rate and Rhythm: Normal rate and regular rhythm.      Heart sounds: Normal heart sounds.   Pulmonary:      Effort: Pulmonary effort is normal. No respiratory distress.      Breath sounds: Normal breath sounds.   Abdominal:      General: There is no distension.      Palpations: Abdomen is soft.      Tenderness: There is no abdominal tenderness.     Musculoskeletal:         General: No deformity. Normal range of motion.      Cervical back: Neck supple.     Skin:     General: Skin is warm and dry.      Coloration: Skin is pale.      Comments: Left sided hemiparesis at baseline     Neurological:      General: No focal deficit present.       Mental Status: She is alert and oriented to person, place, and time. Mental status is at baseline.      Cranial Nerves: No cranial nerve deficit.     Psychiatric:         Mood and Affect: Mood normal.         Results Reviewed       Procedure Component Value Units Date/Time    Retic Count [[id number]]  (Abnormal) Collected: 05/19/25 1134    Lab Status: Final result Specimen: Blood from Hand, Right Updated: 05/19/25 1609     Retic Ct Abs 66,500     Retic Ct Pct 2.72 %     Lactate dehydrogenase [[id number]]  (Normal) Collected: 05/19/25 1337    Lab Status: Final result Specimen: Blood from Arm, Left Updated: 05/19/25 1554      U/L     Vitamin B12 [[id number]] Collected: 05/19/25 1337    Lab Status: In process Specimen: Blood from Arm, Left Updated: 05/19/25 1536    TIBC Panel (incl. Iron, TIBC, % Iron Saturation) [[id number]] Collected: 05/19/25 1337    Lab Status: In process Specimen: Blood from Arm, Left Updated: 05/19/25 1535    Ferritin [[id number]] Collected: 05/19/25 1337    Lab Status: In process Specimen: Blood from Arm, Left Updated: 05/19/25 1535    Folate [[id number]] Collected: 05/19/25 1337    Lab Status: In process Specimen: Blood from Arm, Left Updated: 05/19/25 1535    HS Troponin I 2hr [[id number]]  (Normal) Collected: 05/19/25 1337    Lab Status: Final result Specimen: Blood from Arm, Left Updated: 05/19/25 1409     hs TnI 2hr 3 ng/L      Delta 2hr hsTnI 0 ng/L     Protime-INR [[id number]]  (Normal) Collected: 05/19/25 1337    Lab Status: Final result Specimen: Blood from Arm, Left Updated: 05/19/25 1359     Protime 14.3 seconds      INR 1.03    Narrative:      INR Therapeutic Range    Indication                                             INR Range      Atrial Fibrillation                                               2.0-3.0  Hypercoagulable State                                    2.0.2.3  Left Ventricular Asist Device                            2.0-3.0  Mechanical Heart Valve                                   -    Aortic(with afib, MI, embolism, HF, LA enlargement,    and/or coagulopathy)                                     2.0-3.0 (2.5-3.5)     Mitral                                                             2.5-3.5  Prosthetic/Bioprosthetic Heart Valve               2.0-3.0  Venous thromboembolism (VTE: VT, PE        2.0-3.0    APTT [[id number]]  (Normal) Collected: 05/19/25 1337    Lab Status: Final result Specimen: Blood from Arm, Left Updated: 05/19/25 1359     PTT 28 seconds     TSH, 3rd generation with Free T4 reflex [[id number]]  (Normal) Collected: 05/19/25 1236    Lab Status: Final result Specimen: Blood from Hand, Right Updated: 05/19/25 1319     TSH 3RD GENERATON 0.544 uIU/mL     Comprehensive metabolic panel [[id number]]  (Abnormal) Collected: 05/19/25 1236    Lab Status: Final result Specimen: Blood from Hand, Right Updated: 05/19/25 1302     Sodium 137 mmol/L      Potassium 4.1 mmol/L      Chloride 106 mmol/L      CO2 21 mmol/L      ANION GAP 10 mmol/L      BUN 13 mg/dL      Creatinine 0.47 mg/dL      Glucose 86 mg/dL      Calcium 7.9 mg/dL      Corrected Calcium 8.8 mg/dL      AST 17 U/L      ALT 7 U/L      Alkaline Phosphatase 57 U/L      Total Protein 5.8 g/dL      Albumin 2.9 g/dL      Total Bilirubin 0.55 mg/dL      eGFR 103 ml/min/1.73sq m     Narrative:      [employer] guidelines for Chronic Kidney Disease (CKD):     Stage 1 with normal or high GFR (GFR > 90 mL/min/1.73 square meters)    Stage 2 Mild CKD (GFR = 60-89 mL/min/1.73 square meters)    Stage 3A Moderate CKD (GFR = 45-59 mL/min/1.73 square meters)    Stage 3B Moderate CKD (GFR = 30-44 mL/min/1.73 square meters)    Stage 4 Severe CKD (GFR = 15-29 mL/min/1.73 square meters)    Stage 5 End Stage CKD (GFR <15 mL/min/1.73 square meters)  Note: GFR calculation is accurate only with a steady state creatinine    Magnesium [[id number]]  (Normal) Collected: 05/19/25 1236    Lab Status: Final result Specimen: Blood from  Hand, Right Updated: 05/19/25 1302     Magnesium 1.9 mg/dL     iFOBT (FIT) [[id number]]     Lab Status: No result Specimen: Stool     FLU/RSV/COVID - if FLU/RSV clinically relevant (2hr TAT) [[id number]]  (Normal) Collected: 05/19/25 1134    Lab Status: Final result Specimen: Nares from Nose Updated: 05/19/25 1224     SARS-CoV-2 Negative     INFLUENZA A PCR Negative     INFLUENZA B PCR Negative     RSV PCR Negative    Narrative:      This test has been performed using the CoV-2/Flu/RSV plus assay on the [employer] Gene[employer]pert platform. This test has been validated by the  and verified by the performing laboratory.     This test is designed to amplify and detect the following: nucleocapsid (N), envelope (E), and RNA-dependent RNA polymerase (RdRP) genes of the SARS-CoV-2 genome; matrix (M), basic polymerase (PB2), and acidic protein (PA) segments of the influenza A genome; matrix (M) and non-structural protein (NS) segments of the influenza B genome, and the nucleocapsid genes of RSV A and RSV B.     Positive results are indicative of the presence of Flu A, Flu B, RSV, and/or SARS-CoV-2 RNA. Positive results for SARS-CoV-2 or suspected novel influenza should be reported to state, local, or federal health departments according to local reporting requirements.      All results should be assessed in conjunction with clinical presentation and other laboratory markers for clinical management.     FOR PEDIATRIC PATIENTS - copy/paste COVID Guidelines URL to browser: [email or web address]       Lactic acid, plasma (w/reflex if result > 2.0) [[id number]]  (Normal) Collected: 05/19/25 1134    Lab Status: Final result Specimen: Blood from Hand, Right Updated: 05/19/25 1213     LACTIC ACID 1.0 mmol/L     Narrative:      Result may be elevated if tourniquet was used during collection.    HS Troponin 0hr (reflex protocol) [[id number]]  (Normal) Collected: 05/19/25 1134    Lab  Status: Final result Specimen: Blood from Hand, Right Updated: 05/19/25 1207     hs TnI 0hr 3 ng/L     CBC and differential [[id number]]  (Abnormal) Collected: 05/19/25 1134    Lab Status: Final result Specimen: Blood from Hand, Right Updated: 05/19/25 1144     WBC 5.76 Thousand/uL      RBC 2.48 Million/uL      Hemoglobin 7.5 g/dL      Hematocrit 24.7 %       fL      MCH 30.2 pg      MCHC 30.4 g/dL      RDW 12.6 %      MPV 10.1 fL      Platelets 152 Thousands/uL      nRBC 0 /100 WBCs      Segmented % 79 %      Immature Grans % 0 %      Lymphocytes % 13 %      Monocytes % 7 %      Eosinophils Relative 1 %      Basophils Relative 0 %      Absolute Neutrophils 4.54 Thousands/µL      Absolute Immature Grans 0.01 Thousand/uL      Absolute Lymphocytes 0.77 Thousands/µL      Absolute Monocytes 0.39 Thousand/µL      Eosinophils Absolute 0.04 Thousand/µL      Basophils Absolute 0.01 Thousands/µL     Stool Enteric Bacterial Panel by PCR [[id number]]     Lab Status: No result Specimen: Stool     Clostridioides difficile toxin by PCR with EIA [[id number]]     Lab Status: No result Specimen: Stool from Per Rectum             CT abdomen pelvis with contrast   Final Interpretation by Liz Henry MD (05/19 1436)      Moderate to large amount of fecal debris in the rectum with mild perirectal infiltration and trace fluid in the presacral fascia, raise concern for stercoral proctitis      Trace pericholecystic fluid, nonspecific. Correlate clinically and consider ultrasound if concern for acute cholecystitis      The study was marked in EPIC for significant notification.         Workstation performed: [id number]             ECG 12 Lead Documentation Only    Date/Time: 5/19/2025 7:00 PM    Performed by: Cheyenne Lawler MD  Authorized by: Cheyenne Lawler MD    Indications / Diagnosis:  Diarrhea, fatigeu  Rate:     ECG rate:  98    ECG rate assessment: normal    Rhythm:     Rhythm: sinus rhythm    T waves:     T  waves: non-specific    Comments:      Motion artifact        ED Medication and Procedure Management   Prior to Admission Medications   Prescriptions Last Dose Informant Patient Reported? Taking?   Cholecalciferol (VITAMIN D3 PO) Past Week Self Yes Yes   Sig: Take 1 tablet by mouth in the morning.   FLUoxetine (PROzac) 20 mg capsule Not Taking Self No No   Sig: Take 1 capsule (20 mg total) by mouth daily   Patient not taking: Reported on 2025   Myrbetriq 50 MG TB24 Past Week  No Yes   Sig: Take 1 tablet (50 mg total) by mouth daily   apixaban (Eliquis) 5 mg Past Week Self No Yes   Sig: Take 1 tablet (5 mg total) by mouth 2 (two) times a day   atorvastatin (LIPITOR) 40 mg tablet Past Week  No Yes   Sig: Take 1 tablet (40 mg total) by mouth daily   buPROPion (WELLBUTRIN XL) 150 mg 24 hr tablet Not Taking  No No   Sig: Take 1 tablet (150 mg total) by mouth every morning   Patient not taking: Reported on 2025   busPIRone (BUSPAR) 15 mg tablet Not Taking  No No   Sig: Take 1 tablet (15 mg total) by mouth 3 (three) times a day   Patient not taking: Reported on 2025   fluticasone (FLONASE) 50 mcg/act nasal spray Not Taking Self No No   Si spray into each nostril daily   Patient not taking: Reported on 2025   gabapentin (Neurontin) 100 mg capsule More than a month  No No   Sig: Take 3 capsules (300 mg total) by mouth 3 (three) times a day   loratadine (CLARITIN) 10 mg tablet  Self No No   Sig: Take 1 tablet (10 mg total) by mouth daily   Patient taking differently: Take 10 mg by mouth daily As needed   nitrofurantoin (MACROBID) 100 mg capsule Not Taking  No No   Sig: Take 1 capsule (100 mg total) by mouth 2 (two) times a day   Patient not taking: Reported on 2025   nystatin powder Past Week Self No Yes   Sig: apply topically to affected area four times a day   Patient taking differently: No sig reported   omeprazole (PriLOSEC) 20 mg delayed release capsule Past Week Self No Yes   Sig: Take 1  capsule (20 mg total) by mouth daily   ondansetron (ZOFRAN) 4 mg tablet Not Taking  No No   Sig: Take 1 tablet (4 mg total) by mouth every 8 (eight) hours as needed for nausea or vomiting   Patient not taking: Reported on 5/19/2025   oxyCODONE (Roxicodone) 5 immediate release tablet Not Taking  No No   Sig: Take 1 tablet (5 mg total) by mouth every 6 (six) hours as needed for severe pain Max Daily Amount: 20 mg   Patient not taking: Reported on 5/19/2025   rizatriptan (MAXALT) 10 mg tablet Past Week  No Yes   Sig: Take 1 tablet (10 mg total) by mouth as needed for migraine Take at the onset of migraine; if symptoms continue or return, may take another dose at least 2 hours after first dose. Take no more than 2 doses in a day.   senna-docusate sodium (SENOKOT S) 8.6-50 mg per tablet   No No   Sig: Take 1 tablet by mouth daily at bedtime for 7 days   traZODone (DESYREL) 100 mg tablet More than a month Self No No   Sig: Take 1 tablet (100 mg total) by mouth daily at bedtime   Patient not taking: Reported on 5/19/2025      Facility-Administered Medications: None     Current Discharge Medication List        CONTINUE these medications which have NOT CHANGED    Details   apixaban (Eliquis) 5 mg Take 1 tablet (5 mg total) by mouth 2 (two) times a day  Qty: 60 tablet, Refills: 5    Associated Diagnoses: Pulmonary embolism, unspecified chronicity, unspecified pulmonary embolism type, unspecified whether acute cor pulmonale present (HCC)      atorvastatin (LIPITOR) 40 mg tablet Take 1 tablet (40 mg total) by mouth daily  Qty: 100 tablet, Refills: 1    Associated Diagnoses: Mixed hyperlipidemia      Cholecalciferol (VITAMIN D3 PO) Take 1 tablet by mouth in the morning.      Myrbetriq 50 MG TB24 Take 1 tablet (50 mg total) by mouth daily  Qty: 30 tablet, Refills: 5    Associated Diagnoses: Mixed hyperlipidemia      nystatin powder apply topically to affected area four times a day  Qty: 15 g, Refills: 0    Associated Diagnoses:  Yeast infection      omeprazole (PriLOSEC) 20 mg delayed release capsule Take 1 capsule (20 mg total) by mouth daily  Qty: 60 capsule, Refills: 5    Associated Diagnoses: Gastroesophageal reflux disease without esophagitis      rizatriptan (MAXALT) 10 mg tablet Take 1 tablet (10 mg total) by mouth as needed for migraine Take at the onset of migraine; if symptoms continue or return, may take another dose at least 2 hours after first dose. Take no more than 2 doses in a day.  Qty: 9 tablet, Refills: 3    Associated Diagnoses: Other migraine without status migrainosus, not intractable      buPROPion (WELLBUTRIN XL) 150 mg 24 hr tablet Take 1 tablet (150 mg total) by mouth every morning  Qty: 30 tablet, Refills: 5    Associated Diagnoses: Current moderate episode of major depressive disorder without prior episode (HCC)      busPIRone (BUSPAR) 15 mg tablet Take 1 tablet (15 mg total) by mouth 3 (three) times a day  Qty: 90 tablet, Refills: 5    Associated Diagnoses: Anxiety      FLUoxetine (PROzac) 20 mg capsule Take 1 capsule (20 mg total) by mouth daily  Qty: 30 capsule, Refills: 5    Associated Diagnoses: Current moderate episode of major depressive disorder without prior episode (HCC)      fluticasone (FLONASE) 50 mcg/act nasal spray 1 spray into each nostril daily  Qty: 9.9 mL, Refills: 1    Associated Diagnoses: Seasonal allergic rhinitis due to pollen      gabapentin (Neurontin) 100 mg capsule Take 3 capsules (300 mg total) by mouth 3 (three) times a day  Qty: 120 capsule, Refills: 1    Associated Diagnoses: Pain      loratadine (CLARITIN) 10 mg tablet Take 1 tablet (10 mg total) by mouth daily  Qty: 30 tablet, Refills: 5    Associated Diagnoses: Seasonal allergic rhinitis due to pollen      nitrofurantoin (MACROBID) 100 mg capsule Take 1 capsule (100 mg total) by mouth 2 (two) times a day  Qty: 14 capsule, Refills: 0    Associated Diagnoses: Dysuria      ondansetron (ZOFRAN) 4 mg tablet Take 1 tablet (4 mg total)  by mouth every 8 (eight) hours as needed for nausea or vomiting  Qty: 10 tablet, Refills: 0    Associated Diagnoses: Acquired equinovarus deformity of left foot      oxyCODONE (Roxicodone) 5 immediate release tablet Take 1 tablet (5 mg total) by mouth every 6 (six) hours as needed for severe pain Max Daily Amount: 20 mg  Qty: 20 tablet, Refills: 0    Associated Diagnoses: Acquired equinovarus deformity of left foot      senna-docusate sodium (SENOKOT S) 8.6-50 mg per tablet Take 1 tablet by mouth daily at bedtime for 7 days  Qty: 7 tablet, Refills: 0    Associated Diagnoses: Constipation      traZODone (DESYREL) 100 mg tablet Take 1 tablet (100 mg total) by mouth daily at bedtime  Qty: 30 tablet, Refills: 5    Associated Diagnoses: Primary insomnia           No discharge procedures on file.  ED SEPSIS DOCUMENTATION   Time reflects when diagnosis was documented in both MDM as applicable and the Disposition within this note       Time User Action Codes Description Comment    5/19/2025  2:58 PM Cheyenne Lawler [R19.7] Diarrhea, unspecified type     5/19/2025  2:59 PM Cheyenne Lawler [K52.89] Stercoral colitis     5/19/2025  2:59 PM Cheyenne Lawler [D64.9] Anemia                    [1]   Social History  Tobacco Use    Smoking status: Never     Passive exposure: Never    Smokeless tobacco: Never   Vaping Use    Vaping status: Never Used   Substance Use Topics    Alcohol use: Never    Drug use: Never        Cheyenne Lawler MD  05/19/25 0094

## 2025-05-19 NOTE — PLAN OF CARE
Problem: PAIN - ADULT  Goal: Verbalizes/displays adequate comfort level or baseline comfort level  Description: Interventions:  - Encourage patient to monitor pain and request assistance  - Assess pain using appropriate pain scale  - Administer analgesics as ordered based on type and severity of pain and evaluate response  - Implement non-pharmacological measures as appropriate and evaluate response  - Consider cultural and social influences on pain and pain management  - Notify physician/advanced practitioner if interventions unsuccessful or patient reports new pain  - Educate patient/family on pain management process including their role and importance of  reporting pain   - Provide non-pharmacologic/complimentary pain relief interventions  Outcome: Progressing     Problem: GASTROINTESTINAL - ADULT  Goal: Maintains or returns to baseline bowel function  Description: INTERVENTIONS:  - Assess bowel function  - Encourage oral fluids to ensure adequate hydration  - Administer IV fluids if ordered to ensure adequate hydration  - Administer ordered medications as needed  - Encourage mobilization and activity  - Consider nutritional services referral to assist patient with adequate nutrition and appropriate food choices  Outcome: Progressing

## 2025-05-19 NOTE — ASSESSMENT & PLAN NOTE
As noted by BMI of 34  Diet and excercise counseling provided   Magnesium level is in normal range so not the cause of her muscle cramps.

## 2025-05-19 NOTE — H&P
H&P - Hospitalist   Name: Kristina Russell 64 y.o. female I MRN: [id number]  Unit/Bed#: FT 01 I Date of Admission: 5/19/2025   Date of Service: 5/19/2025 I Hospital Day: 0     Assessment & Plan  Acute on chronic anemia  Presented with hemoglobin of 7.5  Previous hemoglobin 12 over 1-year ago  Anemia noted to be macrocytic  Will check B12, folate, LDH, reticulocyte, haptoglobin, iron panel  If iron deficient anemia would recommend GI consultation  Obesity, unspecified obesity severity, unspecified obesity type  As noted by BMI of 34  Diet and excercise counseling provided  Mixed hyperlipidemia  Home statin   Gastroesophageal reflux disease without esophagitis  Continue PPI  Multiple subsegmental pulmonary emboli without acute cor pulmonale (HCC)  Continue Eliquis  Constipation  Noted on imaging  \But now with diarrhea   Diarrhea  Presents with diarrhea for the past couple days CT scan revealed constipation  Suspect likely had constipation which is since improved and now with loose stool  Will monitor for now  Suspect will improve with conservative management spontaneous if not consider bacterial culture  C. difficile ordered in the emergency department      VTE Pharmacologic Prophylaxis:   Moderate Risk (Score 3-4) - Pharmacological DVT Prophylaxis Ordered: apixaban (Eliquis).  Code Status: full  code  Discussion with family: Patient declined call to .     Anticipated Length of Stay: Patient will be admitted on an inpatient basis with an anticipated length of stay of greater than 2 midnights secondary to see below.    History of Present Illness   Chief Complaint:      Kristina Russell is a 64 y.o. female with past medical history significant stroke with left-sided hemiplegia initially presented with loose stool.  Reports multiple bowel movements over the past 1 to 2 days.  Otherwise denies any acute complaints.  Denies fevers, chills, cough, abdominal pain, nausea, vomiting or any other complaints    Review of  Systems   Constitutional:  Negative for activity change, appetite change, chills, diaphoresis, fever and unexpected weight change.   HENT:  Negative for congestion, facial swelling and rhinorrhea.    Eyes:  Negative for photophobia and visual disturbance.   Respiratory:  Negative for cough, shortness of breath and wheezing.    Cardiovascular:  Negative for chest pain and palpitations.   Gastrointestinal:  Negative for abdominal pain, blood in stool, constipation, diarrhea, nausea and vomiting.   Genitourinary:  Negative for decreased urine volume, difficulty urinating, dysuria, flank pain, frequency, hematuria and urgency.   Musculoskeletal:  Negative for arthralgias, back pain, joint swelling and myalgias.   Neurological:  Negative for dizziness, syncope, facial asymmetry, light-headedness, numbness and headaches.   Psychiatric/Behavioral:  Negative for confusion and decreased concentration. The patient is not nervous/anxious.        Historical Information   Past Medical History:   Diagnosis Date    Anemia     Anxiety [id number]    Cancer (HCC)     breast history of    Depression [id number]    Gastroesophageal reflux disease without esophagitis 2021    Migraine     Mixed hyperlipidemia 2021    Mixed hyperlipidemia 2021    Obesity, unspecified obesity severity, unspecified obesity type 2018    PE (pulmonary thromboembolism) (HCC)     Skin cancer     Left leg lump. cryotherapy and excision, unsure of which kind     Sleep apnea     Stroke (HCC)     questionable history per pt     Past Surgical History:   Procedure Laterality Date    ACHILLES TENDON SURGERY Left 2024    Procedure: Achilles lengthening, talonavicular joint capsule release, posterior tibial tendon lengthening, plantar fascia release;  Surgeon: James R Lachman, MD;  Location:  MAIN OR;  Service: Orthopedics    ANKLE SURGERY Left     pins and plate     BREAST LUMPECTOMY Right      - malignant     SECTION       x2    DILATION AND CURETTAGE OF UTERUS      EXTERNAL FIXATOR (EX FIX) REMOVAL Left 7/29/2024    Procedure: REMOVAL EXTERNAL FIXATOR (EX FIX),  Placement of short leg cast;  Surgeon: James R Lachman, MD;  Location:  MAIN OR;  Service: Orthopedics    FOOT CAPSULE RELEASE W/ PERCUTANEOUS HEEL CORD LENGTHENING, TIBIAL TENDON TRANSFER Left 6/24/2024    Procedure: Achilles lengthening, talonavicular joint capsule release, posterior tibial tendon lengthening, plantar fascia release;  Surgeon: James R Lachman, MD;  Location:  MAIN OR;  Service: Orthopedics    PLANTAR FASCIA SURGERY Left 6/24/2024    Procedure: plantar fascia release;  Surgeon: James R Lachman, MD;  Location:  MAIN OR;  Service: Orthopedics    [place] APPLICATION MULTIPLANE EXTERNAL FIXATION SYSTEM Left 6/24/2024    Procedure: APPLICATION EXTERNAL FIXATION DEVICE LOWER EXTREMITY, Achilles lengthening, talonavicular joint capsule release, posterior tibial tendon lengthening, plantar fascia release;  Surgeon: James R Lachman, MD;  Location:  MAIN OR;  Service: Orthopedics    REDUCTION MAMMAPLASTY      TONSILLECTOMY      age 8     Social History     Tobacco Use    Smoking status: Never     Passive exposure: Never    Smokeless tobacco: Never   Vaping Use    Vaping status: Never Used   Substance and Sexual Activity    Alcohol use: Never    Drug use: Never    Sexual activity: Not Currently     Partners: Male     Birth control/protection: None     E-Cigarette/Vaping    E-Cigarette Use Never User      E-Cigarette/Vaping Substances    Nicotine No     THC No     CBD No     Flavoring No     Other No     Unknown No      Family History   Problem Relation Age of Onset    No Known Problems Mother     No Known Problems Father     Stroke Maternal Grandmother     Stroke Maternal Grandfather     Lung cancer Brother      Social History:  Marital Status:      Patient Pre-hospital Living Situation: Home  Patient Pre-hospital Level of Mobility: walks  Patient  Pre-hospital Diet Restrictions: none    Meds/Allergies   I have reviewed home medications with patient personally.  Prior to Admission medications    Medication Sig Start Date End Date Taking? Authorizing Provider   apixaban (Eliquis) 5 mg Take 1 tablet (5 mg total) by mouth 2 (two) times a day 5/1/24   Alfred Crane MD   atorvastatin (LIPITOR) 40 mg tablet Take 1 tablet (40 mg total) by mouth daily 3/6/25   Alfred Crane MD   buPROPion (WELLBUTRIN XL) 150 mg 24 hr tablet Take 1 tablet (150 mg total) by mouth every morning 8/14/24   Alfred Crane MD   busPIRone (BUSPAR) 15 mg tablet Take 1 tablet (15 mg total) by mouth 3 (three) times a day 8/14/24   Alfred Crane MD   Cholecalciferol (VITAMIN D3 PO) Take 1 tablet by mouth daily    Historical Provider, MD   FLUoxetine (PROzac) 20 mg capsule Take 1 capsule (20 mg total) by mouth daily 5/1/24   Alfred Crane MD   fluticasone (FLONASE) 50 mcg/act nasal spray 1 spray into each nostril daily  Patient taking differently: 1 spray into each nostril daily As needed 5/1/24   Alfred Crane MD   gabapentin (Neurontin) 100 mg capsule Take 3 capsules (300 mg total) by mouth 3 (three) times a day 8/14/24   Alfred Crane MD   loratadine (CLARITIN) 10 mg tablet Take 1 tablet (10 mg total) by mouth daily  Patient taking differently: Take 10 mg by mouth daily As needed 5/1/24   Alfred Crane MD   Myrbetriq 50 MG TB24 Take 1 tablet (50 mg total) by mouth daily 8/14/24   Alfred Crane MD   nitrofurantoin (MACROBID) 100 mg capsule Take 1 capsule (100 mg total) by mouth 2 (two) times a day 1/14/25   Alfred Crane MD   nystatin powder apply topically to affected area four times a day  Patient taking differently: No sig reported 7/25/23   Alfred Crane MD   omeprazole (PriLOSEC) 20 mg delayed release capsule Take 1 capsule (20 mg total) by mouth daily 5/1/24   Alfred Crane MD   ondansetron (ZOFRAN) 4 mg tablet Take 1 tablet (4 mg total) by mouth every 8 (eight)  hours as needed for nausea or vomiting 6/24/24   Jessica Yusuf PA-C   oxyCODONE (Roxicodone) 5 immediate release tablet Take 1 tablet (5 mg total) by mouth every 6 (six) hours as needed for severe pain Max Daily Amount: 20 mg 7/29/24   Laron Tan PA-C   rizatriptan (MAXALT) 10 mg tablet Take 1 tablet (10 mg total) by mouth as needed for migraine Take at the onset of migraine; if symptoms continue or return, may take another dose at least 2 hours after first dose. Take no more than 2 doses in a day. 3/5/25   Alfred Crane MD   senna-docusate sodium (SENOKOT S) 8.6-50 mg per tablet Take 1 tablet by mouth daily at bedtime for 7 days 12/21/22 2/24/23  Teetee Ivy MD   traZODone (DESYREL) 100 mg tablet Take 1 tablet (100 mg total) by mouth daily at bedtime 5/1/24   Alfred Crane MD     Allergies   Allergen Reactions    Codeine Headache     Headaches  Other reaction(s): headache       Objective :  Temp:  [97.7 °F (36.5 °C)] 97.7 °F (36.5 °C)  HR:  [82-99] 85  BP: (114-141)/(55-64) 121/58  Resp:  [17-22] 17  SpO2:  [93 %-98 %] 96 %  O2 Device: None (Room air)    Physical Exam  Constitutional:       General: She is not in acute distress.     Appearance: She is well-developed. She is not diaphoretic.   HENT:      Head: Normocephalic and atraumatic.      Nose: Nose normal.      Mouth/Throat:      Pharynx: No oropharyngeal exudate.     Eyes:      General: No scleral icterus.        Right eye: No discharge.         Left eye: No discharge.      Conjunctiva/sclera: Conjunctivae normal.     Neck:      Thyroid: No thyromegaly.      Vascular: No JVD.     Cardiovascular:      Rate and Rhythm: Normal rate and regular rhythm.      Heart sounds: Normal heart sounds. No murmur heard.     No friction rub. No gallop.   Pulmonary:      Effort: Pulmonary effort is normal. No respiratory distress.      Breath sounds: Normal breath sounds. No wheezing or rales.   Chest:      Chest wall: No tenderness.   Abdominal:      General:  "Bowel sounds are normal. There is no distension.      Palpations: Abdomen is soft.      Tenderness: There is no abdominal tenderness. There is no guarding or rebound.     Musculoskeletal:         General: No tenderness or deformity. Normal range of motion.      Cervical back: Normal range of motion and neck supple.     Skin:     General: Skin is warm and dry.      Findings: No erythema or rash.     Neurological:      Mental Status: She is alert.      Cranial Nerves: No cranial nerve deficit.      Sensory: No sensory deficit.      Motor: No abnormal muscle tone.      Coordination: Coordination normal.          Lines/Drains:            Lab Results: I have reviewed the following results:  Results from last 7 days   Lab Units 05/19/25  1134   WBC Thousand/uL 5.76   HEMOGLOBIN g/dL 7.5*   HEMATOCRIT % 24.7*   PLATELETS Thousands/uL 152   SEGS PCT % 79*   LYMPHO PCT % 13*   MONO PCT % 7   EOS PCT % 1     Results from last 7 days   Lab Units 05/19/25  1236   SODIUM mmol/L 137   POTASSIUM mmol/L 4.1   CHLORIDE mmol/L 106   CO2 mmol/L 21   BUN mg/dL 13   CREATININE mg/dL 0.47*   ANION GAP mmol/L 10   CALCIUM mg/dL 7.9*   ALBUMIN g/dL 2.9*   TOTAL BILIRUBIN mg/dL 0.55   ALK PHOS U/L 57   ALT U/L 7   AST U/L 17   GLUCOSE RANDOM mg/dL 86     Results from last 7 days   Lab Units 05/19/25  1337   INR  1.03         No results found for: \"HGBA1C\"  Results from last 7 days   Lab Units 05/19/25  1134   LACTIC ACID mmol/L 1.0       Imaging Results Review: I personally reviewed the following image studies/reports in PACS and discussed pertinent findings with Radiology: chest xray. My interpretation of the radiology images/reports is:  .  Other Study Results Review: EKG was reviewed.     Administrative Statements   I have spent a total time of 76 minutes in caring for this patient on the day of the visit/encounter including Diagnostic results.    ** Please Note: This note has been constructed using a voice recognition system. **    "

## 2025-05-20 PROBLEM — E66.811 CLASS 1 OBESITY DUE TO EXCESS CALORIES WITH SERIOUS COMORBIDITY AND BODY MASS INDEX (BMI) OF 32.0 TO 32.9 IN ADULT: Status: ACTIVE | Noted: 2018-08-06

## 2025-05-20 PROBLEM — E66.09 CLASS 1 OBESITY DUE TO EXCESS CALORIES WITH SERIOUS COMORBIDITY AND BODY MASS INDEX (BMI) OF 32.0 TO 32.9 IN ADULT: Status: ACTIVE | Noted: 2018-08-06

## 2025-05-20 LAB
ABO GROUP BLD BPU: NORMAL
ALBUMIN SERPL BCG-MCNC: 3.7 G/DL (ref 3.5–5)
ALP SERPL-CCNC: 69 U/L (ref 34–104)
ALT SERPL W P-5'-P-CCNC: 9 U/L (ref 7–52)
ANION GAP SERPL CALCULATED.3IONS-SCNC: 12 MMOL/L (ref 4–13)
AST SERPL W P-5'-P-CCNC: 12 U/L (ref 13–39)
BASOPHILS # BLD AUTO: 0.05 THOUSANDS/ÂΜL (ref 0–0.1)
BASOPHILS NFR BLD AUTO: 1 % (ref 0–1)
BILIRUB SERPL-MCNC: 0.85 MG/DL (ref 0.2–1)
BPU ID: NORMAL
BUN SERPL-MCNC: 13 MG/DL (ref 5–25)
CALCIUM SERPL-MCNC: 8.8 MG/DL (ref 8.4–10.2)
CHLORIDE SERPL-SCNC: 104 MMOL/L (ref 96–108)
CO2 SERPL-SCNC: 24 MMOL/L (ref 21–32)
CREAT SERPL-MCNC: 0.53 MG/DL (ref 0.6–1.3)
CROSSMATCH: NORMAL
CRP SERPL QL: 40.5 MG/L
EOSINOPHIL # BLD AUTO: 0.13 THOUSAND/ÂΜL (ref 0–0.61)
EOSINOPHIL NFR BLD AUTO: 2 % (ref 0–6)
ERYTHROCYTE [DISTWIDTH] IN BLOOD BY AUTOMATED COUNT: 12.5 % (ref 11.6–15.1)
GFR SERPL CREATININE-BSD FRML MDRD: 99 ML/MIN/1.73SQ M
GLUCOSE SERPL-MCNC: 66 MG/DL (ref 65–140)
HCT VFR BLD AUTO: 41 % (ref 34.8–46.1)
HEMOCCULT STL QL IA: NEGATIVE
HGB BLD-MCNC: 13.7 G/DL (ref 11.5–15.4)
IMM GRANULOCYTES # BLD AUTO: 0.02 THOUSAND/UL (ref 0–0.2)
IMM GRANULOCYTES NFR BLD AUTO: 0 % (ref 0–2)
LYMPHOCYTES # BLD AUTO: 1.79 THOUSANDS/ÂΜL (ref 0.6–4.47)
LYMPHOCYTES NFR BLD AUTO: 21 % (ref 14–44)
MAGNESIUM SERPL-MCNC: 1.9 MG/DL (ref 1.9–2.7)
MCH RBC QN AUTO: 30.2 PG (ref 26.8–34.3)
MCHC RBC AUTO-ENTMCNC: 33.4 G/DL (ref 31.4–37.4)
MCV RBC AUTO: 91 FL (ref 82–98)
MONOCYTES # BLD AUTO: 0.56 THOUSAND/ÂΜL (ref 0.17–1.22)
MONOCYTES NFR BLD AUTO: 7 % (ref 4–12)
NEUTROPHILS # BLD AUTO: 5.92 THOUSANDS/ÂΜL (ref 1.85–7.62)
NEUTS SEG NFR BLD AUTO: 70 % (ref 43–75)
NRBC BLD AUTO-RTO: 0 /100 WBCS
PLATELET # BLD AUTO: 428 THOUSANDS/UL (ref 149–390)
PMV BLD AUTO: 9.7 FL (ref 8.9–12.7)
POTASSIUM SERPL-SCNC: 3.8 MMOL/L (ref 3.5–5.3)
PROT SERPL-MCNC: 6.8 G/DL (ref 6.4–8.4)
RBC # BLD AUTO: 4.53 MILLION/UL (ref 3.81–5.12)
SODIUM SERPL-SCNC: 140 MMOL/L (ref 135–147)
UNIT DISPENSE STATUS: NORMAL
UNIT PRODUCT CODE: NORMAL
UNIT PRODUCT VOLUME: 350 ML
UNIT RH: NORMAL
WBC # BLD AUTO: 8.47 THOUSAND/UL (ref 4.31–10.16)

## 2025-05-20 PROCEDURE — G0328 FECAL BLOOD SCRN IMMUNOASSAY: HCPCS | Performed by: INTERNAL MEDICINE

## 2025-05-20 PROCEDURE — 85025 COMPLETE CBC W/AUTO DIFF WBC: CPT | Performed by: INTERNAL MEDICINE

## 2025-05-20 PROCEDURE — 86140 C-REACTIVE PROTEIN: CPT | Performed by: INTERNAL MEDICINE

## 2025-05-20 PROCEDURE — 87493 C DIFF AMPLIFIED PROBE: CPT | Performed by: INTERNAL MEDICINE

## 2025-05-20 PROCEDURE — 87505 NFCT AGENT DETECTION GI: CPT | Performed by: INTERNAL MEDICINE

## 2025-05-20 PROCEDURE — 80053 COMPREHEN METABOLIC PANEL: CPT | Performed by: INTERNAL MEDICINE

## 2025-05-20 PROCEDURE — 83010 ASSAY OF HAPTOGLOBIN QUANT: CPT | Performed by: INTERNAL MEDICINE

## 2025-05-20 PROCEDURE — 83735 ASSAY OF MAGNESIUM: CPT | Performed by: INTERNAL MEDICINE

## 2025-05-20 PROCEDURE — 99232 SBSQ HOSP IP/OBS MODERATE 35: CPT | Performed by: INTERNAL MEDICINE

## 2025-05-20 RX ADMIN — TRAZODONE HYDROCHLORIDE 100 MG: 100 TABLET ORAL at 22:30

## 2025-05-20 RX ADMIN — APIXABAN 5 MG: 5 TABLET, FILM COATED ORAL at 08:54

## 2025-05-20 RX ADMIN — ATORVASTATIN CALCIUM 40 MG: 40 TABLET, FILM COATED ORAL at 08:54

## 2025-05-20 RX ADMIN — FLUOXETINE HYDROCHLORIDE 20 MG: 20 CAPSULE ORAL at 08:54

## 2025-05-20 RX ADMIN — BUSPIRONE HYDROCHLORIDE 15 MG: 5 TABLET ORAL at 08:54

## 2025-05-20 RX ADMIN — OXYBUTYNIN CHLORIDE 10 MG: 5 TABLET, EXTENDED RELEASE ORAL at 08:53

## 2025-05-20 RX ADMIN — APIXABAN 5 MG: 5 TABLET, FILM COATED ORAL at 17:46

## 2025-05-20 RX ADMIN — GABAPENTIN 300 MG: 300 CAPSULE ORAL at 08:53

## 2025-05-20 RX ADMIN — GABAPENTIN 300 MG: 300 CAPSULE ORAL at 17:50

## 2025-05-20 RX ADMIN — BUSPIRONE HYDROCHLORIDE 15 MG: 5 TABLET ORAL at 17:46

## 2025-05-20 RX ADMIN — BUSPIRONE HYDROCHLORIDE 15 MG: 5 TABLET ORAL at 22:29

## 2025-05-20 RX ADMIN — GABAPENTIN 300 MG: 300 CAPSULE ORAL at 22:30

## 2025-05-20 RX ADMIN — BUPROPION HYDROCHLORIDE 150 MG: 150 TABLET, EXTENDED RELEASE ORAL at 08:55

## 2025-05-20 NOTE — PROGRESS NOTES
Progress Note - Hospitalist   Name: Kristina Russell 64 y.o. female I MRN: [id number]  Unit/Bed#: -01 I Date of Admission: 5/19/2025   Date of Service: 5/20/2025 I Hospital Day: 1    Assessment & Plan  Acute on chronic anemia  Recent Labs     05/19/25  1134 05/20/25  0922   HGB 7.5* 13.7   * 91     Previous hemoglobin 12 over 1-year ago  Anemia noted to be macrocytic  Results of iron panel shows low iron, iron saturation, and transferrin. B12 normal. Likely iron deficiency contributing to anemia  Hold off GI consult unless Hgb downtrends  Class 1 obesity due to excess calories with serious comorbidity and body mass index (BMI) of 32.0 to 32.9 in adult  Body mass index is 32.35 kg/m².    Recommend incorporating a more whole foods plant-predominant diet along with decreasing consumption of red meats and processed foods  Per AHA guidelines, recommend moderate-vigorous intensity exercise for 30 minutes a day for 5 days a week or a total of 150 min/week  Mixed hyperlipidemia  Lab Results   Component Value Date    CHOLESTEROL 166 08/28/2023    TRIG 86 08/28/2023    HDL 64 08/28/2023    LDLCALC 85 08/28/2023     Home statin   Gastroesophageal reflux disease without esophagitis  Continue PPI  Multiple subsegmental pulmonary emboli without acute cor pulmonale (HCC)  Continue Eliquis  Constipation  Noted on imaging  Chronic-usually has BM 1x a week, has tried multiple medications for this with no relief  But now with diarrhea   Diarrhea  Presents with diarrhea for the past couple days CT scan revealed constipation. Pt has had 4 mo of on and off diarrhea, occurring about 2x a month and lasting for a few days. This episode is her worst so far. She endorses abdominal pain associated with these episodes. She also has felt she has lost weight as he clothes are looser, but has not measured her weight. Typically she is constipated usually have 1 bowel movement a week.  Suspect likely had constipation which is since improved  and now with loose stool  Will monitor for now  Suspect will improve with conservative management spontaneous if not consider bacterial culture  C. difficile ordered in the emergency department.  CRP to evaluate for inflammatory cause of diarrhea.   Given new hx of 4 mo of symptoms less concerned for c. Diff. Currently considering Crohn's vs mixed IBS vs overflow incontinence secondary to chronic fecal impaction.    VTE Pharmacologic Prophylaxis: VTE Score: 5 High Risk (Score >/= 5) - Pharmacological DVT Prophylaxis Ordered: apixaban (Eliquis). Sequential Compression Devices Ordered.    Mobility:   Basic Mobility Inpatient Raw Score: 7  JH-HLM Goal: 2: Bed activities/Dependent transfer  JH-HLM Achieved: 2: Bed activities/Dependent transfer  JH-HLM Goal achieved. Continue to encourage appropriate mobility.    Patient Centered Rounds: I performed bedside rounds with nursing staff today.   Discussions with Specialists or Other Care Team Provider: none    Education and Discussions with Family / Patient: Patient declined call to .     Current Length of Stay: 1 day(s)  Current Patient Status: Inpatient   Certification Statement: The patient will continue to require additional inpatient hospital stay due to GI work up.  Discharge Plan: Anticipate discharge in 48-72 hrs to home with home services.    Code Status: Level 1 - Full Code    Subjective   Pt states she is feeling better this morning after eating some breakfast, she as unable to finish it. She states that she is typically constipated, 1 BM per week being her normal, she has been treated for this with multiple agents with no improvement. For the last 4 mo she has had occasional diarrhea, coming about twice a month and lasting a day or two, this episode is her worst bout. These episodes are associated with abdominal pain which she believes is relieved by defecation. Today she is having intermittent abdominal pain, 6/10 and sharp, initially in RLQ and  then moving to [facility], pain comes and goes during interview and is associated with feeling like she needs to have a bowel movement. Pt believes she has lost weight recently as her clothes fit differently but has not checked with a scale. She denies blood in stool, difficult to flush stools, fevers, and chills.     Objective :  Temp:  [97.5 °F (36.4 °C)-98.2 °F (36.8 °C)] 98.2 °F (36.8 °C)  HR:  [82-95] 84  BP: ()/(49-76) 111/57  Resp:  [18-19] 18  SpO2:  [93 %-97 %] 95 %  O2 Device: None (Room air)    Body mass index is 32.35 kg/m².     Input and Output Summary (last 24 hours):     Intake/Output Summary (Last 24 hours) at 5/20/2025 1652  Last data filed at 5/20/2025 1330  Gross per 24 hour   Intake 733.75 ml   Output 450 ml   Net 283.75 ml       Physical Exam  Constitutional:       General: She is not in acute distress.    Cardiovascular:      Rate and Rhythm: Normal rate and regular rhythm.      Heart sounds: Normal heart sounds.   Pulmonary:      Effort: Pulmonary effort is normal.      Breath sounds: Normal breath sounds.   Abdominal:      General: Abdomen is flat. There is no distension.      Palpations: Abdomen is soft.      Tenderness: There is no guarding or rebound.      Comments: Mild tenderness to deep palpation in lower abdomen     Musculoskeletal:      Right lower leg: No edema.      Left lower leg: No edema.     Skin:     Coloration: Skin is pale.     Neurological:      Mental Status: She is alert and oriented to person, place, and time.           Lab Results: I have reviewed the following results:   Results from last 7 days   Lab Units 05/20/25  0922   WBC Thousand/uL 8.47   HEMOGLOBIN g/dL 13.7   HEMATOCRIT % 41.0   PLATELETS Thousands/uL 428*   SEGS PCT % 70   LYMPHO PCT % 21   MONO PCT % 7   EOS PCT % 2     Results from last 7 days   Lab Units 05/20/25  0922   SODIUM mmol/L 140   POTASSIUM mmol/L 3.8   CHLORIDE mmol/L 104   CO2 mmol/L 24   BUN mg/dL 13   CREATININE mg/dL 0.53*   ANION GAP mmol/L  12   CALCIUM mg/dL 8.8   ALBUMIN g/dL 3.7   TOTAL BILIRUBIN mg/dL 0.85   ALK PHOS U/L 69   ALT U/L 9   AST U/L 12*   GLUCOSE RANDOM mg/dL 66     Results from last 7 days   Lab Units 05/19/25  1337   INR  1.03             Results from last 7 days   Lab Units 05/19/25  1134   LACTIC ACID mmol/L 1.0       Recent Cultures (last 7 days):         Imaging Results Review: No pertinent imaging studies reviewed.  Other Study Results Review: No additional pertinent studies reviewed.    Last 24 Hours Medication List:     Current Facility-Administered Medications:     acetaminophen (TYLENOL) tablet 650 mg, Q6H PRN    apixaban (ELIQUIS) tablet 5 mg, BID    atorvastatin (LIPITOR) tablet 40 mg, Daily    buPROPion (WELLBUTRIN XL) 24 hr tablet 150 mg, QAM    busPIRone (BUSPAR) tablet 15 mg, TID    FLUoxetine (PROzac) capsule 20 mg, Daily    gabapentin (NEURONTIN) capsule 300 mg, TID    oxybutynin (DITROPAN-XL) 24 hr tablet 10 mg, Daily    oxyCODONE (ROXICODONE) IR tablet 5 mg, Q6H PRN    pantoprazole (PROTONIX) EC tablet 40 mg, Early Morning    traZODone (DESYREL) tablet 100 mg, HS    Administrative Statements   Today, Patient Was Seen By: Victor Manuel Franco DO      **Please Note: This note may have been constructed using a voice recognition system.**

## 2025-05-20 NOTE — ASSESSMENT & PLAN NOTE
Noted on imaging  Chronic-usually has BM 1x a week, has tried multiple medications for this with no relief  But now with diarrhea

## 2025-05-20 NOTE — PLAN OF CARE
Problem: PAIN - ADULT  Goal: Verbalizes/displays adequate comfort level or baseline comfort level  Description: Interventions:  - Encourage patient to monitor pain and request assistance  - Assess pain using appropriate pain scale  - Administer analgesics as ordered based on type and severity of pain and evaluate response  - Implement non-pharmacological measures as appropriate and evaluate response  - Consider cultural and social influences on pain and pain management  - Notify physician/advanced practitioner if interventions unsuccessful or patient reports new pain  - Educate patient/family on pain management process including their role and importance of  reporting pain   - Provide non-pharmacologic/complimentary pain relief interventions  Outcome: Progressing     Problem: INFECTION - ADULT  Goal: Absence or prevention of progression during hospitalization  Description: INTERVENTIONS:  - Assess and monitor for signs and symptoms of infection  - Monitor lab/diagnostic results  - Monitor all insertion sites, i.e. indwelling lines, tubes, and drains  - Monitor endotracheal if appropriate and nasal secretions for changes in amount and color  - Isaban appropriate cooling/warming therapies per order  - Administer medications as ordered  - Instruct and encourage patient and family to use good hand hygiene technique  - Identify and instruct in appropriate isolation precautions for identified infection/condition  Outcome: Progressing

## 2025-05-20 NOTE — CASE MANAGEMENT
Case Management Assessment & Discharge Planning Note    Patient name Kristina Russell  Location /-01 MRN [id number]  : 1960 Date 2025       Current Admission Date: 2025  Current Admission Diagnosis:Acute on chronic anemia   Patient Active Problem List    Diagnosis Date Noted    Acute on chronic anemia 2025    Constipation 2025    Diarrhea 2025    Acquired equinovarus deformity of left foot 2024    Hemiparesis (HCC) 10/16/2023    Multiple subsegmental pulmonary emboli without acute cor pulmonale (HCC) 2022    Mixed hyperlipidemia 2021    Gastroesophageal reflux disease without esophagitis 2021    Migraine     Apnea, sleep 2018    Obesity, unspecified obesity severity, unspecified obesity type 2018      LOS (days): 1  Geometric Mean LOS (GMLOS) (days): 2.8  Days to GMLOS:1.8     OBJECTIVE:    Risk of Unplanned Readmission Score: 10.36         Current admission status: Inpatient       Preferred Pharmacy:   [id number] - [place], [place] - [address] [place] [id number]  Phone: [phone] Fax: [phone]    Primary Care Provider: Alfred Crane MD    Primary Insurance: [facility]  Secondary Insurance: [facility]    ASSESSMENT:  Active Health Care Proxies    There are no active Health Care Proxies on file.       Advance Directives  Does patient have a Health Care POA?: No  Was patient offered paperwork?: Yes (declined)  Does patient currently have a Health Care decision maker?: Yes, please see Health Care Proxy section  Does patient have Advance Directives?: No  Was patient offered paperwork?: Yes (declined)  Primary Contact: Tavon         Readmission Root Cause  30 Day Readmission: No    Patient Information  Admitted from:: Home  Mental Status: Alert  During Assessment patient was accompanied by: Not accompanied during  assessment  Assessment information provided by:: Patient  Primary Caregiver: Private caregiver  Caregiver's Name:: [facility]  Support Systems: Son  County of Residence: [place]  What city do you live in?: [place]  Home entry access options. Select all that apply.: Stairs, Ramp  Number of steps to enter home.: 4  Do the steps have railings?: Yes  Type of Current Residence: [facility]  Living Arrangements: Lives w/ Children (David and daughter in law)  Is patient a ?: No    Activities of Daily Living Prior to Admission  Functional Status: Assistance  Completes ADLs independently?: No  Level of ADL dependence: Assistance  Ambulates independently?: No  Level of ambulatory dependence: Total Dependent  Does patient use assisted devices?: Yes  Assisted Devices (DME) used: Wheelchair, Shower Chair, Bedside Commode  Does patient currently own DME?: Yes  What DME does the patient currently own?: Walker, Wheelchair, Shower Chair, Bedside Commode  Does patient have a history of Outpatient Therapy (PT/OT)?: No  Does the patient have a history of Short-Term Rehab?: Yes  Does patient have a history of HHC?: Yes (revolutionary)  Does patient currently have HHC?: No    Current Home Health Care  Type of Current Home Care Services: Home health aide ([facility])    Patient Information Continued  Income Source: SSI/SSD  Does patient have prescription coverage?: Yes  Can the patient afford their medications and any related supplies (such as glucometers or test strips)?: Yes  Does patient receive dialysis treatments?: No  Does patient have a history of substance abuse?: No  Does patient have a history of Mental Health Diagnosis?: No         Means of Transportation  [facility] ([facility])      Social Determinants of Health (SDOH)      Flowsheet Row Most Recent Value   Housing Stability    In the last 12 months, was there a time when you were not able to pay the mortgage or rent  on time? N   In the past 12 months, how many times have you moved where you were living? 0   At any time in the past 12 months, were you homeless or living in a shelter (including now)? N   Transportation Needs    In the past 12 months, has lack of transportation kept you from medical appointments or from getting medications? no   In the past 12 months, has lack of transportation kept you from meetings, work, or from getting things needed for daily living? No   Food Insecurity    Within the past 12 months, you worried that your food would run out before you got the money to buy more. Never true   Within the past 12 months, the food you bought just didn't last and you didn't have money to get more. Never true   Utilities    In the past 12 months has the electric, gas, oil, or water company threatened to shut off services in your home? No            DISCHARGE DETAILS:    Discharge planning discussed with:: Patient  Freedom of Choice: Yes  Comments - Freedom of Choice: CM discussed freedom of choice as it pertains to discharge planning. Patient informed cm if hospital recommended inpatient rehab, she would be agreeable.  CM contacted family/caregiver?: No- see comments (declined)  Were Treatment Team discharge recommendations reviewed with patient/caregiver?: Yes  Did patient/caregiver verbalize understanding of patient care needs?: Yes  Were patient/caregiver advised of the risks associated with not following Treatment Team discharge recommendations?: Yes         Requested Home Health Care         Is the patient interested in HHC at discharge?: No    DME Referral Provided  Referral made for DME?: No    Other Referral/Resources/Interventions Provided:  Referral Comments: referral sent for rehab and hhc    Would you like to participate in our [facility] service program?  : No - Declined    Treatment Team Recommendation: SNF, Short Term Rehab  Discharge Destination Plan:: Short Term Rehab, SNF  Transport at  Discharge : Camilla brewster

## 2025-05-20 NOTE — ASSESSMENT & PLAN NOTE
Body mass index is 32.35 kg/m².    Recommend incorporating a more whole foods plant-predominant diet along with decreasing consumption of red meats and processed foods  Per AHA guidelines, recommend moderate-vigorous intensity exercise for 30 minutes a day for 5 days a week or a total of 150 min/week

## 2025-05-20 NOTE — ASSESSMENT & PLAN NOTE
Presents with diarrhea for the past couple days CT scan revealed constipation. Pt has had 4 mo of on and off diarrhea, occurring about 2x a month and lasting for a few days. This episode is her worst so far. She endorses abdominal pain associated with these episodes. She also has felt she has lost weight as he clothes are looser, but has not measured her weight. Typically she is constipated usually have 1 bowel movement a week.  Suspect likely had constipation which is since improved and now with loose stool  Will monitor for now  Suspect will improve with conservative management spontaneous if not consider bacterial culture  C. difficile ordered in the emergency department.  CRP to evaluate for inflammatory cause of diarrhea.   Given new hx of 4 mo of symptoms less concerned for c. Diff. Currently considering Crohn's vs mixed IBS vs overflow incontinence secondary to chronic fecal impaction.

## 2025-05-20 NOTE — ASSESSMENT & PLAN NOTE
Lab Results   Component Value Date    CHOLESTEROL 166 08/28/2023    TRIG 86 08/28/2023    HDL 64 08/28/2023    LDLCALC 85 08/28/2023     Home statin

## 2025-05-20 NOTE — PLAN OF CARE
Problem: SAFETY ADULT  Goal: Patient will remain free of falls  Description: INTERVENTIONS:  - Educate patient/family on patient safety including physical limitations  - Instruct patient to call for assistance with activity   - Consider consulting OT/PT to assist with strengthening/mobility based on AM PAC & JH-HLM score  - Consult OT/PT to assist with strengthening/mobility   - Keep Call bell within reach  - Keep bed low and locked with side rails adjusted as appropriate  - Keep care items and personal belongings within reach  - Initiate and maintain comfort rounds  - Make Fall Risk Sign visible to staff  - Offer Toileting every 2 Hours, in advance of need  - Initiate/Maintain bed alarm  - Obtain necessary fall risk management equipment:   - Apply yellow socks and bracelet for high fall risk patients  - Consider moving patient to room near nurses station  Outcome: Progressing

## 2025-05-20 NOTE — WOUND OSTOMY CARE
Consult Note - Wound   Kristina Russell 64 y.o. female MRN: [id number]  Unit/Bed#: -01 Encounter: [id number]        Assessment :   Patient admitted to [facility] due to acute on chronic anemia. History of cancer, GERD, HLD, skin cancer, sleep apnea, stroke. Wound care nurse consulted for sacral redness. Patient is agreeable to assessment, alert and oriented x4, incontinent of bowel and bladder at times due to urgency, is assist of 1-2 to turn for assessment, is an assist with care.    1. MASD/IAD left buttock- Wound is round in shape, partial thickness, 100% blanchable pink tissue, no drainage noted. [place]-wound is dry, intact, blanchable pink and blanchable red skin.     2. Bilateral elbows, hips, and heels- Skin is dry, intact, blanchable.     Educated patient on importance of frequent offloading of pressure via turning, repositioning, and weight-shifting. Verbalized understanding of plan of care.    No induration, fluctuance, odor, warmth, or purulence noted to the above noted wound. Patient tolerated well, denies pain to the wound. Primary nurse aware of plan of care. See flow sheets for more detailed assessment findings. Will follow along.      Skin care plans:  1-Cleanse sacro-buttock with soap and water, pat dry. Apply thin layer of Calazime/Protective Zinc Oxide paste to sacrum and buttocks TID and PRN.  2-Hydraguard/Silicone Cream to bilateral heels BID and PRN.  3-Elevate heels to offload pressure.  4-Ehob cushion when out of bed.  5-Turn/reposition q2h for pressure re-distribution on skin.  6-Moisturize skin daily with skin nourishing cream.      Wound 05/20/25 Irritant Contact Dermatitis Incontinence Buttocks Left (Active)   Wound Image   05/20/25 1045   Wound Description Pink 05/20/25 1045   Non-staged Wound Description Partial thickness 05/20/25 1045   Wound Length (cm) 0.6 cm 05/20/25 1045   Wound Width (cm) 0.6 cm 05/20/25 1045   Wound Depth (cm) 0.1 cm 05/20/25 1045   Wound Surface Area (cm^2) 0.28 cm^2  05/20/25 1045   Wound Volume (cm^3) 0.019 cm^3 05/20/25 1045   Calculated Wound Volume (cm^3) 0.04 cm^3 05/20/25 1045   Drainage Amount None 05/20/25 1045   [place]adise-wound Assessment Dry;Intact;Pink 05/20/25 1045   Treatments Cleansed;Site care 05/20/25 1045   Dressing Protective barrier 05/20/25 1045   Wound packed? No 05/20/25 1045       Contact through [employer] Secure Chat with any questions  Wound Care will continue to follow while inpatient    Lashay ORTIZN RN CWON  Wound and Ostomy care

## 2025-05-20 NOTE — ASSESSMENT & PLAN NOTE
Recent Labs     05/19/25  1134 05/20/25  0922   HGB 7.5* 13.7   * 91     Previous hemoglobin 12 over 1-year ago  Anemia noted to be macrocytic  Results of iron panel shows low iron, iron saturation, and transferrin. B12 normal. Likely iron deficiency contributing to anemia  Hold off GI consult unless Hgb downtrends

## 2025-05-20 NOTE — UTILIZATION REVIEW
Initial Clinical Review    Admission: Date/Time/Statement:   Admission Orders (From admission, onward)       Ordered        05/19/25 1523  INPATIENT ADMISSION  Once                          Orders Placed This Encounter   Procedures    INPATIENT ADMISSION     Standing Status:   Standing     Number of Occurrences:   1     Level of Care:   Med Surg [16]     Estimated length of stay:   More than 2 Midnights     Certification:   I certify that inpatient services are medically necessary for this patient for a duration of greater than two midnights. See H&P and MD Progress Notes for additional information about the patient's course of treatment.     ED Arrival Information       Expected   -    Arrival   5/19/2025 10:36    Acuity   Emergent              Means of arrival   Ambulance    Escorted by   [facility]    Service   Hospitalist    Admission type   Emergency              Arrival complaint   DIARRHEA             Chief Complaint   Patient presents with    Diarrhea     Pt arrives via EMS from home with complaints of diarrhea over the last 4 days, generalized weakness as a result, as well and abd cramping. Reports poor PO intake.       Initial Presentation: 64 y.o. female to ED from home via EMS w/PMHX significant stroke with left-sided hemiplegia initially presented with loose stool. Reports multiple bowel movements over the past 1 to 2 days.   CT revealed constipation . Hgb 7.5 ( 12 1 year ago ) . Admitted IP status w/ acute onc hronic anemia , constipation / diarrhea . Plan f/u cdiff , monitor diarrhea . GERD PPI . Hx PE eliquis . HLD statin . Anemia check B12 , folate , LDH , reticulocyte , haptoglobin , Fe panel , GI consult .     Anticipated Length of Stay/Certification Statement:  Patient will be admitted on an inpatient basis with an anticipated length of stay of greater than 2 midnights secondary to see below.     Date: 5/20    Day 2: 4 mo of on and off diarrhea, occurring about 2x a month and lasting for a few  days. This episode is her worst so far. She endorses abdominal pain associated with these episodes. She also has felt she has lost weight as he clothes are looser, but has not measured her weight. Typically she is constipated usually have 1 bowel movement a week. CRP to evaluate for inflammatory cause of diarrhea.   Given new hx of 4 mo of symptoms less concerned for c. Diff. Currently considering Crohn's vs mixed IBS vs overflow incontinence secondary to chronic fecal impaction. Mild tenderness to deep palpation lower abd .     Date: 5/21  Day 3: Has surpassed a 2nd midnight with active treatments and services.  FIT neg , hgb inc to 13.1 from 7.5 . Cont w/ diarrhea despite fasting during episodes . CRP elevated 40, crohn's is top of differential . Plan for colonoscopy to evaluate , GI consulted . Anticipate discharge in 48-72 hrs to home with home services.     D Treatment-Medication Administration from 05/19/2025 1035 to 05/19/2025 1710         Date/Time Order Dose Route Action     05/19/2025 1135 sodium chloride 0.9 % bolus 1,000 mL 1,000 mL Intravenous New Bag     05/19/2025 1318 iohexol (OMNIPAQUE) 350 MG/ML injection (MULTI-DOSE) 100 mL 100 mL Intravenous Given            Scheduled Medications:  apixaban, 5 mg, Oral, BID  atorvastatin, 40 mg, Oral, Daily  buPROPion, 150 mg, Oral, QAM  busPIRone, 15 mg, Oral, TID  FLUoxetine, 20 mg, Oral, Daily  gabapentin, 300 mg, Oral, TID  oxybutynin, 10 mg, Oral, Daily  pantoprazole, 40 mg, Oral, Early Morning  traZODone, 100 mg, Oral, HS      Continuous IV Infusions:     PRN Meds:  acetaminophen, 650 mg, Oral, Q6H PRN  oxyCODONE, 5 mg, Oral, Q6H PRN      ED Triage Vitals   Temperature Pulse Respirations Blood Pressure SpO2 Pain Score   05/19/25 1037 05/19/25 1037 05/19/25 1037 05/19/25 1037 05/19/25 1037 05/19/25 1802   97.7 °F (36.5 °C) 99 20 129/60 98 % 9     Weight (last 2 days)       Date/Time Weight    05/19/25 17:17:20 85.5 (188.49)            Vital Signs (last 3  days)       Date/Time Temp Pulse Resp BP MAP (mmHg) SpO2 O2 Device Patient Position - Orthostatic VS Pain    05/21/25 0830 -- -- -- -- -- -- -- -- No Pain    05/21/25 08:11:23 97.9 °F (36.6 °C) 74 -- 110/57 75 95 % -- -- --    05/21/25 0753 -- -- -- -- -- -- -- -- No Pain    05/20/25 23:47:34 97.5 °F (36.4 °C) 77 -- 107/55 72 95 % -- -- --    05/20/25 1947 -- -- -- -- -- -- -- -- No Pain    05/20/25 14:24:33 98.2 °F (36.8 °C) 84 18 111/57 75 95 % None (Room air) Sitting --    05/20/25 1100 -- -- -- -- -- -- -- -- No Pain    05/20/25 0742 97.9 °F (36.6 °C) 82 -- 113/58 76 94 % -- -- --    05/19/25 22:50:18 -- 95 18 135/76 96 93 % None (Room air) Sitting No Pain    05/19/25 21:53:19 97.5 °F (36.4 °C) 86 -- 96/49 65 94 % -- -- --    05/19/25 2100 97.5 °F (36.4 °C) 86 18 96/49 -- 94 % -- -- --    05/19/25 18:42:18 97.7 °F (36.5 °C) 85 19 132/73 93 96 % None (Room air) -- --    05/19/25 1839 -- -- 19 -- -- 97 % -- -- --    05/19/25 18:38:02 -- 93 -- 132/73 93 95 % -- -- --    05/19/25 1802 -- -- -- -- -- -- -- -- 9    05/19/25 1720 97.8 °F (36.6 °C) -- -- -- -- 96 % None (Room air) -- --    05/19/25 17:17:20 97.8 °F (36.6 °C) 95 18 133/71 92 95 % None (Room air) -- --    05/19/25 1700 97.8 °F (36.6 °C) 95 19 133/71 -- 95 % None (Room air) -- --    05/19/25 1640 97.5 °F (36.4 °C) 97 20 131/63 -- 96 % None (Room air) -- --    05/19/25 1625 97.6 °F (36.4 °C) 99 20 131/68 -- 96 % -- -- --    05/19/25 1618 97.5 °F (36.4 °C) 80 18 133/74 -- -- -- -- --    05/19/25 1530 -- 87 15 114/56 79 94 % -- -- --    05/19/25 1500 97.5 °F (36.4 °C) 85 17 121/58 83 96 % -- -- --    05/19/25 1430 -- 87 17 114/57 78 93 % -- -- --    05/19/25 1400 -- 87 17 125/56 81 94 % -- -- --    05/19/25 1300 -- 84 21 141/63 91 97 % -- -- --    05/19/25 1230 -- 83 21 135/64 92 96 % -- -- --    05/19/25 1200 -- 82 20 124/60 85 98 % None (Room air) -- --    05/19/25 1145 -- 86 21 120/58 81 97 % -- -- --    05/19/25 1100 -- 90 22 115/55 79 95 % -- -- --     05/19/25 1037 97.7 °F (36.5 °C) 99 20 129/60 -- 98 % None (Room air) Lying --              Pertinent Labs/Diagnostic Test Results:   Radiology:  CT abdomen pelvis with contrast   Final Interpretation by Liz Henry MD (05/19 1436)      Moderate to large amount of fecal debris in the rectum with mild perirectal infiltration and trace fluid in the presacral fascia, raise concern for stercoral proctitis      Trace pericholecystic fluid, nonspecific. Correlate clinically and consider ultrasound if concern for acute cholecystitis      The study was marked in EPIC for significant notification.         Workstation performed: [id number]           Cardiology:  ECG 12 lead    by Interface, Ris Results In (05/19 1041)        GI:  No orders to display       Results from last 7 days   Lab Units 05/19/25  1134   SARS-COV-2  Negative     Results from last 7 days   Lab Units 05/20/25  0922 05/19/25  1134   WBC Thousand/uL 8.47 5.76   HEMOGLOBIN g/dL 13.7 7.5*   HEMATOCRIT % 41.0 24.7*   PLATELETS Thousands/uL 428* 152   TOTAL NEUT ABS Thousands/µL 5.92 4.54     Results from last 7 days   Lab Units 05/19/25  1134   RETIC CT ABS  66,500   RETIC CT PCT % 2.72*     Results from last 7 days   Lab Units 05/20/25  0922 05/19/25  1236   SODIUM mmol/L 140 137   POTASSIUM mmol/L 3.8 4.1   CHLORIDE mmol/L 104 106   CO2 mmol/L 24 21   ANION GAP mmol/L 12 10   BUN mg/dL 13 13   CREATININE mg/dL 0.53* 0.47*   EGFR ml/min/1.73sq m 99 103   CALCIUM mg/dL 8.8 7.9*   MAGNESIUM mg/dL 1.9 1.9     Results from last 7 days   Lab Units 05/20/25  0922 05/19/25  1236   AST U/L 12* 17   ALT U/L 9 7   ALK PHOS U/L 69 57   TOTAL PROTEIN g/dL 6.8 5.8*   ALBUMIN g/dL 3.7 2.9*   TOTAL BILIRUBIN mg/dL 0.85 0.55         Results from last 7 days   Lab Units 05/20/25  0922 05/19/25  1236   GLUCOSE RANDOM mg/dL 66 86     Results from last 7 days   Lab Units 05/19/25  1337 05/19/25  1134   HS TNI 0HR ng/L  --  3   HS TNI 2HR ng/L 3  --    HSTNI D2 ng/L 0  --         Results from last 7 days   Lab Units 05/19/25  1337   PROTIME seconds 14.3   INR  1.03   PTT seconds 28     Results from last 7 days   Lab Units 05/19/25  1236   TSH 3RD GENERATON uIU/mL 0.544       Results from last 7 days   Lab Units 05/19/25  1134   LACTIC ACID mmol/L 1.0       Results from last 7 days   Lab Units 05/19/25  1337   FERRITIN ng/mL 128   IRON SATURATION % 10*   IRON ug/dL 26*   TIBC ug/dL 261.8     Results from last 7 days   Lab Units 05/19/25  1337   TRANSFERRIN mg/dL 187*     Results from last 7 days   Lab Units 05/20/25  0410   UNIT PRODUCT CODE  [id number]   UNIT NUMBER  [id number]   UNITABO  A   UNITRH  POS   CROSSMATCH  Compatible   UNIT DISPENSE STATUS  Presumed Trans   UNIT PRODUCT VOL ml 350       Results from last 7 days   Lab Units 05/20/25  0922   CRP mg/L 40.5*       Results from last 7 days   Lab Units 05/19/25  1134   INFLUENZA A PCR  Negative   INFLUENZA B PCR  Negative   RSV PCR  Negative       Past Medical History:   Diagnosis Date    Anemia     Anxiety [id number]    Cancer (HCC) 2007    breast history of    Depression [id number]    Gastroesophageal reflux disease without esophagitis 05/21/2021    Migraine     Mixed hyperlipidemia 05/21/2021    Mixed hyperlipidemia 05/21/2021    Obesity, unspecified obesity severity, unspecified obesity type 08/06/2018    PE (pulmonary thromboembolism) (HCC)     Skin cancer     Left leg lump. cryotherapy and excision, unsure of which kind     Sleep apnea     Stroke (HCC)     questionable history per pt     Present on Admission:   Multiple subsegmental pulmonary emboli without acute cor pulmonale (HCC)   Gastroesophageal reflux disease without esophagitis   Mixed hyperlipidemia      Admitting Diagnosis: Diarrhea [R19.7]  Anemia [D64.9]  Diarrhea, unspecified type [R19.7]  Stercoral colitis [K52.89]  Age/Sex: 64 y.o. female    Network Utilization Review Department  ATTENTION: Please call with any questions or concerns to [phone] and  carefully listen to the prompts so that you are directed to the right person. All voicemails are confidential.   For Discharge needs, contact Care Management DC Support Team at [phone] opt. 2  Send all requests for admission clinical reviews, approved or denied determinations and any other requests to dedicated fax number below belonging to the campus where the patient is receiving treatment. List of dedicated fax numbers for the Facilities:  FACILITY NAME UR FAX NUMBER   ADMISSION DENIALS (Administrative/Medical Necessity) [phone]   DISCHARGE SUPPORT TEAM (NETWORK) [phone]   PARENT CHILD HEALTH (Maternity/NICU/Pediatrics) [phone]   [facility] [phone]   [facility] [phone]   [facility] [phone]   [facility] [phone]   [facility] [phone]   [facility] [phone]   [facility] [phone]   [facility] [phone]   [facility] [phone]   [facility] [phone]   [facility] [phone]   [facility] [phone]

## 2025-05-21 LAB
C COLI+JEJUNI TUF STL QL NAA+PROBE: NEGATIVE
C DIFF TOX GENS STL QL NAA+PROBE: NEGATIVE
EC STX1+STX2 GENES STL QL NAA+PROBE: NEGATIVE
HAPTOGLOB SERPL-MCNC: 342 MG/DL (ref 37–355)
HCT VFR BLD AUTO: 38.8 % (ref 34.8–46.1)
HGB BLD-MCNC: 13.1 G/DL (ref 11.5–15.4)
SALMONELLA SP SPAO STL QL NAA+PROBE: NEGATIVE
SHIGELLA SP+EIEC IPAH STL QL NAA+PROBE: NEGATIVE

## 2025-05-21 PROCEDURE — 97110 THERAPEUTIC EXERCISES: CPT

## 2025-05-21 PROCEDURE — 85014 HEMATOCRIT: CPT | Performed by: INTERNAL MEDICINE

## 2025-05-21 PROCEDURE — 97167 OT EVAL HIGH COMPLEX 60 MIN: CPT

## 2025-05-21 PROCEDURE — 99232 SBSQ HOSP IP/OBS MODERATE 35: CPT | Performed by: INTERNAL MEDICINE

## 2025-05-21 PROCEDURE — 97163 PT EVAL HIGH COMPLEX 45 MIN: CPT

## 2025-05-21 PROCEDURE — 85018 HEMOGLOBIN: CPT | Performed by: INTERNAL MEDICINE

## 2025-05-21 PROCEDURE — 99222 1ST HOSP IP/OBS MODERATE 55: CPT | Performed by: INTERNAL MEDICINE

## 2025-05-21 RX ORDER — SENNOSIDES 8.6 MG
1 TABLET ORAL
Status: DISCONTINUED | OUTPATIENT
Start: 2025-05-21 | End: 2025-05-24 | Stop reason: HOSPADM

## 2025-05-21 RX ORDER — POLYETHYLENE GLYCOL 3350 17 G/17G
17 POWDER, FOR SOLUTION ORAL 2 TIMES DAILY
Status: DISCONTINUED | OUTPATIENT
Start: 2025-05-21 | End: 2025-05-24 | Stop reason: HOSPADM

## 2025-05-21 RX ORDER — LOPERAMIDE HCL 1 MG/7.5ML
2 SUSPENSION ORAL 3 TIMES DAILY PRN
Status: DISCONTINUED | OUTPATIENT
Start: 2025-05-21 | End: 2025-05-21

## 2025-05-21 RX ADMIN — GABAPENTIN 300 MG: 300 CAPSULE ORAL at 21:46

## 2025-05-21 RX ADMIN — GABAPENTIN 300 MG: 300 CAPSULE ORAL at 15:24

## 2025-05-21 RX ADMIN — PANTOPRAZOLE SODIUM 40 MG: 40 TABLET, DELAYED RELEASE ORAL at 05:49

## 2025-05-21 RX ADMIN — APIXABAN 5 MG: 5 TABLET, FILM COATED ORAL at 17:40

## 2025-05-21 RX ADMIN — TRAZODONE HYDROCHLORIDE 100 MG: 100 TABLET ORAL at 21:46

## 2025-05-21 RX ADMIN — POLYETHYLENE GLYCOL 3350 17 G: 17 POWDER, FOR SOLUTION ORAL at 15:23

## 2025-05-21 RX ADMIN — POLYETHYLENE GLYCOL 3350 17 G: 17 POWDER, FOR SOLUTION ORAL at 21:48

## 2025-05-21 RX ADMIN — OXYBUTYNIN CHLORIDE 10 MG: 5 TABLET, EXTENDED RELEASE ORAL at 08:18

## 2025-05-21 RX ADMIN — ATORVASTATIN CALCIUM 40 MG: 40 TABLET, FILM COATED ORAL at 08:18

## 2025-05-21 RX ADMIN — BUSPIRONE HYDROCHLORIDE 15 MG: 5 TABLET ORAL at 08:18

## 2025-05-21 RX ADMIN — SENNOSIDES 8.6 MG: 8.6 TABLET ORAL at 21:46

## 2025-05-21 RX ADMIN — APIXABAN 5 MG: 5 TABLET, FILM COATED ORAL at 08:18

## 2025-05-21 RX ADMIN — GABAPENTIN 300 MG: 300 CAPSULE ORAL at 08:18

## 2025-05-21 RX ADMIN — BUSPIRONE HYDROCHLORIDE 15 MG: 5 TABLET ORAL at 21:46

## 2025-05-21 RX ADMIN — BUSPIRONE HYDROCHLORIDE 15 MG: 5 TABLET ORAL at 15:24

## 2025-05-21 RX ADMIN — FLUOXETINE HYDROCHLORIDE 20 MG: 20 CAPSULE ORAL at 08:18

## 2025-05-21 RX ADMIN — BUPROPION HYDROCHLORIDE 150 MG: 150 TABLET, EXTENDED RELEASE ORAL at 08:18

## 2025-05-21 NOTE — PLAN OF CARE
Problem: OCCUPATIONAL THERAPY ADULT  Goal: Performs self-care activities at highest level of function for planned discharge setting.  See evaluation for individualized goals.  Description: Treatment Interventions: ADL retraining, Functional transfer training, UE strengthening/ROM, Endurance training, Patient/family training, Equipment evaluation/education, Compensatory technique education, Continued evaluation, Energy conservation, Activityengagement          See flowsheet documentation for full assessment, interventions and recommendations.   Note: Limitation: Decreased ADL status, Decreased UE ROM, Decreased UE strength, Decreased endurance, Decreased self-care trans, Decreased high-level ADLs  Prognosis: Good  Assessment: Patient is a 64 y.o. female seen for OT evaluation s/p admit to [facility] on 5/19/2025 w/Diarrhea. Commorbidities affecting patient's functional performance at time of assessment include:  Acute on chronic anemia, Obesity, GERD, Multiple subsegmental pulmonary emboli, constipation, Diarrhea, presented to ED with loose stool. Reports multiple bowel movements over the past 1 to 2 days.  Orders placed for OT evaluation and treatment.  Performed at least two patient identifiers during session including name and wristband.  Prior to admission, Patient lives with family in a one story mobile home with ramped entrance. Patient has caregivers that assist with mobility and self care needs. patient transfers to/ from bed and Chair with assist of 1, at times uses a sliding board for transferring. Patient is able to complete grooming, eating and parts of UB bathing/ dressing with set up assist.  Personal factors affecting patient at time of initial evaluation include: limited caregiver support, decreased initiation and engagement, difficulty performing ADLs, and difficulty performing IADLs. Upon evaluation, patient requires moderate assist for UB ADLs, maximal assist for LB ADLs.   Occupational performance is affected by the following deficits: decreased functional use of BUEs, in hand manipulation deficit with impaired reach, grasp and coordination, decreased muscle strength, degenerative arthritic joint changes, impaired gross motor coordination, impaired fine motor coordination, dynamic sit/ stand balance deficit with poor standing tolerance time for self care and functional mobility, decreased activity tolerance, and postural control and postural alignment deficit, requiring external assistance to complete transitional movements.  Patient to benefit from continued Occupational Therapy treatment while in the hospital to address deficits as defined above and maximize level of functional independence with ADLs and functional mobility. Occupational Performance areas to address include: grooming , bathing/ shower, dressing, toilet hygiene, transfer to all surfaces, functional mobility, emergency response, IADLs: safety procedures, and Leisure Participation. From OT standpoint, recommendation at time of d/c would be Level 2.     Rehab Resource Intensity Level, OT: II (Moderate Resource Intensity)

## 2025-05-21 NOTE — ASSESSMENT & PLAN NOTE
- Patient reports of intermittent diarrhea and fecal incontinence over the past 4 months with intermittent episodes of days without stool in between.  She has a long history of constipation in the past.  - HGB on admission was 7.5 and she was transfused one unit with increase to 13.7 consistent with a lab error.  Her baseline is in the 12s per review.  - CT Scan A/P on admission showed a large amount of fecal debris's in the rectum with mild perirectal infiltration raising concern for stercoral proctitis.  - TSH level normal.  - She is having overflow diarrhea in the setting of chronic constipation.  - STOP loperamide.  - Tap water enemas q 4 hours until good stool return.  I reached out to the nurse to inform her of this plan.  - Bowel regimen with Miralax 1 capful BID and Senna at bedtime.  - Monitor stool output.  - Her last colonoscopy was 8 year ago.  Recommend repeat colonoscopy- likely will plan for this procedure as an outpatient.

## 2025-05-21 NOTE — ASSESSMENT & PLAN NOTE
Recent Labs     05/19/25  1134 05/20/25  0922 05/21/25  0937   HGB 7.5* 13.7 13.1   * 91  --      Previous hemoglobin 12 over 1-year ago  Anemia noted to be macrocytic  Results of iron panel shows low iron, iron saturation, and transferrin. B12 normal. Likely iron deficiency contributing to anemia  FIT negative

## 2025-05-21 NOTE — ASSESSMENT & PLAN NOTE
Presents with diarrhea for the past 2 days CT scan revealed constipation. Pt has had 4 mo of on and off diarrhea, occurring about 2x a month and lasting for a few days each time. This episode is her worst so far. She endorses abdominal pain associated with these episodes. She also has felt she has lost weight as he clothes are looser, but has not measured her weight. She also endorses fatigue over last 4mo, and continued diarrhea despite fasting during episodes. Typically she is constipated usually have 1 bowel movement a week.  Suspect likely had constipation which is since improved and now with loose stool  Will monitor for now  Suspect will improve with conservative management spontaneous if not consider bacterial culture  C. difficile ordered in the emergency department.  CRP elevated at 40  Given new hx of 4 mo of symptoms less concerned for c. Diff. Given history consistent with inflammatory diarrhea and elevated CRP Crohn's is top of differential. Other diagnoses considered are IBS and overflow incontinence secondary to impacted stool.  Colonoscopy to evaluate for signs of Crohn's.  Consult GI.

## 2025-05-21 NOTE — PLAN OF CARE
Problem: PAIN - ADULT  Goal: Verbalizes/displays adequate comfort level or baseline comfort level  Description: Interventions:  - Encourage patient to monitor pain and request assistance  - Assess pain using appropriate pain scale  - Administer analgesics as ordered based on type and severity of pain and evaluate response  - Implement non-pharmacological measures as appropriate and evaluate response  - Consider cultural and social influences on pain and pain management  - Notify physician/advanced practitioner if interventions unsuccessful or patient reports new pain  - Educate patient/family on pain management process including their role and importance of  reporting pain   - Provide non-pharmacologic/complimentary pain relief interventions  Outcome: Progressing     Problem: INFECTION - ADULT  Goal: Absence or prevention of progression during hospitalization  Description: INTERVENTIONS:  - Assess and monitor for signs and symptoms of infection  - Monitor lab/diagnostic results  - Monitor all insertion sites, i.e. indwelling lines, tubes, and drains  - Monitor endotracheal if appropriate and nasal secretions for changes in amount and color  - [place] appropriate cooling/warming therapies per order  - Administer medications as ordered  - Instruct and encourage patient and family to use good hand hygiene technique  - Identify and instruct in appropriate isolation precautions for identified infection/condition  Outcome: Progressing

## 2025-05-21 NOTE — PLAN OF CARE
Problem: PAIN - ADULT  Goal: Verbalizes/displays adequate comfort level or baseline comfort level  Description: Interventions:  - Encourage patient to monitor pain and request assistance  - Assess pain using appropriate pain scale  - Administer analgesics as ordered based on type and severity of pain and evaluate response  - Implement non-pharmacological measures as appropriate and evaluate response  - Consider cultural and social influences on pain and pain management  - Notify physician/advanced practitioner if interventions unsuccessful or patient reports new pain  - Educate patient/family on pain management process including their role and importance of  reporting pain   - Provide non-pharmacologic/complimentary pain relief interventions  Outcome: Progressing     Problem: INFECTION - ADULT  Goal: Absence or prevention of progression during hospitalization  Description: INTERVENTIONS:  - Assess and monitor for signs and symptoms of infection  - Monitor lab/diagnostic results  - Monitor all insertion sites, i.e. indwelling lines, tubes, and drains  - Monitor endotracheal if appropriate and nasal secretions for changes in amount and color  - [place] appropriate cooling/warming therapies per order  - Administer medications as ordered  - Instruct and encourage patient and family to use good hand hygiene technique  - Identify and instruct in appropriate isolation precautions for identified infection/condition  Outcome: Progressing  Goal: Absence of fever/infection during neutropenic period  Description: INTERVENTIONS:  - Monitor WBC  - Perform strict hand hygiene  - Limit to healthy visitors only  - No plants, dried, fresh or silk flowers with carson in patient room  Outcome: Progressing     Problem: SAFETY ADULT  Goal: Patient will remain free of falls  Description: INTERVENTIONS:  - Educate patient/family on patient safety including physical limitations  - Instruct patient to call for assistance with activity   -  Consider consulting OT/PT to assist with strengthening/mobility based on AM PAC & JH-HLM score  - Consult OT/PT to assist with strengthening/mobility   - Keep Call bell within reach  - Keep bed low and locked with side rails adjusted as appropriate  - Keep care items and personal belongings within reach  - Initiate and maintain comfort rounds  - Make Fall Risk Sign visible to staff  - Offer Toileting every 2 Hours, in advance of need  - Initiate/Maintain bed alarm    - Apply yellow socks and bracelet for high fall risk patients  - Consider moving patient to room near nurses station  Outcome: Progressing  Goal: Maintain or return to baseline ADL function  Description: INTERVENTIONS:  -  Assess patient's ability to carry out ADLs; assess patient's baseline for ADL function and identify physical deficits which impact ability to perform ADLs (bathing, care of mouth/teeth, toileting, grooming, dressing, etc.)  - Assess/evaluate cause of self-care deficits   - Assess range of motion  - Assess patient's mobility; develop plan if impaired  - Assess patient's need for assistive devices and provide as appropriate  - Encourage maximum independence but intervene and supervise when necessary  - Involve family in performance of ADLs  - Assess for home care needs following discharge   - Consider OT consult to assist with ADL evaluation and planning for discharge  - Provide patient education as appropriate  - Monitor functional capacity and physical performance, use of AM PAC & JH-HLM   - Monitor gait, balance and fatigue with ambulation    Outcome: Progressing  Goal: Maintains/Returns to pre admission functional level  Description: INTERVENTIONS:  - Perform AM-PAC 6 Click Basic Mobility/ Daily Activity assessment daily.  - Set and communicate daily mobility goal to care team and patient/family/caregiver.   - Collaborate with rehabilitation services on mobility goals if consulted  - Perform Range of Motion 3 times a day.  -  Reposition patient every 2 hours.  - Dangle patient 3 times a day  - Stand patient 3 times a day  - Ambulate patient 3 times a day  - Out of bed to chair 3 times a day   - Out of bed for meals 3 times a day  - Out of bed for toileting  - Record patient progress and toleration of activity level   Outcome: Progressing     Problem: DISCHARGE PLANNING  Goal: Discharge to home or other facility with appropriate resources  Description: INTERVENTIONS:  - Identify barriers to discharge w/patient and caregiver  - Arrange for needed discharge resources and transportation as appropriate  - Identify discharge learning needs (meds, wound care, etc.)  - Arrange for interpretive services to assist at discharge as needed  - Refer to Case Management Department for coordinating discharge planning if the patient needs post-hospital services based on physician/advanced practitioner order or complex needs related to functional status, cognitive ability, or social support system  Outcome: Progressing     Problem: Knowledge Deficit  Goal: Patient/family/caregiver demonstrates understanding of disease process, treatment plan, medications, and discharge instructions  Description: Complete learning assessment and assess knowledge base.  Interventions:  - Provide teaching at level of understanding  - Provide teaching via preferred learning methods  Outcome: Progressing     Problem: Prexisting or High Potential for Compromised Skin Integrity  Goal: Skin integrity is maintained or improved  Description: INTERVENTIONS:  - Identify patients at risk for skin breakdown  - Assess and monitor skin integrity including under and around medical devices   - Assess and monitor nutrition and hydration status  - Monitor labs  - Assess for incontinence   - Turn and reposition patient  - Assist with mobility/ambulation  - Relieve pressure over sourav prominences   - Avoid friction and shearing  - Provide appropriate hygiene as needed including keeping skin  clean and dry  - Evaluate need for skin moisturizer/barrier cream  - Collaborate with interdisciplinary team  - Patient/family teaching  - Consider wound care consult    Assess:  - Review Stefan scale daily  -  - Inspect skin when repositioning, toileting, and assisting with ADLS    - Assess extremities for adequate circulation and sensation     Bed Management:  - Have minimal linens on bed & keep smooth, unwrinkled  - Change linens as needed when moist or perspiring  - Avoid sitting or lying in one position for more than 2 hours while in bed?Keep HOB at 30degrees   - Toileting:  - Offer bedside commode  - Assess for incontinence every 2 hours       Activity:  - Mobilize patient 3 times a day  - Encourage activity and walks on unit  - Encourage or provide ROM exercises   - Turn and reposition patient every 2 Hours  - Use appropriate equipment to lift or move patient in bed  - Instruct/ Assist with weight shifting every 2 hours  when out of bed in chair      Skin Care:  - Avoid use of baby powder, tape, friction and shearing, hot water or constrictive clothing    - Do not massage red bony areas      Outcome: Progressing     Problem: GASTROINTESTINAL - ADULT  Goal: Maintains or returns to baseline bowel function  Description: INTERVENTIONS:  - Assess bowel function  - Encourage oral fluids to ensure adequate hydration  - Administer IV fluids if ordered to ensure adequate hydration  - Administer ordered medications as needed  - Encourage mobilization and activity  - Consider nutritional services referral to assist patient with adequate nutrition and appropriate food choices  Outcome: Progressing     Problem: Nutrition/Hydration-ADULT  Goal: Nutrient/Hydration intake appropriate for improving, restoring or maintaining nutritional needs  Description: Monitor and assess patient's nutrition/hydration status for malnutrition. Collaborate with interdisciplinary team and initiate plan and interventions as ordered.  Monitor  patient's weight and dietary intake as ordered or per policy. Utilize nutrition screening tool and intervene as necessary. Determine patient's food preferences and provide high-protein, high-caloric foods as appropriate.     INTERVENTIONS:  - Monitor oral intake, urinary output, labs, and treatment plans  - Assess nutrition and hydration status and recommend course of action  - Evaluate amount of meals eaten  - Assist patient with eating if necessary   - Allow adequate time for meals  - Recommend/ encourage appropriate diets, oral nutritional supplements, and vitamin/mineral supplements  - Order, calculate, and assess calorie counts as needed  - Recommend, monitor, and adjust tube feedings and TPN/PPN based on assessed needs  - Assess need for intravenous fluids  - Provide specific nutrition/hydration education as appropriate  - Include patient/family/caregiver in decisions related to nutrition  Outcome: Progressing

## 2025-05-21 NOTE — PROGRESS NOTES
Progress Note - Hospitalist   Name: Kristina Russell 64 y.o. female I MRN: [id number]  Unit/Bed#: -01 I Date of Admission: 5/19/2025   Date of Service: 5/21/2025 I Hospital Day: 2    Assessment & Plan  Acute on chronic anemia  Recent Labs     05/19/25  1134 05/20/25  0922 05/21/25  0937   HGB 7.5* 13.7 13.1   * 91  --      Previous hemoglobin 12 over 1-year ago  Anemia noted to be macrocytic  Results of iron panel shows low iron, iron saturation, and transferrin. B12 normal. Likely iron deficiency contributing to anemia  FIT negative  Class 1 obesity due to excess calories with serious comorbidity and body mass index (BMI) of 32.0 to 32.9 in adult  Body mass index is 32.35 kg/m².    Recommend incorporating a more whole foods plant-predominant diet along with decreasing consumption of red meats and processed foods  Per AHA guidelines, recommend moderate-vigorous intensity exercise for 30 minutes a day for 5 days a week or a total of 150 min/week  Mixed hyperlipidemia  Lab Results   Component Value Date    CHOLESTEROL 166 08/28/2023    TRIG 86 08/28/2023    HDL 64 08/28/2023    LDLCALC 85 08/28/2023     Home statin   Gastroesophageal reflux disease without esophagitis  Continue PPI  Multiple subsegmental pulmonary emboli without acute cor pulmonale (HCC)  Continue Eliquis  Constipation  Noted on imaging  Chronic-usually has BM 1x a week, has tried multiple medications for this with no relief  But now with diarrhea   Diarrhea  Presents with diarrhea for the past 2 days CT scan revealed constipation. Pt has had 4 mo of on and off diarrhea, occurring about 2x a month and lasting for a few days each time. This episode is her worst so far. She endorses abdominal pain associated with these episodes. She also has felt she has lost weight as he clothes are looser, but has not measured her weight. She also endorses fatigue over last 4mo, and continued diarrhea despite fasting during episodes. Typically she is constipated  usually have 1 bowel movement a week.  Suspect likely had constipation which is since improved and now with loose stool  Will monitor for now  Suspect will improve with conservative management spontaneous if not consider bacterial culture  C. difficile ordered in the emergency department.  CRP elevated at 40  Given new hx of 4 mo of symptoms less concerned for c. Diff. Given history consistent with inflammatory diarrhea and elevated CRP Crohn's is top of differential. Other diagnoses considered are IBS and overflow incontinence secondary to impacted stool.  Colonoscopy to evaluate for signs of Crohn's.  Consult GI.    VTE Pharmacologic Prophylaxis: VTE Score: 5 High Risk (Score >/= 5) - Pharmacological DVT Prophylaxis Ordered: apixaban (Eliquis). Sequential Compression Devices Ordered.    Mobility:   Basic Mobility Inpatient Raw Score: 6  JH-HLM Goal: 2: Bed activities/Dependent transfer  JH-HLM Achieved: 2: Bed activities/Dependent transfer  JH-HLM Goal achieved. Continue to encourage appropriate mobility.    Patient Centered Rounds: I performed bedside rounds with nursing staff today.   Discussions with Specialists or Other Care Team Provider: No specialists currently onboard, will consult GI    Education and Discussions with Family / Patient: Attempted to update  (son, Mandeep) via phone. Left voicemail.     Current Length of Stay: 2 day(s)  Current Patient Status: Inpatient   Certification Statement: The patient will continue to require additional inpatient hospital stay due to GI work up  Discharge Plan: Anticipate discharge in 48-72 hrs to home with home services.    Code Status: Level 1 - Full Code    Subjective   Pt is doing well this morning, had one loose bowel movement last night and none since. She has no abdominal pain today. She endorses increased fatigue over last 4 mo. She also states that she attempted dietary modification including avoiding dairy and fasting without improvement of  diarrhea while episodes were occurring.     Objective :  Temp:  [97.5 °F (36.4 °C)-98.2 °F (36.8 °C)] 97.9 °F (36.6 °C)  HR:  [74-84] 74  BP: (107-111)/(55-57) 110/57  Resp:  [18] 18  SpO2:  [95 %] 95 %  O2 Device: None (Room air)    Body mass index is 32.35 kg/m².     Input and Output Summary (last 24 hours):     Intake/Output Summary (Last 24 hours) at 5/21/2025 1059  Last data filed at 5/21/2025 1020  Gross per 24 hour   Intake 762 ml   Output 700 ml   Net 62 ml       Physical Exam  Constitutional:       General: She is not in acute distress.  HENT:      Mouth/Throat:      Mouth: Mucous membranes are moist.     Cardiovascular:      Rate and Rhythm: Normal rate and regular rhythm.      Heart sounds: Normal heart sounds.   Pulmonary:      Effort: Pulmonary effort is normal.      Breath sounds: Normal breath sounds.   Abdominal:      General: Abdomen is flat. There is no distension.      Palpations: Abdomen is soft.      Tenderness: There is no abdominal tenderness. There is no guarding or rebound.     Musculoskeletal:      Right lower leg: No edema.      Left lower leg: No edema.     Skin:     General: Skin is warm and dry.     Neurological:      Mental Status: She is alert and oriented to person, place, and time.           Lab Results: I have reviewed the following results:   Results from last 7 days   Lab Units 05/21/25  0937 05/20/25  0922   WBC Thousand/uL  --  8.47   HEMOGLOBIN g/dL 13.1 13.7   HEMATOCRIT % 38.8 41.0   PLATELETS Thousands/uL  --  428*   SEGS PCT %  --  70   LYMPHO PCT %  --  21   MONO PCT %  --  7   EOS PCT %  --  2     Results from last 7 days   Lab Units 05/20/25  0922   SODIUM mmol/L 140   POTASSIUM mmol/L 3.8   CHLORIDE mmol/L 104   CO2 mmol/L 24   BUN mg/dL 13   CREATININE mg/dL 0.53*   ANION GAP mmol/L 12   CALCIUM mg/dL 8.8   ALBUMIN g/dL 3.7   TOTAL BILIRUBIN mg/dL 0.85   ALK PHOS U/L 69   ALT U/L 9   AST U/L 12*   GLUCOSE RANDOM mg/dL 66     Results from last 7 days   Lab Units  05/19/25  1337   INR  1.03             Results from last 7 days   Lab Units 05/19/25  1134   LACTIC ACID mmol/L 1.0       Recent Cultures (last 7 days):         Imaging Results Review: No pertinent imaging studies reviewed.  Other Study Results Review: No additional pertinent studies reviewed.    Last 24 Hours Medication List:     Current Facility-Administered Medications:     acetaminophen (TYLENOL) tablet 650 mg, Q6H PRN    apixaban (ELIQUIS) tablet 5 mg, BID    atorvastatin (LIPITOR) tablet 40 mg, Daily    buPROPion (WELLBUTRIN XL) 24 hr tablet 150 mg, QAM    busPIRone (BUSPAR) tablet 15 mg, TID    FLUoxetine (PROzac) capsule 20 mg, Daily    gabapentin (NEURONTIN) capsule 300 mg, TID    oxybutynin (DITROPAN-XL) 24 hr tablet 10 mg, Daily    oxyCODONE (ROXICODONE) IR tablet 5 mg, Q6H PRN    pantoprazole (PROTONIX) EC tablet 40 mg, Early Morning    traZODone (DESYREL) tablet 100 mg, HS    Administrative Statements   Today, Patient Was Seen By: Fátima Onofre MS4    **Please Note: This note may have been constructed using a voice recognition system.**

## 2025-05-21 NOTE — CONSULTS
Consultation - Gastroenterology   Name: Kristina Russell 64 y.o. female I MRN: [id number]  Unit/Bed#: -01 I Date of Admission: 5/19/2025   Date of Service: 5/21/2025 I Hospital Day: 2   Inpatient consult to gastroenterology  Consult performed by: Kirstin Mondragon PA-C  Consult ordered by: Victor Manuel Franco DO        Physician Requesting Evaluation: Victor Manuel Franco DO   Reason for Evaluation / Principal Problem: Diarrhea/constipation    Assessment & Plan  Diarrhea    Constipation    - Patient reports of intermittent diarrhea and fecal incontinence over the past 4 months with intermittent episodes of days without stool in between.  She has a long history of constipation in the past.  - HGB on admission was 7.5 and she was transfused one unit with increase to 13.7 consistent with a lab error.  Her baseline is in the 12s per review.  - CT Scan A/P on admission showed a large amount of fecal debris's in the rectum with mild perirectal infiltration raising concern for stercoral proctitis.  - TSH level normal.  - She is having overflow diarrhea in the setting of chronic constipation.  - STOP loperamide.  - Tap water enemas q 4 hours until good stool return.  I reached out to the nurse to inform her of this plan.  - Bowel regimen with Miralax 1 capful BID and Senna at bedtime.  - Monitor stool output.  - Her last colonoscopy was 8 year ago.  Recommend repeat colonoscopy- likely will plan for this procedure as an outpatient.    Thank you for this consultation. The patient will be seen and evaluated by Dr. Posadas.    History of Present Illness   HPI:  Kristina Russell is a 64 y.o. female with a PMH of CVA with left sided hemiplegia, breast cancer, hyperlipidemia, PE, sleep apnea presented with complaints of diarrhea.  Patient reports that she has been having intermittent bouts of diarrhea over the past 4 months where she will have diarrhea for a number of days and incontinent episodes/leakage of stool.  Then she can go several  days without a bowel movement.  She reports a long history of issues with constipation in the past.  Minimal abdominal discomfort.  No rectal bleeding.  Her last colonoscopy was in 2017 and normal.  She denies any family history of colon cancer.  She denies any family history of ulcerative colitis or Crohn's disease.  No fevers.    Review of Systems   Constitutional: Negative.    HENT: Negative.     Eyes: Negative.    Respiratory: Negative.     Cardiovascular: Negative.    Gastrointestinal:  Positive for constipation and diarrhea.   Endocrine: Negative.    Genitourinary: Negative.    Musculoskeletal: Negative.    Neurological: Negative.    Hematological: Negative.    Psychiatric/Behavioral: Negative.       Historical Information   Past Medical History:   Diagnosis Date    Anemia     Anxiety [id number]    Cancer (HCC)     breast history of    Depression [id number]    Gastroesophageal reflux disease without esophagitis 2021    Migraine     Mixed hyperlipidemia 2021    Mixed hyperlipidemia 2021    Obesity, unspecified obesity severity, unspecified obesity type 2018    PE (pulmonary thromboembolism) (HCC)     Skin cancer     Left leg lump. cryotherapy and excision, unsure of which kind     Sleep apnea     Stroke (HCC)     questionable history per pt     Past Surgical History:   Procedure Laterality Date    ACHILLES TENDON SURGERY Left 2024    Procedure: Achilles lengthening, talonavicular joint capsule release, posterior tibial tendon lengthening, plantar fascia release;  Surgeon: James R Lachman, MD;  Location:  MAIN OR;  Service: Orthopedics    ANKLE SURGERY Left     pins and plate     BREAST LUMPECTOMY Right      - malignant     SECTION      x2    DILATION AND CURETTAGE OF UTERUS      EXTERNAL FIXATOR (EX FIX) REMOVAL Left 2024    Procedure: REMOVAL EXTERNAL FIXATOR (EX FIX),  Placement of short leg cast;  Surgeon: James R Lachman, MD;  Location:  MAIN OR;   Service: Orthopedics    FOOT CAPSULE RELEASE W/ PERCUTANEOUS HEEL CORD LENGTHENING, TIBIAL TENDON TRANSFER Left 6/24/2024    Procedure: Achilles lengthening, talonavicular joint capsule release, posterior tibial tendon lengthening, plantar fascia release;  Surgeon: James R Lachman, MD;  Location:  MAIN OR;  Service: Orthopedics    PLANTAR FASCIA SURGERY Left 6/24/2024    Procedure: plantar fascia release;  Surgeon: James R Lachman, MD;  Location:  MAIN OR;  Service: Orthopedics    SD APPLICATION MULTIPLANE EXTERNAL FIXATION SYSTEM Left 6/24/2024    Procedure: APPLICATION EXTERNAL FIXATION DEVICE LOWER EXTREMITY, Achilles lengthening, talonavicular joint capsule release, posterior tibial tendon lengthening, plantar fascia release;  Surgeon: James R Lachman, MD;  Location:  MAIN OR;  Service: Orthopedics    REDUCTION MAMMAPLASTY      TONSILLECTOMY      age 8     Social History     Tobacco Use    Smoking status: Never     Passive exposure: Never    Smokeless tobacco: Never   Vaping Use    Vaping status: Never Used   Substance and Sexual Activity    Alcohol use: Never    Drug use: Never    Sexual activity: Not Currently     Partners: Male     Birth control/protection: None     E-Cigarette/Vaping    E-Cigarette Use Never User      E-Cigarette/Vaping Substances    Nicotine No     THC No     CBD No     Flavoring No     Other No     Unknown No      Family History   Problem Relation Age of Onset    No Known Problems Mother     No Known Problems Father     Stroke Maternal Grandmother     Stroke Maternal Grandfather     Lung cancer Brother      Social History     Tobacco Use    Smoking status: Never     Passive exposure: Never    Smokeless tobacco: Never   Vaping Use    Vaping status: Never Used   Substance and Sexual Activity    Alcohol use: Never    Drug use: Never    Sexual activity: Not Currently     Partners: Male     Birth control/protection: None       Current Facility-Administered Medications:     acetaminophen  (TYLENOL) tablet 650 mg, Q6H PRN    apixaban (ELIQUIS) tablet 5 mg, BID    atorvastatin (LIPITOR) tablet 40 mg, Daily    buPROPion (WELLBUTRIN XL) 24 hr tablet 150 mg, QAM    busPIRone (BUSPAR) tablet 15 mg, TID    FLUoxetine (PROzac) capsule 20 mg, Daily    gabapentin (NEURONTIN) capsule 300 mg, TID    oxybutynin (DITROPAN-XL) 24 hr tablet 10 mg, Daily    oxyCODONE (ROXICODONE) IR tablet 5 mg, Q6H PRN    pantoprazole (PROTONIX) EC tablet 40 mg, Early Morning    polyethylene glycol (MIRALAX) packet 17 g, BID    senna (SENOKOT) tablet 8.6 mg, HS    traZODone (DESYREL) tablet 100 mg, HS  Prior to Admission Medications   Prescriptions Last Dose Informant Patient Reported? Taking?   Cholecalciferol (VITAMIN D3 PO) Past Week Self Yes Yes   Sig: Take 1 tablet by mouth in the morning.   FLUoxetine (PROzac) 20 mg capsule Not Taking Self No No   Sig: Take 1 capsule (20 mg total) by mouth daily   Patient not taking: Reported on 2025   Myrbetriq 50 MG TB24 Past Week  No Yes   Sig: Take 1 tablet (50 mg total) by mouth daily   apixaban (Eliquis) 5 mg Past Week Self No Yes   Sig: Take 1 tablet (5 mg total) by mouth 2 (two) times a day   atorvastatin (LIPITOR) 40 mg tablet Past Week  No Yes   Sig: Take 1 tablet (40 mg total) by mouth daily   buPROPion (WELLBUTRIN XL) 150 mg 24 hr tablet Not Taking  No No   Sig: Take 1 tablet (150 mg total) by mouth every morning   Patient not taking: Reported on 2025   busPIRone (BUSPAR) 15 mg tablet Not Taking  No No   Sig: Take 1 tablet (15 mg total) by mouth 3 (three) times a day   Patient not taking: Reported on 2025   fluticasone (FLONASE) 50 mcg/act nasal spray Not Taking Self No No   Si spray into each nostril daily   Patient not taking: Reported on 2025   gabapentin (Neurontin) 100 mg capsule More than a month  No No   Sig: Take 3 capsules (300 mg total) by mouth 3 (three) times a day   loratadine (CLARITIN) 10 mg tablet  Self No No   Sig: Take 1 tablet (10 mg  total) by mouth daily   Patient taking differently: Take 10 mg by mouth daily As needed   nitrofurantoin (MACROBID) 100 mg capsule Not Taking  No No   Sig: Take 1 capsule (100 mg total) by mouth 2 (two) times a day   Patient not taking: Reported on 5/19/2025   nystatin powder Past Week Self No Yes   Sig: apply topically to affected area four times a day   Patient taking differently: No sig reported   omeprazole (PriLOSEC) 20 mg delayed release capsule Past Week Self No Yes   Sig: Take 1 capsule (20 mg total) by mouth daily   ondansetron (ZOFRAN) 4 mg tablet Not Taking  No No   Sig: Take 1 tablet (4 mg total) by mouth every 8 (eight) hours as needed for nausea or vomiting   Patient not taking: Reported on 5/19/2025   oxyCODONE (Roxicodone) 5 immediate release tablet Not Taking  No No   Sig: Take 1 tablet (5 mg total) by mouth every 6 (six) hours as needed for severe pain Max Daily Amount: 20 mg   Patient not taking: Reported on 5/19/2025   rizatriptan (MAXALT) 10 mg tablet Past Week  No Yes   Sig: Take 1 tablet (10 mg total) by mouth as needed for migraine Take at the onset of migraine; if symptoms continue or return, may take another dose at least 2 hours after first dose. Take no more than 2 doses in a day.   senna-docusate sodium (SENOKOT S) 8.6-50 mg per tablet   No No   Sig: Take 1 tablet by mouth daily at bedtime for 7 days   traZODone (DESYREL) 100 mg tablet More than a month Self No No   Sig: Take 1 tablet (100 mg total) by mouth daily at bedtime   Patient not taking: Reported on 5/19/2025      Facility-Administered Medications: None     Codeine    Objective :  Temp:  [97.5 °F (36.4 °C)-98.2 °F (36.8 °C)] 97.9 °F (36.6 °C)  HR:  [74-84] 74  BP: (107-111)/(55-57) 110/57  Resp:  [18] 18  SpO2:  [95 %] 95 %  O2 Device: None (Room air)    Physical Exam  Constitutional:       General: She is not in acute distress.     Appearance: Normal appearance.   HENT:      Head: Normocephalic and atraumatic.      Cardiovascular:      Rate and Rhythm: Normal rate and regular rhythm.   Pulmonary:      Effort: Pulmonary effort is normal. No respiratory distress.      Breath sounds: Normal breath sounds.   Abdominal:      General: There is no distension.      Palpations: Abdomen is soft.      Tenderness: There is no abdominal tenderness.     Skin:     General: Skin is warm and dry.     Neurological:      Mental Status: She is alert and oriented to person, place, and time.     Psychiatric:         Mood and Affect: Mood normal.         Behavior: Behavior normal.           Lab Results: I have reviewed the following results:CBC/BMP:   .     05/21/25  0937   HGB 13.1   HCT 38.8    , Creatinine Clearance: Estimated Creatinine Clearance: 111.9 mL/min (A) (by C-G formula based on SCr of 0.53 mg/dL (L))., LFTs: No new results in last 24 hours. , PTT/INR:No new results in last 24 hours.     Imaging Results Review: I reviewed radiology reports from this admission including: CT abdomen/pelvis.

## 2025-05-21 NOTE — PLAN OF CARE
Problem: PHYSICAL THERAPY ADULT  Goal: Performs mobility at highest level of function for planned discharge setting.  See evaluation for individualized goals.  Description: Treatment/Interventions: Therapeutic exercise, Endurance training, Patient/family training, Bed mobility, LE strengthening/ROM, Spoke to nursing, OT, Spoke to MD          See flowsheet documentation for full assessment, interventions and recommendations.  Note: Prognosis: Good  Problem List: Decreased strength, Decreased range of motion, Decreased endurance, Impaired balance, Decreased mobility, Pain  Assessment: Pt is 64 y.o. female seen for high-complexity PT evaluation on 5/21/2025 s/p admit to [facility] on 5/19/2025 w/ Diarrhea. PT was consulted to assess pt's functional mobility and d/c needs. Order placed for PT eval and tx, w/ activity as tolerated order. PTA, pt was living with family in a one story mobile home with 4 BILLY and reports assist required at baseline for ADLs and functional mobility. At time of eval, patient required modAx2 for bed mobility and standing was deferred as pt does not have her CAM boot with her. Upon evaluation, pt presenting with impaired functional mobility d/t decreased strength, decreased ROM, decreased endurance, impaired balance, decreased mobility, and activity intolerance. Pertinent PMHx and current co-morbidities affecting pt's physical performance at time of assessment include: acute on chronic anemia, multiple subsegmental pulmonary emboli, diarrhea. Personal factors affecting pt at time of eval include: stairs to enter home, inability to navigate level surfaces w/o external assistance, and unable to perform physical activity. The following objective measures performed on IE also reveal limitations: AM-PAC 6-Clicks: 6/24. Pt's clinical presentation is currently unstable/unpredictable seen in pt's presentation of abnormal lab value(s), need for input for task focus and mobility technique,  need for two person moderate assist w/ all phases of mobility when usually mobilizing independently, and ongoing medical assessment. Overall, pt's rehab potential and prognosis to return to PLOF is good as impacted by objective findings, warranting pt to receive further skilled PT interventions to address impairments, activity limitations, and participation restrictions. Pt to benefit from continued PT services to address deficits as defined above and maximize level of functional independent mobility and consistency. From PT/mobility standpoint, recommendation at time of d/c would be level 2, moderate resource intensity in order to facilitate return to PLOF.        Rehab Resource Intensity Level, PT: II (Moderate Resource Intensity)    See flowsheet documentation for full assessment.   Muna Lema; PT, DPT

## 2025-05-21 NOTE — OCCUPATIONAL THERAPY NOTE
Occupational Therapy Evaluation        Patient Name: Kristina Russell  Today's Date: 5/21/2025 05/21/25 0830   OT Last Visit   OT Visit Date 05/21/25   Note Type   Note type Evaluation   Pain Assessment   Pain Assessment Tool 0-10   Pain Score No Pain   Restrictions/Precautions   Weight Bearing Precautions Per Order No   Braces or Orthoses Other (Comment)  (left LE CAM Boot not necessary per Ortho Note/ patient prefers to stand with CAM Boot on.)   Other Precautions Chair Alarm;Bed Alarm;Limb alert  (Left  sided weakness)   Home Living   Type of Home Mobile home   Home Layout Ramped entrance;One level;Performs ADLs on one level;Able to live on main level with bedroom/bathroom  (4 BILLY)   Bathroom Shower/Tub Tub/shower unit  (sponge bathing)   Bathroom Toilet   (bed pan)   Bathroom Equipment Commode   Home Equipment Hospital bed;Wheelchair-manual;Walker   Additional Comments able to stand and transfer to the w/c using a slide board   Prior Function   Level of [place] Needs assistance with ADLs;Needs assistance with functional mobility;Needs assistance with IADLS   Lives With Family   Receives Help From Personal care attendant  (12 hrs/ 7 days)   IADLs Family/Friend/Other provides transportation;Family/Friend/Other provides meals;Family/Friend/Other provides medication management   Falls in the last 6 months   (history of falls  a year ago)   Vocational Retired   Lifestyle   Autonomy Patient lives with family in a one story mobile home with ramped entrance. Patient has caregivers that assist with mobility and self care needs. patient transfers to/ from bed and Chair with assist of 1, at times uses a sliding board for transferring. Patient is able to complete grooming, eating and parts of UB bathing/ dressing with set up assist.   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   Intrinsic Gratification enjoys crocheting/ reading   General   Family/Caregiver Present  No   ADL   Where Assessed Edge of bed   Eating Assistance 4  Minimal Assistance   Grooming Assistance 4  Minimal Assistance   UB Bathing Assistance 3  Moderate Assistance   LB Bathing Assistance 2  Maximal Assistance   UB Dressing Assistance 3  Moderate Assistance   LB Dressing Assistance 2  Maximal Assistance   Toileting Assistance  2  Maximal Assistance   Functional Assistance 3  Moderate Assistance   Bed Mobility   Supine to Sit 3  Moderate assistance   Additional items Assist x 2;Increased time required;Verbal cues;LE management;Bedrails   Sit to Supine 3  Moderate assistance   Additional items Assist x 2;Increased time required;HOB elevated;Verbal cues;LE management   Transfers   Sit to Stand   (deferred- patient refused to attempt at his time.)   Functional Mobility   Functional Mobility 3  Moderate assistance   Additional Comments positional changes in bed and sitting at edge of bed.   Balance   Static Sitting Fair   Dynamic Sitting Fair -   Activity Tolerance   Activity Tolerance Patient limited by fatigue;Patient limited by pain   RUE Assessment   RUE Assessment WFL   LUE Assessment   LUE Assessment X  (limited functional use of LUE secondary to previous stroke, presents with active mobility of shoulder, elbow and forearm. Presents with severe deficit in functional use of left hand/ contractures/ no isolated finger movements and limited functional .)   LUE Strength   LUE Overall Strength Deficits  (proximal- ~ 3/5)   Hand Function   Gross Motor Coordination Impaired   Fine Motor Coordination Impaired   Vision-Basic Assessment   Current Vision Wears glasses all the time   Psychosocial   Psychosocial (WDL) WDL   Cognition   Overall Cognitive Status WFL   Arousal/Participation Alert;Responsive;Cooperative   Attention Within functional limits   Orientation Level Oriented X4   Memory Within functional limits   Following Commands Follows all commands and directions without difficulty   Assessment   Limitation  Decreased ADL status;Decreased UE ROM;Decreased UE strength;Decreased endurance;Decreased self-care trans;Decreased high-level ADLs   Prognosis Good   Assessment Patient is a 64 y.o. female seen for OT evaluation s/p admit [facility] on [facility]/2025 w/Diarrhea. Commorbidities affecting patient's functional performance at time of assessment include:  Acute on chronic anemia, Obesity, GERD, Multiple subsegmental pulmonary emboli, constipation, Diarrhea, presented to ED with loose stool. Reports multiple bowel movements over the past 1 to 2 days.  Orders placed for OT evaluation and treatment.  Performed at least two patient identifiers during session including name and wristband.  Prior to admission, Patient lives with family in a one story mobile home with ramped entrance. Patient has caregivers that assist with mobility and self care needs. patient transfers to/ from bed and Chair with assist of 1, at times uses a sliding board for transferring. Patient is able to complete grooming, eating and parts of UB bathing/ dressing with set up assist.  Personal factors affecting patient at time of initial evaluation include: limited caregiver support, decreased initiation and engagement, difficulty performing ADLs, and difficulty performing IADLs. Upon evaluation, patient requires moderate assist for UB ADLs, maximal assist for LB ADLs.  Occupational performance is affected by the following deficits: decreased functional use of BUEs, in hand manipulation deficit with impaired reach, grasp and coordination, decreased muscle strength, degenerative arthritic joint changes, impaired gross motor coordination, impaired fine motor coordination, dynamic sit/ stand balance deficit with poor standing tolerance time for self care and functional mobility, decreased activity tolerance, and postural control and postural alignment deficit, requiring external assistance to complete transitional movements.  Patient to benefit  from continued Occupational Therapy treatment while in the hospital to address deficits as defined above and maximize level of functional independence with ADLs and functional mobility. Occupational Performance areas to address include: grooming , bathing/ shower, dressing, toilet hygiene, transfer to all surfaces, functional mobility, emergency response, IADLs: safety procedures, and Leisure Participation. From OT standpoint, recommendation at time of d/c would be Level 2.   Plan   Treatment Interventions ADL retraining;Functional transfer training;UE strengthening/ROM;Endurance training;Patient/family training;Equipment evaluation/education;Compensatory technique education;Continued evaluation;Energy conservation;Activityengagement   Goal Expiration Date 06/04/25   OT Frequency 3-5x/wk   Discharge Recommendation   Rehab Resource Intensity Level, OT II (Moderate Resource Intensity)   AM-PAC Daily Activity Inpatient   Lower Body Dressing 1   Bathing 2   Toileting 2   Upper Body Dressing 2   Grooming 3   Eating 3   Daily Activity Raw Score 13   Daily Activity Standardized Score (Calc for Raw Score >=11) 32.03   AM-PAC Applied Cognition Inpatient   Following a Speech/Presentation 4   Understanding Ordinary Conversation 4   Taking Medications 3   Remembering Where Things Are Placed or Put Away 4   Remembering List of 4-5 Errands 4   Taking Care of Complicated Tasks 4   Applied Cognition Raw Score 23   Applied Cognition Standardized Score 53.08   Barthel Index   Feeding 5   Bathing 0   Grooming Score 0   Dressing Score 5   Bladder Score 5   Bowels Score 10   Toilet Use Score 5   Transfers (Bed/Chair) Score 5   Mobility (Level Surface) Score 0   Stairs Score 0   Barthel Index Score 35       1 - Patient will verbalize and demonstrate use of energy conservation/ deep breathing technique and work simplification skills during functional activity with no verbal cues.    2 - Patient will verbalize and demonstrate good body  mechanics and joint protection techniques during  ADLs/ IADLs with no verbal cues.    3 - Patient will increase OOB/ sitting tolerance to 2-4 hours per day for increased participation in self care and leisure tasks with no s/s of exertion.    4 - Patient will increase standing tolerance time to 5  minutes with unilateral UE support to complete sink level ADLs@ mod I level.    5 - Patient will increase sitting tolerance at edge of bed to 20 minutes to complete UB ADLs @ set up assist level.    6 - Patient will transfer bed to Chair / toilet at Set up assist level with AD as indicated.     7 - Patient will complete UB ADLs with set up assist.     8 - Patient will complete LB ADLs with min assist with the use of adaptive equipment.     9 - Patient will complete toileting hygiene with set up assist/ supervision for thoroughness    10 - Patient/ Family  will demonstrate competency with UE Home Exercise Program.

## 2025-05-21 NOTE — PHYSICAL THERAPY NOTE
PHYSICAL THERAPY EVALUATION  NAME:  Kristina Russell  DATE: 05/21/25    AGE:   64 y.o.  Mrn:   [id number]  ADMIT DX:  Diarrhea [R19.7]  Anemia [D64.9]  Diarrhea, unspecified type [R19.7]  Stercoral colitis [K52.89]  Problem List: Problem List[1]    Past Medical History  Past Medical History[2]    Past Surgical History  Past Surgical History[3]    Length Of Stay: 2  Performed at least 2 patient identifiers during session: Name and Birthday       05/21/25 0834   PT Last Visit   PT Visit Date 05/21/25   Note Type   Note type Evaluation   Pain Assessment   Pain Assessment Tool 0-10   Pain Score No Pain   Restrictions/Precautions   Weight Bearing Precautions Per Order No   Braces or Orthoses Other (Comment)  (left LE CAM Boot not necessary per Ortho Note/ patient prefers to stand with CAM Boot on as she does not currently have a supportive shoe)   Other Precautions Chair Alarm;Bed Alarm;Limb alert  (Left sided weakness)   Home Living   Type of Home Mobile home   Home Layout Ramped entrance;One level;Performs ADLs on one level;Able to live on main level with bedroom/bathroom   Bathroom Shower/Tub Tub/shower unit  (pt sponge bathing at baseline)   Bathroom Toilet   (pt uses bed pain at [facility])   Bathroom Equipment Commode   Home Equipment Hospital bed;Wheelchair-manual;Walker   Additional Comments Pt reports use of slide board for out of bed transfers. States she does stand with assist and UE support at [facility]   Prior Function   Level of [place] Needs assistance with ADLs;Needs assistance with functional mobility;Needs assistance with IADLS   Lives With Family   Receives Help From Personal care attendant  (12 hrs/ 7 days)   IADLs Family/Friend/Other provides transportation;Family/Friend/Other provides meals;Family/Friend/Other provides medication management   Falls in the last 6 months   (pt denies falls in the last 6 months)   Vocational Retired   General   Family/Caregiver Present No   Cognition   Overall Cognitive Status  WFL   Arousal/Participation Responsive   Attention Within functional limits   Orientation Level Oriented X4   Memory Within functional limits   Following Commands Follows all commands and directions without difficulty   Comments Patient agreeable to PT evaluation   RLE Assessment   RLE Assessment X   Strength RLE   R Hip Flexion 3+/5   R Hip ABduction 3-/5   R Ankle Dorsiflexion 1/5  (limited due to increased tone at plantar flexors/decreased AROM/PROM of ankles)   R Ankle Plantar Flexion 1/5   LLE Assessment   LLE Assessment X   Strength LLE   L Hip Flexion 3+/5   L Hip ABduction 2+/5   L Ankle Dorsiflexion 2+/5  (limited due to increased tone at plantar flexors/decreased AROM/PROM of ankles)   L Ankle Plantar Flexion 2+/5   Vision-Basic Assessment   Current Vision Wears glasses all the time   Light Touch   RLE Light Touch Grossly intact   LLE Light Touch Grossly intact   Bed Mobility   Rolling R 3  Moderate assistance   Additional items Assist x 2;Increased time required;Verbal cues;LE management;Bedrails   Rolling L 3  Moderate assistance   Additional items Assist x 2;Increased time required;Verbal cues;LE management;Bedrails   Supine to Sit 3  Moderate assistance   Additional items Assist x 2;Increased time required;Verbal cues;LE management;Bedrails   Sit to Supine 3  Moderate assistance   Additional items Assist x 2;Increased time required;Verbal cues;LE management;Bedrails   Additional Comments Pt seated EOB about 5 minutes with supervision assist. Standing transfers deferred as pt uses CAM boot on L foot during standing. Plan for slide board transfer as able in upcoming sessions   Balance   Static Sitting Fair   Dynamic Sitting Fair -   Activity Tolerance   Activity Tolerance Patient limited by fatigue   Assessment   Prognosis Good   Problem List Decreased strength;Decreased range of motion;Decreased endurance;Impaired balance;Decreased mobility;Pain   Assessment Pt is 64 y.o. female seen for high-complexity  PT evaluation on 5/21/2025 s/p admit to [facility] on 5/19/2025 w/ Diarrhea. PT was consulted to assess pt's functional mobility and d/c needs. Order placed for PT eval and tx, w/ activity as tolerated order. PTA, pt was living with family in a one story mobile home with 4 BILLY and reports assist required at baseline for ADLs and functional mobility. At time of eval, patient required modAx2 for bed mobility and standing was deferred as pt does not have her CAM boot with her. Upon evaluation, pt presenting with impaired functional mobility d/t decreased strength, decreased ROM, decreased endurance, impaired balance, decreased mobility, and activity intolerance. Pertinent PMHx and current co-morbidities affecting pt's physical performance at time of assessment include: acute on chronic anemia, multiple subsegmental pulmonary emboli, diarrhea. Personal factors affecting pt at time of eval include: stairs to enter home, inability to navigate level surfaces w/o external assistance, and unable to perform physical activity. The following objective measures performed on IE also reveal limitations: AM-PAC 6-Clicks: 6/24. Pt's clinical presentation is currently unstable/unpredictable seen in pt's presentation of abnormal lab value(s), need for input for task focus and mobility technique, need for two person moderate assist w/ all phases of mobility when usually mobilizing independently, and ongoing medical assessment. Overall, pt's rehab potential and prognosis to return to PLOF is good as impacted by objective findings, warranting pt to receive further skilled PT interventions to address impairments, activity limitations, and participation restrictions. Pt to benefit from continued PT services to address deficits as defined above and maximize level of functional independent mobility and consistency. From PT/mobility standpoint, recommendation at time of d/c would be level 2, moderate resource intensity in order  to facilitate return to PLOF.   Goals   [facility] Expiration Date 05/31/25   Short Term Goal #1 In 10 days: Perform all bed mobility tasks with min A of 1 to decrease caregiver burden, Increase static sitting and dynamic sitting balance 1/2 grade to decrease risk for falls, Tolerate seated at EOB 15 minutes to facilitate functional task performance, and PT to see and establish goals for standing and out of bed transfers when appropriate   PT Treatment Day 0   Plan   Treatment/Interventions Therapeutic exercise;Endurance training;Patient/family training;Bed mobility;LE strengthening/ROM;Spoke to nursing;OT;Spoke to MD   PT Frequency 3-5x/wk   Discharge Recommendation   Rehab Resource Intensity Level, PT II (Moderate Resource Intensity)   AM-PAC Basic Mobility Inpatient   Turning in Flat Bed Without Bedrails 1   Lying on Back to Sitting on Edge of Flat Bed Without Bedrails 1   Moving Bed to Chair 1   Standing Up From Chair Using Arms 1   Walk in Room 1   Climb 3-5 Stairs With Railing 1   Basic Mobility Inpatient Raw Score 6   Turning Head Towards Sound 4   Follow Simple Instructions 4   Low Function Basic Mobility Raw Score  14   Low Function Basic Mobility Standardized Score  22.01   [facility] Highest Level Of Mobility   -HL Goal 2: Bed activities/Dependent transfer   -HL Achieved 3: Sit at edge of bed   Additional Treatment Session   Start Time 0901   End Time 0913   Treatment Assessment Pt seen for PT treatment session this date, consisting of ther ex focused on strengthening. Pertinent barriers during this session include  fatigue, muscle weakness, impaired ROM . PT instructed pt in seated TE including knee extension, hip flexion, and hip abduction while seated EOB; pt demonstrates good understanding of exercises and recalls from prior PT sessions. Pt prognosis for achieving goals is good, pending pt progress with hospitalization/medical status improvements, and indicated by Stimulability, orientation, and  ability to follow directions. Pt limited d/t fear of falling. PT recommends level 2, moderate resource intensity upon discharge. Pt continues to be functioning below baseline level, and remains limited 2* factors listed above. PT will continue to see pt during current hospitalization in order to address the deficits listed above and provide interventions consistent w/ POC in effort to achieve STGs.   Additional Treatment Day 1   Exercises   Hip Flexion Sitting;10 reps;AROM;Bilateral   Hip Abduction Sitting;10 reps;AROM;Bilateral   Knee AROM Long Arc Quad Sitting;10 reps;AROM;Bilateral   End of Consult   Patient Position at End of Consult All needs within reach;Bed/Chair alarm activated;Supine       Time In: 0834  Time Out: 0900  Total Evaluation Minutes: 26    Time In: 0901  Time Out: 0913  Total Treatment Minutes: 12    Muna Lema, PT         [1]   Patient Active Problem List  Diagnosis    Apnea, sleep    Class 1 obesity due to excess calories with serious comorbidity and body mass index (BMI) of 32.0 to 32.9 in adult    Migraine    Mixed hyperlipidemia    Gastroesophageal reflux disease without esophagitis    Multiple subsegmental pulmonary emboli without acute cor pulmonale (HCC)    Hemiparesis (HCC)    Acquired equinovarus deformity of left foot    Acute on chronic anemia    Constipation    Diarrhea   [2]   Past Medical History:  Diagnosis Date    Anemia     Anxiety [id number]    Cancer (HCC) 2007    breast history of    Depression [id number]    Gastroesophageal reflux disease without esophagitis 05/21/2021    Migraine     Mixed hyperlipidemia 05/21/2021    Mixed hyperlipidemia 05/21/2021    Obesity, unspecified obesity severity, unspecified obesity type 08/06/2018    PE (pulmonary thromboembolism) (HCC)     Skin cancer     Left leg lump. cryotherapy and excision, unsure of which kind     Sleep apnea     Stroke (HCC)     questionable history per pt   [3]   Past Surgical History:  Procedure Laterality Date     ACHILLES TENDON SURGERY Left 2024    Procedure: Achilles lengthening, talonavicular joint capsule release, posterior tibial tendon lengthening, plantar fascia release;  Surgeon: James R Lachman, MD;  Location:  MAIN OR;  Service: Orthopedics    ANKLE SURGERY Left     pins and plate     BREAST LUMPECTOMY Right      - malignant     SECTION      x2    DILATION AND CURETTAGE OF UTERUS      EXTERNAL FIXATOR (EX FIX) REMOVAL Left 2024    Procedure: REMOVAL EXTERNAL FIXATOR (EX FIX),  Placement of short leg cast;  Surgeon: James R Lachman, MD;  Location:  MAIN OR;  Service: Orthopedics    FOOT CAPSULE RELEASE W/ PERCUTANEOUS HEEL CORD LENGTHENING, TIBIAL TENDON TRANSFER Left 2024    Procedure: Achilles lengthening, talonavicular joint capsule release, posterior tibial tendon lengthening, plantar fascia release;  Surgeon: James R Lachman, MD;  Location:  MAIN OR;  Service: Orthopedics    PLANTAR FASCIA SURGERY Left 2024    Procedure: plantar fascia release;  Surgeon: James R Lachman, MD;  Location:  MAIN OR;  Service: Orthopedics    MD APPLICATION MULTIPLANE EXTERNAL FIXATION SYSTEM Left 2024    Procedure: APPLICATION EXTERNAL FIXATION DEVICE LOWER EXTREMITY, Achilles lengthening, talonavicular joint capsule release, posterior tibial tendon lengthening, plantar fascia release;  Surgeon: James R Lachman, MD;  Location:  MAIN OR;  Service: Orthopedics    REDUCTION MAMMAPLASTY      TONSILLECTOMY      age 8

## 2025-05-21 NOTE — PLAN OF CARE
Problem: PAIN - ADULT  Goal: Verbalizes/displays adequate comfort level or baseline comfort level  Description: Interventions:  - Encourage patient to monitor pain and request assistance  - Assess pain using appropriate pain scale  - Administer analgesics as ordered based on type and severity of pain and evaluate response  - Implement non-pharmacological measures as appropriate and evaluate response  - Consider cultural and social influences on pain and pain management  - Notify physician/advanced practitioner if interventions unsuccessful or patient reports new pain  - Educate patient/family on pain management process including their role and importance of  reporting pain   - Provide non-pharmacologic/complimentary pain relief interventions  5/21/2025 0752 by Paget Barzey  Outcome: Progressing  5/21/2025 0752 by Paget Barzey  Outcome: Progressing     Problem: INFECTION - ADULT  Goal: Absence or prevention of progression during hospitalization  Description: INTERVENTIONS:  - Assess and monitor for signs and symptoms of infection  - Monitor lab/diagnostic results  - Monitor all insertion sites, i.e. indwelling lines, tubes, and drains  - Monitor endotracheal if appropriate and nasal secretions for changes in amount and color  - [place] appropriate cooling/warming therapies per order  - Administer medications as ordered  - Instruct and encourage patient and family to use good hand hygiene technique  - Identify and instruct in appropriate isolation precautions for identified infection/condition  5/21/2025 0752 by Paget Barzey  Outcome: Progressing  5/21/2025 0752 by Paget Barzey  Outcome: Progressing

## 2025-05-21 NOTE — PLAN OF CARE
Problem: PAIN - ADULT  Goal: Verbalizes/displays adequate comfort level or baseline comfort level  Description: Interventions:  - Encourage patient to monitor pain and request assistance  - Assess pain using appropriate pain scale  - Administer analgesics as ordered based on type and severity of pain and evaluate response  - Implement non-pharmacological measures as appropriate and evaluate response  - Consider cultural and social influences on pain and pain management  - Notify physician/advanced practitioner if interventions unsuccessful or patient reports new pain  - Educate patient/family on pain management process including their role and importance of  reporting pain   - Provide non-pharmacologic/complimentary pain relief interventions  Outcome: Progressing     Problem: INFECTION - ADULT  Goal: Absence or prevention of progression during hospitalization  Description: INTERVENTIONS:  - Assess and monitor for signs and symptoms of infection  - Monitor lab/diagnostic results  - Monitor all insertion sites, i.e. indwelling lines, tubes, and drains  - Monitor endotracheal if appropriate and nasal secretions for changes in amount and color  - [place] appropriate cooling/warming therapies per order  - Administer medications as ordered  - Instruct and encourage patient and family to use good hand hygiene technique  - Identify and instruct in appropriate isolation precautions for identified infection/condition  Outcome: Progressing  Goal: Absence of fever/infection during neutropenic period  Description: INTERVENTIONS:  - Monitor WBC  - Perform strict hand hygiene  - Limit to healthy visitors only  - No plants, dried, fresh or silk flowers with carson in patient room  Outcome: Progressing     Problem: SAFETY ADULT  Goal: Patient will remain free of falls  Description: INTERVENTIONS:  - Educate patient/family on patient safety including physical limitations  - Instruct patient to call for assistance with activity   -  Consider consulting OT/PT to assist with strengthening/mobility based on AM PAC & JH-HLM score  - Consult OT/PT to assist with strengthening/mobility   - Keep Call bell within reach  - Keep bed low and locked with side rails adjusted as appropriate  - Keep care items and personal belongings within reach  - Initiate and maintain comfort rounds  - Make Fall Risk Sign visible to staff  - Offer Toileting every 2 Hours, in advance of need  - Initiate/Maintain alarm  - Obtain necessary fall risk management equipment  - Apply yellow socks and bracelet for high fall risk patients  - Consider moving patient to room near nurses station  Outcome: Progressing  Goal: Maintain or return to baseline ADL function  Description: INTERVENTIONS:  -  Assess patient's ability to carry out ADLs; assess patient's baseline for ADL function and identify physical deficits which impact ability to perform ADLs (bathing, care of mouth/teeth, toileting, grooming, dressing, etc.)  - Assess/evaluate cause of self-care deficits   - Assess range of motion  - Assess patient's mobility; develop plan if impaired  - Assess patient's need for assistive devices and provide as appropriate  - Encourage maximum independence but intervene and supervise when necessary  - Involve family in performance of ADLs  - Assess for home care needs following discharge   - Consider OT consult to assist with ADL evaluation and planning for discharge  - Provide patient education as appropriate  - Monitor functional capacity and physical performance, use of AM PAC & JH-HLM   - Monitor gait, balance and fatigue with ambulation    Outcome: Progressing  Goal: Maintains/Returns to pre admission functional level  Description: INTERVENTIONS:  - Perform AM-PAC 6 Click Basic Mobility/ Daily Activity assessment daily.  - Set and communicate daily mobility goal to care team and patient/family/caregiver.   - Collaborate with rehabilitation services on mobility goals if consulted  -  Perform Range of Motion 3 times a day.  - Reposition patient every 2 hours.  - Dangle patient 3 times a day  - Stand patient 3 times a day  - Ambulate patient 3 times a day  - Out of bed to chair 3 times a day   - Out of bed for meals 3 times a day  - Out of bed for toileting  - Record patient progress and toleration of activity level   Outcome: Progressing     Problem: DISCHARGE PLANNING  Goal: Discharge to home or other facility with appropriate resources  Description: INTERVENTIONS:  - Identify barriers to discharge w/patient and caregiver  - Arrange for needed discharge resources and transportation as appropriate  - Identify discharge learning needs (meds, wound care, etc.)  - Arrange for interpretive services to assist at discharge as needed  - Refer to Case Management Department for coordinating discharge planning if the patient needs post-hospital services based on physician/advanced practitioner order or complex needs related to functional status, cognitive ability, or social support system  Outcome: Progressing     Problem: Knowledge Deficit  Goal: Patient/family/caregiver demonstrates understanding of disease process, treatment plan, medications, and discharge instructions  Description: Complete learning assessment and assess knowledge base.  Interventions:  - Provide teaching at level of understanding  - Provide teaching via preferred learning methods  Outcome: Progressing     Problem: Prexisting or High Potential for Compromised Skin Integrity  Goal: Skin integrity is maintained or improved  Description: INTERVENTIONS:  - Identify patients at risk for skin breakdown  - Assess and monitor skin integrity including under and around medical devices   - Assess and monitor nutrition and hydration status  - Monitor labs  - Assess for incontinence   - Turn and reposition patient  - Assist with mobility/ambulation  - Relieve pressure over sourav prominences   - Avoid friction and shearing  - Provide appropriate  hygiene as needed including keeping skin clean and dry  - Evaluate need for skin moisturizer/barrier cream  - Collaborate with interdisciplinary team  - Patient/family teaching  - Consider wound care consult    Assess:  - Review Stefan scale daily  - Clean and moisturize skin  - Inspect skin when repositioning, toileting, and assisting with ADLS  - Assess under medical devices such   - Assess extremities for adequate circulation and sensation     Bed Management:  - Have minimal linens on bed & keep smooth, unwrinkled  - Change linens as needed when moist or perspiring  - Avoid sitting or lying in one position  while in bed?Keep HOB 30 degrees   - Toileting:  - Offer bedside commode  - Assess for incontinence every 2  - Use incontinent care products after each incontinent episode     Activity:  - Mobilize patient 3 times a day  - Encourage activity and walks on unit  - Encourage or provide ROM exercises   - Turn and reposition patient every 2 Hours  - Use appropriate equipment to lift or move patient in bed  - Instruct/ Assist with weight shifting every 2 when out of bed in chair  - Consider limitation of chair time    Skin Care:  - Avoid use of baby powder, tape, friction and shearing, hot water or constrictive clothing  - Relieve pressure over bony prominences   - Do not massage red bony areas    Next Steps:  - Teach patient strategies to minimize risks   - Consider consults to  interdisciplinary teams   Outcome: Progressing     Problem: GASTROINTESTINAL - ADULT  Goal: Maintains or returns to baseline bowel function  Description: INTERVENTIONS:  - Assess bowel function  - Encourage oral fluids to ensure adequate hydration  - Administer IV fluids if ordered to ensure adequate hydration  - Administer ordered medications as needed  - Encourage mobilization and activity  - Consider nutritional services referral to assist patient with adequate nutrition and appropriate food choices  Outcome: Progressing     Problem:  Nutrition/Hydration-ADULT  Goal: Nutrient/Hydration intake appropriate for improving, restoring or maintaining nutritional needs  Description: Monitor and assess patient's nutrition/hydration status for malnutrition. Collaborate with interdisciplinary team and initiate plan and interventions as ordered.  Monitor patient's weight and dietary intake as ordered or per policy. Utilize nutrition screening tool and intervene as necessary. Determine patient's food preferences and provide high-protein, high-caloric foods as appropriate.     INTERVENTIONS:  - Monitor oral intake, urinary output, labs, and treatment plans  - Assess nutrition and hydration status and recommend course of action  - Evaluate amount of meals eaten  - Assist patient with eating if necessary   - Allow adequate time for meals  - Recommend/ encourage appropriate diets, oral nutritional supplements, and vitamin/mineral supplements  - Order, calculate, and assess calorie counts as needed  - Recommend, monitor, and adjust tube feedings and TPN/PPN based on assessed needs  - Assess need for intravenous fluids  - Provide specific nutrition/hydration education as appropriate  - Include patient/family/caregiver in decisions related to nutrition  Outcome: Progressing

## 2025-05-22 LAB
ALBUMIN SERPL BCG-MCNC: 3.1 G/DL (ref 3.5–5)
ALP SERPL-CCNC: 55 U/L (ref 34–104)
ALT SERPL W P-5'-P-CCNC: 8 U/L (ref 7–52)
ANION GAP SERPL CALCULATED.3IONS-SCNC: 6 MMOL/L (ref 4–13)
AST SERPL W P-5'-P-CCNC: 12 U/L (ref 13–39)
BASOPHILS # BLD AUTO: 0.04 THOUSANDS/ÂΜL (ref 0–0.1)
BASOPHILS NFR BLD AUTO: 1 % (ref 0–1)
BILIRUB SERPL-MCNC: 0.26 MG/DL (ref 0.2–1)
BUN SERPL-MCNC: 11 MG/DL (ref 5–25)
CALCIUM ALBUM COR SERPL-MCNC: 9 MG/DL (ref 8.3–10.1)
CALCIUM SERPL-MCNC: 8.3 MG/DL (ref 8.4–10.2)
CHLORIDE SERPL-SCNC: 105 MMOL/L (ref 96–108)
CO2 SERPL-SCNC: 29 MMOL/L (ref 21–32)
CREAT SERPL-MCNC: 0.5 MG/DL (ref 0.6–1.3)
EOSINOPHIL # BLD AUTO: 0.22 THOUSAND/ÂΜL (ref 0–0.61)
EOSINOPHIL NFR BLD AUTO: 4 % (ref 0–6)
ERYTHROCYTE [DISTWIDTH] IN BLOOD BY AUTOMATED COUNT: 12.8 % (ref 11.6–15.1)
GFR SERPL CREATININE-BSD FRML MDRD: 101 ML/MIN/1.73SQ M
GLUCOSE SERPL-MCNC: 101 MG/DL (ref 65–140)
HCT VFR BLD AUTO: 36.2 % (ref 34.8–46.1)
HGB BLD-MCNC: 11.9 G/DL (ref 11.5–15.4)
IMM GRANULOCYTES # BLD AUTO: 0.01 THOUSAND/UL (ref 0–0.2)
IMM GRANULOCYTES NFR BLD AUTO: 0 % (ref 0–2)
LYMPHOCYTES # BLD AUTO: 2.17 THOUSANDS/ÂΜL (ref 0.6–4.47)
LYMPHOCYTES NFR BLD AUTO: 35 % (ref 14–44)
MAGNESIUM SERPL-MCNC: 1.9 MG/DL (ref 1.9–2.7)
MCH RBC QN AUTO: 30 PG (ref 26.8–34.3)
MCHC RBC AUTO-ENTMCNC: 32.9 G/DL (ref 31.4–37.4)
MCV RBC AUTO: 91 FL (ref 82–98)
MONOCYTES # BLD AUTO: 0.63 THOUSAND/ÂΜL (ref 0.17–1.22)
MONOCYTES NFR BLD AUTO: 10 % (ref 4–12)
NEUTROPHILS # BLD AUTO: 3.15 THOUSANDS/ÂΜL (ref 1.85–7.62)
NEUTS SEG NFR BLD AUTO: 50 % (ref 43–75)
NRBC BLD AUTO-RTO: 0 /100 WBCS
PLATELET # BLD AUTO: 370 THOUSANDS/UL (ref 149–390)
PMV BLD AUTO: 9.9 FL (ref 8.9–12.7)
POTASSIUM SERPL-SCNC: 3.9 MMOL/L (ref 3.5–5.3)
PROT SERPL-MCNC: 5.8 G/DL (ref 6.4–8.4)
RBC # BLD AUTO: 3.97 MILLION/UL (ref 3.81–5.12)
SODIUM SERPL-SCNC: 140 MMOL/L (ref 135–147)
WBC # BLD AUTO: 6.22 THOUSAND/UL (ref 4.31–10.16)

## 2025-05-22 PROCEDURE — 85025 COMPLETE CBC W/AUTO DIFF WBC: CPT | Performed by: INTERNAL MEDICINE

## 2025-05-22 PROCEDURE — 80053 COMPREHEN METABOLIC PANEL: CPT | Performed by: INTERNAL MEDICINE

## 2025-05-22 PROCEDURE — 83735 ASSAY OF MAGNESIUM: CPT | Performed by: INTERNAL MEDICINE

## 2025-05-22 PROCEDURE — 99232 SBSQ HOSP IP/OBS MODERATE 35: CPT | Performed by: INTERNAL MEDICINE

## 2025-05-22 RX ADMIN — BUSPIRONE HYDROCHLORIDE 15 MG: 5 TABLET ORAL at 16:05

## 2025-05-22 RX ADMIN — PANTOPRAZOLE SODIUM 40 MG: 40 TABLET, DELAYED RELEASE ORAL at 06:00

## 2025-05-22 RX ADMIN — GABAPENTIN 300 MG: 300 CAPSULE ORAL at 16:05

## 2025-05-22 RX ADMIN — TRAZODONE HYDROCHLORIDE 100 MG: 100 TABLET ORAL at 21:40

## 2025-05-22 RX ADMIN — ATORVASTATIN CALCIUM 40 MG: 40 TABLET, FILM COATED ORAL at 08:31

## 2025-05-22 RX ADMIN — APIXABAN 5 MG: 5 TABLET, FILM COATED ORAL at 08:30

## 2025-05-22 RX ADMIN — OXYBUTYNIN CHLORIDE 10 MG: 5 TABLET, EXTENDED RELEASE ORAL at 08:31

## 2025-05-22 RX ADMIN — BUSPIRONE HYDROCHLORIDE 15 MG: 5 TABLET ORAL at 08:27

## 2025-05-22 RX ADMIN — POLYETHYLENE GLYCOL 3350 17 G: 17 POWDER, FOR SOLUTION ORAL at 21:40

## 2025-05-22 RX ADMIN — FLUOXETINE HYDROCHLORIDE 20 MG: 20 CAPSULE ORAL at 08:28

## 2025-05-22 RX ADMIN — SENNOSIDES 8.6 MG: 8.6 TABLET ORAL at 21:40

## 2025-05-22 RX ADMIN — GABAPENTIN 300 MG: 300 CAPSULE ORAL at 08:27

## 2025-05-22 RX ADMIN — APIXABAN 5 MG: 5 TABLET, FILM COATED ORAL at 17:09

## 2025-05-22 RX ADMIN — BUPROPION HYDROCHLORIDE 150 MG: 150 TABLET, EXTENDED RELEASE ORAL at 08:27

## 2025-05-22 RX ADMIN — POLYETHYLENE GLYCOL 3350 17 G: 17 POWDER, FOR SOLUTION ORAL at 08:31

## 2025-05-22 NOTE — PROGRESS NOTES
Progress Note - Hospitalist   Name: Kristina Russell 64 y.o. female I MRN: [id number]  Unit/Bed#: -01 I Date of Admission: 5/19/2025   Date of Service: 5/22/2025 I Hospital Day: 3    Assessment & Plan  Acute on chronic anemia  Recent Labs     05/20/25  0922 05/21/25  0937 05/22/25  0542   HGB 13.7 13.1 11.9   MCV 91  --  91   Hgb has been stable since transfusion  Previous hemoglobin 12 over 1-year ago  Anemia noted to be macrocytic  Results of iron panel shows low iron, iron saturation, and transferrin. B12 normal. Likely iron deficiency contributing to anemia  FIT negative  Class 1 obesity due to excess calories with serious comorbidity and body mass index (BMI) of 32.0 to 32.9 in adult  Body mass index is 32.35 kg/m².    Recommend incorporating a more whole foods plant-predominant diet along with decreasing consumption of red meats and processed foods  Per AHA guidelines, recommend moderate-vigorous intensity exercise for 30 minutes a day for 5 days a week or a total of 150 min/week  Mixed hyperlipidemia  Lab Results   Component Value Date    CHOLESTEROL 166 08/28/2023    TRIG 86 08/28/2023    HDL 64 08/28/2023    LDLCALC 85 08/28/2023     Home statin   Gastroesophageal reflux disease without esophagitis  Continue PPI  Multiple subsegmental pulmonary emboli without acute cor pulmonale (HCC)  Continue Eliquis  Constipation  Per GI on bowel regimen with miralax and senna  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 Pt has had 4 mo of on and off diarrhea, occurring about 2x a month and lasting for a few days each time. This episode is her worst so far. She endorses abdominal pain associated with these episodes. She also has felt she has lost weight as he  clothes are looser, but has not measured her weight. She also endorses fatigue over last 4mo, and continued diarrhea despite fasting during episodes. Typically she is constipated usually have 1 bowel movement a week.  C. Difficile negative  CRP elevated at 40  Per GI consult given elevated CRP and CT showing retained fecal matter diarrhea is likely overflow secondary to impacted stool and stercoral proctitis. Management as above.  Colonoscopy recommended outpatient    VTE Pharmacologic Prophylaxis: VTE Score: 5 High Risk (Score >/= 5) - Pharmacological DVT Prophylaxis Ordered: apixaban (Eliquis). Sequential Compression Devices Ordered.    Mobility:   Basic Mobility Inpatient Raw Score: 6  JH-HLM Goal: 2: Bed activities/Dependent transfer  JH-HLM Achieved: 2: Bed activities/Dependent transfer  JH-HLM Goal achieved. Continue to encourage appropriate mobility.    Patient Centered Rounds: I performed bedside rounds with nursing staff today.   Discussions with Specialists or Other Care Team Provider: GI    Education and Discussions with Family / Patient: Attempted to update  (son) via phone. Left voicemail.     Current Length of Stay: 3 day(s)  Current Patient Status: Inpatient   Certification Statement: The patient will continue to require additional inpatient hospital stay due to management of stercoral proctitis  Discharge Plan: Anticipate discharge tomorrow to home with home services.    Code Status: Level 1 - Full Code    Subjective   Pt is feeling better this morning, has not had a BM since overnight, 2 large stools overnight per nursing. She reports intermittent 5/10 cramping abdominal pain RLQ. Her appetite is normal. No nausea or vomiting.    Objective :  Temp:  [97.9 °F (36.6 °C)-98.6 °F (37 °C)] 98.6 °F (37 °C)  HR:  [65-80] 69  BP: ()/(45-53) 113/53  Resp:  [16-18] 18  SpO2:  [92 %-95 %] 92 %  O2 Device: None (Room air)    Body mass index is 32.35 kg/m².     Input and Output Summary (last  24 hours):     Intake/Output Summary (Last 24 hours) at 5/22/2025 1524  Last data filed at 5/22/2025 1424  Gross per 24 hour   Intake 840 ml   Output 650 ml   Net 190 ml       Physical Exam  Constitutional:       General: She is not in acute distress.     Appearance: She is not toxic-appearing.   HENT:      Mouth/Throat:      Mouth: Mucous membranes are moist.     Cardiovascular:      Rate and Rhythm: Normal rate and regular rhythm.      Heart sounds: Normal heart sounds.   Pulmonary:      Effort: Pulmonary effort is normal.      Breath sounds: Normal breath sounds. No wheezing, rhonchi or rales.   Abdominal:      General: Abdomen is flat.      Palpations: Abdomen is soft.      Tenderness: There is no abdominal tenderness. There is no guarding or rebound.     Musculoskeletal:      Right lower leg: No edema.      Left lower leg: No edema.     Skin:     General: Skin is warm and dry.     Neurological:      Mental Status: She is alert and oriented to person, place, and time.           Lab Results: I have reviewed the following results:   Results from last 7 days   Lab Units 05/22/25  0542   WBC Thousand/uL 6.22   HEMOGLOBIN g/dL 11.9   HEMATOCRIT % 36.2   PLATELETS Thousands/uL 370   SEGS PCT % 50   LYMPHO PCT % 35   MONO PCT % 10   EOS PCT % 4     Results from last 7 days   Lab Units 05/22/25  0542   SODIUM mmol/L 140   POTASSIUM mmol/L 3.9   CHLORIDE mmol/L 105   CO2 mmol/L 29   BUN mg/dL 11   CREATININE mg/dL 0.50*   ANION GAP mmol/L 6   CALCIUM mg/dL 8.3*   ALBUMIN g/dL 3.1*   TOTAL BILIRUBIN mg/dL 0.26   ALK PHOS U/L 55   ALT U/L 8   AST U/L 12*   GLUCOSE RANDOM mg/dL 101     Results from last 7 days   Lab Units 05/19/25  1337   INR  1.03             Results from last 7 days   Lab Units 05/19/25  1134   LACTIC ACID mmol/L 1.0       Recent Cultures (last 7 days):   Results from last 7 days   Lab Units 05/20/25  1216   C DIFF TOXIN B BY PCR  Negative       Imaging Results Review: No pertinent imaging studies  reviewed.  Other Study Results Review: No additional pertinent studies reviewed.    Last 24 Hours Medication List:     Current Facility-Administered Medications:     acetaminophen (TYLENOL) tablet 650 mg, Q6H PRN    apixaban (ELIQUIS) tablet 5 mg, BID    atorvastatin (LIPITOR) tablet 40 mg, Daily    buPROPion (WELLBUTRIN XL) 24 hr tablet 150 mg, QAM    busPIRone (BUSPAR) tablet 15 mg, TID    FLUoxetine (PROzac) capsule 20 mg, Daily    gabapentin (NEURONTIN) capsule 300 mg, TID    oxybutynin (DITROPAN-XL) 24 hr tablet 10 mg, Daily    oxyCODONE (ROXICODONE) IR tablet 5 mg, Q6H PRN    pantoprazole (PROTONIX) EC tablet 40 mg, Early Morning    polyethylene glycol (MIRALAX) packet 17 g, BID    senna (SENOKOT) tablet 8.6 mg, HS    traZODone (DESYREL) tablet 100 mg, HS    Administrative Statements   Today, Patient Was Seen By: Fátima Onofre MS4    **Please Note: This note may have been constructed using a voice recognition system.**

## 2025-05-22 NOTE — ASSESSMENT & PLAN NOTE
Recent Labs     05/20/25  0922 05/21/25  0937 05/22/25  0542   HGB 13.7 13.1 11.9   MCV 91  --  91   Hgb has been stable since transfusion  Previous hemoglobin 12 over 1-year ago  Anemia noted to be macrocytic  Results of iron panel shows low iron, iron saturation, and transferrin. B12 normal. Likely iron deficiency contributing to anemia  FIT negative

## 2025-05-22 NOTE — ASSESSMENT & PLAN NOTE
Presents with diarrhea for the past 2 days CT scan revealed constipation. Pt has had 4 mo of on and off diarrhea, occurring about 2x a month and lasting for a few days each time. This episode is her worst so far. She endorses abdominal pain associated with these episodes. She also has felt she has lost weight as he clothes are looser, but has not measured her weight. She also endorses fatigue over last 4mo, and continued diarrhea despite fasting during episodes. Typically she is constipated usually have 1 bowel movement a week.  C. Difficile negative  CRP elevated at 40  Per GI consult given elevated CRP and CT showing retained fecal matter diarrhea is likely overflow secondary to impacted stool and stercoral proctitis. Management as above.  Colonoscopy recommended outpatient

## 2025-05-22 NOTE — PROGRESS NOTES
Progress Note - Gastroenterology   Name: Kristina Russell 64 y.o. female I MRN: [id number]  Unit/Bed#: [facility]-01 I Date of Admission: 5/19/2025   Date of Service: 5/22/2025 I Hospital Day: 3    Assessment & Plan  Diarrhea    Constipation    - Patient reports of intermittent diarrhea and fecal incontinence over the past 4 months with intermittent episodes of days without stool in between.  She has a long history of constipation in the past.  - HGB on admission was 7.5 and she was transfused one unit with increase to 13.7 consistent with a lab error.  Her baseline is in the 12s per review.  - CT Scan A/P on admission showed a large amount of fecal debris's in the rectum with mild perirectal infiltration raising concern for stercoral proctitis.  - TSH level normal.  - She is having overflow diarrhea in the setting of chronic constipation.  - Tap water enemas until good return/clear.  Nurse will give an additional enema this am.  - Bowel regimen with Miralax 1 capful BID.  - Her last colonoscopy was 8 year ago.  Recommend repeat colonoscopy as an outpatient.    The patient will be seen by Dr. Posadas.  GI will sign off, please reconsult prn.    Subjective   Patient reports a bowel movement yesterday afternoon.  Per patient's nurse, she was given report from night shift that there were 2 large bowel movements.      Objective :  Temp:  [97.9 °F (36.6 °C)-98.3 °F (36.8 °C)] 98.3 °F (36.8 °C)  HR:  [65-80] 65  BP: ()/(45-51) 75/45  Resp:  [16-18] 18  SpO2:  [94 %-95 %] 94 %  O2 Device: None (Room air)    Physical Exam  Constitutional:       Appearance: Normal appearance.   HENT:      Head: Normocephalic and atraumatic.     Cardiovascular:      Rate and Rhythm: Normal rate and regular rhythm.   Pulmonary:      Effort: Pulmonary effort is normal. No respiratory distress.      Breath sounds: Normal breath sounds.   Abdominal:      General: There is no distension.      Palpations: Abdomen is soft.      Tenderness: There is no  abdominal tenderness.     Skin:     General: Skin is warm and dry.     Neurological:      Mental Status: She is alert.           Lab Results: I have reviewed the following results:CBC/BMP:   .     05/22/25  0542   WBC 6.22   HGB 11.9   HCT 36.2      SODIUM 140   K 3.9      CO2 29   BUN 11   CREATININE 0.50*   GLUC 101   MG 1.9    , Creatinine Clearance: Estimated Creatinine Clearance: 118.6 mL/min (A) (by C-G formula based on SCr of 0.5 mg/dL (L))., LFTs:   .     05/22/25  0542   AST 12*   ALT 8   ALB 3.1*   TBILI 0.26   ALKPHOS 55    , PTT/INR:No new results in last 24 hours.

## 2025-05-22 NOTE — PLAN OF CARE
Problem: GASTROINTESTINAL - ADULT  Goal: Maintains or returns to baseline bowel function  Description: INTERVENTIONS:  - Assess bowel function  - Encourage oral fluids to ensure adequate hydration  - Administer IV fluids if ordered to ensure adequate hydration  - Administer ordered medications as needed  - Encourage mobilization and activity  - Consider nutritional services referral to assist patient with adequate nutrition and appropriate food choices  Outcome: Progressing     Problem: Nutrition/Hydration-ADULT  Goal: Nutrient/Hydration intake appropriate for improving, restoring or maintaining nutritional needs  Description: Monitor and assess patient's nutrition/hydration status for malnutrition. Collaborate with interdisciplinary team and initiate plan and interventions as ordered.  Monitor patient's weight and dietary intake as ordered or per policy. Utilize nutrition screening tool and intervene as necessary. Determine patient's food preferences and provide high-protein, high-caloric foods as appropriate.     INTERVENTIONS:  - Monitor oral intake, urinary output, labs, and treatment plans  - Assess nutrition and hydration status and recommend course of action  - Evaluate amount of meals eaten  - Assist patient with eating if necessary   - Allow adequate time for meals  - Recommend/ encourage appropriate diets, oral nutritional supplements, and vitamin/mineral supplements  - Order, calculate, and assess calorie counts as needed  - Recommend, monitor, and adjust tube feedings and TPN/PPN based on assessed needs  - Assess need for intravenous fluids  - Provide specific nutrition/hydration education as appropriate  - Include patient/family/caregiver in decisions related to nutrition  Outcome: Progressing

## 2025-05-22 NOTE — ASSESSMENT & PLAN NOTE
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

## 2025-05-22 NOTE — CASE MANAGEMENT
Case Management Discharge Planning Note    Patient name Kristina Russell  Location /-01 MRN [id number]  : 1960 Date 2025       Current Admission Date: 2025  Current Admission Diagnosis:Diarrhea   Patient Active Problem List    Diagnosis Date Noted    Acute on chronic anemia 2025    Constipation 2025    Diarrhea 2025    Acquired equinovarus deformity of left foot 2024    Hemiparesis (HCC) 10/16/2023    Multiple subsegmental pulmonary emboli without acute cor pulmonale (HCC) 2022    Mixed hyperlipidemia 2021    Gastroesophageal reflux disease without esophagitis 2021    Migraine     Apnea, sleep 2018    Class 1 obesity due to excess calories with serious comorbidity and body mass index (BMI) of 32.0 to 32.9 in adult 2018      LOS (days): 3  Geometric Mean LOS (GMLOS) (days): 3.7  Days to GMLOS:0.6     OBJECTIVE:  Risk of Unplanned Readmission Score: 12.28         Current admission status: Inpatient   Preferred Pharmacy:   [facility] [id number] [address]  Phone: [phone] Fax: [phone]    Primary Care Provider: Alfred Crane MD    Primary Insurance: [facility] DUAL [facility] RE[facility]  Secondary Insurance: [facility]    DISCHARGE DETAILS:    Discharge planning discussed with:: Patient  Freedom of Choice: Yes  Comments - Freedom of Choice:  discussed freedom of choice as it pertains to discharge planning. Patient declined inpatient rehab and is agreeable to [facility]. [facility]. [facility] accepted and reserved.     Were Treatment Team discharge recommendations reviewed with patient/caregiver?: Yes  Did patient/caregiver verbalize understanding of patient care needs?: Yes  Were patient/caregiver advised of the risks associated with not following Treatment Team discharge recommendations?:  Yes         Requested Home Health Care         Is the patient interested in HHC at discharge?: Yes  Home Health Discipline requested:: Nursing, Occupational Therapy, Physical Therapy  Home Health Agency Name:: [facility]  HHA External Referral Reason (only applicable if external HHA name selected): Patient has established relationship with provider  Home Health Follow-Up Provider:: PCP  Home Health Services Needed:: Evaluate Functional Status and Safety, Strengthening/Theraputic Exercises to Improve Function, Gait/ADL Training  Homebound Criteria Met:: Requires the Assistance of Another Person for Safe Ambulation or to Leave the Home, Uses an Assist Device (i.e. cane, walker, etc)  Supporting Clincal Findings:: Fatigues Easliy in Short Distances, Limited Endurance         Other Referral/Resources/Interventions Provided:  Referral Comments: [facility]    Would you like to participate in our [facility] service program?  : No - Declined    Treatment Team Recommendation: SNF, Short Term Rehab  Discharge Destination Plan:: Home with Home Health Care

## 2025-05-22 NOTE — ASSESSMENT & PLAN NOTE
- Patient reports of intermittent diarrhea and fecal incontinence over the past 4 months with intermittent episodes of days without stool in between.  She has a long history of constipation in the past.  - HGB on admission was 7.5 and she was transfused one unit with increase to 13.7 consistent with a lab error.  Her baseline is in the 12s per review.  - CT Scan A/P on admission showed a large amount of fecal debris's in the rectum with mild perirectal infiltration raising concern for stercoral proctitis.  - TSH level normal.  - She is having overflow diarrhea in the setting of chronic constipation.  - Tap water enemas until good return/clear.  Nurse will give an additional enema this am.  - Bowel regimen with Miralax 1 capful BID.  - Her last colonoscopy was 8 year ago.  Recommend repeat colonoscopy as an outpatient.

## 2025-05-23 ENCOUNTER — ANESTHESIA (INPATIENT)
Dept: GASTROENTEROLOGY | Facility: HOSPITAL | Age: 65
DRG: 812 | End: 2025-05-23
Payer: COMMERCIAL

## 2025-05-23 ENCOUNTER — ANESTHESIA EVENT (INPATIENT)
Dept: GASTROENTEROLOGY | Facility: HOSPITAL | Age: 65
DRG: 812 | End: 2025-05-23
Payer: COMMERCIAL

## 2025-05-23 ENCOUNTER — APPOINTMENT (INPATIENT)
Dept: GASTROENTEROLOGY | Facility: HOSPITAL | Age: 65
DRG: 812 | End: 2025-05-23
Attending: INTERNAL MEDICINE
Payer: COMMERCIAL

## 2025-05-23 LAB
ALBUMIN SERPL BCG-MCNC: 3.2 G/DL (ref 3.5–5)
ALP SERPL-CCNC: 55 U/L (ref 34–104)
ALT SERPL W P-5'-P-CCNC: 9 U/L (ref 7–52)
ANION GAP SERPL CALCULATED.3IONS-SCNC: 4 MMOL/L (ref 4–13)
AST SERPL W P-5'-P-CCNC: 9 U/L (ref 13–39)
BASOPHILS # BLD AUTO: 0.05 THOUSANDS/ÂΜL (ref 0–0.1)
BASOPHILS NFR BLD AUTO: 1 % (ref 0–1)
BILIRUB SERPL-MCNC: 0.3 MG/DL (ref 0.2–1)
BUN SERPL-MCNC: 10 MG/DL (ref 5–25)
CALCIUM ALBUM COR SERPL-MCNC: 9 MG/DL (ref 8.3–10.1)
CALCIUM SERPL-MCNC: 8.4 MG/DL (ref 8.4–10.2)
CHLORIDE SERPL-SCNC: 107 MMOL/L (ref 96–108)
CO2 SERPL-SCNC: 28 MMOL/L (ref 21–32)
CREAT SERPL-MCNC: 0.57 MG/DL (ref 0.6–1.3)
EOSINOPHIL # BLD AUTO: 0.3 THOUSAND/ÂΜL (ref 0–0.61)
EOSINOPHIL NFR BLD AUTO: 5 % (ref 0–6)
ERYTHROCYTE [DISTWIDTH] IN BLOOD BY AUTOMATED COUNT: 12.8 % (ref 11.6–15.1)
GFR SERPL CREATININE-BSD FRML MDRD: 97 ML/MIN/1.73SQ M
GLUCOSE SERPL-MCNC: 89 MG/DL (ref 65–140)
HCT VFR BLD AUTO: 36.3 % (ref 34.8–46.1)
HGB BLD-MCNC: 11.6 G/DL (ref 11.5–15.4)
IMM GRANULOCYTES # BLD AUTO: 0.01 THOUSAND/UL (ref 0–0.2)
IMM GRANULOCYTES NFR BLD AUTO: 0 % (ref 0–2)
LYMPHOCYTES # BLD AUTO: 2.4 THOUSANDS/ÂΜL (ref 0.6–4.47)
LYMPHOCYTES NFR BLD AUTO: 38 % (ref 14–44)
MAGNESIUM SERPL-MCNC: 2 MG/DL (ref 1.9–2.7)
MCH RBC QN AUTO: 29.4 PG (ref 26.8–34.3)
MCHC RBC AUTO-ENTMCNC: 32 G/DL (ref 31.4–37.4)
MCV RBC AUTO: 92 FL (ref 82–98)
MONOCYTES # BLD AUTO: 0.64 THOUSAND/ÂΜL (ref 0.17–1.22)
MONOCYTES NFR BLD AUTO: 10 % (ref 4–12)
NEUTROPHILS # BLD AUTO: 2.96 THOUSANDS/ÂΜL (ref 1.85–7.62)
NEUTS SEG NFR BLD AUTO: 46 % (ref 43–75)
NRBC BLD AUTO-RTO: 0 /100 WBCS
PLATELET # BLD AUTO: 371 THOUSANDS/UL (ref 149–390)
PMV BLD AUTO: 10.1 FL (ref 8.9–12.7)
POTASSIUM SERPL-SCNC: 4.1 MMOL/L (ref 3.5–5.3)
PROT SERPL-MCNC: 5.9 G/DL (ref 6.4–8.4)
RBC # BLD AUTO: 3.95 MILLION/UL (ref 3.81–5.12)
SODIUM SERPL-SCNC: 139 MMOL/L (ref 135–147)
WBC # BLD AUTO: 6.36 THOUSAND/UL (ref 4.31–10.16)

## 2025-05-23 PROCEDURE — 83735 ASSAY OF MAGNESIUM: CPT | Performed by: INTERNAL MEDICINE

## 2025-05-23 PROCEDURE — 45330 DIAGNOSTIC SIGMOIDOSCOPY: CPT | Performed by: INTERNAL MEDICINE

## 2025-05-23 PROCEDURE — 85025 COMPLETE CBC W/AUTO DIFF WBC: CPT | Performed by: INTERNAL MEDICINE

## 2025-05-23 PROCEDURE — 99232 SBSQ HOSP IP/OBS MODERATE 35: CPT | Performed by: INTERNAL MEDICINE

## 2025-05-23 PROCEDURE — 0DJD8ZZ INSPECTION OF LOWER INTESTINAL TRACT, VIA NATURAL OR ARTIFICIAL OPENING ENDOSCOPIC: ICD-10-PCS | Performed by: INTERNAL MEDICINE

## 2025-05-23 PROCEDURE — 80053 COMPREHEN METABOLIC PANEL: CPT | Performed by: INTERNAL MEDICINE

## 2025-05-23 RX ORDER — PROPOFOL 10 MG/ML
INJECTION, EMULSION INTRAVENOUS AS NEEDED
Status: DISCONTINUED | OUTPATIENT
Start: 2025-05-23 | End: 2025-05-23

## 2025-05-23 RX ORDER — SODIUM CHLORIDE, SODIUM LACTATE, POTASSIUM CHLORIDE, CALCIUM CHLORIDE 600; 310; 30; 20 MG/100ML; MG/100ML; MG/100ML; MG/100ML
INJECTION, SOLUTION INTRAVENOUS CONTINUOUS PRN
Status: DISCONTINUED | OUTPATIENT
Start: 2025-05-23 | End: 2025-05-23

## 2025-05-23 RX ORDER — EPHEDRINE SULFATE 50 MG/ML
INJECTION INTRAVENOUS AS NEEDED
Status: DISCONTINUED | OUTPATIENT
Start: 2025-05-23 | End: 2025-05-23

## 2025-05-23 RX ORDER — LIDOCAINE HYDROCHLORIDE 10 MG/ML
INJECTION, SOLUTION EPIDURAL; INFILTRATION; INTRACAUDAL; PERINEURAL AS NEEDED
Status: DISCONTINUED | OUTPATIENT
Start: 2025-05-23 | End: 2025-05-23

## 2025-05-23 RX ADMIN — FLUOXETINE HYDROCHLORIDE 20 MG: 20 CAPSULE ORAL at 16:21

## 2025-05-23 RX ADMIN — OXYBUTYNIN CHLORIDE 10 MG: 5 TABLET, EXTENDED RELEASE ORAL at 16:21

## 2025-05-23 RX ADMIN — APIXABAN 5 MG: 5 TABLET, FILM COATED ORAL at 17:05

## 2025-05-23 RX ADMIN — SENNOSIDES 8.6 MG: 8.6 TABLET ORAL at 21:27

## 2025-05-23 RX ADMIN — EPHEDRINE SULFATE 10 MG: 50 INJECTION, SOLUTION INTRAVENOUS at 13:01

## 2025-05-23 RX ADMIN — BUSPIRONE HYDROCHLORIDE 15 MG: 5 TABLET ORAL at 21:27

## 2025-05-23 RX ADMIN — ATORVASTATIN CALCIUM 40 MG: 40 TABLET, FILM COATED ORAL at 16:21

## 2025-05-23 RX ADMIN — POLYETHYLENE GLYCOL 3350 17 G: 17 POWDER, FOR SOLUTION ORAL at 16:21

## 2025-05-23 RX ADMIN — PANTOPRAZOLE SODIUM 40 MG: 40 TABLET, DELAYED RELEASE ORAL at 16:33

## 2025-05-23 RX ADMIN — SODIUM CHLORIDE, SODIUM LACTATE, POTASSIUM CHLORIDE, AND CALCIUM CHLORIDE: .6; .31; .03; .02 INJECTION, SOLUTION INTRAVENOUS at 12:32

## 2025-05-23 RX ADMIN — GABAPENTIN 300 MG: 300 CAPSULE ORAL at 21:27

## 2025-05-23 RX ADMIN — APIXABAN 5 MG: 5 TABLET, FILM COATED ORAL at 16:21

## 2025-05-23 RX ADMIN — GABAPENTIN 300 MG: 300 CAPSULE ORAL at 16:22

## 2025-05-23 RX ADMIN — GABAPENTIN 300 MG: 300 CAPSULE ORAL at 00:28

## 2025-05-23 RX ADMIN — PROPOFOL 20 MG: 10 INJECTION, EMULSION INTRAVENOUS at 13:00

## 2025-05-23 RX ADMIN — BUSPIRONE HYDROCHLORIDE 15 MG: 5 TABLET ORAL at 16:22

## 2025-05-23 RX ADMIN — LIDOCAINE HYDROCHLORIDE 50 MG: 10 INJECTION, SOLUTION EPIDURAL; INFILTRATION; INTRACAUDAL; PERINEURAL at 12:58

## 2025-05-23 RX ADMIN — TRAZODONE HYDROCHLORIDE 100 MG: 100 TABLET ORAL at 21:27

## 2025-05-23 RX ADMIN — PROPOFOL 80 MG: 10 INJECTION, EMULSION INTRAVENOUS at 12:58

## 2025-05-23 RX ADMIN — BUSPIRONE HYDROCHLORIDE 15 MG: 5 TABLET ORAL at 00:28

## 2025-05-23 NOTE — ASSESSMENT & PLAN NOTE
Last BM was yesterday afternoon after enema.   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

## 2025-05-23 NOTE — ASSESSMENT & PLAN NOTE
Presents with diarrhea for the past 2 days CT scan revealed constipation. Pt has had 4 mo of on and off diarrhea, occurring about 2x a month and lasting for a few days each time. This episode is her worst so far. She endorses abdominal pain associated with these episodes. She also has felt she has lost weight as he clothes are looser, but has not measured her weight. She also endorses fatigue over last 4mo, and continued diarrhea despite fasting during episodes. Typically she is constipated usually have 1 bowel movement a week.  C. Difficile negative  CRP elevated at 40  Per GI consult given elevated CRP and CT showing retained fecal matter diarrhea is likely overflow secondary to impacted stool and stercoral proctitis. Management as above.  Flexible sigmoidoscopy today.

## 2025-05-23 NOTE — ANESTHESIA POSTPROCEDURE EVALUATION
Post-Op Assessment Note    CV Status:  Stable  Pain Score: 0    Pain management: adequate       Mental Status:  Arousable   Hydration Status:  Stable   PONV Controlled:  None   Airway Patency:  Patent     Post Op Vitals Reviewed: Yes    No anethesia notable event occurred.    Staff: CRNA           Last Filed PACU Vitals:  Vitals Value Taken Time   Temp 97.2    Pulse 67    /64    Resp 12    SpO2 93

## 2025-05-23 NOTE — PLAN OF CARE
Problem: PAIN - ADULT  Goal: Verbalizes/displays adequate comfort level or baseline comfort level  Description: Interventions:  - Encourage patient to monitor pain and request assistance  - Assess pain using appropriate pain scale  - Administer analgesics as ordered based on type and severity of pain and evaluate response  - Implement non-pharmacological measures as appropriate and evaluate response  - Consider cultural and social influences on pain and pain management  - Notify physician/advanced practitioner if interventions unsuccessful or patient reports new pain  - Educate patient/family on pain management process including their role and importance of  reporting pain   - Provide non-pharmacologic/complimentary pain relief interventions  Outcome: Progressing     Problem: INFECTION - ADULT  Goal: Absence or prevention of progression during hospitalization  Description: INTERVENTIONS:  - Assess and monitor for signs and symptoms of infection  - Monitor lab/diagnostic results  - Monitor all insertion sites, i.e. indwelling lines, tubes, and drains  - Monitor endotracheal if appropriate and nasal secretions for changes in amount and color  - Spencer appropriate cooling/warming therapies per order  - Administer medications as ordered  - Instruct and encourage patient and family to use good hand hygiene technique  - Identify and instruct in appropriate isolation precautions for identified infection/condition  Outcome: Progressing     Problem: SAFETY ADULT  Goal: Patient will remain free of falls  Description: INTERVENTIONS:  - Educate patient/family on patient safety including physical limitations  - Instruct patient to call for assistance with activity   - Consider consulting OT/PT to assist with strengthening/mobility based on AM PAC & JH-HLM score  - Consult OT/PT to assist with strengthening/mobility   - Keep Call bell within reach  - Keep bed low and locked with side rails adjusted as appropriate  - Keep  care items and personal belongings within reach  - Initiate and maintain comfort rounds  - Make Fall Risk Sign visible to staff  - Offer Toileting every 2 Hours, in advance of need  - Initiate/Maintain bed alarm  - Apply yellow socks and bracelet for high fall risk patients  - Consider moving patient to room near nurses station  Outcome: Progressing

## 2025-05-23 NOTE — ASSESSMENT & PLAN NOTE
Recent Labs     05/21/25  0937 05/22/25  0542 05/22/25  0542 05/23/25  0508   HGB 13.1 11.9  --  11.6   MCV  --  91   < > 92    < > = values in this interval not displayed.   Hgb has been slowly downtrending since transfusion, initially 13.7 on 5/20 and today 11.6. Given negative FIT less concerned for GI bleed, pt likely returning to baseline anemia. Sigmoidoscopy today for further evaluation of diarrhea will provide more reassurance that this is not due to ongoing bleed.  Previous hemoglobin 12 over 1-year ago  Anemia noted to be macrocytic  Results of iron panel shows low iron, iron saturation, and transferrin. B12 normal. Likely iron deficiency contributing to anemia  FIT negative

## 2025-05-23 NOTE — ANESTHESIA PREPROCEDURE EVALUATION
Procedure:  FLEXIBLE SIGMOIDOSCOPY    Relevant Problems   ANESTHESIA (within normal limits)      CARDIO   (+) Migraine   (+) Mixed hyperlipidemia   (+) Multiple subsegmental pulmonary emboli without acute cor pulmonale (HCC) (No oxygen use, asymptomatic. Last Eliquis on 5/22)      GI/HEPATIC   (+) Gastroesophageal reflux disease without esophagitis      HEMATOLOGY   (+) Acute on chronic anemia      NEURO/PSYCH   (+) Migraine      PULMONARY   (+) Apnea, sleep      Lab Results   Component Value Date/Time    WBC 6.36 05/23/2025 05:08 AM    RBC 3.95 05/23/2025 05:08 AM    HGB 11.6 05/23/2025 05:08 AM    HCT 36.3 05/23/2025 05:08 AM     05/23/2025 05:08 AM       Chemistry        Component Value Date/Time    K 4.1 05/23/2025 0508    K 3.8 10/15/2024 1527     05/23/2025 0508     10/15/2024 1527    CO2 28 05/23/2025 0508    CO2 25 10/15/2024 1527    BUN 10 05/23/2025 0508    BUN 7 10/15/2024 1527    CREATININE 0.57 (L) 05/23/2025 0508    CREATININE 0.74 10/15/2024 1527        Component Value Date/Time    CALCIUM 8.4 05/23/2025 0508    CALCIUM 8.9 10/15/2024 1527    ALKPHOS 55 05/23/2025 0508    ALKPHOS 66 10/15/2024 1527    AST 9 (L) 05/23/2025 0508    AST 13 10/15/2024 1527    ALT 9 05/23/2025 0508    ALT 9 10/15/2024 1527        Lab Results   Component Value Date/Time    ALKPHOS 55 05/23/2025 05:08 AM    ALT 9 05/23/2025 05:08 AM    AST 9 (L) 05/23/2025 05:08 AM    ALB 3.2 (L) 05/23/2025 05:08 AM    TBILI 0.30 05/23/2025 05:08 AM     Type and Screen:  AB    CT abdomen pelvis with contrast  Result Date: 5/19/2025  Impression: Moderate to large amount of fecal debris in the rectum with mild perirectal infiltration and trace fluid in the presacral fascia, raise concern for stercoral proctitis Trace pericholecystic fluid, nonspecific. Correlate clinically and consider ultrasound if concern for acute cholecystitis     Physical Exam    Airway     Mallampati score: III  TM Distance: >3 FB  Neck ROM:  full      Cardiovascular  Cardiovascular exam normal    Dental    edentulous    Pulmonary  Pulmonary exam normal     Neurological    She appears awake, alert and oriented x3.      Other Findings  post-pubertal.      Anesthesia Plan  ASA Score- 3     Anesthesia Type- IV sedation with anesthesia with ASA Monitors.         Additional Monitors:     Airway Plan: natural airway.           Plan Factors-Exercise tolerance (METS): <4 METS.    Chart reviewed. EKG reviewed. Imaging results reviewed. Existing labs reviewed. Patient summary reviewed.    Patient is not a current smoker.      Obstructive sleep apnea risk education given perioperatively.        Induction- intravenous.    Postoperative Plan- .   Monitoring Plan - Monitoring plan - standard ASA monitoring      Perioperative Resuscitation Plan - Level 1 - Full Code.       Informed Consent- Anesthetic plan and risks discussed with patient.  I personally reviewed this patient with the CRNA. Discussed and agreed on the Anesthesia Plan with the CRNA..      NPO Status:  Vitals Value Taken Time   Date of last liquid 05/22/25 05/23/25 11:15   Time of last liquid 2230 05/23/25 11:15   Date of last solid 05/22/25 05/23/25 11:15   Time of last solid 1700 05/23/25 11:15

## 2025-05-23 NOTE — PROGRESS NOTES
Progress Note - Hospitalist   Name: Kristina Russell 64 y.o. female I MRN: [id number]  Unit/Bed#: -01 I Date of Admission: 5/19/2025   Date of Service: 5/23/2025 I Hospital Day: 4    Assessment & Plan  Acute on chronic anemia  Recent Labs     05/21/25  0937 05/22/25  0542 05/22/25  0542 05/23/25  0508   HGB 13.1 11.9  --  11.6   MCV  --  91   < > 92    < > = values in this interval not displayed.   Hgb has been slowly downtrending since transfusion, initially 13.7 on 5/20 and today 11.6. Given negative FIT less concerned for GI bleed, pt likely returning to baseline anemia. Sigmoidoscopy today for further evaluation of diarrhea will provide more reassurance that this is not due to ongoing bleed.  Previous hemoglobin 12 over 1-year ago  Anemia noted to be macrocytic  Results of iron panel shows low iron, iron saturation, and transferrin. B12 normal. Likely iron deficiency contributing to anemia  FIT negative  Class 1 obesity due to excess calories with serious comorbidity and body mass index (BMI) of 32.0 to 32.9 in adult  Body mass index is 32.35 kg/m².    Recommend incorporating a more whole foods plant-predominant diet along with decreasing consumption of red meats and processed foods  Per AHA guidelines, recommend moderate-vigorous intensity exercise for 30 minutes a day for 5 days a week or a total of 150 min/week  Mixed hyperlipidemia  Lab Results   Component Value Date    CHOLESTEROL 166 08/28/2023    TRIG 86 08/28/2023    HDL 64 08/28/2023    LDLCALC 85 08/28/2023     Home statin   Gastroesophageal reflux disease without esophagitis  Continue PPI  Multiple subsegmental pulmonary emboli without acute cor pulmonale (HCC)  Continue Eliquis  Constipation  Last BM was yesterday afternoon after enema.   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 Pt has had 4 mo of on and off diarrhea, occurring about 2x a month and lasting for a few days each time. This episode is her worst so far. She endorses abdominal pain associated with these episodes. She also has felt she has lost weight as he clothes are looser, but has not measured her weight. She also endorses fatigue over last 4mo, and continued diarrhea despite fasting during episodes. Typically she is constipated usually have 1 bowel movement a week.  C. Difficile negative  CRP elevated at 40  Per GI consult given elevated CRP and CT showing retained fecal matter diarrhea is likely overflow secondary to impacted stool and stercoral proctitis. Management as above.  Flexible sigmoidoscopy today.    VTE Pharmacologic Prophylaxis: VTE Score: 5 High Risk (Score >/= 5) - Pharmacological DVT Prophylaxis Ordered: apixaban (Eliquis). Sequential Compression Devices Ordered.    Mobility:   Basic Mobility Inpatient Raw Score: 6  JH-HLM Goal: 2: Bed activities/Dependent transfer  JH-HLM Achieved: 2: Bed activities/Dependent transfer  JH-HLM Goal achieved. Continue to encourage appropriate mobility.    Patient Centered Rounds: I performed bedside rounds with nursing staff today.   Discussions with Specialists or Other Care Team Provider: Gastroenterology    Education and Discussions with Family / Patient: Attempted to reach son (Mandeep) and left a message.     Current Length of Stay: 4 day(s)  Current Patient Status: Inpatient   Certification Statement: The patient will continue to require additional inpatient hospital stay due to ongoing GI evaluation  Discharge Plan: Anticipate discharge in 24-48 hrs to home.    Code Status: Level 1 - Full Code    Subjective   Pt reports that last BM was yesterday afternoon after her enema. She reports feeling bloated after her last enema. Still  has 5/10 intermittent abdominal pain usually in LLQ but sometimes in RLQ. Pt also reports rattley cough    Objective :  Temp:  [97 °F (36.1 °C)-98.6 °F (37 °C)] 97 °F (36.1 °C)  HR:  [69-73] 69  BP: (108-153)/(53-67) 153/67  Resp:  [16-20] 20  SpO2:  [92 %-97 %] 97 %  O2 Device: None (Room air)    Body mass index is 32.35 kg/m².     Input and Output Summary (last 24 hours):     Intake/Output Summary (Last 24 hours) at 5/23/2025 1118  Last data filed at 5/22/2025 2158  Gross per 24 hour   Intake 462 ml   Output 1200 ml   Net -738 ml       Physical Exam  Constitutional:       General: She is not in acute distress.     Appearance: She is obese.   HENT:      Mouth/Throat:      Mouth: Mucous membranes are moist.     Cardiovascular:      Rate and Rhythm: Normal rate and regular rhythm.      Heart sounds: Normal heart sounds.   Pulmonary:      Effort: Pulmonary effort is normal.      Breath sounds: Normal breath sounds. No wheezing, rhonchi or rales.   Abdominal:      General: Abdomen is flat.      Palpations: Abdomen is soft.      Tenderness: There is no guarding or rebound.      Comments: Pt has momentary abdominal tenderness in LUQ that goes away part way through exam     Musculoskeletal:      Right lower leg: No edema.      Left lower leg: No edema.     Skin:     General: Skin is warm and dry.     Neurological:      Mental Status: She is alert and oriented to person, place, and time.           Lab Results: I have reviewed the following results:   Results from last 7 days   Lab Units 05/23/25  0508   WBC Thousand/uL 6.36   HEMOGLOBIN g/dL 11.6   HEMATOCRIT % 36.3   PLATELETS Thousands/uL 371   SEGS PCT % 46   LYMPHO PCT % 38   MONO PCT % 10   EOS PCT % 5     Results from last 7 days   Lab Units 05/23/25  0508   SODIUM mmol/L 139   POTASSIUM mmol/L 4.1   CHLORIDE mmol/L 107   CO2 mmol/L 28   BUN mg/dL 10   CREATININE mg/dL 0.57*   ANION GAP mmol/L 4   CALCIUM mg/dL 8.4   ALBUMIN g/dL 3.2*   TOTAL BILIRUBIN mg/dL 0.30    ALK PHOS U/L 55   ALT U/L 9   AST U/L 9*   GLUCOSE RANDOM mg/dL 89     Results from last 7 days   Lab Units 05/19/25  1337   INR  1.03             Results from last 7 days   Lab Units 05/19/25  1134   LACTIC ACID mmol/L 1.0       Recent Cultures (last 7 days):   Results from last 7 days   Lab Units 05/20/25  1216   C DIFF TOXIN B BY PCR  Negative       Imaging Results Review: No pertinent imaging studies reviewed.  Other Study Results Review: No additional pertinent studies reviewed.    Last 24 Hours Medication List:     Current Facility-Administered Medications:     acetaminophen (TYLENOL) tablet 650 mg, Q6H PRN    apixaban (ELIQUIS) tablet 5 mg, BID    atorvastatin (LIPITOR) tablet 40 mg, Daily    buPROPion (WELLBUTRIN XL) 24 hr tablet 150 mg, QAM    busPIRone (BUSPAR) tablet 15 mg, TID    FLUoxetine (PROzac) capsule 20 mg, Daily    gabapentin (NEURONTIN) capsule 300 mg, TID    oxybutynin (DITROPAN-XL) 24 hr tablet 10 mg, Daily    oxyCODONE (ROXICODONE) IR tablet 5 mg, Q6H PRN    pantoprazole (PROTONIX) EC tablet 40 mg, Early Morning    polyethylene glycol (MIRALAX) packet 17 g, BID    senna (SENOKOT) tablet 8.6 mg, HS    traZODone (DESYREL) tablet 100 mg, HS    Administrative Statements   Today, Patient Was Seen By: Fátima Onofre MS4    **Please Note: This note may have been constructed using a voice recognition system.**

## 2025-05-24 VITALS
WEIGHT: 188.49 LBS | BODY MASS INDEX: 32.18 KG/M2 | DIASTOLIC BLOOD PRESSURE: 65 MMHG | RESPIRATION RATE: 17 BRPM | OXYGEN SATURATION: 98 % | SYSTOLIC BLOOD PRESSURE: 106 MMHG | TEMPERATURE: 97.9 F | HEIGHT: 64 IN | HEART RATE: 85 BPM

## 2025-05-24 PROCEDURE — 99239 HOSP IP/OBS DSCHRG MGMT >30: CPT | Performed by: INTERNAL MEDICINE

## 2025-05-24 RX ORDER — POLYETHYLENE GLYCOL 3350 17 G/17G
17 POWDER, FOR SOLUTION ORAL DAILY
Qty: 72 EACH | Refills: 0 | Status: SHIPPED | OUTPATIENT
Start: 2025-05-24

## 2025-05-24 RX ADMIN — ATORVASTATIN CALCIUM 40 MG: 40 TABLET, FILM COATED ORAL at 08:52

## 2025-05-24 RX ADMIN — APIXABAN 5 MG: 5 TABLET, FILM COATED ORAL at 08:52

## 2025-05-24 RX ADMIN — PANTOPRAZOLE SODIUM 40 MG: 40 TABLET, DELAYED RELEASE ORAL at 05:24

## 2025-05-24 RX ADMIN — OXYBUTYNIN CHLORIDE 10 MG: 5 TABLET, EXTENDED RELEASE ORAL at 08:53

## 2025-05-24 RX ADMIN — BUSPIRONE HYDROCHLORIDE 15 MG: 5 TABLET ORAL at 08:53

## 2025-05-24 RX ADMIN — FLUOXETINE HYDROCHLORIDE 20 MG: 20 CAPSULE ORAL at 08:52

## 2025-05-24 RX ADMIN — GABAPENTIN 300 MG: 300 CAPSULE ORAL at 08:52

## 2025-05-24 RX ADMIN — POLYETHYLENE GLYCOL 3350 17 G: 17 POWDER, FOR SOLUTION ORAL at 08:56

## 2025-05-24 RX ADMIN — BUPROPION HYDROCHLORIDE 150 MG: 150 TABLET, EXTENDED RELEASE ORAL at 08:52

## 2025-05-24 NOTE — CASE MANAGEMENT
Case Management Discharge Planning Note    Patient name Kristina Russell  Location /-01 MRN [id number]  : 1960 Date 2025       Current Admission Date: 2025  Current Admission Diagnosis:Diarrhea   Patient Active Problem List    Diagnosis Date Noted    Acute on chronic anemia 2025    Constipation 2025    Diarrhea 2025    Acquired equinovarus deformity of left foot 2024    Hemiparesis (HCC) 10/16/2023    Multiple subsegmental pulmonary emboli without acute cor pulmonale (HCC) 2022    Mixed hyperlipidemia 2021    Gastroesophageal reflux disease without esophagitis 2021    Migraine     Apnea, sleep 2018    Class 1 obesity due to excess calories with serious comorbidity and body mass index (BMI) of 32.0 to 32.9 in adult 2018      LOS (days): 5  Geometric Mean LOS (GMLOS) (days): 3.7  Days to GMLOS:-1.1     OBJECTIVE:  Risk of Unplanned Readmission Score: 10.88         Current admission status: Inpatient   Preferred Pharmacy:   [facility] [id number] [address]  Phone: [phone] Fax: [phone]    Primary Care Provider: Alfred Crane MD    Primary Insurance: [facility] DUAL COMPLETE  REP  Secondary Insurance: [facility]    DISCHARGE DETAILS:    Discharge planning discussed with:: roe Hernandez  Freedom of Choice: Yes  Comments - Freedom of Choice: Pt to be d/andrés home today with [facility].  Roe Hernandez is agreeable to this dcp.  CM contacted family/caregiver?: Yes  Were Treatment Team discharge recommendations reviewed with patient/caregiver?: Yes  Did patient/caregiver verbalize understanding of patient care needs?: Yes  Were patient/caregiver advised of the risks associated with not following Treatment Team discharge recommendations?: Yes    Contacts  Patient Contacts: David  Relationship to Patient::  Family  Contact Method: Phone  Phone Number: [phone]  Reason/Outcome: Discharge Planning    Requested Home Health Care         Is the patient interested in HHC at discharge?: Yes    DME Referral Provided  Referral made for DME?: No    Other Referral/Resources/Interventions Provided:  Interventions: HHC, Transportation  Referral Comments: Revolutionary notified of discharge.  [facility] transport arranged for 1300 via [employer].  Roe Hernandez is agreeable to this dcp and transport time    Would you like to participate in our [facility] service program?  : No - Declined    Treatment Team Recommendation: Home with Home Health Care  Discharge Destination Plan:: Home with Home Health Care  Transport at Discharge : [facility]     Number/Name of Dispatcher: Roundtrip  Transported by (Company and Unit #): [employer]  ETA of Transport (Date): 05/24/25  ETA of Transport (Time): 1300              IMM Given (Date):: 05/24/25  IMM Given to:: Patient  Family notified:: roe Hernandez

## 2025-05-24 NOTE — PLAN OF CARE
Problem: PAIN - ADULT  Goal: Verbalizes/displays adequate comfort level or baseline comfort level  Description: Interventions:  - Encourage patient to monitor pain and request assistance  - Assess pain using appropriate pain scale  - Administer analgesics as ordered based on type and severity of pain and evaluate response  - Implement non-pharmacological measures as appropriate and evaluate response  - Consider cultural and social influences on pain and pain management  - Notify physician/advanced practitioner if interventions unsuccessful or patient reports new pain  - Educate patient/family on pain management process including their role and importance of  reporting pain   - Provide non-pharmacologic/complimentary pain relief interventions  Outcome: Progressing     Problem: Nutrition/Hydration-ADULT  Goal: Nutrient/Hydration intake appropriate for improving, restoring or maintaining nutritional needs  Description: Monitor and assess patient's nutrition/hydration status for malnutrition. Collaborate with interdisciplinary team and initiate plan and interventions as ordered.  Monitor patient's weight and dietary intake as ordered or per policy. Utilize nutrition screening tool and intervene as necessary. Determine patient's food preferences and provide high-protein, high-caloric foods as appropriate.     INTERVENTIONS:  - Monitor oral intake, urinary output, labs, and treatment plans  - Assess nutrition and hydration status and recommend course of action  - Evaluate amount of meals eaten  - Assist patient with eating if necessary   - Allow adequate time for meals  - Recommend/ encourage appropriate diets, oral nutritional supplements, and vitamin/mineral supplements  - Order, calculate, and assess calorie counts as needed  - Recommend, monitor, and adjust tube feedings and TPN/PPN based on assessed needs  - Assess need for intravenous fluids  - Provide specific nutrition/hydration education as appropriate  -  Include patient/family/caregiver in decisions related to nutrition  Outcome: Progressing

## 2025-05-24 NOTE — DISCHARGE SUMMARY
[facility]   Discharge - Name: Kristina Russell I  MRN: [id number]  Unit/Bed#: -01 I Date of Admission: 5/19/2025   Date of Service: 5/24/2025 I Hospital Day: 5      Principal Problem:    Diarrhea  Active Problems:    Class 1 obesity due to excess calories with serious comorbidity and body mass index (BMI) of 32.0 to 32.9 in adult    Mixed hyperlipidemia    Gastroesophageal reflux disease without esophagitis    Multiple subsegmental pulmonary emboli without acute cor pulmonale (HCC)    Acute on chronic anemia    Constipation      Medical Problems       Resolved Problems  Date Reviewed: 12/3/2024   None       Discharging Physician / Practitioner: Victor Manuel Franco DO  PCP: Alfred Crane MD  Admission Date:   Admission Orders (From admission, onward)       Ordered        05/19/25 1523  INPATIENT ADMISSION  Once                          Discharge Date: 05/24/25    Next Steps for Physician/AP Assuming Care:  F/u OP PCP and GI    Test Results Pending at Discharge (will require follow up):  none    Medication Changes for Discharge & Rationale:   See after visit summary for reconciled discharge medications provided to patient and/or family.     Consultations During Hospital Stay:  IP CONSULT TO GASTROENTEROLOGY    Procedures Performed:   Flexible Sigmoidoscopy  Result Date: 5/23/2025  Impression: The descending colon, sigmoid colon and rectosigmoid appeared normal. Some stool was present in the rectum, however, the rectum was not impacted.  There is no evidence of proctitis. RECOMMENDATION: 1.  Continue MiraLAX 1 packet twice a day 2.  Resume diet 3.  Discharge the patient home  GI will be signing off at this time Shaun Posadas DO FACG, FACP    CT abdomen pelvis with contrast  Result Date: 5/19/2025  Impression: Moderate to large amount of fecal debris in the rectum with mild perirectal infiltration and trace fluid in the presacral fascia, raise concern for stercoral proctitis Trace  "pericholecystic fluid, nonspecific. Correlate clinically and consider ultrasound if concern for acute cholecystitis The study was marked in EPIC for significant notification. Workstation performed: [id number]       No Chest XR results available for this patient.         Significant Findings / Test Results:   None other than noted above    Incidental Findings:   none    Hospital Course:   Kristina Russell is a 64 y.o. female patient who originally presented to the hospital on 5/19/2025 due to worsening loose stool and noted to have progressively frequent multiple bowel movements over the past 1 to 2 days prior to admission.  Was admitted for further evaluation and workup.  Gastroenterology was consulted and patient underwent flexible sigmoidoscopy with results noted above.  Otherwise deemed medically stable for discharge.  Patient was evaluated by PT and OT and recommended for rehab on discharge but patient opting to go home with home health services.  Discussed the importance of having regular home health services at home after discharge for continued follow-up and workup to improve mobility.  Deemed stable for discharge and all questions were answered.  Recommend to follow-up with outpatient PCP within 1 to 2 weeks after discharge.  Recommended to continue with physical therapy regimen.      Please see above list of diagnoses and related plan for additional information.     Discharge Day Visit / Exam:   Subjective: No complaints at this time.  Understanding of plan.  Eager for discharge.  Requesting transportation assistance Home  Vitals: Blood Pressure: 106/65 (05/24/25 0641)  Pulse: 79 (05/24/25 0649)  Temperature: 97.9 °F (36.6 °C) (05/24/25 0641)  Temp Source: Temporal (05/23/25 1115)  Respirations: 17 (05/23/25 2141)  Height: 5' 4\" (162.6 cm) (05/19/25 1717)  Weight - Scale: 85.5 kg (188 lb 7.9 oz) (05/19/25 1717)  SpO2: 94 % (05/24/25 0649)  Physical Exam  Vitals and nursing note reviewed.   Constitutional:       " General: She is not in acute distress.     Appearance: She is ill-appearing. She is not toxic-appearing.   HENT:      Head: Normocephalic.      Nose: Nose normal.      Mouth/Throat:      Mouth: Mucous membranes are dry.     Eyes:      General: No scleral icterus.     Conjunctiva/sclera: Conjunctivae normal.       Cardiovascular:      Rate and Rhythm: Normal rate.      Pulses: Normal pulses.           Radial pulses are 2+ on the right side and 2+ on the left side.      Heart sounds: Normal heart sounds.   Pulmonary:      Effort: Pulmonary effort is normal.      Breath sounds: Normal breath sounds.   Abdominal:      General: Bowel sounds are normal. There is no distension.      Palpations: Abdomen is soft.      Tenderness: There is no abdominal tenderness.     Musculoskeletal:         General: No tenderness.     Skin:     General: Skin is dry.      Coloration: Skin is not jaundiced.     Neurological:      Mental Status: She is alert.     Psychiatric:         Mood and Affect: Mood normal.         Behavior: Behavior normal. Behavior is cooperative.          Discussion with Family: Patient declined call to .     Discharge instructions/Information to patient and family:   See after visit summary for information provided to patient and family.      Provisions for Follow-Up Care:  See after visit summary for information related to follow-up care and any pertinent home health orders.      Mobility at time of Discharge:   Basic Mobility Inpatient Raw Score: 6  JH-HLM Goal: 2: Bed activities/Dependent transfer  JH-HLM Achieved: 2: Bed activities/Dependent transfer  HLM Goal achieved. Continue to encourage appropriate mobility.     Disposition:   Home with VNA Services (Reminder: Complete face to face encounter)    Planned Readmission: none    Administrative Statements   Discharge Statement:  I have spent a total time of 49 minutes in caring for this patient on the day of the visit/encounter. >30 minutes of time  was spent on: Diagnostic results, Prognosis, Risks and benefits of tx options, Instructions for management, Patient and family education, Importance of tx compliance, Risk factor reductions, Impressions, Counseling / Coordination of care, Documenting in the medical record, Reviewing / ordering tests, medicine, procedures  , and Communicating with other healthcare professionals .    **Please Note: This note may have been constructed using a voice recognition system**

## 2025-05-27 ENCOUNTER — TRANSITIONAL CARE MANAGEMENT (OUTPATIENT)
Dept: FAMILY MEDICINE CLINIC | Facility: CLINIC | Age: 65
End: 2025-05-27

## 2025-05-27 NOTE — ANESTHESIA POSTPROCEDURE EVALUATION
Post-Op Assessment Note    Last Filed PACU Vitals:  Vitals Value Taken Time   Temp 97.4 °F (36.3 °C) 05/23/25 13:10   Pulse 73 05/23/25 13:50   /56 05/23/25 13:50   Resp 18 05/23/25 13:50   SpO2 96 % 05/23/25 13:50       Modified Ester:     Vitals Value Taken Time   Activity 2 05/23/25 13:50   Respiration 2 05/23/25 13:50   Circulation 2 05/23/25 13:50   Consciousness 2 05/23/25 13:50   Oxygen Saturation 2 05/23/25 13:50     Modified Ester Score: 10

## 2025-05-28 NOTE — UTILIZATION REVIEW
NOTIFICATION OF ADMISSION DISCHARGE   This is a Notification of Discharge from [facility]. Please be advised that this patient has been discharge from our facility. Below you will find the admission and discharge date and time including the patient’s disposition.   UTILIZATION REVIEW CONTACT:  Utilization Review Assistants  Network Utilization Review Department  Phone: [phone] x carefully listen to the prompts. All voicemails are confidential.  Email: [email or web address]     ADMISSION INFORMATION  PRESENTATION DATE: 5/19/2025 10:36 AM  OBERVATION ADMISSION DATE: N/A  INPATIENT ADMISSION DATE: 5/19/25  3:23 PM   DISCHARGE DATE: 5/24/2025  2:25 PM   DISPOSITION:Home with Home Health Care    [facility] Utilization Review Department  ATTENTION: Please call with any questions or concerns to [phone] and carefully listen to the prompts so that you are directed to the right person. All voicemails are confidential.   For Discharge needs, contact Care Management DC Support Team at [phone] opt. 2  Send all requests for admission clinical reviews, approved or denied determinations and any other requests to dedicated fax number below belonging to the campus where the patient is receiving treatment. List of dedicated fax numbers for the Facilities:  FACILITY NAME UR FAX NUMBER   ADMISSION DENIALS (Administrative/Medical Necessity) [phone]   DISCHARGE SUPPORT TEAM ([facility]) [phone]   PARENT CHILD HEALTH (Maternity/NICU/Pediatrics) [phone]   [facility] [phone]   [facility] [phone]   [facility] [phone]   [facility] [phone]   [facility] [phone]   [facility] [phone]   [facility] [phone]   [facility] [phone]    [facility] [phone]   [facility] [phone]   [facility] [phone]   [facility] [phone]

## 2025-06-06 ENCOUNTER — TELEPHONE (OUTPATIENT)
Dept: LAB | Facility: HOSPITAL | Age: 65
End: 2025-06-06

## 2025-06-06 ENCOUNTER — OFFICE VISIT (OUTPATIENT)
Dept: FAMILY MEDICINE CLINIC | Facility: CLINIC | Age: 65
End: 2025-06-06
Payer: COMMERCIAL

## 2025-06-06 VITALS
OXYGEN SATURATION: 95 % | HEART RATE: 106 BPM | DIASTOLIC BLOOD PRESSURE: 74 MMHG | SYSTOLIC BLOOD PRESSURE: 108 MMHG | TEMPERATURE: 97.5 F

## 2025-06-06 DIAGNOSIS — G43.809 OTHER MIGRAINE WITHOUT STATUS MIGRAINOSUS, NOT INTRACTABLE: ICD-10-CM

## 2025-06-06 DIAGNOSIS — F32.1 CURRENT MODERATE EPISODE OF MAJOR DEPRESSIVE DISORDER WITHOUT PRIOR EPISODE (HCC): ICD-10-CM

## 2025-06-06 DIAGNOSIS — K52.89 STERCORAL COLITIS: ICD-10-CM

## 2025-06-06 DIAGNOSIS — I26.94 MULTIPLE SUBSEGMENTAL PULMONARY EMBOLI WITHOUT ACUTE COR PULMONALE (HCC): ICD-10-CM

## 2025-06-06 DIAGNOSIS — K21.9 GASTROESOPHAGEAL REFLUX DISEASE WITHOUT ESOPHAGITIS: ICD-10-CM

## 2025-06-06 DIAGNOSIS — B37.9 YEAST INFECTION: ICD-10-CM

## 2025-06-06 DIAGNOSIS — E66.09 CLASS 1 OBESITY DUE TO EXCESS CALORIES WITH SERIOUS COMORBIDITY AND BODY MASS INDEX (BMI) OF 32.0 TO 32.9 IN ADULT: ICD-10-CM

## 2025-06-06 DIAGNOSIS — E66.811 CLASS 1 OBESITY DUE TO EXCESS CALORIES WITH SERIOUS COMORBIDITY AND BODY MASS INDEX (BMI) OF 32.0 TO 32.9 IN ADULT: ICD-10-CM

## 2025-06-06 DIAGNOSIS — E78.2 MIXED HYPERLIPIDEMIA: ICD-10-CM

## 2025-06-06 DIAGNOSIS — F51.01 PRIMARY INSOMNIA: ICD-10-CM

## 2025-06-06 DIAGNOSIS — G47.33 OBSTRUCTIVE SLEEP APNEA SYNDROME: Primary | ICD-10-CM

## 2025-06-06 DIAGNOSIS — E11.40 TYPE 2 DIABETES MELLITUS WITH DIABETIC NEUROPATHY, WITHOUT LONG-TERM CURRENT USE OF INSULIN (HCC): ICD-10-CM

## 2025-06-06 PROCEDURE — 99495 TRANSJ CARE MGMT MOD F2F 14D: CPT | Performed by: FAMILY MEDICINE

## 2025-06-06 RX ORDER — MIRABEGRON 50 MG/1
1 TABLET, FILM COATED, EXTENDED RELEASE ORAL DAILY
Qty: 90 TABLET | Refills: 1 | Status: SHIPPED | OUTPATIENT
Start: 2025-06-06

## 2025-06-06 RX ORDER — OMEPRAZOLE 20 MG/1
20 CAPSULE, DELAYED RELEASE ORAL DAILY
Qty: 90 CAPSULE | Refills: 1 | Status: SHIPPED | OUTPATIENT
Start: 2025-06-06

## 2025-06-06 RX ORDER — ATORVASTATIN CALCIUM 40 MG/1
40 TABLET, FILM COATED ORAL DAILY
Qty: 100 TABLET | Refills: 1 | Status: SHIPPED | OUTPATIENT
Start: 2025-06-06

## 2025-06-06 RX ORDER — NYSTATIN 100000 [USP'U]/G
POWDER TOPICAL 2 TIMES DAILY
Qty: 15 G | Refills: 3 | Status: SHIPPED | OUTPATIENT
Start: 2025-06-06

## 2025-06-06 RX ORDER — TRAZODONE HYDROCHLORIDE 100 MG/1
100 TABLET ORAL
Qty: 90 TABLET | Refills: 1 | Status: SHIPPED | OUTPATIENT
Start: 2025-06-06

## 2025-06-06 RX ORDER — RIZATRIPTAN BENZOATE 10 MG/1
10 TABLET ORAL AS NEEDED
Qty: 9 TABLET | Refills: 3 | Status: SHIPPED | OUTPATIENT
Start: 2025-06-06

## 2025-06-06 NOTE — ASSESSMENT & PLAN NOTE
Patient  is stable with current medication and we discussed a low fat low cholesterol diet. Weight loss also discussed for this will help lower cholesterol also. Recheck lipids in 6 months.   Orders:  •  Myrbetriq 50 MG TB24; Take 1 tablet (50 mg total) by mouth daily  •  atorvastatin (LIPITOR) 40 mg tablet; Take 1 tablet (40 mg total) by mouth daily  •  Comprehensive metabolic panel; Future  •  Lipid Panel with Direct LDL reflex; Future

## 2025-06-06 NOTE — ASSESSMENT & PLAN NOTE
Patient to continue with present therapy and decrease caffeine, avoid ETOH and smoking to decrease acid production. Pt should also cease eating prior to bedtime and avoid excessive fluid intake prior to sleep. May use antacids as needed for breakthrough GERD. All pateint questions answered today about this condition.   Orders:  •  CBC and differential; Future  •  omeprazole (PriLOSEC) 20 mg delayed release capsule; Take 1 capsule (20 mg total) by mouth daily

## 2025-06-06 NOTE — PROGRESS NOTES
Name: Kristina Russell      : 1960      MRN: [id number]  Encounter Provider: Alfred Crane MD  Encounter Date: 2025   Encounter department: [facility]  :  Assessment & Plan  Obstructive sleep apnea syndrome  Patient instructed to continue using CPAP as directed to decrease episodes of apnea to minimize risk of cardiac complications. Pt instructed to kep  machine in clean working order and to call if any replacement parts are needed.        Class 1 obesity due to excess calories with serious comorbidity and body mass index (BMI) of 32.0 to 32.9 in adult  Patient to increase exercise and partake of a diet with less calories to promote  weight loss        Gastroesophageal reflux disease without esophagitis  Patient to continue with present therapy and decrease caffeine, avoid ETOH and smoking to decrease acid production. Pt should also cease eating prior to bedtime and avoid excessive fluid intake prior to sleep. May use antacids as needed for breakthrough GERD. All pateint questions answered today about this condition.   Orders:  •  CBC and differential; Future  •  omeprazole (PriLOSEC) 20 mg delayed release capsule; Take 1 capsule (20 mg total) by mouth daily    Mixed hyperlipidemia  Patient  is stable with current medication and we discussed a low fat low cholesterol diet. Weight loss also discussed for this will help lower cholesterol also. Recheck lipids in 6 months.   Orders:  •  Myrbetriq 50 MG TB24; Take 1 tablet (50 mg total) by mouth daily  •  atorvastatin (LIPITOR) 40 mg tablet; Take 1 tablet (40 mg total) by mouth daily  •  Comprehensive metabolic panel; Future  •  Lipid Panel with Direct LDL reflex; Future    Multiple subsegmental pulmonary emboli without acute cor pulmonale (HCC)  Patient is stable  and will continue present plan of care and reassess at next routine visit. All questions about this problem from patient were answered today.        [facility]  "colitis    Orders:  •  Ambulatory Referral to Gastroenterology; Future    Yeast infection    Orders:  •  nystatin powder; Apply topically 2 (two) times a day    Other migraine without status migrainosus, not intractable    Orders:  •  rizatriptan (MAXALT) 10 mg tablet; Take 1 tablet (10 mg total) by mouth as needed for migraine Take at the onset of migraine; if symptoms continue or return, may take another dose at least 2 hours after first dose. Take no more than 2 doses in a day.    Primary insomnia    Orders:  •  traZODone (DESYREL) 100 mg tablet; Take 1 tablet (100 mg total) by mouth daily at bedtime    Current moderate episode of major depressive disorder without prior episode (HCC)           Type 2 diabetes mellitus with diabetic neuropathy, without long-term current use of insulin (HCC)    No results found for: \"HGBA1C\"           TCM Call (since 5/23/2025)     Date and time call was made  5/27/2025 11:32 AM    Hospital care reviewed  Records reviewed    Patient was hospitialized at  [facility]    Date of Admission  05/19/25    Date of discharge  05/24/25    Diagnosis  Diarrhea    Disposition  Home    Were the patients medications reviewed and updated  Yes      TCM Call (since 5/23/2025)     Did you obtain your prescribed medications  No    Do you need help managing your prescriptions or medications  No    Is transportation to your appointment needed  No    I have advised the patient to call PCP with any new or worsening symptoms  [facility]    Support System  Children    The type of support provided  Emotional    Do you have social support  Yes, some           Depression Screening and Follow-up Plan: Patient was screened for depression during today's encounter. They screened negative with a PHQ-2 score of 0.      Falls Plan of Care: balance, strength, and gait training instructions were provided. Recommended assistive device to help with gait and balance.     Urinary Incontinence Plan of Care: " counseling topics discussed: practice Kegel (pelvic floor strengthening) exercises, use restroom every 2 hours, limit alcohol, caffeine, spicy foods, and acidic foods, limiting fluid intake 3-4 hours before bed, weight loss, preventing constipation and limiting fluid intake to 60 oz. per day.     History of Present Illness   64-year-old female today for checkup for multimedical problems as well as TCM visit from recent hospital stay for sterile coral colitis.  Patient had a problem with impaction from stasis and ended up having to be disimpacted.  Patient had a flexible Sigg and was found to have some anemia of unknown origin patient to receive a unit of blood in the hospital and was discharged.  Will see about following up with a CBC and I am also going to reconsult her to go back to the GI apryl Dr. Hunter for probable colonoscopy in the future.  Discussed with patient about a good bowel regimen with increasing fiber in her diet as well as trying to have a whole grains as well as more vegetables.      Review of Systems   Constitutional:  Negative for activity change, appetite change, fatigue and fever.   HENT:  Negative for congestion, ear pain, postnasal drip, rhinorrhea, sinus pressure, sinus pain, sneezing and sore throat.    Eyes:  Negative for pain and redness.   Respiratory:  Negative for apnea, cough, chest tightness, shortness of breath and wheezing.    Cardiovascular:  Negative for chest pain, palpitations and leg swelling.   Gastrointestinal:  Negative for abdominal pain, constipation, diarrhea, nausea and vomiting.   Endocrine: Negative for cold intolerance and heat intolerance.   Genitourinary:  Negative for difficulty urinating, dysuria, frequency, hematuria and urgency.   Musculoskeletal:  Negative for arthralgias, back pain, gait problem and myalgias.   Skin:  Negative for rash.   Neurological:  Negative for dizziness, speech difficulty, weakness, numbness and headaches.   Hematological:  Does not  bruise/bleed easily.   Psychiatric/Behavioral:  Negative for agitation, confusion and hallucinations.        Objective   /74 (BP Location: Left arm, Patient Position: Sitting, Cuff Size: Large)   Pulse (!) 106   Temp 97.5 °F (36.4 °C) (Skin)   SpO2 95%      Physical Exam  Constitutional:       Appearance: Normal appearance. She is not ill-appearing.   HENT:      Head: Normocephalic and atraumatic.      Right Ear: Tympanic membrane normal.      Left Ear: Tympanic membrane normal.      Nose: Nose normal.      Mouth/Throat:      Mouth: Mucous membranes are moist.     Eyes:      Extraocular Movements: Extraocular movements intact.      Conjunctiva/sclera: Conjunctivae normal.      Pupils: Pupils are equal, round, and reactive to light.       Cardiovascular:      Rate and Rhythm: Normal rate and regular rhythm.   Pulmonary:      Effort: Pulmonary effort is normal. No respiratory distress.      Breath sounds: Normal breath sounds. No wheezing.   Abdominal:      General: Bowel sounds are normal.      Palpations: Abdomen is soft.      Tenderness: There is no abdominal tenderness.     Musculoskeletal:         General: No tenderness. Normal range of motion.      Cervical back: Normal range of motion and neck supple.      Right lower leg: No edema.      Left lower leg: No edema.     Skin:     General: Skin is warm and dry.     Neurological:      Mental Status: She is alert and oriented to person, place, and time.     Psychiatric:         Mood and Affect: Mood normal.         Behavior: Behavior normal.         Thought Content: Thought content normal.         Judgment: Judgment normal.

## 2025-06-06 NOTE — ASSESSMENT & PLAN NOTE
Orders:  •  rizatriptan (MAXALT) 10 mg tablet; Take 1 tablet (10 mg total) by mouth as needed for migraine Take at the onset of migraine; if symptoms continue or return, may take another dose at least 2 hours after first dose. Take no more than 2 doses in a day.

## 2025-06-10 ENCOUNTER — TELEPHONE (OUTPATIENT)
Dept: LAB | Facility: HOSPITAL | Age: 65
End: 2025-06-10

## 2025-06-18 PROBLEM — E11.40 TYPE 2 DIABETES MELLITUS WITH DIABETIC NEUROPATHY, WITHOUT LONG-TERM CURRENT USE OF INSULIN (HCC): Status: RESOLVED | Noted: 2025-06-06 | Resolved: 2025-06-18

## 2025-06-27 LAB
ALBUMIN SERPL-MCNC: 3.7 G/DL (ref 3.5–5.7)
ALP SERPL-CCNC: 65 U/L (ref 35–120)
ALT SERPL-CCNC: 11 U/L
ANION GAP SERPL CALCULATED.3IONS-SCNC: 13 MMOL/L (ref 3–11)
AST SERPL-CCNC: 11 U/L
BASOPHILS # BLD AUTO: 0 THOU/CMM (ref 0–0.1)
BASOPHILS NFR BLD AUTO: 1 %
BILIRUB SERPL-MCNC: 0.5 MG/DL (ref 0.2–1)
BUN SERPL-MCNC: 8 MG/DL (ref 7–25)
CALCIUM SERPL-MCNC: 8.6 MG/DL (ref 8.5–10.5)
CHLORIDE SERPL-SCNC: 101 MMOL/L (ref 100–109)
CHOLEST SERPL-MCNC: 126 MG/DL
CHOLEST/HDLC SERPL: 2.3 {RATIO}
CO2 SERPL-SCNC: 26 MMOL/L (ref 21–31)
CREAT SERPL-MCNC: 0.52 MG/DL (ref 0.4–1.1)
CYTOLOGY CMNT CVX/VAG CYTO-IMP: ABNORMAL
DIFFERENTIAL METHOD BLD: ABNORMAL
EOSINOPHIL # BLD AUTO: 0.2 THOU/CMM (ref 0–0.5)
EOSINOPHIL NFR BLD AUTO: 3 %
ERYTHROCYTE [DISTWIDTH] IN BLOOD BY AUTOMATED COUNT: 13.5 % (ref 12–16)
GFR/BSA.PRED SERPLBLD CYS-BASED-ARV: 103 ML/MIN/{1.73_M2}
GLUCOSE SERPL-MCNC: 75 MG/DL (ref 65–99)
HCT VFR BLD AUTO: 40 % (ref 35–43)
HDLC SERPL-MCNC: 55 MG/DL (ref 23–92)
HGB BLD-MCNC: 13.4 G/DL (ref 11.5–14.5)
LDLC SERPL CALC-MCNC: 58 MG/DL
LYMPHOCYTES # BLD AUTO: 2 THOU/CMM (ref 1–3)
LYMPHOCYTES NFR BLD AUTO: 25 %
MCH RBC QN AUTO: 30.8 PG (ref 26–34)
MCHC RBC AUTO-ENTMCNC: 33.4 G/DL (ref 32–37)
MCV RBC AUTO: 92 FL (ref 80–100)
MONOCYTES # BLD AUTO: 0.6 THOU/CMM (ref 0.3–1)
MONOCYTES NFR BLD AUTO: 7 %
NEUTROPHILS # BLD AUTO: 5.2 THOU/CMM (ref 1.8–7.8)
NEUTROPHILS NFR BLD AUTO: 64 %
NONHDLC SERPL-MCNC: 71 MG/DL
PLATELET # BLD AUTO: 366 THOU/CMM (ref 140–350)
PMV BLD REES-ECKER: 9.5 FL (ref 7.5–11.3)
POTASSIUM SERPL-SCNC: 4 MMOL/L (ref 3.5–5.2)
PROT SERPL-MCNC: 6.5 G/DL (ref 6.3–8.3)
RBC # BLD AUTO: 4.34 MILL/CMM (ref 3.7–4.7)
SODIUM SERPL-SCNC: 140 MMOL/L (ref 135–145)
TRIGL SERPL-MCNC: 66 MG/DL
WBC # BLD AUTO: 8 THOU/CMM (ref 4–10)

## 2025-07-22 ENCOUNTER — TELEPHONE (OUTPATIENT)
Age: 65
End: 2025-07-22

## 2025-07-22 NOTE — TELEPHONE ENCOUNTER
"Joy from the [facility] office of the aging called stating she spoke with us previously about a fax she sent for the patient on 7/12/25. She was also given another fax number that would print in the office. When she faxed it to the second fax number she was given, she got an error message \"memory full.\" I do not see any notation of the call or that we have received the fax. She will fax it again to [phone]. I called  central faxing and left a message to watch for the fax and to send it to us as soon as possible.    "

## (undated) DEVICE — INTENDED FOR TISSUE SEPARATION, AND OTHER PROCEDURES THAT REQUIRE A SHARP SURGICAL BLADE TO PUNCTURE OR CUT.: Brand: BARD-PARKER SAFETY BLADES SIZE 15, STERILE

## (undated) DEVICE — CAST PADDING 6IN UNSTERILE

## (undated) DEVICE — ABDOMINAL PAD: Brand: DERMACEA

## (undated) DEVICE — SUT VICRYL 4-0 PS-2 27 IN J426H

## (undated) DEVICE — TAPE CAST 4IN FIBERGLASS 4YD WHITE

## (undated) DEVICE — NEEDLE 23G X 1 1/2 SAFETY-GLIDE THIN WALL

## (undated) DEVICE — SPONGE SCRUB 4 PCT CHLORHEXIDINE

## (undated) DEVICE — Device

## (undated) DEVICE — KERLIX BANDAGE ROLL: Brand: KERLIX

## (undated) DEVICE — OCCLUSIVE GAUZE STRIP,3% BISMUTH TRIBROMOPHENATE IN PETROLATUM BLEND: Brand: XEROFORM

## (undated) DEVICE — GLOVE SRG BIOGEL 7.5

## (undated) DEVICE — ACE WRAP 6 IN UNSTERILE

## (undated) DEVICE — NEPTUNE E-SEP SMOKE EVACUATION PENCIL, COATED, 70MM BLADE, PUSH BUTTON SWITCH: Brand: NEPTUNE E-SEP

## (undated) DEVICE — GLOVE SRG BIOGEL 8

## (undated) DEVICE — COBAN 4 IN STERILE

## (undated) DEVICE — SUT ETHILON 3-0 PS-1 18 IN 1663H

## (undated) DEVICE — PAD GROUNDING DUAL ADULT

## (undated) DEVICE — CHLORAPREP HI-LITE 26ML ORANGE

## (undated) DEVICE — HIGH PERFORMANCE BONE DRILL

## (undated) DEVICE — GAUZE SPONGES,16 PLY: Brand: CURITY

## (undated) DEVICE — 3M™ DURAPORE™ SURGICAL TAPE 1538-1, 1 INCH X 10 YARD (2,5CM X 9,1M), 12 ROLLS/BOX: Brand: 3M™ DURAPORE™

## (undated) DEVICE — GLOVE INDICATOR PI UNDERGLOVE SZ 8 BLUE

## (undated) DEVICE — DRAPE C-ARM X-RAY

## (undated) DEVICE — CUFF TOURNIQUET 30 X 4 IN QUICK CONNECT DISP 1BLA

## (undated) DEVICE — ADJUSTMENT INSTRUMENT LRF: Brand: HOFFMANN

## (undated) DEVICE — CAST PADDING 6 IN STERILE

## (undated) DEVICE — DRAPE SHEET THREE QUARTER

## (undated) DEVICE — BANDAGE, ESMARK LF STR 6"X9' (20/CS): Brand: CYPRESS

## (undated) DEVICE — PADDING CAST 4 IN  COTTON STRL

## (undated) DEVICE — BRUSH EZ SCRUB PCMX W/NAIL CLEANER

## (undated) DEVICE — BETHLEHEM UNIV MAJ EXT ,KIT: Brand: CARDINAL HEALTH

## (undated) DEVICE — INTENDED FOR TISSUE SEPARATION, AND OTHER PROCEDURES THAT REQUIRE A SHARP SURGICAL BLADE TO PUNCTURE OR CUT.: Brand: BARD-PARKER ® CARBON RIB-BACK BLADES